# Patient Record
Sex: MALE | Race: WHITE | HISPANIC OR LATINO | Employment: OTHER | URBAN - METROPOLITAN AREA
[De-identification: names, ages, dates, MRNs, and addresses within clinical notes are randomized per-mention and may not be internally consistent; named-entity substitution may affect disease eponyms.]

---

## 2017-05-25 ENCOUNTER — HOSPITAL ENCOUNTER (OUTPATIENT)
Dept: RADIOLOGY | Facility: HOSPITAL | Age: 67
Discharge: HOME | End: 2017-05-25
Payer: COMMERCIAL

## 2017-05-25 ENCOUNTER — ALLSCRIPTS OFFICE VISIT (OUTPATIENT)
Dept: OTHER | Facility: OTHER | Age: 67
End: 2017-05-25

## 2017-05-25 ENCOUNTER — TRANSCRIBE ORDERS (OUTPATIENT)
Dept: ADMINISTRATIVE | Facility: HOSPITAL | Age: 67
End: 2017-05-25

## 2017-05-25 DIAGNOSIS — J20.9 ACUTE BRONCHITIS: ICD-10-CM

## 2017-05-25 PROCEDURE — 71020 HB CHEST X-RAY 2VW FRONTAL&LATL: CPT

## 2017-06-02 ENCOUNTER — ALLSCRIPTS OFFICE VISIT (OUTPATIENT)
Dept: OTHER | Facility: OTHER | Age: 67
End: 2017-06-02

## 2018-01-13 VITALS
SYSTOLIC BLOOD PRESSURE: 128 MMHG | WEIGHT: 143 LBS | RESPIRATION RATE: 17 BRPM | TEMPERATURE: 96.4 F | BODY MASS INDEX: 26.31 KG/M2 | HEIGHT: 62 IN | HEART RATE: 88 BPM | DIASTOLIC BLOOD PRESSURE: 84 MMHG

## 2018-01-13 NOTE — MISCELLANEOUS
Provider Comments  Provider Comments:   CALLED PT REGARDING MISSED APPT, PT R/S FOR 11/1 /AT        Signatures   Electronically signed by : Med Cedeño DO; Oct 27 2016  5:24PM EST                       (Author)

## 2018-01-14 VITALS
OXYGEN SATURATION: 97 % | BODY MASS INDEX: 26.42 KG/M2 | SYSTOLIC BLOOD PRESSURE: 120 MMHG | HEIGHT: 62 IN | TEMPERATURE: 95 F | HEART RATE: 84 BPM | RESPIRATION RATE: 18 BRPM | WEIGHT: 143.56 LBS | DIASTOLIC BLOOD PRESSURE: 70 MMHG

## 2018-01-16 NOTE — MISCELLANEOUS
Message  MRI we had ordered hadn't gone through with insurance 1-2 months ago, but pt had followed with Ortho, Called pt to ask him if he has started PT as per Ortho recs, and if he is still following with Ortho, per him Ortho doesn't want any imaging at this time - I advised him that any imaging he needs can be ordered by [facility] as indicated as they are managing his knee at this time   Pt agreed to the plan and expressed understanding, reports his knee is doing much better with the PT, and he will be following up with Ortho next month      Signatures   Electronically signed by : Lili Ellis DO; Dec 28 2016  9:05PM EST                       (Author)

## 2019-01-17 ENCOUNTER — OFFICE VISIT (OUTPATIENT)
Dept: FAMILY MEDICINE CLINIC | Facility: CLINIC | Age: 69
End: 2019-01-17
Payer: COMMERCIAL

## 2019-01-17 VITALS
BODY MASS INDEX: 26.76 KG/M2 | SYSTOLIC BLOOD PRESSURE: 118 MMHG | HEART RATE: 82 BPM | RESPIRATION RATE: 16 BRPM | DIASTOLIC BLOOD PRESSURE: 70 MMHG | TEMPERATURE: 97.8 F | WEIGHT: 145.4 LBS | HEIGHT: 62 IN

## 2019-01-17 DIAGNOSIS — G47.00 INSOMNIA, UNSPECIFIED TYPE: Primary | ICD-10-CM

## 2019-01-17 DIAGNOSIS — Z13.1 SCREENING FOR DIABETES MELLITUS (DM): ICD-10-CM

## 2019-01-17 DIAGNOSIS — G25.81 RESTLESS LEGS SYNDROME: ICD-10-CM

## 2019-01-17 DIAGNOSIS — Z13.220 SCREENING CHOLESTEROL LEVEL: ICD-10-CM

## 2019-01-17 PROCEDURE — 99203 OFFICE O/P NEW LOW 30 MIN: CPT | Performed by: FAMILY MEDICINE

## 2019-01-17 RX ORDER — GABAPENTIN 100 MG/1
CAPSULE ORAL
Qty: 30 CAPSULE | Refills: 0 | Status: SHIPPED | OUTPATIENT
Start: 2019-01-17 | End: 2019-01-31 | Stop reason: SDUPTHER

## 2019-01-17 RX ORDER — HYDROXYZINE PAMOATE 25 MG/1
25 CAPSULE ORAL
Qty: 30 CAPSULE | Refills: 0 | Status: SHIPPED | OUTPATIENT
Start: 2019-01-17 | End: 2019-02-06

## 2019-01-17 NOTE — PROGRESS NOTES
Gustavo Roth 1950 male MRN: [id number]    [address] [facility]  Follow-up Visit    ASSESSMENT/PLAN  Gustavo Roth is a 79 y o  male with significant PmhX of , peripheral neuropathy/restless leg syndrome, sleep apnea, colon polyps presents to the office for     Diagnoses and all orders for this visit:    Insomnia, unspecified type  -     CBC and differential; Future  -     hydrOXYzine pamoate (VISTARIL) 25 mg capsule; Take 1 capsule (25 mg total) by mouth daily at bedtime as needed for itching    Screening cholesterol level  -     Lipid panel; Future    Screening for diabetes mellitus (DM)  -     Comprehensive metabolic panel; Future  -     Hemoglobin A1C; Future    Restless legs syndrome  -     CBC and differential; Future  -     Magnesium; Future  -     gabapentin (NEURONTIN) 100 mg capsule; Take 1 tablet x 3 days, then 2 tablets x 3 days, then 3 tablets x 3days      Patient is here to establish  He was seen last night with multiple concerns  At that time I advised him let us address 2 topics and bring him back for full physical and workup  Screening labs were sent to discuss at her next visit  Insomnia patient was started on Vistaril 25 mg at bedtime if his symptoms do not improve with 1 tablet he is to increase to 2 tablets  Restless leg syndrome with a mixed component of peripheral neuropathy; will start the patient on gabapentin to taper up to 300 mg at bedtime  Will discuss about starting a dopamine medication if the restless leg worsens      Disposition: Return to the clinic in 2 weeks; would like the flu vaccine our next appointment        No future appointments  SUBJECTIVE  CC: Physical Exam      HPI:  Gustavo Roth is a 79 y o  male with significant pmhx of  peripheral neuropathy/restless leg syndrome, sleep apnea, colon polyps presenting to the office to establish care  Patient has not been seen by his PCP in over 2 years    Patient states that he was supposed to be seen today for physical but has acute concerns  Patient states that he has multiple joint pains from his work  However his biggest concerns are insomnia as well as his restless legs syndrome  He states that he has tried multiple medications over-the-counter to try to help with his sleeping and has not been successful  Does not recall what his PCP put him on almost 2 years ago but it was ineffective  Patient states that his restless leg is more like pins and needles over his calves especially at nighttime  Denies any history of diabetes, hypertension, cholesterol concerns  Patient currently has no shortness of breath or chest pain at this time  He would like the flu vaccine at her next appointment  Review of Systems   Constitutional: Negative for activity change, appetite change, chills, fatigue and fever  HENT: Negative for congestion  Eyes: Negative for visual disturbance  Respiratory: Negative for cough, chest tightness and shortness of breath  Cardiovascular: Negative for chest pain and leg swelling  Gastrointestinal: Negative for abdominal distention, abdominal pain, constipation, diarrhea, nausea and vomiting  Musculoskeletal: Positive for arthralgias and back pain  Allergic/Immunologic: Negative for environmental allergies  Neurological: Negative for dizziness, light-headedness and headaches  Psychiatric/Behavioral: Positive for sleep disturbance  All other systems reviewed and are negative  Historical Information   The patient history was reviewed as follows:    Past Medical History:   Diagnosis Date    Arthritis     shoulder     Past Surgical History:   Procedure Laterality Date    COLONOSCOPY N/A 12/12/2016    Procedure: COLONOSCOPY;  Surgeon: Adam Sorensen MD;  Location: [facility] GI LAB;   Service:     ELBOW SURGERY Bilateral     HERNIA REPAIR Right 2001    inguinal    KNEE ARTHROSCOPY Right     ROTATOR CUFF REPAIR Bilateral 2011     Family History Problem Relation Age of Onset    No Known Problems Mother     No Known Problems Father       Social History   History   Alcohol Use No     History   Drug Use No     History   Smoking Status    Never Smoker   Smokeless Tobacco    Never Used       Medications:     Current Outpatient Prescriptions:     ibuprofen (MOTRIN) 200 mg tablet, Take 200 mg by mouth every 6 (six) hours as needed for mild pain, Disp: , Rfl:   No Known Allergies    OBJECTIVE    Vitals:   Vitals:    01/17/19 1802   BP: 118/70   BP Location: Left arm   Patient Position: Sitting   Cuff Size: Standard   Pulse: 82   Resp: 16   Temp: 97 8 °F (36 6 °C)   Weight: 66 kg (145 lb 6 4 oz)   Height: 5' 2" (1 575 m)           Physical Exam   Constitutional: He is oriented to person, place, and time  Vital signs are normal  He appears well-developed and well-nourished  HENT:   Head: Normocephalic and atraumatic  Right Ear: Hearing normal    Left Ear: Hearing normal    Eyes: Pupils are equal, round, and reactive to light  Conjunctivae and EOM are normal    Neck: Normal range of motion  Neck supple  Cardiovascular: Normal rate, regular rhythm, S1 normal, S2 normal, normal heart sounds and intact distal pulses  No murmur heard  Pulmonary/Chest: Effort normal and breath sounds normal  No respiratory distress  He has no wheezes  Abdominal: Soft  Bowel sounds are normal  He exhibits no distension  There is no tenderness  Musculoskeletal: Normal range of motion  He exhibits no edema, tenderness or deformity  No abnormalities seen   Neurological: He is alert and oriented to person, place, and time  He has normal strength  Skin: Skin is warm  No rash noted  Psychiatric: He has a normal mood and affect  His speech is normal and behavior is normal  Judgment and thought content normal    Vitals reviewed           Fifi Daigle MD  [facility] [address]

## 2019-01-22 LAB
ALBUMIN SERPL-MCNC: 4.4 G/DL (ref 3.6–4.8)
ALBUMIN/GLOB SERPL: 1.9 {RATIO} (ref 1.2–2.2)
ALP SERPL-CCNC: 86 IU/L (ref 39–117)
ALT SERPL-CCNC: 17 IU/L (ref 0–44)
AST SERPL-CCNC: 20 IU/L (ref 0–40)
BASOPHILS # BLD AUTO: 0 X10E3/UL (ref 0–0.2)
BASOPHILS NFR BLD AUTO: 0 %
BILIRUB SERPL-MCNC: 0.4 MG/DL (ref 0–1.2)
BUN SERPL-MCNC: 11 MG/DL (ref 8–27)
BUN/CREAT SERPL: 14 (ref 10–24)
CALCIUM SERPL-MCNC: 8.7 MG/DL (ref 8.6–10.2)
CHLORIDE SERPL-SCNC: 106 MMOL/L (ref 96–106)
CHOLEST SERPL-MCNC: 170 MG/DL (ref 100–199)
CO2 SERPL-SCNC: 23 MMOL/L (ref 20–29)
CREAT SERPL-MCNC: 0.77 MG/DL (ref 0.76–1.27)
EOSINOPHIL # BLD AUTO: 0.1 X10E3/UL (ref 0–0.4)
EOSINOPHIL NFR BLD AUTO: 1 %
ERYTHROCYTE [DISTWIDTH] IN BLOOD BY AUTOMATED COUNT: 12.7 % (ref 12.3–15.4)
GLOBULIN SER-MCNC: 2.3 G/DL (ref 1.5–4.5)
GLUCOSE SERPL-MCNC: 102 MG/DL (ref 65–99)
HBA1C MFR BLD: 5.3 % (ref 4.8–5.6)
HCT VFR BLD AUTO: 45.4 % (ref 37.5–51)
HDLC SERPL-MCNC: 46 MG/DL
HGB BLD-MCNC: 15.9 G/DL (ref 13–17.7)
IMM GRANULOCYTES # BLD: 0 X10E3/UL (ref 0–0.1)
IMM GRANULOCYTES NFR BLD: 0 %
LABCORP COMMENT: NORMAL
LDLC SERPL CALC-MCNC: 108 MG/DL (ref 0–99)
LYMPHOCYTES # BLD AUTO: 2.4 X10E3/UL (ref 0.7–3.1)
LYMPHOCYTES NFR BLD AUTO: 44 %
MAGNESIUM SERPL-MCNC: 2 MG/DL (ref 1.6–2.3)
MCH RBC QN AUTO: 32.3 PG (ref 26.6–33)
MCHC RBC AUTO-ENTMCNC: 35 G/DL (ref 31.5–35.7)
MCV RBC AUTO: 92 FL (ref 79–97)
MICRODELETION SYND BLD/T FISH: NORMAL
MONOCYTES # BLD AUTO: 0.5 X10E3/UL (ref 0.1–0.9)
MONOCYTES NFR BLD AUTO: 9 %
NEUTROPHILS # BLD AUTO: 2.4 X10E3/UL (ref 1.4–7)
NEUTROPHILS NFR BLD AUTO: 46 %
PLATELET # BLD AUTO: 223 X10E3/UL (ref 150–379)
POTASSIUM SERPL-SCNC: 3.8 MMOL/L (ref 3.5–5.2)
PROT SERPL-MCNC: 6.7 G/DL (ref 6–8.5)
RBC # BLD AUTO: 4.93 X10E6/UL (ref 4.14–5.8)
SL AMB EGFR AFRICAN AMERICAN: 109 ML/MIN/1.73
SL AMB EGFR NON AFRICAN AMERICAN: 95 ML/MIN/1.73
SL AMB VLDL CHOLESTEROL CALC: 16 MG/DL (ref 5–40)
SODIUM SERPL-SCNC: 143 MMOL/L (ref 134–144)
TRIGL SERPL-MCNC: 82 MG/DL (ref 0–149)
WBC # BLD AUTO: 5.4 X10E3/UL (ref 3.4–10.8)

## 2019-01-31 ENCOUNTER — OFFICE VISIT (OUTPATIENT)
Dept: FAMILY MEDICINE CLINIC | Facility: CLINIC | Age: 69
End: 2019-01-31
Payer: COMMERCIAL

## 2019-01-31 VITALS
BODY MASS INDEX: 27.05 KG/M2 | HEART RATE: 84 BPM | SYSTOLIC BLOOD PRESSURE: 118 MMHG | WEIGHT: 147 LBS | TEMPERATURE: 98.7 F | RESPIRATION RATE: 16 BRPM | DIASTOLIC BLOOD PRESSURE: 68 MMHG | HEIGHT: 62 IN

## 2019-01-31 DIAGNOSIS — Z23 ENCOUNTER FOR IMMUNIZATION: ICD-10-CM

## 2019-01-31 DIAGNOSIS — G25.81 RESTLESS LEGS SYNDROME: Primary | ICD-10-CM

## 2019-01-31 PROCEDURE — 99213 OFFICE O/P EST LOW 20 MIN: CPT | Performed by: FAMILY MEDICINE

## 2019-01-31 RX ORDER — GABAPENTIN 300 MG/1
300 CAPSULE ORAL
Qty: 90 CAPSULE | Refills: 0 | Status: SHIPPED | OUTPATIENT
Start: 2019-01-31 | End: 2019-03-12 | Stop reason: SDUPTHER

## 2019-01-31 NOTE — PROGRESS NOTES
Leopold Raveling 1950 male MRN: [id number]    [address] [facility]  Follow-up Visit    ASSESSMENT/PLAN  Leopold Raveling is a 79 y o  male presents to the office for     1  Restless legs syndrome  Improvement with gabapentin  Increase to 300mg at bedtime Unable to tolerate vistaril given drowniess next day  W  - gabapentin (NEURONTIN) 300 mg capsule; Take 1 capsule (300 mg total) by mouth daily at bedtime Take 1 tablet at bedtime  Dispense: 90 capsule; Refill: 0    2  Encounter for immunization    - PREFERRED: influenza vaccine, [id number], high-dose, PF 0 5 mL, for patients 65 yr+ (FLUZONE HIGH-DOSE)    Reviewed screening labs  Repeat in 1 year    Disposition: Return to the clinic in 2 months per patient request    Health Maintence:  -Counseling on Weight loss: Encourage 30 minutes of accumulated moderate-intensity physical activity on 5 or more days per week  - Eat Healthy diet      No future appointments  SUBJECTIVE  CC: Follow-up (lab reults and Flu vaccine)      HPI:  Leopold Raveling is a 79 y o  male presenting to the office for a follow up on Lab reviews, and nex Rx  States that gabapentin has decreased his restless leg attacks  taking 200mg  However insomnia has improved but not 100% Has taken hydroxyzine but too drowsy on the morning  Admit to poor diet and exercise  No other concerns    Review of Systems   Constitutional: Negative for activity change, appetite change, chills, fatigue and fever  HENT: Negative for congestion  Eyes: Negative for visual disturbance  Respiratory: Negative for cough, chest tightness and shortness of breath  Cardiovascular: Negative for chest pain and leg swelling  Gastrointestinal: Negative for abdominal distention, abdominal pain, constipation, diarrhea, nausea and vomiting  Musculoskeletal:        Restless leg cramps   Allergic/Immunologic: Negative for environmental allergies     Neurological: Negative for dizziness, light-headedness and headaches  Psychiatric/Behavioral: Positive for sleep disturbance  All other systems reviewed and are negative  Historical Information   The patient history was reviewed as follows:    Past Medical History:   Diagnosis Date    Arthritis     shoulder     Past Surgical History:   Procedure Laterality Date    COLONOSCOPY N/A 12/12/2016    Procedure: COLONOSCOPY;  Surgeon: Graig Spurling, MD;  Location: [facility]; Service:     ELBOW SURGERY Bilateral     HERNIA REPAIR Right 2001    inguinal    KNEE ARTHROSCOPY Right     ROTATOR CUFF REPAIR Bilateral 2011     Family History   Problem Relation Age of Onset    No Known Problems Mother     No Known Problems Father       Social History   History   Alcohol Use No     History   Drug Use No     History   Smoking Status    Never Smoker   Smokeless Tobacco    Never Used       Medications:     Current Outpatient Prescriptions:     gabapentin (NEURONTIN) 300 mg capsule, Take 1 capsule (300 mg total) by mouth daily at bedtime Take 1 tablet at bedtime, Disp: 90 capsule, Rfl: 0    hydrOXYzine pamoate (VISTARIL) 25 mg capsule, Take 1 capsule (25 mg total) by mouth daily at bedtime as needed for itching, Disp: 30 capsule, Rfl: 0    ibuprofen (MOTRIN) 200 mg tablet, Take 200 mg by mouth every 6 (six) hours as needed for mild pain, Disp: , Rfl:   No Known Allergies    OBJECTIVE    Vitals:   Vitals:    01/31/19 1759   BP: 118/68   BP Location: Left arm   Patient Position: Sitting   Cuff Size: Standard   Pulse: 84   Resp: 16   Temp: 98 7 °F (37 1 °C)   Weight: 66 7 kg (147 lb)   Height: 5' 2" (1 575 m)           Physical Exam   Constitutional: He is oriented to person, place, and time  Vital signs are normal  He appears well-developed and well-nourished  HENT:   Head: Normocephalic and atraumatic     Right Ear: Tympanic membrane, external ear and ear canal normal    Left Ear: Tympanic membrane, external ear and ear canal normal    Nose: Nose normal    Mouth/Throat: Oropharynx is clear and moist    Eyes: Pupils are equal, round, and reactive to light  Conjunctivae and EOM are normal    Neck: Normal range of motion  Neck supple  Cardiovascular: Normal rate, regular rhythm, S1 normal, S2 normal, normal heart sounds and intact distal pulses  No murmur heard  Pulmonary/Chest: Effort normal and breath sounds normal  No respiratory distress  He has no wheezes  Musculoskeletal: Normal range of motion  He exhibits no edema  Neurological: He is alert and oriented to person, place, and time  He has normal strength  Skin: Skin is warm  Psychiatric: He has a normal mood and affect  His speech is normal and behavior is normal  Judgment and thought content normal    Vitals reviewed           Kaylene Munguia MD  [facility] 2939

## 2019-02-04 PROCEDURE — 90662 IIV NO PRSV INCREASED AG IM: CPT | Performed by: FAMILY MEDICINE

## 2019-02-04 PROCEDURE — 90471 IMMUNIZATION ADMIN: CPT | Performed by: FAMILY MEDICINE

## 2019-02-04 NOTE — TELEPHONE ENCOUNTER
Patient Info :  Anson Andre  1950    I called and left a v/m for patient to call back to inform us of his [employer]  Our call was dropped while I was filling out patients Reg      I left message asking to also bring his claim info with him at his visit in case of not being able to call back    Patients next appt is 2/6 at 2:45pm

## 2019-02-04 NOTE — TELEPHONE ENCOUNTER
Patient returned call stated he didn't have the claim information handy he was working  Patient will be be bringing everything to his appointment on Wednesday

## 2019-02-06 ENCOUNTER — OFFICE VISIT (OUTPATIENT)
Dept: OBGYN CLINIC | Facility: CLINIC | Age: 69
End: 2019-02-06
Payer: COMMERCIAL

## 2019-02-06 VITALS
HEART RATE: 82 BPM | BODY MASS INDEX: 27.2 KG/M2 | SYSTOLIC BLOOD PRESSURE: 117 MMHG | HEIGHT: 62 IN | DIASTOLIC BLOOD PRESSURE: 68 MMHG | WEIGHT: 147.8 LBS

## 2019-02-06 DIAGNOSIS — I73.9 PERIPHERAL ARTERY DISEASE (HCC): ICD-10-CM

## 2019-02-06 DIAGNOSIS — G25.81 RESTLESS LEGS SYNDROME: ICD-10-CM

## 2019-02-06 DIAGNOSIS — S43.52XA ACROMIOCLAVICULAR SPRAIN, LEFT, INITIAL ENCOUNTER: ICD-10-CM

## 2019-02-06 DIAGNOSIS — S50.01XA CONTUSION OF RIGHT ELBOW, INITIAL ENCOUNTER: ICD-10-CM

## 2019-02-06 DIAGNOSIS — S13.4XXA WHIPLASH INJURY TO NECK, INITIAL ENCOUNTER: Primary | ICD-10-CM

## 2019-02-06 DIAGNOSIS — G47.33 OBSTRUCTIVE SLEEP APNEA OF ADULT: ICD-10-CM

## 2019-02-06 DIAGNOSIS — S80.02XA CONTUSION OF LEFT KNEE, INITIAL ENCOUNTER: ICD-10-CM

## 2019-02-06 DIAGNOSIS — S50.02XA CONTUSION OF LEFT ELBOW, INITIAL ENCOUNTER: ICD-10-CM

## 2019-02-06 PROCEDURE — 99214 OFFICE O/P EST MOD 30 MIN: CPT | Performed by: ORTHOPAEDIC SURGERY

## 2019-02-06 RX ORDER — IBUPROFEN 800 MG/1
800 TABLET ORAL 3 TIMES DAILY
COMMUNITY
Start: 2019-02-01 | End: 2019-03-18 | Stop reason: ALTCHOICE

## 2019-02-06 RX ORDER — CYCLOBENZAPRINE HCL 10 MG
10 TABLET ORAL
COMMUNITY
Start: 2019-02-01 | End: 2019-03-18 | Stop reason: ALTCHOICE

## 2019-02-06 NOTE — LETTER
February 6, 2019     Patient: Prasanna Mendes   YOB: 1950   Date of Visit: 2/6/2019       To Whom it May Concern:    Nova Moise is under my professional care  He was seen in my office on 2/6/2019  He should remain out of work until further notice  He will be re-evaluated in 4 weeks  If you have any questions or concerns, please don't hesitate to call           Sincerely,          River Morris MD        CC: No Recipients
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
## 2019-02-14 NOTE — TELEPHONE ENCOUNTER
Caller: Patient  C/B # [phone]  Dr Amanda Brought    Patient only speaks German -- He called in checking on the status of his paperwork  He states he dropped it off on Monday and apparently needs this paperwork submitted by tomorrow  Do we have any info on this?   Thanks

## 2019-02-25 ENCOUNTER — TELEPHONE (OUTPATIENT)
Dept: OBGYN CLINIC | Facility: CLINIC | Age: 69
End: 2019-02-25

## 2019-03-05 ENCOUNTER — HOSPITAL ENCOUNTER (EMERGENCY)
Facility: HOSPITAL | Age: 69
Discharge: HOME | End: 2019-03-06
Attending: EMERGENCY MEDICINE
Payer: COMMERCIAL

## 2019-03-05 ENCOUNTER — APPOINTMENT (EMERGENCY)
Dept: RADIOLOGY | Facility: HOSPITAL | Age: 69
End: 2019-03-05
Payer: COMMERCIAL

## 2019-03-05 DIAGNOSIS — R11.0 NAUSEA: ICD-10-CM

## 2019-03-05 DIAGNOSIS — R19.7 DIARRHEA: Primary | ICD-10-CM

## 2019-03-05 LAB
ALBUMIN SERPL BCP-MCNC: 3.7 G/DL (ref 3.5–5)
ALP SERPL-CCNC: 95 U/L (ref 46–116)
ALT SERPL W P-5'-P-CCNC: 19 U/L (ref 12–78)
ANION GAP SERPL CALCULATED.3IONS-SCNC: 11 MMOL/L (ref 4–13)
AST SERPL W P-5'-P-CCNC: 18 U/L (ref 5–45)
BACTERIA UR QL AUTO: ABNORMAL /HPF
BASOPHILS # BLD AUTO: 0.01 THOUSANDS/ΜL (ref 0–0.1)
BASOPHILS NFR BLD AUTO: 0 % (ref 0–1)
BILIRUB SERPL-MCNC: 0.7 MG/DL (ref 0.2–1)
BILIRUB UR QL STRIP: NEGATIVE
BUN SERPL-MCNC: 14 MG/DL (ref 5–25)
CALCIUM SERPL-MCNC: 8.4 MG/DL (ref 8.3–10.1)
CHLORIDE SERPL-SCNC: 105 MMOL/L (ref 100–108)
CLARITY UR: CLEAR
CO2 SERPL-SCNC: 23 MMOL/L (ref 21–32)
COLOR UR: YELLOW
CREAT SERPL-MCNC: 0.88 MG/DL (ref 0.6–1.3)
EOSINOPHIL # BLD AUTO: 0.04 THOUSAND/ΜL (ref 0–0.61)
EOSINOPHIL NFR BLD AUTO: 0 % (ref 0–6)
ERYTHROCYTE [DISTWIDTH] IN BLOOD BY AUTOMATED COUNT: 11.9 % (ref 11.6–15.1)
GFR SERPL CREATININE-BSD FRML MDRD: 90 ML/MIN/1.73SQ M
GLUCOSE SERPL-MCNC: 158 MG/DL (ref 65–140)
GLUCOSE UR STRIP-MCNC: NEGATIVE MG/DL
HCT VFR BLD AUTO: 49 % (ref 36.5–49.3)
HGB BLD-MCNC: 16.2 G/DL (ref 12–17)
HGB UR QL STRIP.AUTO: ABNORMAL
IMM GRANULOCYTES # BLD AUTO: 0.02 THOUSAND/UL (ref 0–0.2)
IMM GRANULOCYTES NFR BLD AUTO: 0 % (ref 0–2)
KETONES UR STRIP-MCNC: NEGATIVE MG/DL
LEUKOCYTE ESTERASE UR QL STRIP: NEGATIVE
LIPASE SERPL-CCNC: 172 U/L (ref 73–393)
LYMPHOCYTES # BLD AUTO: 0.63 THOUSANDS/ΜL (ref 0.6–4.47)
LYMPHOCYTES NFR BLD AUTO: 7 % (ref 14–44)
MCH RBC QN AUTO: 31 PG (ref 26.8–34.3)
MCHC RBC AUTO-ENTMCNC: 33.1 G/DL (ref 31.4–37.4)
MCV RBC AUTO: 94 FL (ref 82–98)
MONOCYTES # BLD AUTO: 0.61 THOUSAND/ΜL (ref 0.17–1.22)
MONOCYTES NFR BLD AUTO: 7 % (ref 4–12)
NEUTROPHILS # BLD AUTO: 7.77 THOUSANDS/ΜL (ref 1.85–7.62)
NEUTS SEG NFR BLD AUTO: 86 % (ref 43–75)
NITRITE UR QL STRIP: NEGATIVE
NON-SQ EPI CELLS URNS QL MICRO: ABNORMAL /HPF
NRBC BLD AUTO-RTO: 0 /100 WBCS
PH UR STRIP.AUTO: 6.5 [PH]
PLATELET # BLD AUTO: 215 THOUSANDS/UL (ref 149–390)
PMV BLD AUTO: 9.2 FL (ref 8.9–12.7)
POTASSIUM SERPL-SCNC: 3.5 MMOL/L (ref 3.5–5.3)
PROT SERPL-MCNC: 7.3 G/DL (ref 6.4–8.2)
PROT UR STRIP-MCNC: NEGATIVE MG/DL
RBC # BLD AUTO: 5.22 MILLION/UL (ref 3.88–5.62)
RBC #/AREA URNS AUTO: ABNORMAL /HPF
SODIUM SERPL-SCNC: 139 MMOL/L (ref 136–145)
SP GR UR STRIP.AUTO: 1.01 (ref 1–1.03)
TROPONIN I SERPL-MCNC: <0.02 NG/ML
UROBILINOGEN UR QL STRIP.AUTO: 0.2 E.U./DL
WBC # BLD AUTO: 9.08 THOUSAND/UL (ref 4.31–10.16)
WBC #/AREA URNS AUTO: ABNORMAL /HPF

## 2019-03-05 PROCEDURE — 71046 X-RAY EXAM CHEST 2 VIEWS: CPT

## 2019-03-05 PROCEDURE — 93005 ELECTROCARDIOGRAM TRACING: CPT

## 2019-03-05 PROCEDURE — 96375 TX/PRO/DX INJ NEW DRUG ADDON: CPT

## 2019-03-05 PROCEDURE — 74177 CT ABD & PELVIS W/CONTRAST: CPT

## 2019-03-05 PROCEDURE — 80053 COMPREHEN METABOLIC PANEL: CPT | Performed by: EMERGENCY MEDICINE

## 2019-03-05 PROCEDURE — 81001 URINALYSIS AUTO W/SCOPE: CPT | Performed by: EMERGENCY MEDICINE

## 2019-03-05 PROCEDURE — 99285 EMERGENCY DEPT VISIT HI MDM: CPT

## 2019-03-05 PROCEDURE — 96374 THER/PROPH/DIAG INJ IV PUSH: CPT

## 2019-03-05 PROCEDURE — 84484 ASSAY OF TROPONIN QUANT: CPT | Performed by: EMERGENCY MEDICINE

## 2019-03-05 PROCEDURE — 96361 HYDRATE IV INFUSION ADD-ON: CPT

## 2019-03-05 PROCEDURE — 36415 COLL VENOUS BLD VENIPUNCTURE: CPT | Performed by: EMERGENCY MEDICINE

## 2019-03-05 PROCEDURE — 83690 ASSAY OF LIPASE: CPT | Performed by: EMERGENCY MEDICINE

## 2019-03-05 PROCEDURE — 85025 COMPLETE CBC W/AUTO DIFF WBC: CPT | Performed by: EMERGENCY MEDICINE

## 2019-03-05 RX ORDER — ONDANSETRON 2 MG/ML
4 INJECTION INTRAMUSCULAR; INTRAVENOUS ONCE
Status: COMPLETED | OUTPATIENT
Start: 2019-03-05 | End: 2019-03-05

## 2019-03-05 RX ORDER — MORPHINE SULFATE 4 MG/ML
4 INJECTION, SOLUTION INTRAMUSCULAR; INTRAVENOUS ONCE
Status: COMPLETED | OUTPATIENT
Start: 2019-03-05 | End: 2019-03-05

## 2019-03-05 RX ADMIN — SODIUM CHLORIDE 1000 ML: 0.9 INJECTION, SOLUTION INTRAVENOUS at 21:50

## 2019-03-05 RX ADMIN — ONDANSETRON 4 MG: 2 INJECTION INTRAMUSCULAR; INTRAVENOUS at 21:52

## 2019-03-05 RX ADMIN — MORPHINE SULFATE 4 MG: 4 INJECTION INTRAVENOUS at 21:52

## 2019-03-05 RX ADMIN — IOHEXOL 100 ML: 350 INJECTION, SOLUTION INTRAVENOUS at 23:18

## 2019-03-06 ENCOUNTER — VBI (OUTPATIENT)
Dept: ADMINISTRATIVE | Facility: OTHER | Age: 69
End: 2019-03-06

## 2019-03-06 VITALS
HEART RATE: 80 BPM | TEMPERATURE: 96.5 F | DIASTOLIC BLOOD PRESSURE: 69 MMHG | SYSTOLIC BLOOD PRESSURE: 115 MMHG | OXYGEN SATURATION: 96 % | RESPIRATION RATE: 18 BRPM

## 2019-03-06 LAB
ATRIAL RATE: 0 BPM
ATRIAL RATE: 106 BPM
P AXIS: 19 DEGREES
PR INTERVAL: 164 MS
QRS AXIS: -66 DEGREES
QRS AXIS: 270 DEGREES
QRSD INTERVAL: 84 MS
QRSD INTERVAL: 98 MS
QT INTERVAL: 342 MS
QT INTERVAL: 80 MS
QTC INTERVAL: 146 MS
QTC INTERVAL: 454 MS
T WAVE AXIS: 0 DEGREES
T WAVE AXIS: 36 DEGREES
VENTRICULAR RATE: 106 BPM
VENTRICULAR RATE: 200 BPM

## 2019-03-06 PROCEDURE — 93010 ELECTROCARDIOGRAM REPORT: CPT | Performed by: INTERNAL MEDICINE

## 2019-03-06 RX ORDER — ONDANSETRON 4 MG/1
4 TABLET, FILM COATED ORAL EVERY 6 HOURS
Qty: 12 TABLET | Refills: 0 | Status: SHIPPED | OUTPATIENT
Start: 2019-03-06 | End: 2019-03-12

## 2019-03-06 RX ORDER — LOPERAMIDE HYDROCHLORIDE 2 MG/1
2 CAPSULE ORAL 4 TIMES DAILY PRN
Qty: 12 CAPSULE | Refills: 0 | Status: SHIPPED | OUTPATIENT
Start: 2019-03-06 | End: 2019-03-12

## 2019-03-06 NOTE — ED PROVIDER NOTES
History  Chief Complaint   Patient presents with    Abdominal Pain     pt presents to the ed with abdominal pain and distension for several days  at time of triage the pt left the traige room to emergently use the bathroom      HPI     79 year male that presents today with abdominal pain  Patient states since Sunday he had a barbecue and started having abdominal pain  Patient states feels abdominal distension along with diffuse pain he has been going to the bathroom often with diarrhea watery diarrhea  Mild nausea  Vomited yesterday  States no fevers but has been having chills  States today he had chest pain  No shortness of breath  Previous history of cholecystectomy  79 year female that presents with abdominal pain  Will do lab work symptomatic treatment reassess patient  Prior to Admission Medications   Prescriptions Last Dose Informant Patient Reported? Taking? cyclobenzaprine (FLEXERIL) 10 mg tablet   Yes No   Sig: Take 10 mg by mouth   gabapentin (NEURONTIN) 300 mg capsule   No No   Sig: Take 1 capsule (300 mg total) by mouth daily at bedtime Take 1 tablet at bedtime   ibuprofen (MOTRIN) 800 mg tablet   Yes No   Sig: Take 800 mg by mouth Three times a day      Facility-Administered Medications: None       Past Medical History:   Diagnosis Date    Arthritis     shoulder       Past Surgical History:   Procedure Laterality Date    COLONOSCOPY N/A 12/12/2016    Procedure: COLONOSCOPY;  Surgeon: Abbie Peralta MD;  Location: [facility];   Service:     ELBOW SURGERY Bilateral     HERNIA REPAIR Right 2001    inguinal    KNEE ARTHROSCOPY Right     ROTATOR CUFF REPAIR Bilateral 2011       Family History   Problem Relation Age of Onset    No Known Problems Mother     No Known Problems Father     No Known Problems Sister     No Known Problems Brother     No Known Problems Maternal Aunt     No Known Problems Maternal Uncle     No Known Problems Paternal Aunt     No Known Problems Paternal Uncle     No Known Problems Maternal Grandmother     No Known Problems Maternal Grandfather     No Known Problems Paternal Grandmother     No Known Problems Paternal Grandfather     ADD / ADHD Neg Hx     Anesthesia problems Neg Hx     Cancer Neg Hx     Clotting disorder Neg Hx     Collagen disease Neg Hx     Diabetes Neg Hx     Dislocations Neg Hx     Learning disabilities Neg Hx     Neurological problems Neg Hx     Osteoporosis Neg Hx     Rheumatologic disease Neg Hx     Scoliosis Neg Hx     Vascular Disease Neg Hx      I have reviewed and agree with the history as documented  Social History     Tobacco Use    Smoking status: Never Smoker    Smokeless tobacco: Never Used   Substance Use Topics    Alcohol use: No    Drug use: No        Review of Systems   Constitutional: Positive for chills  Negative for diaphoresis and fever  HENT: Negative  Respiratory: Negative  Negative for cough, shortness of breath and wheezing  Cardiovascular: Negative  Negative for chest pain, palpitations and leg swelling  Gastrointestinal: Positive for abdominal pain, nausea and vomiting  Negative for abdominal distention  Genitourinary: Negative  Musculoskeletal: Negative  Skin: Negative  Neurological: Negative  Psychiatric/Behavioral: Negative  All other systems reviewed and are negative  Physical Exam  Physical Exam   Constitutional: He is oriented to person, place, and time  He appears well-developed and well-nourished  Non-toxic appearance  He does not appear ill  No distress  HENT:   Head: Normocephalic and atraumatic  Nose: Nose normal    Mouth/Throat: Oropharynx is clear and moist    Eyes: Pupils are equal, round, and reactive to light  Conjunctivae and EOM are normal    Neck: Normal range of motion  Neck supple  Cardiovascular: Normal rate, regular rhythm and normal heart sounds  No murmur heard    Pulmonary/Chest: Effort normal and breath sounds normal  No respiratory distress  He has no wheezes  He has no rales  Abdominal: Soft  Bowel sounds are normal  He exhibits no distension  There is generalized tenderness  There is no rigidity, no rebound, no guarding, no CVA tenderness, no tenderness at McBurney's point and negative Michelle's sign  Musculoskeletal: Normal range of motion  He exhibits no edema, tenderness or deformity  Neurological: He is alert and oriented to person, place, and time  No cranial nerve deficit  Skin: Skin is warm and dry  Capillary refill takes less than 2 seconds  No rash noted  He is not diaphoretic  No pallor  Psychiatric: He has a normal mood and affect  Vitals reviewed        Vital Signs  ED Triage Vitals   Temperature Pulse Respirations Blood Pressure SpO2   03/05/19 2113 03/05/19 2113 03/05/19 2113 03/05/19 2113 03/05/19 2113   (!) 96 5 °F (35 8 °C) 98 18 110/69 95 %      Temp src Heart Rate Source Patient Position - Orthostatic VS BP Location FiO2 (%)   -- -- -- -- --             Pain Score       03/05/19 2152       7           Vitals:    03/05/19 2245 03/05/19 2330 03/06/19 0000 03/06/19 0015   BP:       Pulse: 84 82 82 80       Visual Acuity      ED Medications  Medications   sodium chloride 0 9 % bolus 1,000 mL (0 mL Intravenous Stopped 3/5/19 2345)   ondansetron (ZOFRAN) injection 4 mg (4 mg Intravenous Given 3/5/19 2152)   morphine (PF) 4 mg/mL injection 4 mg (4 mg Intravenous Given 3/5/19 2152)   iohexol (OMNIPAQUE) 350 MG/ML injection (SINGLE-DOSE) 100 mL (100 mL Intravenous Given 3/5/19 2318)       Diagnostic Studies  Results Reviewed     Procedure Component Value Units Date/Time    Urine Microscopic [[id number]]  (Abnormal) Collected:  03/05/19 2346    Lab Status:  Final result Specimen:  Urine, Clean Catch Updated:  03/05/19 2357     RBC, UA 0-1 /hpf      WBC, UA None Seen /hpf      Epithelial Cells None Seen /hpf      Bacteria, UA None Seen /hpf     UA w Reflex to Microscopic [[id number]]  (Abnormal) Collected: 03/05/19 2346    Lab Status:  Final result Specimen:  Urine, Clean Catch Updated:  03/05/19 2350     Color, UA Yellow     Clarity, UA Clear     Specific Gravity, UA 1 010     pH, UA 6 5     Leukocytes, UA Negative     Nitrite, UA Negative     Protein, UA Negative mg/dl      Glucose, UA Negative mg/dl      Ketones, UA Negative mg/dl      Urobilinogen, UA 0 2 E U /dl      Bilirubin, UA Negative     Blood, UA Small    Troponin I [[id number]]  (Normal) Collected:  03/05/19 2124    Lab Status:  Final result Specimen:  Blood from Arm, Left Updated:  03/05/19 2202     Troponin I <0 02 ng/mL     Comprehensive metabolic panel [[id number]]  (Abnormal) Collected:  03/05/19 2124    Lab Status:  Final result Specimen:  Blood from Arm, Left Updated:  03/05/19 2159     Sodium 139 mmol/L      Potassium 3 5 mmol/L      Chloride 105 mmol/L      CO2 23 mmol/L      ANION GAP 11 mmol/L      BUN 14 mg/dL      Creatinine 0 88 mg/dL      Glucose 158 mg/dL      Calcium 8 4 mg/dL      AST 18 U/L      ALT 19 U/L      Alkaline Phosphatase 95 U/L      Total Protein 7 3 g/dL      Albumin 3 7 g/dL      Total Bilirubin 0 70 mg/dL      eGFR 90 ml/min/1 73sq m     Narrative:       National Kidney Disease Education Program recommendations are as follows:  GFR calculation is accurate only with a steady state creatinine  Chronic Kidney disease less than 60 ml/min/1 73 sq  meters  Kidney failure less than 15 ml/min/1 73 sq  meters      Lipase [[id number]]  (Normal) Collected:  03/05/19 2124    Lab Status:  Final result Specimen:  Blood from Arm, Left Updated:  03/05/19 2159     Lipase 172 u/L     CBC and differential [[id number]]  (Abnormal) Collected:  03/05/19 2124    Lab Status:  Final result Specimen:  Blood from Arm, Left Updated:  03/05/19 2134     WBC 9 08 Thousand/uL      RBC 5 22 Million/uL      Hemoglobin 16 2 g/dL      Hematocrit 49 0 %      MCV 94 fL      MCH 31 0 pg      MCHC 33 1 g/dL      RDW 11 9 %      MPV 9 2 fL      Platelets 226 Thousands/uL nRBC 0 /100 WBCs      Neutrophils Relative 86 %      Immat GRANS % 0 %      Lymphocytes Relative 7 %      Monocytes Relative 7 %      Eosinophils Relative 0 %      Basophils Relative 0 %      Neutrophils Absolute 7 77 Thousands/µL      Immature Grans Absolute 0 02 Thousand/uL      Lymphocytes Absolute 0 63 Thousands/µL      Monocytes Absolute 0 61 Thousand/µL      Eosinophils Absolute 0 04 Thousand/µL      Basophils Absolute 0 01 Thousands/µL                  CT abdomen pelvis with contrast   Final Result by Preethi Jimenez MD (03/06 9462)      Nonspecific gastroenteritis and diarrhea  Moderate distention of the stomach with relative transition at the junction of the 1st and 2nd segments of the duodenum attributed to reactive gastric ileus and transient peristalsis/muscular contraction  Trace left subdiaphragmatic free fluid  No abscess, free gas, or pneumatosis intestinalis  Sigmoid diverticulosis  Cholelithiasis  Incidentally noted partially visualized ascending aortic aneurysm at 4 1 cm  Minimal gastroesophageal reflux  Prominent dependent hypoventilatory changes at the lung bases  No definitive airspace consolidation to suggest pneumonia as reported on the preliminary [facility] reading  Correlate with clinical findings  The major findings are in agreement with the preliminary report provided by [facility] which was provided shortly after completion of the exam  Additional finding of partially visualized ascending aortic aneurysm  will now be communicated with    patient's clinical team by our radiology liaison  Workstation performed: [id number]         XR chest 2 views   Final Result by Cristine Carballo MD (03/06 6381)      No acute cardiopulmonary disease              Workstation performed: [id number]                    Procedures  Procedures       Phone Contacts  ED Phone Contact    ED Course  ED Course as of Mar 08 0654   Wed Mar 06, 2019 0003 [facility] report: 1  Consolidative infiltrate or atelectasis in posterior lung bases  2  Fluid throughout the colon consistent with diarrha  3  Gallstone  Normal gallbladder  0003 With no cough or congestion  No fever will not treat with antibiotics  Will discharge patient  MDM    Disposition  Final diagnoses:   Diarrhea   Nausea     Time reflects when diagnosis was documented in both MDM as applicable and the Disposition within this note     Time User Action Codes Description Comment    3/6/2019 12:04 AM Tyra Mac Ryanlima Add [R19 7] Diarrhea     3/6/2019 12:04 AM Jair Parks Add [R11 0] Nausea       ED Disposition     ED Disposition Condition Date/Time Comment    Discharge Stable Wed Mar 6, 2019 12:04 AM Lobo Daniels discharge to home/self care              Follow-up Information     Follow up With Specialties Details Why Contact Info    Jacinda Devi MD Family Medicine Schedule an appointment as soon as possible for a visit   [address] [facility]  [phone]            Discharge Medication List as of 3/6/2019 12:04 AM      START taking these medications    Details   loperamide (IMODIUM) 2 mg capsule Take 1 capsule (2 mg total) by mouth 4 (four) times a day as needed for diarrhea, Starting Wed 3/6/2019, Print      ondansetron (ZOFRAN) 4 mg tablet Take 1 tablet (4 mg total) by mouth every 6 (six) hours, Starting Wed 3/6/2019, Print         CONTINUE these medications which have NOT CHANGED    Details   cyclobenzaprine (FLEXERIL) 10 mg tablet Take 10 mg by mouth, Starting Fri 2/1/2019, Until Mon 2/11/2019, Historical Med      gabapentin (NEURONTIN) 300 mg capsule Take 1 capsule (300 mg total) by mouth daily at bedtime Take 1 tablet at bedtime, Starting Thu 1/31/2019, Normal      ibuprofen (MOTRIN) 800 mg tablet Take 800 mg by mouth Three times a day, Starting Fri 2/1/2019, Until Mon 2/11/2019, Historical Med           No discharge procedures on file     ED Provider  Electronically Signed by           Vira Edward MD  03/08/19 8976

## 2019-03-06 NOTE — TELEPHONE ENCOUNTER
lmom for patient to call back - needs a ED F/U for abdominal pain    1st attempt    PATIENT RETURNED PHONE CALL STATED THAT HE IS FINE NOW AND WILL CALL BACK WHEN HE NEEDS AN APPT

## 2019-03-08 ENCOUNTER — TELEPHONE (OUTPATIENT)
Dept: FAMILY MEDICINE CLINIC | Facility: CLINIC | Age: 69
End: 2019-03-08

## 2019-03-08 NOTE — TELEPHONE ENCOUNTER
Left detailed message on personal voice mail    Patient was advised as per Dr Geronimo Preciado to continue medications given in the ED  Imodium and Zolfran and drink plenty of fluid till he is seen on Monday

## 2019-03-12 ENCOUNTER — TELEPHONE (OUTPATIENT)
Dept: FAMILY MEDICINE CLINIC | Facility: CLINIC | Age: 69
End: 2019-03-12

## 2019-03-12 ENCOUNTER — OFFICE VISIT (OUTPATIENT)
Dept: FAMILY MEDICINE CLINIC | Facility: CLINIC | Age: 69
End: 2019-03-12
Payer: COMMERCIAL

## 2019-03-12 VITALS
RESPIRATION RATE: 16 BRPM | DIASTOLIC BLOOD PRESSURE: 60 MMHG | HEART RATE: 80 BPM | TEMPERATURE: 98.4 F | SYSTOLIC BLOOD PRESSURE: 118 MMHG | HEIGHT: 62 IN | WEIGHT: 147 LBS | BODY MASS INDEX: 27.05 KG/M2

## 2019-03-12 DIAGNOSIS — I71.9 AORTIC ANEURYSM WITHOUT RUPTURE, UNSPECIFIED PORTION OF AORTA (HCC): Primary | ICD-10-CM

## 2019-03-12 DIAGNOSIS — K80.80 BILIARY CALCULUS OF OTHER SITE WITHOUT OBSTRUCTION: ICD-10-CM

## 2019-03-12 DIAGNOSIS — K66.0 ABDOMINAL ADHESIONS: ICD-10-CM

## 2019-03-12 DIAGNOSIS — K52.9 GASTROENTERITIS: Primary | ICD-10-CM

## 2019-03-12 DIAGNOSIS — K21.9 GASTROESOPHAGEAL REFLUX DISEASE WITHOUT ESOPHAGITIS: ICD-10-CM

## 2019-03-12 DIAGNOSIS — I71.9 AORTIC ANEURYSM WITHOUT RUPTURE, UNSPECIFIED PORTION OF AORTA (HCC): ICD-10-CM

## 2019-03-12 DIAGNOSIS — G25.81 RESTLESS LEGS SYNDROME: ICD-10-CM

## 2019-03-12 PROBLEM — N40.0 BENIGN PROSTATIC HYPERPLASIA WITHOUT LOWER URINARY TRACT SYMPTOMS: Status: ACTIVE | Noted: 2019-03-12

## 2019-03-12 PROBLEM — K80.20 CHOLELITHIASIS: Status: ACTIVE | Noted: 2019-03-12

## 2019-03-12 PROCEDURE — 1160F RVW MEDS BY RX/DR IN RCRD: CPT | Performed by: FAMILY MEDICINE

## 2019-03-12 PROCEDURE — 99214 OFFICE O/P EST MOD 30 MIN: CPT | Performed by: FAMILY MEDICINE

## 2019-03-12 PROCEDURE — 3008F BODY MASS INDEX DOCD: CPT | Performed by: FAMILY MEDICINE

## 2019-03-12 RX ORDER — GABAPENTIN 300 MG/1
300 CAPSULE ORAL
Qty: 90 CAPSULE | Refills: 3 | Status: SHIPPED | OUTPATIENT
Start: 2019-03-12 | End: 2019-03-18 | Stop reason: ALTCHOICE

## 2019-03-12 RX ORDER — OMEPRAZOLE 40 MG/1
40 CAPSULE, DELAYED RELEASE ORAL DAILY
Qty: 30 CAPSULE | Refills: 2 | Status: SHIPPED | OUTPATIENT
Start: 2019-03-12 | End: 2020-03-12

## 2019-03-12 NOTE — PROGRESS NOTES
Vicente Cedeno 1950 male MRN: [id number]    [facility] - [address]      ASSESSMENT/PLAN  Vicente Cedeno is a 79 y o  male presents to the office for    1  Gastroenteritis  Currently the gastroenteritis has completely resolved  Likely secondary to an infectious component when patient was at a [facility]  2  Aortic aneurysm without rupture, unspecified portion of aorta (HCC)  Incidental finding on CT scan showing an ascending aortic aneurysm  Corrected my error to the patient  At 1st when I 1st discuss this with him I thought it is that abdominal aorta spoke with him over the phone shortly after he left the office to advise him that I meant ascending aortic  Will like the patient to get an ECHO cardiogram in 6 months  Aneurysm that is less than 5 cm may be monitored without surgery  However I may recommend surgical repair of a small aneurysm thats growing more than 0 5 cm per year  Currently the patient is asymptomatic      3  Biliary calculus of other site without obstruction  Currently asymptomatic therefore will continue observed  Educated the patient on the symptoms if worsen    4  Gastroesophageal reflux disease without esophagitis  - omeprazole (PriLOSEC) 40 MG capsule; Take 1 capsule (40 mg total) by mouth daily  Dispense: 30 capsule; Refill: 2    5  Restless legs syndrome  Controlled  - gabapentin (NEURONTIN) 300 mg capsule; Take 1 capsule (300 mg total) by mouth daily at bedtime Take 1 tablet at bedtime  Dispense: 90 capsule; Refill: 3    6  Abdominal adhesions  Likely will need a general surgeon evaluation for possible repair given that patient has significant pain  - Ambulatory referral to General Surgery;  Future     Disposition:  Return to office in 6 months    Future Appointments   Date Time Provider Kailash Maddie   3/18/2019  9:00 AM Mckenzie Koroma MD [facility]          SUBJECTIVE  CC: Follow-up [facility] ED stomach  feeling better today )      HPI:  Zak Antunez is a 79 y o  male who presents for an acute appointment  Urology->BPH/ Infection 1 month appointment  Currently symptoms have improved  Patient was seen in the emergency room on March 5, 2019 after eating something after barbecue  Patient 7 suffered from a gastroenteritis and since then has had no abdominal pain with diarrhea  Patient at baseline does have abdominal pain of the right lower quadrant secondary to adhesions from prior hernia that was performed with a mesh  Patient states that he was told to come directly from the emergency room to see his doctor given acute findings that were found on CT scan  Given the language  The patient was unaware of what they were discussing with him  Patient does admit to having acid reflux and was supposed to be started on a PPI but never received the prescription at her last visit  Patient would like a refill on his restless leg syndrome medications states that is working appropriately  Review of Systems   Constitutional: Negative for activity change, appetite change, chills, fatigue and fever  HENT: Negative for congestion  Eyes: Negative for visual disturbance  Respiratory: Negative for cough, chest tightness and shortness of breath  Cardiovascular: Negative for chest pain and leg swelling  Gastrointestinal: Positive for abdominal pain  Negative for abdominal distention, constipation, diarrhea, nausea and vomiting  Allergic/Immunologic: Negative for environmental allergies  Neurological: Negative for dizziness, light-headedness and headaches  All other systems reviewed and are negative        Historical Information   The patient history was reviewed as follows:  Past Medical History:   Diagnosis Date    Arthritis     shoulder         Medications:     Current Outpatient Medications:     cyclobenzaprine (FLEXERIL) 10 mg tablet, Take 10 mg by mouth, Disp: , Rfl:     gabapentin (NEURONTIN) 300 mg capsule, Take 1 capsule (300 mg total) by mouth daily at bedtime Take 1 tablet at bedtime, Disp: 90 capsule, Rfl: 3    ibuprofen (MOTRIN) 800 mg tablet, Take 800 mg by mouth Three times a day, Disp: , Rfl:     omeprazole (PriLOSEC) 40 MG capsule, Take 1 capsule (40 mg total) by mouth daily, Disp: 30 capsule, Rfl: 2    No Known Allergies    OBJECTIVE  Vitals:   Vitals:    03/12/19 0824   BP: 118/60   BP Location: Left arm   Patient Position: Sitting   Cuff Size: Standard   Pulse: 80   Resp: 16   Temp: 98 4 °F (36 9 °C)   Weight: 66 7 kg (147 lb)   Height: 5' 2" (1 575 m)         Physical Exam   Constitutional: He is oriented to person, place, and time  Vital signs are normal  He appears well-developed and well-nourished  HENT:   Head: Normocephalic and atraumatic  Right Ear: Tympanic membrane, external ear and ear canal normal    Left Ear: Tympanic membrane, external ear and ear canal normal    Nose: Nose normal    Mouth/Throat: Oropharynx is clear and moist    Eyes: Pupils are equal, round, and reactive to light  Conjunctivae and EOM are normal    Neck: Normal range of motion  Neck supple  Cardiovascular: Normal rate, regular rhythm, S1 normal, S2 normal, normal heart sounds and intact distal pulses  No murmur heard  Pulmonary/Chest: Effort normal and breath sounds normal  No respiratory distress  He has no wheezes  Abdominal: Soft  Bowel sounds are normal  There is tenderness (RLQ)  Right lower quadrant hard 5-6 cm mass over the hernia scar   Musculoskeletal: Normal range of motion  He exhibits no edema  Neurological: He is alert and oriented to person, place, and time  He has normal strength  Skin: Skin is warm  Psychiatric: He has a normal mood and affect  His speech is normal and behavior is normal  Judgment and thought content normal    Vitals reviewed                   Jewel Mcelroy MD,   [facility]  3/12/2019

## 2019-03-12 NOTE — TELEPHONE ENCOUNTER
Spoke to the patient's son who works at [employer] to the patient about the findings of his gallbladder and his acid reflux  Main focused on his aortic ascending aneurysm  I advised him that it was 4 1 cm at this time would like to repeat an echo and a CT at 6 months  Patient's son does not want wait 6 months he wants him to be seen by a specialist   I highly recommended that he see a cardiologist and vascular surgeon the son will be having him see his personal colleagues there at the hospital and will let me know what is going on  A copy of all his records and referrals were left at the   I did advise him that the guidelines stated at 5 cm is when you can refer to a vascular cardiologist for further recommendations    Again the patient is asymptomatic and this was found on incidental finding on CT

## 2019-03-18 ENCOUNTER — OFFICE VISIT (OUTPATIENT)
Dept: OBGYN CLINIC | Facility: CLINIC | Age: 69
End: 2019-03-18
Payer: COMMERCIAL

## 2019-03-18 VITALS
HEART RATE: 72 BPM | HEIGHT: 62 IN | BODY MASS INDEX: 26.87 KG/M2 | WEIGHT: 146 LBS | SYSTOLIC BLOOD PRESSURE: 107 MMHG | DIASTOLIC BLOOD PRESSURE: 68 MMHG

## 2019-03-18 DIAGNOSIS — S13.4XXD WHIPLASH INJURY TO NECK, SUBSEQUENT ENCOUNTER: Primary | ICD-10-CM

## 2019-03-18 DIAGNOSIS — S80.02XA CONTUSION OF LEFT KNEE, INITIAL ENCOUNTER: ICD-10-CM

## 2019-03-18 DIAGNOSIS — S43.52XA ACROMIOCLAVICULAR SPRAIN, LEFT, INITIAL ENCOUNTER: ICD-10-CM

## 2019-03-18 DIAGNOSIS — S50.02XA CONTUSION OF LEFT ELBOW, INITIAL ENCOUNTER: ICD-10-CM

## 2019-03-18 PROCEDURE — 99213 OFFICE O/P EST LOW 20 MIN: CPT | Performed by: ORTHOPAEDIC SURGERY

## 2019-03-18 RX ORDER — LEVOFLOXACIN 500 MG/1
500 TABLET, FILM COATED ORAL DAILY
Refills: 0 | COMMUNITY
Start: 2019-03-01 | End: 2019-12-10
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
## 2019-03-18 NOTE — LETTER
2019     Patient: Lida Bone   YOB: 1950   Date of Visit: 3/18/2019       To Whom it May Concern:    Bobetta Apgar is under my professional care  He was seen in my office on 3/18/2019  He may return to work without restriction on 3/25/2019  If you have any questions or concerns, please don't hesitate to call           Sincerely,          Oris Members, MD        CC: No Recipients

## 2019-04-02 ENCOUNTER — TELEPHONE (OUTPATIENT)
Dept: FAMILY MEDICINE CLINIC | Facility: CLINIC | Age: 69
End: 2019-04-02

## 2019-05-01 ENCOUNTER — TELEPHONE (OUTPATIENT)
Dept: FAMILY MEDICINE CLINIC | Facility: CLINIC | Age: 69
End: 2019-05-01

## 2019-06-05 ENCOUNTER — TELEPHONE (OUTPATIENT)
Dept: FAMILY MEDICINE CLINIC | Facility: CLINIC | Age: 69
End: 2019-06-05

## 2019-09-06 ENCOUNTER — TELEPHONE (OUTPATIENT)
Dept: PAIN MEDICINE | Facility: CLINIC | Age: 69
End: 2019-09-06
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
## 2019-09-09 ENCOUNTER — OFFICE VISIT (OUTPATIENT)
Dept: FAMILY MEDICINE CLINIC | Facility: CLINIC | Age: 69
End: 2019-09-09
Payer: COMMERCIAL

## 2019-09-09 VITALS
HEIGHT: 62 IN | HEART RATE: 70 BPM | TEMPERATURE: 97.7 F | WEIGHT: 149.6 LBS | RESPIRATION RATE: 14 BRPM | DIASTOLIC BLOOD PRESSURE: 68 MMHG | SYSTOLIC BLOOD PRESSURE: 110 MMHG | BODY MASS INDEX: 27.53 KG/M2

## 2019-09-09 DIAGNOSIS — G47.09 OTHER INSOMNIA: ICD-10-CM

## 2019-09-09 DIAGNOSIS — I71.9 AORTIC ANEURYSM WITHOUT RUPTURE, UNSPECIFIED PORTION OF AORTA (HCC): Primary | ICD-10-CM

## 2019-09-09 DIAGNOSIS — R41.3 MEMORY CHANGE: ICD-10-CM

## 2019-09-09 PROCEDURE — 99213 OFFICE O/P EST LOW 20 MIN: CPT | Performed by: FAMILY MEDICINE

## 2019-09-09 PROCEDURE — 1101F PT FALLS ASSESS-DOCD LE1/YR: CPT | Performed by: FAMILY MEDICINE

## 2019-09-09 RX ORDER — GABAPENTIN 300 MG/1
300 CAPSULE ORAL DAILY
Refills: 0 | COMMUNITY
Start: 2019-08-29 | End: 2020-04-03

## 2019-09-09 RX ORDER — HYDROXYZINE HYDROCHLORIDE 25 MG/1
25 TABLET, FILM COATED ORAL
Qty: 30 TABLET | Refills: 0 | Status: SHIPPED | OUTPATIENT
Start: 2019-09-09 | End: 2019-11-10 | Stop reason: SDUPTHER

## 2019-09-10 NOTE — PROGRESS NOTES
Assessment/Plan:       Diagnoses and all orders for this visit:    Aortic aneurysm without rupture, unspecified portion of aorta [facility])  Spoke with son [place], patient signed record release for records and testing done at [facility], [facility]  Will get records this week and order CT scan for aortic aneurysm  Echo based on reports  Patient denies any symptoms  Other insomnia  -     hydrOXYzine HCL (ATARAX) 25 mg tablet; Take 1 tablet (25 mg total) by mouth daily at bedtime    Memory change  -     Ambulatory referral to Neurology; Future    Other orders  -     gabapentin (NEURONTIN) 300 mg capsule        Subjective:      Patient ID: Yandel Espana is a 81 y o  male  80 y/o male presents for follow up  Patient states he needs a repeat CT scan for his aorta aneurysm  It was found by incidental finding on 3/19 and was found to be 4 1 cm  Patient did follow up with cardiology at [address] [place] [address] and had testing done there  No report or record release for information  No symptoms of dizziness, headaches, chest pain, or shortness of breath  Patient also complains of insomnia and has taken vistaril and had much improvement with it  Patient complains of memory concerns and is finding himself more forgetful  It is occurring more than before  Still able to pay own bills, dress himself, and cook  The following portions of the patient's history were reviewed and updated as appropriate: allergies, current medications, past family history, past medical history, past social history, past surgical history and problem list     Review of Systems   Constitutional: Negative for fever  HENT: Negative for sore throat  Respiratory: Negative for cough and shortness of breath  Cardiovascular: Negative for chest pain and leg swelling  Gastrointestinal: Negative for abdominal pain, constipation, nausea and vomiting  Genitourinary: Negative for dysuria  Musculoskeletal: Negative for back pain     Neurological: Negative for dizziness, seizures, weakness, light-headedness, numbness and headaches  Psychiatric/Behavioral: Positive for sleep disturbance  Negative for agitation  Objective:      /68 (BP Location: Left arm, Patient Position: Sitting, Cuff Size: Standard)   Pulse 70   Temp 97 7 °F (36 5 °C)   Resp 14   Ht 5' 2" (1 575 m)   Wt 67 9 kg (149 lb 9 6 oz)   BMI 27 36 kg/m²          Physical Exam   Constitutional: He is oriented to person, place, and time  He appears well-developed and well-nourished  HENT:   Head: Normocephalic and atraumatic  Right Ear: External ear normal    Left Ear: External ear normal    Nose: Nose normal    Mouth/Throat: Oropharynx is clear and moist  No oropharyngeal exudate  Eyes: EOM are normal  Right eye exhibits no discharge  Left eye exhibits no discharge  Neck: Normal range of motion  Neck supple  Cardiovascular: Normal rate, regular rhythm, normal heart sounds and intact distal pulses  Pulmonary/Chest: Effort normal and breath sounds normal  He has no wheezes  Abdominal: Soft  Bowel sounds are normal  There is no tenderness  Musculoskeletal: He exhibits no edema or tenderness  Neurological: He is alert and oriented to person, place, and time  No cranial nerve deficit or sensory deficit  Coordination normal    Skin: Skin is warm  No erythema  Psychiatric: He has a normal mood and affect  His behavior is normal    Vitals reviewed

## 2019-09-10 NOTE — TELEPHONE ENCOUNTER
Claim is open and active all info from auto received by  office s/w [facility] at [phone]  I scanned all auto info to patient chart patient is all set for appointment on 09/16/19 with Dr Sylvester Aceves

## 2019-09-16 ENCOUNTER — CONSULT (OUTPATIENT)
Dept: PAIN MEDICINE | Facility: CLINIC | Age: 69
End: 2019-09-16
Payer: COMMERCIAL

## 2019-09-16 VITALS — WEIGHT: 149 LBS | BODY MASS INDEX: 27.42 KG/M2 | HEIGHT: 62 IN

## 2019-09-16 DIAGNOSIS — M54.12 CERVICAL RADICULOPATHY: ICD-10-CM

## 2019-09-16 DIAGNOSIS — M54.2 NECK PAIN: ICD-10-CM

## 2019-09-16 DIAGNOSIS — G89.4 CHRONIC PAIN SYNDROME: Primary | ICD-10-CM

## 2019-09-16 PROCEDURE — 99204 OFFICE O/P NEW MOD 45 MIN: CPT | Performed by: ANESTHESIOLOGY
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
## 2019-09-25 ENCOUNTER — APPOINTMENT (OUTPATIENT)
Dept: RADIOLOGY | Facility: CLINIC | Age: 69
End: 2019-09-25
Payer: COMMERCIAL

## 2019-09-25 ENCOUNTER — TELEPHONE (OUTPATIENT)
Dept: OBGYN CLINIC | Facility: CLINIC | Age: 69
End: 2019-09-25

## 2019-09-25 ENCOUNTER — OFFICE VISIT (OUTPATIENT)
Dept: OBGYN CLINIC | Facility: CLINIC | Age: 69
End: 2019-09-25
Payer: COMMERCIAL

## 2019-09-25 VITALS
HEART RATE: 97 BPM | HEIGHT: 62 IN | BODY MASS INDEX: 27.49 KG/M2 | DIASTOLIC BLOOD PRESSURE: 66 MMHG | SYSTOLIC BLOOD PRESSURE: 109 MMHG | WEIGHT: 149.4 LBS

## 2019-09-25 DIAGNOSIS — G56.21 ULNAR NEUROPATHY OF RIGHT UPPER EXTREMITY: ICD-10-CM

## 2019-09-25 DIAGNOSIS — M25.522 PAIN IN LEFT ELBOW: ICD-10-CM

## 2019-09-25 DIAGNOSIS — M77.01 MEDIAL EPICONDYLITIS OF RIGHT ELBOW: Primary | ICD-10-CM

## 2019-09-25 DIAGNOSIS — M25.521 PAIN IN RIGHT ELBOW: ICD-10-CM

## 2019-09-25 PROCEDURE — 73080 X-RAY EXAM OF ELBOW: CPT

## 2019-09-25 PROCEDURE — 99214 OFFICE O/P EST MOD 30 MIN: CPT | Performed by: ORTHOPAEDIC SURGERY

## 2019-09-25 RX ORDER — MELOXICAM 15 MG/1
15 TABLET ORAL DAILY
Qty: 30 TABLET | Refills: 1 | Status: SHIPPED | OUTPATIENT
Start: 2019-09-25 | End: 2020-09-22

## 2019-09-25 NOTE — TELEPHONE ENCOUNTER
Jamie Kitchen   #: [phone]         Patient is calling requesting Pt script to be mailed to his home address  He will be going to an PT office close to his home   Please advise, thank you

## 2019-11-10 DIAGNOSIS — G47.09 OTHER INSOMNIA: ICD-10-CM

## 2019-11-11 NOTE — TELEPHONE ENCOUNTER
Please have patient schedule appointment with Dr Jose Daniel Dubois for follow up  This will not be refilled

## 2019-11-18 NOTE — TELEPHONE ENCOUNTER
Called patient and lmom once more  Called patient's wife Eligio Akins  She will have him call back to schedule

## 2019-11-20 RX ORDER — HYDROXYZINE HYDROCHLORIDE 25 MG/1
TABLET, FILM COATED ORAL
Qty: 30 TABLET | Refills: 0 | Status: SHIPPED | OUTPATIENT
Start: 2019-11-20 | End: 2019-12-19

## 2019-11-20 NOTE — TELEPHONE ENCOUNTER
Called patient and scheduled appointment 12/12 at 5:45 with Dr Daxa Camp as he needs late evening appointment

## 2019-12-10 ENCOUNTER — OFFICE VISIT (OUTPATIENT)
Dept: FAMILY MEDICINE CLINIC | Facility: CLINIC | Age: 69
End: 2019-12-10
Payer: COMMERCIAL

## 2019-12-10 VITALS
DIASTOLIC BLOOD PRESSURE: 82 MMHG | HEIGHT: 62 IN | TEMPERATURE: 99.8 F | SYSTOLIC BLOOD PRESSURE: 130 MMHG | BODY MASS INDEX: 26.87 KG/M2 | RESPIRATION RATE: 16 BRPM | WEIGHT: 146 LBS

## 2019-12-10 DIAGNOSIS — J06.9 UPPER RESPIRATORY TRACT INFECTION, UNSPECIFIED TYPE: Primary | ICD-10-CM

## 2019-12-10 PROCEDURE — 99213 OFFICE O/P EST LOW 20 MIN: CPT | Performed by: FAMILY MEDICINE

## 2019-12-10 RX ORDER — ALBUTEROL SULFATE 90 UG/1
2 AEROSOL, METERED RESPIRATORY (INHALATION) EVERY 6 HOURS PRN
Qty: 1 INHALER | Refills: 5 | Status: SHIPPED | OUTPATIENT
Start: 2019-12-10 | End: 2020-06-15

## 2019-12-10 RX ORDER — AZITHROMYCIN 250 MG/1
TABLET, FILM COATED ORAL
Qty: 6 TABLET | Refills: 0 | Status: SHIPPED | OUTPATIENT
Start: 2019-12-10 | End: 2019-12-14

## 2019-12-10 RX ORDER — TAMSULOSIN HYDROCHLORIDE 0.4 MG/1
CAPSULE ORAL
Refills: 1 | COMMUNITY
Start: 2019-11-10 | End: 2021-03-02 | Stop reason: SDUPTHER

## 2019-12-12 NOTE — PROGRESS NOTES
Lauern Bethea 1950 male MRN: [id number]    [address] - [address]      ASSESSMENT/PLAN  Lauren Bethea is a 81 y o  male presents to the office for    1  Upper respiratory tract infection, unspecified type  Started on Zpack with albuterol as needed  If symptoms dont improve would recommend a course of steroids  - azithromycin (ZITHROMAX) 250 mg tablet; Take 2 tablets today then 1 tablet daily x 4 days  Dispense: 6 tablet; Refill: 0  - albuterol (PROVENTIL HFA,VENTOLIN HFA) 90 mcg/act inhaler; Inhale 2 puffs every 6 (six) hours as needed for wheezing or shortness of breath  Dispense: 1 Inhaler; Refill: 5        RTC in 2 weeks     Future Appointments   Date Time Provider Kailash Perkins   12/19/2019  6:45 PM Frankie Phan MD [facility]  [address]   1/2/2020  3:30 PM Cindy Ho MD [facility]          SUBJECTIVE  CC: Cold Like Symptoms      HPI:  Lauren Bethea is a 81 y o  male who presents for URI symptoms  Upper Respiratory Infection  Patient complains of symptoms of a URI  Symptoms include congestion, nasal congestion, productive cough with  clear colored sputum, sinus pressure and wheezing  Onset of symptoms was 5 days ago, and has been gradually worsening since that time  Treatment to date: none  Review of Systems   Constitutional: Positive for chills and fatigue  Negative for activity change, appetite change and fever  HENT: Positive for congestion  Eyes: Negative for visual disturbance  Respiratory: Positive for cough and chest tightness  Negative for shortness of breath  Cardiovascular: Negative for chest pain and leg swelling  Gastrointestinal: Negative for abdominal distention, abdominal pain, constipation, diarrhea, nausea and vomiting  Musculoskeletal: Negative for arthralgias, back pain and gait problem  Skin: Negative for rash     Allergic/Immunologic: Negative for environmental allergies  Neurological: Positive for headaches  Negative for dizziness  All other systems reviewed and are negative  Historical Information   The patient history was reviewed as follows:  Past Medical History:   Diagnosis Date    Arthritis     shoulder         Medications:     Current Outpatient Medications:     gabapentin (NEURONTIN) 300 mg capsule, , Disp: , Rfl: 0    hydrOXYzine HCL (ATARAX) 25 mg tablet, take 1 tablet by mouth once daily at bedtime, Disp: 30 tablet, Rfl: 0    meloxicam (MOBIC) 15 mg tablet, Take 1 tablet (15 mg total) by mouth daily, Disp: 30 tablet, Rfl: 1    omeprazole (PriLOSEC) 40 MG capsule, Take 1 capsule (40 mg total) by mouth daily, Disp: 30 capsule, Rfl: 2    tamsulosin (FLOMAX) 0 4 mg, , Disp: , Rfl: 1    albuterol (PROVENTIL HFA,VENTOLIN HFA) 90 mcg/act inhaler, Inhale 2 puffs every 6 (six) hours as needed for wheezing or shortness of breath, Disp: 1 Inhaler, Rfl: 5    azithromycin (ZITHROMAX) 250 mg tablet, Take 2 tablets today then 1 tablet daily x 4 days, Disp: 6 tablet, Rfl: 0    No Known Allergies    OBJECTIVE  Vitals:   Vitals:    12/10/19 1652   BP: 130/82   BP Location: Left arm   Patient Position: Sitting   Cuff Size: Standard   Resp: 16   Temp: 99 8 °F (37 7 °C)   Weight: 66 2 kg (146 lb)   Height: 5' 2" (1 575 m)         Physical Exam   Constitutional: He is oriented to person, place, and time  He appears well-developed and well-nourished  HENT:   Head: Normocephalic and atraumatic  Right Ear: Tympanic membrane, external ear and ear canal normal    Left Ear: Tympanic membrane, external ear and ear canal normal    Nose: No mucosal edema  Mouth/Throat: Uvula is midline  Posterior oropharyngeal erythema present  Eyes: Pupils are equal, round, and reactive to light  Conjunctivae and EOM are normal    Neck: Normal range of motion  Neck supple  Cardiovascular: Normal rate, regular rhythm, normal heart sounds and intact distal pulses     Pulmonary/Chest: Effort normal  No respiratory distress  He has wheezes (lower lobes on expiration)  Musculoskeletal: Normal range of motion  He exhibits no edema  Neurological: He is alert and oriented to person, place, and time  Skin: Skin is warm  Capillary refill takes less than 2 seconds  Vitals reviewed                   Yaima Plasencia MD,   [facility]  12/11/2019

## 2019-12-19 ENCOUNTER — OFFICE VISIT (OUTPATIENT)
Dept: FAMILY MEDICINE CLINIC | Facility: CLINIC | Age: 69
End: 2019-12-19
Payer: COMMERCIAL

## 2019-12-19 VITALS
WEIGHT: 146 LBS | RESPIRATION RATE: 14 BRPM | HEART RATE: 72 BPM | BODY MASS INDEX: 26.87 KG/M2 | DIASTOLIC BLOOD PRESSURE: 78 MMHG | SYSTOLIC BLOOD PRESSURE: 102 MMHG | TEMPERATURE: 97.9 F | HEIGHT: 62 IN

## 2019-12-19 DIAGNOSIS — G47.09 OTHER INSOMNIA: ICD-10-CM

## 2019-12-19 DIAGNOSIS — G89.29 CHRONIC PAIN OF LEFT KNEE: ICD-10-CM

## 2019-12-19 DIAGNOSIS — M25.562 CHRONIC PAIN OF LEFT KNEE: ICD-10-CM

## 2019-12-19 DIAGNOSIS — R05.9 COUGH: Primary | ICD-10-CM

## 2019-12-19 DIAGNOSIS — I71.4 ABDOMINAL AORTIC ANEURYSM (AAA) WITHOUT RUPTURE (HCC): ICD-10-CM

## 2019-12-19 PROCEDURE — 99213 OFFICE O/P EST LOW 20 MIN: CPT | Performed by: FAMILY MEDICINE

## 2019-12-19 PROCEDURE — 3008F BODY MASS INDEX DOCD: CPT | Performed by: FAMILY MEDICINE

## 2019-12-19 PROCEDURE — 1160F RVW MEDS BY RX/DR IN RCRD: CPT | Performed by: FAMILY MEDICINE

## 2019-12-19 RX ORDER — HYDROXYZINE 50 MG/1
25 TABLET, FILM COATED ORAL
Qty: 30 TABLET | Refills: 2 | Status: SHIPPED | OUTPATIENT
Start: 2019-12-19 | End: 2019-12-20 | Stop reason: SDUPTHER

## 2019-12-19 RX ORDER — BENZONATATE 100 MG/1
100 CAPSULE ORAL 3 TIMES DAILY PRN
Qty: 20 CAPSULE | Refills: 0 | Status: SHIPPED | OUTPATIENT
Start: 2019-12-19 | End: 2020-03-12

## 2019-12-19 NOTE — PROGRESS NOTES
Fiona Jain 1950 male MRN: [id number]    [facility] [facility] - [address]      ASSESSMENT/PLAN  Fiona Jain is a 81 y o  male presents to the office for    1  Cough  Started on tessalon perles as needed  Likely post viral cough  Advised that the cough could linger x 2 weeks  If fevers develop to please contact our office    - benzonatate (TESSALON PERLES) 100 mg capsule; Take 1 capsule (100 mg total) by mouth 3 (three) times a day as needed for cough  Dispense: 20 capsule; Refill: 0    2  Abdominal aortic aneurysm (AAA) without rupture [facility])  Cardio/Vascular agreed with my plan 1 year ago  Notes scanned to [facility] for chart  4 5cm AAA, will monitor  Repeat CTA now and then yearly  Keep blood pressure <140/80  Asymptomatic at this time  - CTA abdomen w wo contrast; Future    3  Chronic pain of left knee  -> Worsening left knee pain likely injury at work vs over use at work  - Tender over the right later portion of the knee  Concern for Mensicus tear in the setting of DJD  Hx of needing right knee replacement  Unable to tolerate PT in the past per patient  Home exercises not improving  Unstable gait  - MRI knee left  wo contrast; Future    4  Other insomnia  Increase to Atarax 50mg at bed time   - hydrOXYzine HCL (ATARAX) 50 mg tablet; Take 0 5 tablets (25 mg total) by mouth daily at bedtime  Dispense: 30 tablet; Refill: 2     BMI Counseling: Body mass index is 26 7 kg/m²  The BMI is above normal  Nutrition recommendations include decreasing portion sizes and consuming healthier snacks  Exercise recommendations include strength training exercises            RTC in 1 month for review        Future Appointments   Date Time Provider Kailash Perkins   1/2/2020  3:30 PM Lucy Trujillo MD [facility]   1/16/2020  6:45 PM Melida Louis MD [facility]          SUBJECTIVE  CC: Follow-up (review results)      HPI:  Fiona Jain is a 81 y o  male who presents for a chronic appointment  Patient established with Cardiology/vascular  Was advised to repeat a CT a in 6 months to monitor his aortic abdominal aneurysm  Patient states that he has continues to be asymptomatic with no abdominal pain  Left knee; buckling sensation, unstable gait  Continuous pain not resolving with tylenol and Advil  COncern he might need surgery in his left knee like he needed on the right  Patient has a continuous job where he is sitting to standing and he is unable to keep up at work secondary to this pain  Review of Systems   Constitutional: Negative for activity change, appetite change, chills, fatigue and fever  HENT: Negative for congestion  Respiratory: Negative for cough, chest tightness and shortness of breath  Cardiovascular: Negative for chest pain and leg swelling  Gastrointestinal: Negative for abdominal distention, abdominal pain, constipation, diarrhea, nausea and vomiting  Musculoskeletal: Positive for arthralgias (Left knee pain)  All other systems reviewed and are negative        Historical Information   The patient history was reviewed as follows:  Past Medical History:   Diagnosis Date    Arthritis     shoulder         Medications:     Current Outpatient Medications:     albuterol (PROVENTIL HFA,VENTOLIN HFA) 90 mcg/act inhaler, Inhale 2 puffs every 6 (six) hours as needed for wheezing or shortness of breath, Disp: 1 Inhaler, Rfl: 5    gabapentin (NEURONTIN) 300 mg capsule, , Disp: , Rfl: 0    hydrOXYzine HCL (ATARAX) 50 mg tablet, Take 1 tablet (50 mg total) by mouth daily at bedtime, Disp: 30 tablet, Rfl: 2    meloxicam (MOBIC) 15 mg tablet, Take 1 tablet (15 mg total) by mouth daily, Disp: 30 tablet, Rfl: 1    omeprazole (PriLOSEC) 40 MG capsule, Take 1 capsule (40 mg total) by mouth daily, Disp: 30 capsule, Rfl: 2    tamsulosin (FLOMAX) 0 4 mg, , Disp: , Rfl: 1    benzonatate (TESSALON PERLES) 100 mg capsule, Take 1 capsule (100 mg total) by mouth 3 (three) times a day as needed for cough, Disp: 20 capsule, Rfl: 0    No Known Allergies    OBJECTIVE  Vitals:   Vitals:    12/19/19 1814   BP: 102/78   BP Location: Right arm   Patient Position: Sitting   Cuff Size: Standard   Pulse: 72   Resp: 14   Temp: 97 9 °F (36 6 °C)   TempSrc: Tympanic   Weight: 66 2 kg (146 lb)   Height: 5' 2" (1 575 m)         Physical Exam   Constitutional: He is oriented to person, place, and time  Vital signs are normal  He appears well-developed and well-nourished  HENT:   Head: Normocephalic and atraumatic  Eyes: Pupils are equal, round, and reactive to light  Conjunctivae and EOM are normal    Neck: Normal range of motion  Neck supple  Cardiovascular: Normal rate, regular rhythm, S1 normal, S2 normal, normal heart sounds and intact distal pulses  No murmur heard  Pulmonary/Chest: Effort normal and breath sounds normal  No respiratory distress  He has no wheezes  Musculoskeletal: He exhibits no edema  Left knee: He exhibits decreased range of motion and effusion  He exhibits no swelling  Tenderness found  Lateral joint line tenderness noted  Neurological: He is alert and oriented to person, place, and time  He has normal strength  Skin: Skin is warm  Psychiatric: He has a normal mood and affect  His speech is normal and behavior is normal  Judgment and thought content normal    Vitals reviewed                   Azul Martin MD,   [facility]  12/20/2019

## 2019-12-20 RX ORDER — HYDROXYZINE 50 MG/1
50 TABLET, FILM COATED ORAL
Qty: 30 TABLET | Refills: 2 | Status: SHIPPED | OUTPATIENT
Start: 2019-12-20 | End: 2020-03-12

## 2019-12-30 ENCOUNTER — TELEPHONE (OUTPATIENT)
Dept: FAMILY MEDICINE CLINIC | Facility: CLINIC | Age: 69
End: 2019-12-30

## 2019-12-30 NOTE — TELEPHONE ENCOUNTER
Dr Rut Jimenez:    Patient would like to speak with you in reference  to his CT that has not been approved as of yet

## 2019-12-30 NOTE — TELEPHONE ENCOUNTER
I spoke with insurance, they will only allow it to be repeat yearly given that it is only 4 1 cm  Anything > 5cm can obtain a CTA every 6 months  Waiting for call back to explain given that the patient is only Kazakh speaking

## 2020-01-02 ENCOUNTER — CONSULT (OUTPATIENT)
Dept: NEUROLOGY | Facility: CLINIC | Age: 70
End: 2020-01-02
Payer: COMMERCIAL

## 2020-01-02 VITALS
SYSTOLIC BLOOD PRESSURE: 94 MMHG | BODY MASS INDEX: 26.68 KG/M2 | WEIGHT: 145 LBS | HEIGHT: 62 IN | HEART RATE: 109 BPM | DIASTOLIC BLOOD PRESSURE: 62 MMHG

## 2020-01-02 DIAGNOSIS — G47.00 INSOMNIA, UNSPECIFIED TYPE: Primary | ICD-10-CM

## 2020-01-02 DIAGNOSIS — G25.81 RESTLESS LEGS SYNDROME (RLS): ICD-10-CM

## 2020-01-02 DIAGNOSIS — R41.3 MEMORY CHANGE: ICD-10-CM

## 2020-01-02 DIAGNOSIS — R41.3 SHORT-TERM MEMORY LOSS: ICD-10-CM

## 2020-01-02 DIAGNOSIS — R53.1 WEAKNESS: ICD-10-CM

## 2020-01-02 DIAGNOSIS — R51.9 NEW ONSET OF HEADACHES: ICD-10-CM

## 2020-01-02 PROCEDURE — 1160F RVW MEDS BY RX/DR IN RCRD: CPT | Performed by: PSYCHIATRY & NEUROLOGY

## 2020-01-02 PROCEDURE — 99205 OFFICE O/P NEW HI 60 MIN: CPT | Performed by: PSYCHIATRY & NEUROLOGY

## 2020-01-02 NOTE — PROGRESS NOTES
Outpatient Neurology History and Physical  Alex Nolan  [id number]  43 y o   1950          Consult: Yes    Yaima Plasencia MD      Chief Complaint   Patient presents with    Neurologic Problem     NP-Dr Claudean Bears           History Obtained from: patient     HPI:     Mr Mitzi Collado is a 82 yo M that presents with memory disturbance  He says that for past 6 months he's not able to sleep well  He may get 3 hrs of sleep at night  Patient is Maltese speaking  There is some language barrier but we are able to communicate  He reports short term memory loss  He works and he's able to do his job but has noticed constantly forgetting what he's asked to do  He also reports headache at top of head for past 6 months  Denies associated nausea or vomiting  He does report photo sensitivity  Denies noise sensitivity  His headache can be on alternate days  Denies focal weakness  He does report restlessness of legs  He's tried melatonin 12mg  He does get urge to walk around  He's taking gabapentin for leg pain but says it's not helping  Denies fever or rash       Past Medical History:   Diagnosis Date    Arthritis     shoulder    Memory changes                Current Outpatient Medications on File Prior to Visit   Medication Sig Dispense Refill    albuterol (PROVENTIL HFA,VENTOLIN HFA) 90 mcg/act inhaler Inhale 2 puffs every 6 (six) hours as needed for wheezing or shortness of breath 1 Inhaler 5    gabapentin (NEURONTIN) 300 mg capsule Take 300 mg by mouth daily   0    meloxicam (MOBIC) 15 mg tablet Take 1 tablet (15 mg total) by mouth daily 30 tablet 1    omeprazole (PriLOSEC) 40 MG capsule Take 1 capsule (40 mg total) by mouth daily (Patient taking differently: Take 40 mg by mouth as needed ) 30 capsule 2    benzonatate (TESSALON PERLES) 100 mg capsule Take 1 capsule (100 mg total) by mouth 3 (three) times a day as needed for cough (Patient not taking: Reported on 1/2/2020) 20 capsule 0    hydrOXYzine HCL (ATARAX) 50 mg tablet Take 1 tablet (50 mg total) by mouth daily at bedtime (Patient not taking: Reported on 1/2/2020) 30 tablet 2    tamsulosin (FLOMAX) 0 4 mg   1     No current facility-administered medications on file prior to visit  No Known Allergies      Family History   Problem Relation Age of Onset    No Known Problems Mother     No Known Problems Father     No Known Problems Sister     No Known Problems Brother     No Known Problems Maternal Aunt     No Known Problems Maternal Uncle     No Known Problems Paternal Aunt     No Known Problems Paternal Uncle     No Known Problems Maternal Grandmother     No Known Problems Maternal Grandfather     No Known Problems Paternal Grandmother     No Known Problems Paternal Grandfather     ADD / ADHD Neg Hx     Anesthesia problems Neg Hx     Cancer Neg Hx     Clotting disorder Neg Hx     Collagen disease Neg Hx     Diabetes Neg Hx     Dislocations Neg Hx     Learning disabilities Neg Hx     Neurological problems Neg Hx     Osteoporosis Neg Hx     Rheumatologic disease Neg Hx     Scoliosis Neg Hx     Vascular Disease Neg Hx                 Past Surgical History:   Procedure Laterality Date    COLONOSCOPY N/A 12/12/2016    Procedure: COLONOSCOPY;  Surgeon: Cosmo Corley MD;  Location: [facility];   Service:     ELBOW SURGERY Bilateral     HERNIA REPAIR Right 2001    inguinal    KNEE ARTHROSCOPY Right     ROTATOR CUFF REPAIR Bilateral 2011           Social History     Socioeconomic History    Marital status: /Civil Union     Spouse name: Not on file    Number of children: Not on file    Years of education: Not on file    Highest education level: Not on file   Occupational History    Not on file   Social Needs    Financial resource strain: Not on file    Food insecurity:     Worry: Not on file     Inability: Not on file    Transportation needs:     Medical: Not on file     Non-medical: Not on file   Tobacco Use    Smoking status: Never Smoker    Smokeless tobacco: Never Used   Substance and Sexual Activity    Alcohol use: No    Drug use: No    Sexual activity: Not on file   Lifestyle    Physical activity:     Days per week: Not on file     Minutes per session: Not on file    Stress: Not on file   Relationships    Social connections:     Talks on phone: Not on file     Gets together: Not on file     Attends Rastafarian service: Not on file     Active member of club or organization: Not on file     Attends meetings of clubs or organizations: Not on file     Relationship status: Not on file    Intimate partner violence:     Fear of current or ex partner: Not on file     Emotionally abused: Not on file     Physically abused: Not on file     Forced sexual activity: Not on file   Other Topics Concern    Not on file   Social History Narrative    Not on file       Review of Systems  Refer to positive review of systems in HPI  Constitutional- No fever  Eyes- No visual change  ENT- Hearing normal  CV- No chest pain  Resp- No Shortness of breath  GI- No diarrhea  - Bladder normal  MS- No Arthritis   Skin- No rash  Psych- No depression  Endo- No DM  Heme- No nodes    PHYSICAL EXAM:    Vitals:    01/02/20 1506   BP: 94/62   BP Location: Left arm   Patient Position: Sitting   Cuff Size: Adult   Pulse: (!) 109   Weight: 65 8 kg (145 lb)   Height: 5' 2" (1 575 m)         Appearance: No Acute Distress  Ophthalmoscopic: Disc Flat, Normal fundus  Carotid/Heart/Peripheral Vascular: No Bruits, RRR  Orientation: Awake, Alert, and Oriented x 3  Mental status:  Memory:   MOCA: 16/30  There is however some language barrier     Attention: Normal  Knowledge: Appropriate  Language: No aphasia  Speech: No dysarthria  Cranial Nerves:  2 No Visual Defect on Confrontation; Pupils round, equal, reactive to light  3,4,6 Extraocular Movements Intact; no nystagmus  5 Facial Sensation Intact  7 No facial asymmetry  8 Intact hearing  9,10 Palate symmetric, normal gag  11 Good shoulder shrug  12 Tongue Midline  Gait: Stable, No ataxia, can perform tandem walking  Coordination: No ataxia with finger to nose testing and heel to shin testing  Sensory: decreased to light touch and pin prick in left upper and lower extremity  intact Vibration, and Joint Position  Muscle Tone: Normal  Muscle exam  Arm Right Left Leg Right Left   Deltoid 5/5 5/5 Iliopsoas 5/5 4+/5   Biceps 5/5 5/5 Quads 5/5 4+/5   Triceps 5/5 5/5 Hamstrings 5/5 4+/5   Wrist Extension 5/5 5/5 Ankle Dorsi Flexion 5/5 5/5   Wrist Flexion 5/5 5/5 Ankle Plantar Flexion 5/5 5/5   Interossei 5/5 5/5 Ankle Eversion 5/5 5/5   APB 5/5 5/5 Ankle Inversion 5/5 5/5       Reflexes   RJ BJ TJ KJ AJ Plantars Carpenter's   Right 2+ 2+ 2+ 2+ 2+ Downgoing Not present   Left 2+ 2+ 2+ 2+ 2+ Downgoing Not present         Personal review of         None pertinent for review      Assessment/Plan:     1  Insomnia, unspecified type  Diagnostic Sleep Study   2  Memory change  Ambulatory referral to Neurology    MRI brain NeuroQuant wo and w contrast    TSH, 3rd generation with Free T4 reflex    Vitamin B12    Folate    RPR    Protein electrophoresis, serum    Protein electrophoresis, urine    Copper Level    Vitamin D 25 hydroxy    Lyme Antibody Profile with reflex to WB   3  New onset of headaches  MRI brain NeuroQuant wo and w contrast    Lyme Antibody Profile with reflex to WB   4  Short-term memory loss  MRI brain NeuroQuant wo and w contrast    TSH, 3rd generation with Free T4 reflex    Vitamin B12    Folate    RPR    Protein electrophoresis, serum    Protein electrophoresis, urine    Copper Level    Vitamin D 25 hydroxy    Lyme Antibody Profile with reflex to WB   5  Restless legs syndrome (RLS)     6  Weakness  Sedimentation rate, automated    CK (with reflex to MB)    C-reactive protein    Aldolase       New onset of headaches and decreased sensation on left and LLE mild weakness is concerning   We need to r/o brain structural lesion such as remote stroke, tumor  Will get MRI brain neuroquant protocol  His insomnia can affect memory as well  Will obtain baseline sleep study for witnessed apneas, excessive drowsiness, RLS  Will look for reversible causes of memory and weakness  Counseling Documentation:  The patient and/or patient's family were  counseled regarding diagnostic results  Instructions for management,risk factor reductions,prognosis of disease were discussed  Patient and family were educated regarding impressions,risks and benefits of treatment options,importance of compliance with treatment  Total time of encounter: 60 min  More than 50% of time was spent in counseling and coordination of care of patient  BRAN Tomlinson [facility]  [address]  Tyesha David [address]

## 2020-01-02 NOTE — LETTER
January 2, 2020     Jasmin Pena, [address] [facility]    Patient: Jamila Galaviz   YOB: 1950   Date of Visit: 1/2/2020       Dear Dr Campbell Denson: Thank you for referring Jamison Alpers to me for evaluation  Below are my notes for this consultation  If you have questions, please do not hesitate to call me  I look forward to following your patient along with you  Sincerely,        Boris Estrada MD        CC: MD Boris Sprague MD  1/2/2020  4:39 PM  Incomplete  Outpatient Neurology History and Physical  Jamila Galaviz  [id number]  81 y o   1950          Consult: Yes    Savanna Ying MD      Chief Complaint   Patient presents with    Neurologic Problem     NP-Dr Campbell Denson           History Obtained from: patient     HPI:     Mr Lit Calle is a 82 yo M that presents with memory disturbance  He says that for past 6 months he's not able to sleep well  He may get 3 hrs of sleep at night  Patient is Armenian speaking  There is some language barrier but we are able to communicate  He reports short term memory loss  He works and he's able to do his job but has noticed constantly forgetting what he's asked to do  He also reports headache at top of head for past 6 months  Denies associated nausea or vomiting  He does report photo sensitivity  Denies noise sensitivity  His headache can be on alternate days  Denies focal weakness  He does report restlessness of legs  He's tried melatonin 12mg  He does get urge to walk around  He's taking gabapentin for leg pain but says it's not helping  Denies fever or rash       Past Medical History:   Diagnosis Date    Arthritis     shoulder    Memory changes                Current Outpatient Medications on File Prior to Visit   Medication Sig Dispense Refill    albuterol (PROVENTIL HFA,VENTOLIN HFA) 90 mcg/act inhaler Inhale 2 puffs every 6 (six) hours as needed for wheezing or shortness of breath 1 Inhaler 5    gabapentin (NEURONTIN) 300 mg capsule Take 300 mg by mouth daily   0    meloxicam (MOBIC) 15 mg tablet Take 1 tablet (15 mg total) by mouth daily 30 tablet 1    omeprazole (PriLOSEC) 40 MG capsule Take 1 capsule (40 mg total) by mouth daily (Patient taking differently: Take 40 mg by mouth as needed ) 30 capsule 2    benzonatate (TESSALON PERLES) 100 mg capsule Take 1 capsule (100 mg total) by mouth 3 (three) times a day as needed for cough (Patient not taking: Reported on 1/2/2020) 20 capsule 0    hydrOXYzine HCL (ATARAX) 50 mg tablet Take 1 tablet (50 mg total) by mouth daily at bedtime (Patient not taking: Reported on 1/2/2020) 30 tablet 2    tamsulosin (FLOMAX) 0 4 mg   1     No current facility-administered medications on file prior to visit  No Known Allergies      Family History   Problem Relation Age of Onset    No Known Problems Mother     No Known Problems Father     No Known Problems Sister     No Known Problems Brother     No Known Problems Maternal Aunt     No Known Problems Maternal Uncle     No Known Problems Paternal Aunt     No Known Problems Paternal Uncle     No Known Problems Maternal Grandmother     No Known Problems Maternal Grandfather     No Known Problems Paternal Grandmother     No Known Problems Paternal Grandfather     ADD / ADHD Neg Hx     Anesthesia problems Neg Hx     Cancer Neg Hx     Clotting disorder Neg Hx     Collagen disease Neg Hx     Diabetes Neg Hx     Dislocations Neg Hx     Learning disabilities Neg Hx     Neurological problems Neg Hx     Osteoporosis Neg Hx     Rheumatologic disease Neg Hx     Scoliosis Neg Hx     Vascular Disease Neg Hx                 Past Surgical History:   Procedure Laterality Date    COLONOSCOPY N/A 12/12/2016    Procedure: COLONOSCOPY;  Surgeon: Poonam Dye MD;  Location: [place] [address] [facility];   Service:     ELBOW SURGERY Bilateral     HERNIA REPAIR Right 2001    inguinal    KNEE ARTHROSCOPY Right     ROTATOR CUFF REPAIR Bilateral 2011           Social History     Socioeconomic History    Marital status: /Civil Union     Spouse name: Not on file    Number of children: Not on file    Years of education: Not on file    Highest education level: Not on file   Occupational History    Not on file   Social Needs    Financial resource strain: Not on file    Food insecurity:     Worry: Not on file     Inability: Not on file    Transportation needs:     Medical: Not on file     Non-medical: Not on file   Tobacco Use    Smoking status: Never Smoker    Smokeless tobacco: Never Used   Substance and Sexual Activity    Alcohol use: No    Drug use: No    Sexual activity: Not on file   Lifestyle    Physical activity:     Days per week: Not on file     Minutes per session: Not on file    Stress: Not on file   Relationships    Social connections:     Talks on phone: Not on file     Gets together: Not on file     Attends Buddhism service: Not on file     Active member of club or organization: Not on file     Attends meetings of clubs or organizations: Not on file     Relationship status: Not on file    Intimate partner violence:     Fear of current or ex partner: Not on file     Emotionally abused: Not on file     Physically abused: Not on file     Forced sexual activity: Not on file   Other Topics Concern    Not on file   Social History Narrative    Not on file       Review of Systems  Refer to positive review of systems in HPI  Constitutional- No fever  Eyes- No visual change  ENT- Hearing normal  CV- No chest pain  Resp- No Shortness of breath  GI- No diarrhea  - Bladder normal  MS- No Arthritis   Skin- No rash  Psych- No depression  Endo- No DM  Heme- No nodes    PHYSICAL EXAM:    Vitals:    01/02/20 1506   BP: 94/62   BP Location: Left arm   Patient Position: Sitting   Cuff Size: Adult   Pulse: (!) 109   Weight: 65 8 kg (145 lb)   Height: 5' 2" (1 575 m)         Appearance: No Acute Distress  Ophthalmoscopic: Disc Flat, Normal fundus  Carotid/Heart/Peripheral Vascular: No Bruits, RRR  Orientation: Awake, Alert, and Oriented x 3  Mental status:  Memory:   MOCA: 16/30  There is however some language barrier  Attention: Normal  Knowledge: Appropriate  Language: No aphasia  Speech: No dysarthria  Cranial Nerves:  2 No Visual Defect on Confrontation; Pupils round, equal, reactive to light  3,4,6 Extraocular Movements Intact; no nystagmus  5 Facial Sensation Intact  7 No facial asymmetry  8 Intact hearing  9,10 Palate symmetric, normal gag  11 Good shoulder shrug  12 Tongue Midline  Gait: Stable, No ataxia, can perform tandem walking  Coordination: No ataxia with finger to nose testing and heel to shin testing  Sensory: decreased to light touch and pin prick in left upper and lower extremity  intact Vibration, and Joint Position  Muscle Tone: Normal  Muscle exam  Arm Right Left Leg Right Left   Deltoid 5/5 5/5 Iliopsoas 5/5 4+/5   Biceps 5/5 5/5 Quads 5/5 4+/5   Triceps 5/5 5/5 Hamstrings 5/5 4+/5   Wrist Extension 5/5 5/5 Ankle Dorsi Flexion 5/5 5/5   Wrist Flexion 5/5 5/5 Ankle Plantar Flexion 5/5 5/5   Interossei 5/5 5/5 Ankle Eversion 5/5 5/5   APB 5/5 5/5 Ankle Inversion 5/5 5/5       Reflexes   RJ BJ TJ KJ AJ Plantars Carpenter's   Right 2+ 2+ 2+ 2+ 2+ Downgoing Not present   Left 2+ 2+ 2+ 2+ 2+ Downgoing Not present         Personal review of         None pertinent for review      Assessment/Plan:     1  Insomnia, unspecified type  Diagnostic Sleep Study   2  Memory change  Ambulatory referral to Neurology    MRI brain NeuroQuant wo and w contrast    TSH, 3rd generation with Free T4 reflex    Vitamin B12    Folate    RPR    Protein electrophoresis, serum    Protein electrophoresis, urine    Copper Level    Vitamin D 25 hydroxy    Lyme Antibody Profile with reflex to WB   3  New onset of headaches  MRI brain NeuroQuant wo and w contrast    Lyme Antibody Profile with reflex to WB   4   Short-term memory loss  MRI brain NeuroQuant wo and w contrast    TSH, 3rd generation with Free T4 reflex    Vitamin B12    Folate    RPR    Protein electrophoresis, serum    Protein electrophoresis, urine    Copper Level    Vitamin D 25 hydroxy    Lyme Antibody Profile with reflex to WB   5  Restless legs syndrome (RLS)     6  Weakness  Sedimentation rate, automated    CK (with reflex to MB)    C-reactive protein    Aldolase       New onset of headaches and decreased sensation on left and LLE mild weakness is concerning  We need to r/o brain structural lesion such as remote stroke, tumor  Will get MRI brain neuroquant protocol  His insomnia can affect memory as well  Will obtain baseline sleep study for witnessed apneas, excessive drowsiness, RLS  Will look for reversible causes of memory and weakness  Counseling Documentation:  The patient and/or patient's family were  counseled regarding diagnostic results  Instructions for management,risk factor reductions,prognosis of disease were discussed  Patient and family were educated regarding impressions,risks and benefits of treatment options,importance of compliance with treatment  Total time of encounter: 60 min  More than 50% of time was spent in counseling and coordination of care of patient  BRAN Maria    [facility]  [address]  [address]

## 2020-01-27 ENCOUNTER — HOSPITAL ENCOUNTER (OUTPATIENT)
Dept: RADIOLOGY | Facility: HOSPITAL | Age: 70
Discharge: HOME | End: 2020-01-27
Attending: PSYCHIATRY & NEUROLOGY
Payer: COMMERCIAL

## 2020-01-27 DIAGNOSIS — R51.9 NEW ONSET OF HEADACHES: ICD-10-CM

## 2020-01-27 DIAGNOSIS — R41.3 MEMORY CHANGE: ICD-10-CM

## 2020-01-27 DIAGNOSIS — R41.3 SHORT-TERM MEMORY LOSS: ICD-10-CM

## 2020-01-27 PROCEDURE — 70553 MRI BRAIN STEM W/O & W/DYE: CPT

## 2020-01-27 PROCEDURE — A9585 GADOBUTROL INJECTION: HCPCS | Performed by: PSYCHIATRY & NEUROLOGY

## 2020-01-27 RX ADMIN — GADOBUTROL 6.5 ML: 604.72 INJECTION INTRAVENOUS at 15:50

## 2020-01-29 ENCOUNTER — TELEPHONE (OUTPATIENT)
Dept: FAMILY MEDICINE CLINIC | Facility: CLINIC | Age: 70
End: 2020-01-29

## 2020-01-29 NOTE — TELEPHONE ENCOUNTER
Dr Ashlyn Mcguire    Patient called for results of brain MRI, done this past Monday  I advised him that Dr Harper Waller is ordering provider and results are not yet in

## 2020-01-30 NOTE — TELEPHONE ENCOUNTER
Hi,    Patient looking for test results  I believe this study was ordered by your office  Do you mind addressing the results  I have called Radiology to have it read      Thank you

## 2020-01-31 NOTE — TELEPHONE ENCOUNTER
CALLED PT REVIEWED MRI RESULT AND ADVISED HIM IF HE HAS ANY OTHER CONCERNS TO CONTACT DR PANTOJA [facility]

## 2020-01-31 NOTE — TELEPHONE ENCOUNTER
Please call the patient and advise him that his neurologist said that his MRI of the brain was essentially normal   He is to follow-up with her for any other concerns

## 2020-02-12 DIAGNOSIS — M25.562 CHRONIC PAIN OF LEFT KNEE: Primary | ICD-10-CM

## 2020-02-12 DIAGNOSIS — G89.29 CHRONIC PAIN OF LEFT KNEE: Primary | ICD-10-CM

## 2020-02-14 ENCOUNTER — APPOINTMENT (OUTPATIENT)
Dept: RADIOLOGY | Facility: CLINIC | Age: 70
End: 2020-02-14
Payer: COMMERCIAL

## 2020-02-14 DIAGNOSIS — M25.562 CHRONIC PAIN OF LEFT KNEE: ICD-10-CM

## 2020-02-14 DIAGNOSIS — G89.29 CHRONIC PAIN OF LEFT KNEE: ICD-10-CM

## 2020-02-14 PROCEDURE — 73562 X-RAY EXAM OF KNEE 3: CPT

## 2020-02-20 ENCOUNTER — TELEPHONE (OUTPATIENT)
Dept: FAMILY MEDICINE CLINIC | Facility: CLINIC | Age: 70
End: 2020-02-20

## 2020-02-20 DIAGNOSIS — G89.29 CHRONIC PAIN OF LEFT KNEE: Primary | ICD-10-CM

## 2020-02-20 DIAGNOSIS — M25.562 CHRONIC PAIN OF LEFT KNEE: Primary | ICD-10-CM

## 2020-02-20 NOTE — TELEPHONE ENCOUNTER
----- Message from Rommel Bradley MD sent at 2/19/2020  9:24 PM EST -----  [place] speaking    Please advise the patient there is narrowing of the knee seen  Would like him to see Ortho, and see if he would benefit from an MRI to be sure there is no ligament concerns  I understand he wants an MRI, and it was declined; ortho would be best for e  valuation  Please given him contact for Dr Whitney Ambrose   And let me know to place an order

## 2020-03-04 ENCOUNTER — OFFICE VISIT (OUTPATIENT)
Dept: OBGYN CLINIC | Facility: CLINIC | Age: 70
End: 2020-03-04
Payer: COMMERCIAL

## 2020-03-04 VITALS
DIASTOLIC BLOOD PRESSURE: 67 MMHG | WEIGHT: 148.8 LBS | SYSTOLIC BLOOD PRESSURE: 99 MMHG | BODY MASS INDEX: 27.38 KG/M2 | HEART RATE: 82 BPM | HEIGHT: 62 IN

## 2020-03-04 DIAGNOSIS — M23.92 INTERNAL DERANGEMENT OF LEFT KNEE: Primary | ICD-10-CM

## 2020-03-04 DIAGNOSIS — M25.562 LEFT KNEE PAIN, UNSPECIFIED CHRONICITY: ICD-10-CM

## 2020-03-04 PROCEDURE — 3008F BODY MASS INDEX DOCD: CPT | Performed by: ORTHOPAEDIC SURGERY

## 2020-03-04 PROCEDURE — 99214 OFFICE O/P EST MOD 30 MIN: CPT | Performed by: ORTHOPAEDIC SURGERY

## 2020-03-04 PROCEDURE — 1160F RVW MEDS BY RX/DR IN RCRD: CPT | Performed by: ORTHOPAEDIC SURGERY

## 2020-03-04 PROCEDURE — 1036F TOBACCO NON-USER: CPT | Performed by: ORTHOPAEDIC SURGERY
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
## 2020-03-12 ENCOUNTER — OFFICE VISIT (OUTPATIENT)
Dept: FAMILY MEDICINE CLINIC | Facility: CLINIC | Age: 70
End: 2020-03-12
Payer: COMMERCIAL

## 2020-03-12 VITALS
BODY MASS INDEX: 27.6 KG/M2 | HEIGHT: 62 IN | DIASTOLIC BLOOD PRESSURE: 70 MMHG | HEART RATE: 76 BPM | SYSTOLIC BLOOD PRESSURE: 120 MMHG | WEIGHT: 150 LBS | RESPIRATION RATE: 16 BRPM | TEMPERATURE: 98.3 F

## 2020-03-12 DIAGNOSIS — G47.09 OTHER INSOMNIA: ICD-10-CM

## 2020-03-12 DIAGNOSIS — I71.9 AORTIC ANEURYSM WITHOUT RUPTURE, UNSPECIFIED PORTION OF AORTA (HCC): ICD-10-CM

## 2020-03-12 DIAGNOSIS — K21.9 GASTROESOPHAGEAL REFLUX DISEASE WITHOUT ESOPHAGITIS: Primary | ICD-10-CM

## 2020-03-12 DIAGNOSIS — Z13.220 SCREENING, LIPID: ICD-10-CM

## 2020-03-12 DIAGNOSIS — G25.81 RESTLESS LEGS SYNDROME: ICD-10-CM

## 2020-03-12 PROCEDURE — 1160F RVW MEDS BY RX/DR IN RCRD: CPT | Performed by: FAMILY MEDICINE

## 2020-03-12 PROCEDURE — 99214 OFFICE O/P EST MOD 30 MIN: CPT | Performed by: FAMILY MEDICINE

## 2020-03-12 PROCEDURE — 3008F BODY MASS INDEX DOCD: CPT | Performed by: FAMILY MEDICINE

## 2020-03-12 PROCEDURE — 1036F TOBACCO NON-USER: CPT | Performed by: FAMILY MEDICINE

## 2020-03-12 RX ORDER — PANTOPRAZOLE SODIUM 40 MG/1
40 TABLET, DELAYED RELEASE ORAL DAILY
Qty: 30 TABLET | Refills: 2 | Status: SHIPPED | OUTPATIENT
Start: 2020-03-12 | End: 2020-09-22

## 2020-03-12 RX ORDER — HYDROXYZINE HYDROCHLORIDE 25 MG/1
25 TABLET, FILM COATED ORAL
Qty: 30 TABLET | Refills: 2 | Status: SHIPPED | OUTPATIENT
Start: 2020-03-12 | End: 2020-03-27
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
## 2020-03-26 ENCOUNTER — APPOINTMENT (EMERGENCY)
Dept: RADIOLOGY | Facility: HOSPITAL | Age: 70
End: 2020-03-26
Payer: COMMERCIAL

## 2020-03-26 ENCOUNTER — HOSPITAL ENCOUNTER (EMERGENCY)
Facility: HOSPITAL | Age: 70
Discharge: HOME | End: 2020-03-26
Attending: EMERGENCY MEDICINE | Admitting: EMERGENCY MEDICINE
Payer: COMMERCIAL

## 2020-03-26 VITALS
WEIGHT: 148 LBS | HEART RATE: 62 BPM | TEMPERATURE: 97.1 F | OXYGEN SATURATION: 99 % | BODY MASS INDEX: 27.07 KG/M2 | RESPIRATION RATE: 16 BRPM | DIASTOLIC BLOOD PRESSURE: 58 MMHG | SYSTOLIC BLOOD PRESSURE: 113 MMHG

## 2020-03-26 DIAGNOSIS — R07.9 CHEST PAIN, UNSPECIFIED TYPE: Primary | ICD-10-CM

## 2020-03-26 LAB
ALBUMIN SERPL BCP-MCNC: 4.1 G/DL (ref 3.5–5)
ALP SERPL-CCNC: 88 U/L (ref 46–116)
ALT SERPL W P-5'-P-CCNC: 26 U/L (ref 12–78)
ANION GAP SERPL CALCULATED.3IONS-SCNC: 9 MMOL/L (ref 4–13)
APTT PPP: 32 SECONDS (ref 23–37)
AST SERPL W P-5'-P-CCNC: 18 U/L (ref 5–45)
BASOPHILS # BLD AUTO: 0.01 THOUSANDS/ΜL (ref 0–0.1)
BASOPHILS NFR BLD AUTO: 0 % (ref 0–1)
BILIRUB SERPL-MCNC: 0.5 MG/DL (ref 0.2–1)
BILIRUB UR QL STRIP: NEGATIVE
BUN SERPL-MCNC: 12 MG/DL (ref 5–25)
CALCIUM SERPL-MCNC: 8.8 MG/DL (ref 8.3–10.1)
CHLORIDE SERPL-SCNC: 105 MMOL/L (ref 100–108)
CLARITY UR: CLEAR
CO2 SERPL-SCNC: 26 MMOL/L (ref 21–32)
COLOR UR: NORMAL
CREAT SERPL-MCNC: 0.85 MG/DL (ref 0.6–1.3)
D DIMER PPP FEU-MCNC: 0.31 UG/ML FEU
EOSINOPHIL # BLD AUTO: 0.04 THOUSAND/ΜL (ref 0–0.61)
EOSINOPHIL NFR BLD AUTO: 1 % (ref 0–6)
ERYTHROCYTE [DISTWIDTH] IN BLOOD BY AUTOMATED COUNT: 11.9 % (ref 11.6–15.1)
GFR SERPL CREATININE-BSD FRML MDRD: 90 ML/MIN/1.73SQ M
GLUCOSE SERPL-MCNC: 114 MG/DL (ref 65–140)
GLUCOSE UR STRIP-MCNC: NEGATIVE MG/DL
HCT VFR BLD AUTO: 46.9 % (ref 36.5–49.3)
HGB BLD-MCNC: 15.8 G/DL (ref 12–17)
HGB UR QL STRIP.AUTO: NEGATIVE
IMM GRANULOCYTES # BLD AUTO: 0.01 THOUSAND/UL (ref 0–0.2)
IMM GRANULOCYTES NFR BLD AUTO: 0 % (ref 0–2)
INR PPP: 1.05 (ref 0.84–1.19)
KETONES UR STRIP-MCNC: NEGATIVE MG/DL
LEUKOCYTE ESTERASE UR QL STRIP: NEGATIVE
LIPASE SERPL-CCNC: 259 U/L (ref 73–393)
LYMPHOCYTES # BLD AUTO: 2.34 THOUSANDS/ΜL (ref 0.6–4.47)
LYMPHOCYTES NFR BLD AUTO: 40 % (ref 14–44)
MCH RBC QN AUTO: 31.2 PG (ref 26.8–34.3)
MCHC RBC AUTO-ENTMCNC: 33.7 G/DL (ref 31.4–37.4)
MCV RBC AUTO: 93 FL (ref 82–98)
MONOCYTES # BLD AUTO: 0.45 THOUSAND/ΜL (ref 0.17–1.22)
MONOCYTES NFR BLD AUTO: 8 % (ref 4–12)
NEUTROPHILS # BLD AUTO: 2.98 THOUSANDS/ΜL (ref 1.85–7.62)
NEUTS SEG NFR BLD AUTO: 51 % (ref 43–75)
NITRITE UR QL STRIP: NEGATIVE
NRBC BLD AUTO-RTO: 0 /100 WBCS
NT-PROBNP SERPL-MCNC: 97 PG/ML
PH UR STRIP.AUTO: 6.5 [PH]
PLATELET # BLD AUTO: 227 THOUSANDS/UL (ref 149–390)
PMV BLD AUTO: 8.9 FL (ref 8.9–12.7)
POTASSIUM SERPL-SCNC: 4 MMOL/L (ref 3.5–5.3)
PROT SERPL-MCNC: 7.6 G/DL (ref 6.4–8.2)
PROT UR STRIP-MCNC: NEGATIVE MG/DL
PROTHROMBIN TIME: 14 SECONDS (ref 11.6–14.5)
RBC # BLD AUTO: 5.07 MILLION/UL (ref 3.88–5.62)
SODIUM SERPL-SCNC: 140 MMOL/L (ref 136–145)
SP GR UR STRIP.AUTO: 1.01 (ref 1–1.03)
TROPONIN I SERPL-MCNC: <0.02 NG/ML
TROPONIN I SERPL-MCNC: <0.02 NG/ML
UROBILINOGEN UR QL STRIP.AUTO: 0.2 E.U./DL
WBC # BLD AUTO: 5.83 THOUSAND/UL (ref 4.31–10.16)

## 2020-03-26 PROCEDURE — 96360 HYDRATION IV INFUSION INIT: CPT

## 2020-03-26 PROCEDURE — 36415 COLL VENOUS BLD VENIPUNCTURE: CPT | Performed by: EMERGENCY MEDICINE

## 2020-03-26 PROCEDURE — 85025 COMPLETE CBC W/AUTO DIFF WBC: CPT | Performed by: EMERGENCY MEDICINE

## 2020-03-26 PROCEDURE — 84484 ASSAY OF TROPONIN QUANT: CPT | Performed by: EMERGENCY MEDICINE

## 2020-03-26 PROCEDURE — 80053 COMPREHEN METABOLIC PANEL: CPT | Performed by: EMERGENCY MEDICINE

## 2020-03-26 PROCEDURE — 71045 X-RAY EXAM CHEST 1 VIEW: CPT

## 2020-03-26 PROCEDURE — 83880 ASSAY OF NATRIURETIC PEPTIDE: CPT | Performed by: EMERGENCY MEDICINE

## 2020-03-26 PROCEDURE — 99284 EMERGENCY DEPT VISIT MOD MDM: CPT | Performed by: EMERGENCY MEDICINE

## 2020-03-26 PROCEDURE — 83690 ASSAY OF LIPASE: CPT | Performed by: EMERGENCY MEDICINE

## 2020-03-26 PROCEDURE — 99285 EMERGENCY DEPT VISIT HI MDM: CPT

## 2020-03-26 PROCEDURE — 81003 URINALYSIS AUTO W/O SCOPE: CPT | Performed by: EMERGENCY MEDICINE

## 2020-03-26 PROCEDURE — 85610 PROTHROMBIN TIME: CPT | Performed by: EMERGENCY MEDICINE

## 2020-03-26 PROCEDURE — 85730 THROMBOPLASTIN TIME PARTIAL: CPT | Performed by: EMERGENCY MEDICINE

## 2020-03-26 PROCEDURE — 85379 FIBRIN DEGRADATION QUANT: CPT | Performed by: EMERGENCY MEDICINE

## 2020-03-26 PROCEDURE — 93005 ELECTROCARDIOGRAM TRACING: CPT

## 2020-03-26 RX ORDER — MAGNESIUM HYDROXIDE/ALUMINUM HYDROXICE/SIMETHICONE 120; 1200; 1200 MG/30ML; MG/30ML; MG/30ML
30 SUSPENSION ORAL ONCE
Status: COMPLETED | OUTPATIENT
Start: 2020-03-26 | End: 2020-03-26

## 2020-03-26 RX ADMIN — ALUMINUM HYDROXIDE, MAGNESIUM HYDROXIDE, AND SIMETHICONE 30 ML: 200; 200; 20 SUSPENSION ORAL at 19:34

## 2020-03-26 RX ADMIN — SODIUM CHLORIDE 1000 ML: 0.9 INJECTION, SOLUTION INTRAVENOUS at 17:33

## 2020-03-26 NOTE — ED PROVIDER NOTES
History  Chief Complaint   Patient presents with    Chest Pain     Pt reports chest pain x 2 weeks, worse past few days  Also c/o headache  57-year-old male reports intermittent chest discomfort last 2 weeks worse over the last few days also complaining of headache  No fevers no chills no nausea vomiting or diarrhea no cough or congestion  States pain is intermittent and occasionally gets some shortness of breath with this is not had a history of this in the past   Currently patient has mild pain approximately 2 on a scale of 10  Patient does not have any other vascular history that he knows of  Pain does not radiate into his back patient does not have any other signs and symptom of aortic dissection this point his chest x-ray does not show widened mediastinum      History provided by:  Patient   used: No        Prior to Admission Medications   Prescriptions Last Dose Informant Patient Reported? Taking? albuterol (PROVENTIL HFA,VENTOLIN HFA) 90 mcg/act inhaler   No No   Sig: Inhale 2 puffs every 6 (six) hours as needed for wheezing or shortness of breath   gabapentin (NEURONTIN) 300 mg capsule   Yes No   Sig: Take 300 mg by mouth daily    hydrOXYzine HCL (ATARAX) 25 mg tablet   No No   Sig: Take 1 tablet (25 mg total) by mouth daily at bedtime   meloxicam (MOBIC) 15 mg tablet   No No   Sig: Take 1 tablet (15 mg total) by mouth daily   pantoprazole (PROTONIX) 40 mg tablet   No No   Sig: Take 1 tablet (40 mg total) by mouth daily   tamsulosin (FLOMAX) 0 4 mg   Yes No      Facility-Administered Medications: None       Past Medical History:   Diagnosis Date    Arthritis     shoulder    Memory changes        Past Surgical History:   Procedure Laterality Date    COLONOSCOPY N/A 12/12/2016    Procedure: COLONOSCOPY;  Surgeon: Ibis Zamora MD;  Location: [facility] GI LAB;   Service:     ELBOW SURGERY Bilateral     HERNIA REPAIR Right 2001    inguinal    KNEE ARTHROSCOPY Right     ROTATOR CUFF REPAIR Bilateral 2011       Family History   Problem Relation Age of Onset    No Known Problems Mother     No Known Problems Father     No Known Problems Sister     No Known Problems Brother     No Known Problems Maternal Aunt     No Known Problems Maternal Uncle     No Known Problems Paternal Aunt     No Known Problems Paternal Uncle     No Known Problems Maternal Grandmother     No Known Problems Maternal Grandfather     No Known Problems Paternal Grandmother     No Known Problems Paternal Grandfather     ADD / ADHD Neg Hx     Anesthesia problems Neg Hx     Cancer Neg Hx     Clotting disorder Neg Hx     Collagen disease Neg Hx     Diabetes Neg Hx     Dislocations Neg Hx     Learning disabilities Neg Hx     Neurological problems Neg Hx     Osteoporosis Neg Hx     Rheumatologic disease Neg Hx     Scoliosis Neg Hx     Vascular Disease Neg Hx      I have reviewed and agree with the history as documented  E-Cigarette/Vaping    E-Cigarette Use Never User      E-Cigarette/Vaping Substances     Social History     Tobacco Use    Smoking status: Never Smoker    Smokeless tobacco: Never Used   Substance Use Topics    Alcohol use: No    Drug use: No       Review of Systems   Constitutional: Negative for activity change, chills, diaphoresis and fever  HENT: Negative for congestion, ear pain, nosebleeds, sore throat, trouble swallowing and voice change  Eyes: Negative for pain, discharge and redness  Respiratory: Positive for shortness of breath  Negative for apnea, cough, choking, wheezing and stridor  Cardiovascular: Positive for chest pain  Negative for palpitations  Gastrointestinal: Negative for abdominal distention, abdominal pain, constipation, diarrhea, nausea and vomiting  Endocrine: Negative for polydipsia  Genitourinary: Negative for difficulty urinating, dysuria, flank pain, frequency, hematuria and urgency     Musculoskeletal: Negative for back pain, gait problem, joint swelling, myalgias, neck pain and neck stiffness  Skin: Negative for pallor and rash  Neurological: Negative for dizziness, tremors, syncope, speech difficulty, weakness, numbness and headaches  Hematological: Negative for adenopathy  Psychiatric/Behavioral: Negative for confusion, hallucinations, self-injury and suicidal ideas  The patient is not nervous/anxious  Physical Exam  Physical Exam   Constitutional: He is oriented to person, place, and time  He appears well-developed and well-nourished  No distress  HENT:   Head: Normocephalic and atraumatic  Right Ear: External ear normal    Left Ear: External ear normal    Nose: Nose normal    Mouth/Throat: Oropharynx is clear and moist    Eyes: Pupils are equal, round, and reactive to light  Conjunctivae are normal    Neck: Normal range of motion  Neck supple  Cardiovascular: Normal rate, regular rhythm, normal heart sounds and intact distal pulses  Pulmonary/Chest: Effort normal and breath sounds normal    Abdominal: Soft  Bowel sounds are normal    Musculoskeletal: Normal range of motion  Neurological: He is alert and oriented to person, place, and time  He has normal reflexes  Skin: Skin is warm and dry  He is not diaphoretic  Psychiatric: He has a normal mood and affect  Nursing note and vitals reviewed        Vital Signs  ED Triage Vitals [03/26/20 1721]   Temperature Pulse Respirations Blood Pressure SpO2   (!) 97 1 °F (36 2 °C) 70 16 147/70 97 %      Temp Source Heart Rate Source Patient Position - Orthostatic VS BP Location FiO2 (%)   Tympanic Monitor Lying Right arm --      Pain Score       Worst Possible Pain           Vitals:    03/26/20 1721 03/26/20 2147   BP: 147/70 113/58   Pulse: 70 62   Patient Position - Orthostatic VS: Lying          Visual Acuity      ED Medications  Medications   sodium chloride 0 9 % bolus 1,000 mL (0 mL Intravenous Stopped 3/26/20 1833)   aluminum-magnesium hydroxide-simethicone (MYLANTA) 200-200-20 mg/5 mL oral suspension 30 mL (30 mL Oral Given 3/26/20 1934)       Diagnostic Studies  Results Reviewed     Procedure Component Value Units Date/Time    Troponin I [[id number]]  (Normal) Collected:  03/26/20 2049    Lab Status:  Final result Specimen:  Blood from Arm, Left Updated:  03/26/20 2127     Troponin I <0 02 ng/mL     UA (URINE) with reflex to Scope [[id number]] Collected:  03/26/20 1934    Lab Status:  Final result Specimen:  Urine, Clean Catch Updated:  03/26/20 1949     Color, UA Light Yellow     Clarity, UA Clear     Specific Gravity, UA 1 010     pH, UA 6 5     Leukocytes, UA Negative     Nitrite, UA Negative     Protein, UA Negative mg/dl      Glucose, UA Negative mg/dl      Ketones, UA Negative mg/dl      Urobilinogen, UA 0 2 E U /dl      Bilirubin, UA Negative     Blood, UA Negative    D-Dimer [[id number]]  (Normal) Collected:  03/26/20 1733    Lab Status:  Final result Specimen:  Blood from Arm, Right Updated:  03/26/20 1825     D-Dimer, Quant 0 31 ug/ml FEU     Protime-INR [[id number]]  (Normal) Collected:  03/26/20 1733    Lab Status:  Final result Specimen:  Blood from Arm, Right Updated:  03/26/20 1823     Protime 14 0 seconds      INR 1 05    APTT [[id number]]  (Normal) Collected:  03/26/20 1733    Lab Status:  Final result Specimen:  Blood from Arm, Right Updated:  03/26/20 1823     PTT 32 seconds     Lipase [[id number]]  (Normal) Collected:  03/26/20 1733    Lab Status:  Final result Specimen:  Blood from Arm, Right Updated:  03/26/20 1806     Lipase 259 u/L     NT-BNP PRO [[id number]]  (Normal) Collected:  03/26/20 1733    Lab Status:  Final result Specimen:  Blood from Arm, Right Updated:  03/26/20 1806     NT-proBNP 97 pg/mL     Comprehensive metabolic panel [[id number]] Collected:  03/26/20 1733    Lab Status:  Final result Specimen:  Blood from Arm, Right Updated:  03/26/20 1804     Sodium 140 mmol/L      Potassium 4 0 mmol/L      Chloride 105 mmol/L      CO2 26 mmol/L ANION GAP 9 mmol/L      BUN 12 mg/dL      Creatinine 0 85 mg/dL      Glucose 114 mg/dL      Calcium 8 8 mg/dL      AST 18 U/L      ALT 26 U/L      Alkaline Phosphatase 88 U/L      Total Protein 7 6 g/dL      Albumin 4 1 g/dL      Total Bilirubin 0 50 mg/dL      eGFR 90 ml/min/1 73sq m     Narrative:       National Kidney Disease Foundation guidelines for Chronic Kidney Disease (CKD):     Stage 1 with normal or high GFR (GFR > 90 mL/min/1 73 square meters)    Stage 2 Mild CKD (GFR = 60-89 mL/min/1 73 square meters)    Stage 3A Moderate CKD (GFR = 45-59 mL/min/1 73 square meters)    Stage 3B Moderate CKD (GFR = 30-44 mL/min/1 73 square meters)    Stage 4 Severe CKD (GFR = 15-29 mL/min/1 73 square meters)    Stage 5 End Stage CKD (GFR <15 mL/min/1 73 square meters)  Note: GFR calculation is accurate only with a steady state creatinine    Troponin I [[id number]]  (Normal) Collected:  03/26/20 1733    Lab Status:  Final result Specimen:  Blood from Arm, Right Updated:  03/26/20 1804     Troponin I <0 02 ng/mL     CBC and differential [[id number]] Collected:  03/26/20 1733    Lab Status:  Final result Specimen:  Blood from Arm, Right Updated:  03/26/20 1740     WBC 5 83 Thousand/uL      RBC 5 07 Million/uL      Hemoglobin 15 8 g/dL      Hematocrit 46 9 %      MCV 93 fL      MCH 31 2 pg      MCHC 33 7 g/dL      RDW 11 9 %      MPV 8 9 fL      Platelets 087 Thousands/uL      nRBC 0 /100 WBCs      Neutrophils Relative 51 %      Immat GRANS % 0 %      Lymphocytes Relative 40 %      Monocytes Relative 8 %      Eosinophils Relative 1 %      Basophils Relative 0 %      Neutrophils Absolute 2 98 Thousands/µL      Immature Grans Absolute 0 01 Thousand/uL      Lymphocytes Absolute 2 34 Thousands/µL      Monocytes Absolute 0 45 Thousand/µL      Eosinophils Absolute 0 04 Thousand/µL      Basophils Absolute 0 01 Thousands/µL                  XR chest 1 view portable   Final Result by Lynn Ellis MD (03/26 1758)      No acute cardiopulmonary disease  Workstation performed: [id number]                    Procedures  Procedures         ED Course                                 MDM      Disposition  Final diagnoses:   Chest pain, unspecified type     Time reflects when diagnosis was documented in both MDM as applicable and the Disposition within this note     Time User Action Codes Description Comment    3/26/2020  9:38 PM Justyna Mccarthy Add [R07 9] Chest pain, unspecified type       ED Disposition     ED Disposition Condition Date/Time Comment    Discharge Stable Thu Mar 26, 2020  9:38 PM Lupe Alvarenga discharge to home/self care  Follow-up Information     Follow up With Specialties Details Why Contact Info    Moon Miguel MD Family Medicine Schedule an appointment as soon as possible for a visit  As needed [address] [facility]  [phone]            Discharge Medication List as of 3/26/2020  9:38 PM      CONTINUE these medications which have NOT CHANGED    Details   albuterol (PROVENTIL HFA,VENTOLIN HFA) 90 mcg/act inhaler Inhale 2 puffs every 6 (six) hours as needed for wheezing or shortness of breath, Starting Tue 12/10/2019, Normal      gabapentin (NEURONTIN) 300 mg capsule Take 300 mg by mouth daily , Starting Thu 8/29/2019, Historical Med      hydrOXYzine HCL (ATARAX) 25 mg tablet Take 1 tablet (25 mg total) by mouth daily at bedtime, Starting Thu 3/12/2020, Normal      meloxicam (MOBIC) 15 mg tablet Take 1 tablet (15 mg total) by mouth daily, Starting Wed 9/25/2019, Normal      pantoprazole (PROTONIX) 40 mg tablet Take 1 tablet (40 mg total) by mouth daily, Starting Thu 3/12/2020, Normal      tamsulosin (FLOMAX) 0 4 mg Starting Sun 11/10/2019, Historical Med           No discharge procedures on file      [address] Review     None          ED Provider  Electronically Signed by           Manpreet Ash, DO  03/27/20 [address] [facility], DO  03/27/20 0024

## 2020-03-26 NOTE — PROGRESS NOTES
" I have chest pain persistent"  Was on and off, and now wont go away"    Guamanian speaking    Spoke to him myself, advised to report to ED  I called [facility] and advised that he was in route  R/o MI vs aortic dissection  Patient called with CP persistent 10/10   Refused ambulance

## 2020-03-27 ENCOUNTER — VBI (OUTPATIENT)
Dept: FAMILY MEDICINE CLINIC | Facility: CLINIC | Age: 70
End: 2020-03-27

## 2020-03-27 ENCOUNTER — TELEMEDICINE (OUTPATIENT)
Dept: FAMILY MEDICINE CLINIC | Facility: CLINIC | Age: 70
End: 2020-03-27
Payer: COMMERCIAL

## 2020-03-27 ENCOUNTER — TELEPHONE (OUTPATIENT)
Dept: FAMILY MEDICINE CLINIC | Facility: CLINIC | Age: 70
End: 2020-03-27

## 2020-03-27 DIAGNOSIS — I71.9 AORTIC ANEURYSM WITHOUT RUPTURE, UNSPECIFIED PORTION OF AORTA (HCC): ICD-10-CM

## 2020-03-27 DIAGNOSIS — F41.9 ANXIETY: Primary | ICD-10-CM

## 2020-03-27 DIAGNOSIS — G47.09 OTHER INSOMNIA: ICD-10-CM

## 2020-03-27 LAB
ATRIAL RATE: 71 BPM
ATRIAL RATE: 73 BPM
P AXIS: 20 DEGREES
P AXIS: 34 DEGREES
PR INTERVAL: 158 MS
PR INTERVAL: 160 MS
QRS AXIS: -39 DEGREES
QRS AXIS: -39 DEGREES
QRSD INTERVAL: 104 MS
QRSD INTERVAL: 108 MS
QT INTERVAL: 402 MS
QT INTERVAL: 406 MS
QTC INTERVAL: 441 MS
QTC INTERVAL: 442 MS
T WAVE AXIS: 13 DEGREES
T WAVE AXIS: 7 DEGREES
VENTRICULAR RATE: 71 BPM
VENTRICULAR RATE: 73 BPM

## 2020-03-27 PROCEDURE — 93010 ELECTROCARDIOGRAM REPORT: CPT | Performed by: INTERNAL MEDICINE

## 2020-03-27 PROCEDURE — G2012 BRIEF CHECK IN BY MD/QHP: HCPCS | Performed by: FAMILY MEDICINE

## 2020-03-27 RX ORDER — HYDROXYZINE PAMOATE 50 MG/1
50 CAPSULE ORAL 3 TIMES DAILY PRN
Qty: 30 CAPSULE | Refills: 0 | Status: SHIPPED | OUTPATIENT
Start: 2020-03-27 | End: 2020-05-11

## 2020-03-27 NOTE — PROGRESS NOTES
Virtual Regular Visit    Problem List Items Addressed This Visit        Cardiovascular and Mediastinum    Aortic aneurysm without rupture ([address] )       Other    Other insomnia      Other Visit Diagnoses     Anxiety    -  Primary    Relevant Medications    hydrOXYzine pamoate (VISTARIL) 50 mg capsule        Anxiety: New DX; started on Vistaril as needed  Will call patient in 1 week to see how he is with new medications  Insomnia; HOLD atarax given that vistaril can help with insomnia  Patient understands to not use the both together  AAA: Stable per last scan  Pending repeat  ascending aortic aneurysm at 4 1 cm  RTC in 1 week         Reason for visit is Chest pain    Encounter provider Melida Louis MD    Provider located at [address] [place] [id number]      Recent Visits  Date Type Provider Dept   03/27/20 Pod Kip Lazo MD [address] [facility]   03/27/20 Telephone Melida Louis  [facility] recent visits within past 7 days and meeting all other requirements     Future Appointments  No visits were found meeting these conditions  Showing future appointments within next 150 days and meeting all other requirements        After connecting through [employer], the patient was identified by name and date of birth  Fiona Jain was informed that this is a telemedicine visit and that the visit is being conducted through telephone which may not be secure and therefore, might not be HIPAA-compliant  My office door was closed  No one else was in the room  He acknowledged consent and understanding of privacy and security of the video platform  The patient has agreed to participate and understands they can discontinue the visit at any time  Subjective  Fiona Jain is a 81 y o  male sent patient to the ED yesterday given 10/10 CP  Patient with a hx of AAA that is being montiored  Previous 4 5cm on CT   Due for a repeat scan  Patient ED work up was negative  He was dx with panaic attack  Patient believes that its 2/2  COVID and the world that he is having persistent CP  Patient taking Atarax at bedtime for insomnia as needed  No hx of anxiety medications  Declines SSRI would likes something as needed for the time being  Past Medical History:   Diagnosis Date    Arthritis     shoulder    Memory changes        Past Surgical History:   Procedure Laterality Date    COLONOSCOPY N/A 12/12/2016    Procedure: COLONOSCOPY;  Surgeon: Tyrone Horton MD;  Location: [place] [address] GI LAB; Service:     ELBOW SURGERY Bilateral     HERNIA REPAIR Right 2001    inguinal    KNEE ARTHROSCOPY Right     ROTATOR CUFF REPAIR Bilateral 2011       Current Outpatient Medications   Medication Sig Dispense Refill    albuterol (PROVENTIL HFA,VENTOLIN HFA) 90 mcg/act inhaler Inhale 2 puffs every 6 (six) hours as needed for wheezing or shortness of breath 1 Inhaler 5    gabapentin (NEURONTIN) 300 mg capsule Take 300 mg by mouth daily   0    hydrOXYzine pamoate (VISTARIL) 50 mg capsule Take 1 capsule (50 mg total) by mouth 3 (three) times a day as needed for anxiety 30 capsule 0    meloxicam (MOBIC) 15 mg tablet Take 1 tablet (15 mg total) by mouth daily 30 tablet 1    pantoprazole (PROTONIX) 40 mg tablet Take 1 tablet (40 mg total) by mouth daily 30 tablet 2    tamsulosin (FLOMAX) 0 4 mg   1     No current facility-administered medications for this visit  No Known Allergies    Review of Systems   Constitutional: Negative for activity change, appetite change, chills, fatigue and fever  HENT: Negative for congestion  Respiratory: Negative for cough, chest tightness and shortness of breath  Cardiovascular: Negative for chest pain and leg swelling  Gastrointestinal: Negative for abdominal distention, abdominal pain, constipation, diarrhea, nausea and vomiting  Psychiatric/Behavioral: Positive for sleep disturbance   The patient is nervous/anxious  All other systems reviewed and are negative  I spent 15 minutes with the patient during this visit

## 2020-03-27 NOTE — TELEPHONE ENCOUNTER
Dr Erica Lee,    Patient is returning your call from this morning  He would like to speak to you    Please call

## 2020-03-27 NOTE — TELEPHONE ENCOUNTER
PLEASE REVIEW AND CALL PATIENT IF NEED:      Patient went to the ED on 3/26/220 for chest pain unspecified - ED suggest F/U  if needed - Patient also has a previously scheduled appt on 4/6 for Pre-op Cataracts      Thank you,

## 2020-04-03 DIAGNOSIS — G25.81 RESTLESS LEGS SYNDROME: Primary | ICD-10-CM

## 2020-04-03 RX ORDER — GABAPENTIN 300 MG/1
CAPSULE ORAL
Qty: 90 CAPSULE | Refills: 1 | Status: SHIPPED | OUTPATIENT
Start: 2020-04-03 | End: 2020-08-24 | Stop reason: SDUPTHER

## 2020-04-08 ENCOUNTER — TELEPHONE (OUTPATIENT)
Dept: NEUROLOGY | Facility: CLINIC | Age: 70
End: 2020-04-08

## 2020-04-14 ENCOUNTER — TELEPHONE (OUTPATIENT)
Dept: MRI IMAGING | Facility: HOSPITAL | Age: 70
End: 2020-04-14

## 2020-05-10 DIAGNOSIS — F41.9 ANXIETY: ICD-10-CM

## 2020-05-11 RX ORDER — HYDROXYZINE PAMOATE 50 MG/1
CAPSULE ORAL
Qty: 30 CAPSULE | Refills: 0 | Status: SHIPPED | OUTPATIENT
Start: 2020-05-11 | End: 2020-06-15 | Stop reason: SDUPTHER

## 2020-05-19 ENCOUNTER — HOSPITAL ENCOUNTER (OUTPATIENT)
Dept: RADIOLOGY | Facility: HOSPITAL | Age: 70
Discharge: HOME | End: 2020-05-19
Attending: ORTHOPAEDIC SURGERY
Payer: COMMERCIAL

## 2020-05-19 ENCOUNTER — HOSPITAL ENCOUNTER (OUTPATIENT)
Dept: RADIOLOGY | Facility: HOSPITAL | Age: 70
Discharge: HOME | End: 2020-05-19
Attending: FAMILY MEDICINE
Payer: COMMERCIAL

## 2020-05-19 DIAGNOSIS — M23.92 INTERNAL DERANGEMENT OF LEFT KNEE: ICD-10-CM

## 2020-05-19 DIAGNOSIS — I71.9 AORTIC ANEURYSM WITHOUT RUPTURE, UNSPECIFIED PORTION OF AORTA (HCC): ICD-10-CM

## 2020-05-19 PROCEDURE — 74174 CTA ABD&PLVS W/CONTRAST: CPT

## 2020-05-19 PROCEDURE — 73721 MRI JNT OF LWR EXTRE W/O DYE: CPT

## 2020-05-19 RX ADMIN — IOHEXOL 100 ML: 350 INJECTION, SOLUTION INTRAVENOUS at 08:15

## 2020-05-26 ENCOUNTER — TELEPHONE (OUTPATIENT)
Dept: FAMILY MEDICINE CLINIC | Facility: CLINIC | Age: 70
End: 2020-05-26

## 2020-05-26 DIAGNOSIS — I71.4 ABDOMINAL AORTIC ANEURYSM (AAA) WITHOUT RUPTURE (HCC): Primary | ICD-10-CM

## 2020-05-26 DIAGNOSIS — I71.9 AORTIC ANEURYSM WITHOUT RUPTURE, UNSPECIFIED PORTION OF AORTA (HCC): ICD-10-CM

## 2020-05-27 ENCOUNTER — OFFICE VISIT (OUTPATIENT)
Dept: OBGYN CLINIC | Facility: CLINIC | Age: 70
End: 2020-05-27
Payer: COMMERCIAL

## 2020-05-27 VITALS
SYSTOLIC BLOOD PRESSURE: 113 MMHG | DIASTOLIC BLOOD PRESSURE: 72 MMHG | WEIGHT: 148 LBS | HEART RATE: 69 BPM | BODY MASS INDEX: 27.07 KG/M2 | TEMPERATURE: 98.4 F

## 2020-05-27 DIAGNOSIS — S83.242D OTHER TEAR OF MEDIAL MENISCUS OF LEFT KNEE AS CURRENT INJURY, SUBSEQUENT ENCOUNTER: Primary | ICD-10-CM

## 2020-05-27 PROCEDURE — 1160F RVW MEDS BY RX/DR IN RCRD: CPT | Performed by: ORTHOPAEDIC SURGERY

## 2020-05-27 PROCEDURE — 99214 OFFICE O/P EST MOD 30 MIN: CPT | Performed by: ORTHOPAEDIC SURGERY

## 2020-05-27 PROCEDURE — 1036F TOBACCO NON-USER: CPT | Performed by: ORTHOPAEDIC SURGERY

## 2020-05-28 ENCOUNTER — TELEPHONE (OUTPATIENT)
Dept: OBGYN CLINIC | Facility: CLINIC | Age: 70
End: 2020-05-28

## 2020-05-28 LAB
ALBUMIN SERPL-MCNC: 4.6 G/DL (ref 3.8–4.8)
ALBUMIN/GLOB SERPL: 2 {RATIO} (ref 1.2–2.2)
ALP SERPL-CCNC: 84 IU/L (ref 39–117)
ALT SERPL-CCNC: 13 IU/L (ref 0–44)
AST SERPL-CCNC: 18 IU/L (ref 0–40)
BASOPHILS # BLD AUTO: 0 X10E3/UL (ref 0–0.2)
BASOPHILS NFR BLD AUTO: 0 %
BILIRUB SERPL-MCNC: 0.7 MG/DL (ref 0–1.2)
BUN SERPL-MCNC: 11 MG/DL (ref 8–27)
BUN/CREAT SERPL: 12 (ref 10–24)
CALCIUM SERPL-MCNC: 9.4 MG/DL (ref 8.6–10.2)
CHLORIDE SERPL-SCNC: 107 MMOL/L (ref 96–106)
CHOLEST SERPL-MCNC: 194 MG/DL (ref 100–199)
CO2 SERPL-SCNC: 24 MMOL/L (ref 20–29)
CREAT SERPL-MCNC: 0.92 MG/DL (ref 0.76–1.27)
EOSINOPHIL # BLD AUTO: 0.1 X10E3/UL (ref 0–0.4)
EOSINOPHIL NFR BLD AUTO: 1 %
ERYTHROCYTE [DISTWIDTH] IN BLOOD BY AUTOMATED COUNT: 12.4 % (ref 11.6–15.4)
GLOBULIN SER-MCNC: 2.3 G/DL (ref 1.5–4.5)
GLUCOSE SERPL-MCNC: 102 MG/DL (ref 65–99)
HCT VFR BLD AUTO: 44.4 % (ref 37.5–51)
HDLC SERPL-MCNC: 43 MG/DL
HGB BLD-MCNC: 14.8 G/DL (ref 13–17.7)
IMM GRANULOCYTES # BLD: 0 X10E3/UL (ref 0–0.1)
IMM GRANULOCYTES NFR BLD: 0 %
LDLC SERPL CALC-MCNC: 127 MG/DL (ref 0–99)
LYMPHOCYTES # BLD AUTO: 2.1 X10E3/UL (ref 0.7–3.1)
LYMPHOCYTES NFR BLD AUTO: 37 %
MCH RBC QN AUTO: 31.1 PG (ref 26.6–33)
MCHC RBC AUTO-ENTMCNC: 33.3 G/DL (ref 31.5–35.7)
MCV RBC AUTO: 93 FL (ref 79–97)
MICRODELETION SYND BLD/T FISH: NORMAL
MONOCYTES # BLD AUTO: 0.5 X10E3/UL (ref 0.1–0.9)
MONOCYTES NFR BLD AUTO: 8 %
NEUTROPHILS # BLD AUTO: 3.1 X10E3/UL (ref 1.4–7)
NEUTROPHILS NFR BLD AUTO: 54 %
PLATELET # BLD AUTO: 261 X10E3/UL (ref 150–450)
POTASSIUM SERPL-SCNC: 3.9 MMOL/L (ref 3.5–5.2)
PROT SERPL-MCNC: 6.9 G/DL (ref 6–8.5)
RBC # BLD AUTO: 4.76 X10E6/UL (ref 4.14–5.8)
SL AMB EGFR AFRICAN AMERICAN: 99 ML/MIN/1.73
SL AMB EGFR NON AFRICAN AMERICAN: 86 ML/MIN/1.73
SL AMB VLDL CHOLESTEROL CALC: 24 MG/DL (ref 5–40)
SODIUM SERPL-SCNC: 142 MMOL/L (ref 134–144)
TRIGL SERPL-MCNC: 120 MG/DL (ref 0–149)
TSH SERPL DL<=0.005 MIU/L-ACNC: 3.69 UIU/ML (ref 0.45–4.5)
WBC # BLD AUTO: 5.7 X10E3/UL (ref 3.4–10.8)

## 2020-05-29 ENCOUNTER — HOSPITAL ENCOUNTER (OUTPATIENT)
Dept: RADIOLOGY | Facility: HOSPITAL | Age: 70
Discharge: HOME | End: 2020-05-29
Attending: FAMILY MEDICINE
Payer: COMMERCIAL

## 2020-05-29 DIAGNOSIS — I71.9 AORTIC ANEURYSM WITHOUT RUPTURE, UNSPECIFIED PORTION OF AORTA (HCC): ICD-10-CM

## 2020-05-29 PROCEDURE — 71250 CT THORAX DX C-: CPT

## 2020-06-03 ENCOUNTER — TELEPHONE (OUTPATIENT)
Dept: FAMILY MEDICINE CLINIC | Facility: CLINIC | Age: 70
End: 2020-06-03

## 2020-06-05 ENCOUNTER — TELEPHONE (OUTPATIENT)
Dept: FAMILY MEDICINE CLINIC | Facility: CLINIC | Age: 70
End: 2020-06-05

## 2020-06-12 ENCOUNTER — TELEPHONE (OUTPATIENT)
Dept: OBGYN CLINIC | Facility: HOSPITAL | Age: 70
End: 2020-06-12

## 2020-06-14 DIAGNOSIS — S83.242D OTHER TEAR OF MEDIAL MENISCUS OF LEFT KNEE AS CURRENT INJURY, SUBSEQUENT ENCOUNTER: ICD-10-CM

## 2020-06-14 PROCEDURE — U0003 INFECTIOUS AGENT DETECTION BY NUCLEIC ACID (DNA OR RNA); SEVERE ACUTE RESPIRATORY SYNDROME CORONAVIRUS 2 (SARS-COV-2) (CORONAVIRUS DISEASE [COVID-19]), AMPLIFIED PROBE TECHNIQUE, MAKING USE OF HIGH THROUGHPUT TECHNOLOGIES AS DESCRIBED BY CMS-2020-01-R: HCPCS

## 2020-06-15 ENCOUNTER — OFFICE VISIT (OUTPATIENT)
Dept: FAMILY MEDICINE CLINIC | Facility: CLINIC | Age: 70
End: 2020-06-15
Payer: COMMERCIAL

## 2020-06-15 VITALS
BODY MASS INDEX: 26.17 KG/M2 | DIASTOLIC BLOOD PRESSURE: 66 MMHG | SYSTOLIC BLOOD PRESSURE: 114 MMHG | HEIGHT: 62 IN | WEIGHT: 142.2 LBS | RESPIRATION RATE: 16 BRPM | OXYGEN SATURATION: 98 % | TEMPERATURE: 97.2 F | HEART RATE: 77 BPM

## 2020-06-15 DIAGNOSIS — Z01.818 PRE-OP EXAM: ICD-10-CM

## 2020-06-15 DIAGNOSIS — G25.81 RESTLESS LEGS SYNDROME: ICD-10-CM

## 2020-06-15 DIAGNOSIS — S83.242A OTHER TEAR OF MEDIAL MENISCUS OF LEFT KNEE AS CURRENT INJURY, INITIAL ENCOUNTER: Primary | ICD-10-CM

## 2020-06-15 DIAGNOSIS — H26.9 CATARACT OF BOTH EYES, UNSPECIFIED CATARACT TYPE: ICD-10-CM

## 2020-06-15 DIAGNOSIS — R63.0 DECREASED APPETITE: ICD-10-CM

## 2020-06-15 DIAGNOSIS — I71.9 AORTIC ANEURYSM WITHOUT RUPTURE, UNSPECIFIED PORTION OF AORTA (HCC): ICD-10-CM

## 2020-06-15 DIAGNOSIS — F41.9 ANXIETY: ICD-10-CM

## 2020-06-15 LAB — SARS-COV-2 RNA SPEC QL NAA+PROBE: NOT DETECTED

## 2020-06-15 PROCEDURE — 1160F RVW MEDS BY RX/DR IN RCRD: CPT | Performed by: FAMILY MEDICINE

## 2020-06-15 PROCEDURE — 3008F BODY MASS INDEX DOCD: CPT | Performed by: FAMILY MEDICINE

## 2020-06-15 PROCEDURE — 1036F TOBACCO NON-USER: CPT | Performed by: FAMILY MEDICINE

## 2020-06-15 PROCEDURE — 99214 OFFICE O/P EST MOD 30 MIN: CPT | Performed by: FAMILY MEDICINE

## 2020-06-15 RX ORDER — HYDROXYZINE PAMOATE 50 MG/1
50 CAPSULE ORAL
Qty: 90 CAPSULE | Refills: 2 | Status: SHIPPED | OUTPATIENT
Start: 2020-06-15 | End: 2020-07-24

## 2020-06-16 ENCOUNTER — TELEPHONE (OUTPATIENT)
Dept: OBGYN CLINIC | Facility: CLINIC | Age: 70
End: 2020-06-16

## 2020-06-17 ENCOUNTER — ANESTHESIA EVENT (OUTPATIENT)
Dept: PERIOP | Facility: HOSPITAL | Age: 70
End: 2020-06-17
Payer: COMMERCIAL

## 2020-06-18 ENCOUNTER — ANESTHESIA (OUTPATIENT)
Dept: PERIOP | Facility: HOSPITAL | Age: 70
End: 2020-06-18
Payer: COMMERCIAL

## 2020-06-18 ENCOUNTER — HOSPITAL ENCOUNTER (OUTPATIENT)
Facility: HOSPITAL | Age: 70
Setting detail: OUTPATIENT SURGERY
Discharge: HOME | End: 2020-06-18
Attending: ORTHOPAEDIC SURGERY | Admitting: ORTHOPAEDIC SURGERY
Payer: COMMERCIAL

## 2020-06-18 VITALS
DIASTOLIC BLOOD PRESSURE: 67 MMHG | TEMPERATURE: 97 F | WEIGHT: 139 LBS | SYSTOLIC BLOOD PRESSURE: 108 MMHG | BODY MASS INDEX: 25.58 KG/M2 | OXYGEN SATURATION: 98 % | HEART RATE: 60 BPM | RESPIRATION RATE: 18 BRPM | HEIGHT: 62 IN

## 2020-06-18 PROCEDURE — 29881 ARTHRS KNE SRG MNISECTMY M/L: CPT | Performed by: ORTHOPAEDIC SURGERY

## 2020-06-18 PROCEDURE — 29881 ARTHRS KNE SRG MNISECTMY M/L: CPT | Performed by: PHYSICIAN ASSISTANT

## 2020-06-18 RX ORDER — ONDANSETRON 2 MG/ML
INJECTION INTRAMUSCULAR; INTRAVENOUS AS NEEDED
Status: DISCONTINUED | OUTPATIENT
Start: 2020-06-18 | End: 2020-06-18 | Stop reason: SURG

## 2020-06-18 RX ORDER — PROPOFOL 10 MG/ML
INJECTION, EMULSION INTRAVENOUS AS NEEDED
Status: DISCONTINUED | OUTPATIENT
Start: 2020-06-18 | End: 2020-06-18 | Stop reason: SURG

## 2020-06-18 RX ORDER — MAGNESIUM HYDROXIDE 1200 MG/15ML
LIQUID ORAL AS NEEDED
Status: DISCONTINUED | OUTPATIENT
Start: 2020-06-18 | End: 2020-06-18 | Stop reason: HOSPADM

## 2020-06-18 RX ORDER — BUPIVACAINE HYDROCHLORIDE AND EPINEPHRINE 2.5; 5 MG/ML; UG/ML
INJECTION, SOLUTION INFILTRATION; PERINEURAL AS NEEDED
Status: DISCONTINUED | OUTPATIENT
Start: 2020-06-18 | End: 2020-06-18 | Stop reason: HOSPADM

## 2020-06-18 RX ORDER — DEXAMETHASONE SODIUM PHOSPHATE 4 MG/ML
INJECTION, SOLUTION INTRA-ARTICULAR; INTRALESIONAL; INTRAMUSCULAR; INTRAVENOUS; SOFT TISSUE AS NEEDED
Status: DISCONTINUED | OUTPATIENT
Start: 2020-06-18 | End: 2020-06-18 | Stop reason: SURG

## 2020-06-18 RX ORDER — LIDOCAINE HYDROCHLORIDE 10 MG/ML
INJECTION, SOLUTION EPIDURAL; INFILTRATION; INTRACAUDAL; PERINEURAL AS NEEDED
Status: DISCONTINUED | OUTPATIENT
Start: 2020-06-18 | End: 2020-06-18 | Stop reason: SURG

## 2020-06-18 RX ORDER — CEFAZOLIN SODIUM 2 G/50ML
2000 SOLUTION INTRAVENOUS ONCE
Status: COMPLETED | OUTPATIENT
Start: 2020-06-18 | End: 2020-06-18

## 2020-06-18 RX ORDER — SODIUM CHLORIDE, SODIUM LACTATE, POTASSIUM CHLORIDE, CALCIUM CHLORIDE 600; 310; 30; 20 MG/100ML; MG/100ML; MG/100ML; MG/100ML
125 INJECTION, SOLUTION INTRAVENOUS CONTINUOUS
Status: DISCONTINUED | OUTPATIENT
Start: 2020-06-18 | End: 2020-06-18 | Stop reason: HOSPADM

## 2020-06-18 RX ORDER — FENTANYL CITRATE 50 UG/ML
INJECTION, SOLUTION INTRAMUSCULAR; INTRAVENOUS AS NEEDED
Status: DISCONTINUED | OUTPATIENT
Start: 2020-06-18 | End: 2020-06-18 | Stop reason: SURG

## 2020-06-18 RX ORDER — FENTANYL CITRATE/PF 50 MCG/ML
25 SYRINGE (ML) INJECTION
Status: DISCONTINUED | OUTPATIENT
Start: 2020-06-18 | End: 2020-06-18 | Stop reason: HOSPADM

## 2020-06-18 RX ORDER — MIDAZOLAM HYDROCHLORIDE 2 MG/2ML
INJECTION, SOLUTION INTRAMUSCULAR; INTRAVENOUS AS NEEDED
Status: DISCONTINUED | OUTPATIENT
Start: 2020-06-18 | End: 2020-06-18 | Stop reason: SURG

## 2020-06-18 RX ORDER — KETOROLAC TROMETHAMINE 30 MG/ML
INJECTION, SOLUTION INTRAMUSCULAR; INTRAVENOUS AS NEEDED
Status: DISCONTINUED | OUTPATIENT
Start: 2020-06-18 | End: 2020-06-18 | Stop reason: SURG

## 2020-06-18 RX ADMIN — KETOROLAC TROMETHAMINE 30 MG: 30 INJECTION, SOLUTION INTRAMUSCULAR at 08:55

## 2020-06-18 RX ADMIN — ONDANSETRON 4 MG: 2 INJECTION INTRAMUSCULAR; INTRAVENOUS at 08:53

## 2020-06-18 RX ADMIN — FENTANYL CITRATE 75 MCG: 50 INJECTION, SOLUTION INTRAMUSCULAR; INTRAVENOUS at 08:44

## 2020-06-18 RX ADMIN — FENTANYL CITRATE 25 MCG: 50 INJECTION, SOLUTION INTRAMUSCULAR; INTRAVENOUS at 08:57

## 2020-06-18 RX ADMIN — PROPOFOL 150 MG: 10 INJECTION, EMULSION INTRAVENOUS at 08:44

## 2020-06-18 RX ADMIN — CEFAZOLIN SODIUM 2000 MG: 2 SOLUTION INTRAVENOUS at 08:41

## 2020-06-18 RX ADMIN — LIDOCAINE HYDROCHLORIDE 50 MG: 10 INJECTION, SOLUTION EPIDURAL; INFILTRATION; INTRACAUDAL; PERINEURAL at 08:44

## 2020-06-18 RX ADMIN — DEXAMETHASONE SODIUM PHOSPHATE 4 MG: 4 INJECTION, SOLUTION INTRA-ARTICULAR; INTRALESIONAL; INTRAMUSCULAR; INTRAVENOUS; SOFT TISSUE at 08:55

## 2020-06-18 RX ADMIN — SODIUM CHLORIDE, SODIUM LACTATE, POTASSIUM CHLORIDE, AND CALCIUM CHLORIDE: .6; .31; .03; .02 INJECTION, SOLUTION INTRAVENOUS at 07:51

## 2020-06-18 RX ADMIN — MIDAZOLAM HYDROCHLORIDE 2 MG: 1 INJECTION, SOLUTION INTRAMUSCULAR; INTRAVENOUS at 08:37

## 2020-06-19 ENCOUNTER — EVALUATION (OUTPATIENT)
Dept: PHYSICAL THERAPY | Facility: CLINIC | Age: 70
End: 2020-06-19
Payer: COMMERCIAL

## 2020-06-19 ENCOUNTER — TELEPHONE (OUTPATIENT)
Dept: FAMILY MEDICINE CLINIC | Facility: CLINIC | Age: 70
End: 2020-06-19

## 2020-06-19 DIAGNOSIS — S83.242D OTHER TEAR OF MEDIAL MENISCUS OF LEFT KNEE AS CURRENT INJURY, SUBSEQUENT ENCOUNTER: ICD-10-CM

## 2020-06-19 PROCEDURE — 97161 PT EVAL LOW COMPLEX 20 MIN: CPT | Performed by: PHYSICAL THERAPIST

## 2020-06-22 ENCOUNTER — OFFICE VISIT (OUTPATIENT)
Dept: PHYSICAL THERAPY | Facility: CLINIC | Age: 70
End: 2020-06-22
Payer: COMMERCIAL

## 2020-06-22 DIAGNOSIS — S83.242D OTHER TEAR OF MEDIAL MENISCUS OF LEFT KNEE AS CURRENT INJURY, SUBSEQUENT ENCOUNTER: Primary | ICD-10-CM

## 2020-06-22 PROCEDURE — 97110 THERAPEUTIC EXERCISES: CPT | Performed by: PHYSICAL THERAPIST

## 2020-06-22 PROCEDURE — 97116 GAIT TRAINING THERAPY: CPT | Performed by: PHYSICAL THERAPIST

## 2020-06-23 ENCOUNTER — TELEPHONE (OUTPATIENT)
Dept: FAMILY MEDICINE CLINIC | Facility: CLINIC | Age: 70
End: 2020-06-23

## 2020-06-24 ENCOUNTER — TELEPHONE (OUTPATIENT)
Dept: OBGYN CLINIC | Facility: HOSPITAL | Age: 70
End: 2020-06-24

## 2020-06-24 ENCOUNTER — OFFICE VISIT (OUTPATIENT)
Dept: PHYSICAL THERAPY | Facility: CLINIC | Age: 70
End: 2020-06-24
Payer: COMMERCIAL

## 2020-06-24 DIAGNOSIS — S83.242D OTHER TEAR OF MEDIAL MENISCUS OF LEFT KNEE AS CURRENT INJURY, SUBSEQUENT ENCOUNTER: Primary | ICD-10-CM

## 2020-06-24 PROCEDURE — 97140 MANUAL THERAPY 1/> REGIONS: CPT

## 2020-06-24 PROCEDURE — 97110 THERAPEUTIC EXERCISES: CPT

## 2020-06-24 NOTE — TELEPHONE ENCOUNTER
Dr Remedios Dias, Rose Marie Bartlett and Thomas called to see if the patient's surgery was approved by the PIP carrier     [facility] #[phone]

## 2020-06-24 NOTE — TELEPHONE ENCOUNTER
I returned her call, explained that PIP ([employer]) did not approve his surgery due to lack of conservative treatment  I did speak with Osito Colmenares regarding this prior to his surgery date and he expressed moving forward with his primary health insurance, which I did also relay to [employer]  She appreciated this information

## 2020-06-26 ENCOUNTER — OFFICE VISIT (OUTPATIENT)
Dept: PHYSICAL THERAPY | Facility: CLINIC | Age: 70
End: 2020-06-26
Payer: COMMERCIAL

## 2020-06-26 ENCOUNTER — OFFICE VISIT (OUTPATIENT)
Dept: OBGYN CLINIC | Facility: CLINIC | Age: 70
End: 2020-06-26

## 2020-06-26 VITALS — HEIGHT: 62 IN | WEIGHT: 139 LBS | BODY MASS INDEX: 25.58 KG/M2 | TEMPERATURE: 96.7 F

## 2020-06-26 DIAGNOSIS — S83.242D OTHER TEAR OF MEDIAL MENISCUS OF LEFT KNEE AS CURRENT INJURY, SUBSEQUENT ENCOUNTER: Primary | ICD-10-CM

## 2020-06-26 DIAGNOSIS — Z98.890 STATUS POST MENISCECTOMY: Primary | ICD-10-CM

## 2020-06-26 PROCEDURE — 3008F BODY MASS INDEX DOCD: CPT | Performed by: FAMILY MEDICINE

## 2020-06-26 PROCEDURE — 97140 MANUAL THERAPY 1/> REGIONS: CPT

## 2020-06-26 PROCEDURE — 99024 POSTOP FOLLOW-UP VISIT: CPT | Performed by: ORTHOPAEDIC SURGERY

## 2020-06-26 PROCEDURE — 97110 THERAPEUTIC EXERCISES: CPT

## 2020-06-26 PROCEDURE — 3008F BODY MASS INDEX DOCD: CPT | Performed by: ORTHOPAEDIC SURGERY

## 2020-06-26 PROCEDURE — 1160F RVW MEDS BY RX/DR IN RCRD: CPT | Performed by: ORTHOPAEDIC SURGERY

## 2020-06-29 ENCOUNTER — OFFICE VISIT (OUTPATIENT)
Dept: PHYSICAL THERAPY | Facility: CLINIC | Age: 70
End: 2020-06-29
Payer: COMMERCIAL

## 2020-06-29 DIAGNOSIS — S83.242D OTHER TEAR OF MEDIAL MENISCUS OF LEFT KNEE AS CURRENT INJURY, SUBSEQUENT ENCOUNTER: Primary | ICD-10-CM

## 2020-06-29 PROCEDURE — 97530 THERAPEUTIC ACTIVITIES: CPT

## 2020-06-29 PROCEDURE — 97110 THERAPEUTIC EXERCISES: CPT

## 2020-07-01 ENCOUNTER — OFFICE VISIT (OUTPATIENT)
Dept: PHYSICAL THERAPY | Facility: CLINIC | Age: 70
End: 2020-07-01

## 2020-07-01 DIAGNOSIS — S83.242D OTHER TEAR OF MEDIAL MENISCUS OF LEFT KNEE AS CURRENT INJURY, SUBSEQUENT ENCOUNTER: Primary | ICD-10-CM

## 2020-07-01 PROCEDURE — 97530 THERAPEUTIC ACTIVITIES: CPT

## 2020-07-01 PROCEDURE — 97110 THERAPEUTIC EXERCISES: CPT

## 2020-07-01 NOTE — PROGRESS NOTES
Daily Note     Today's date: 2020  Patient name: Nilson Chavez  : 1950  MRN: [id number]  Referring provider: Brigette Cerda MD  Dx:   Encounter Diagnosis     ICD-10-CM    1  Other tear of medial meniscus of left knee as current injury, subsequent encounter S83 242D        Start Time: 0810          Subjective: Just a little knee pain this morning  I went up & down 15 steps yesterday 2 times at home  Objective: See treatment diary below      Assessment: Tolerated treatment well  Patient would benefit from continued PT      Plan: Progress treatment as tolerated         Precautions: Standard      Manuals          L HS/quad/gastroc stretching   performed performed perf perf                                 L knee AROM   0 -> 120 degrees 0-> 120 degrees         Neuro Re-Ed            Sit to stand    23" mat x 20 reps 23" mat x20 reps Low mat x 20 reps 20x low mat        HR on /2 roll   20x 20x 20x 20x 1/2 roll                    SLS ball toss     outdoors 2x10  reps 2x10                                              Ther Ex        Quad set 10x 15x HEP --- --- ---       Heel slide 10x 20x 20x 20x --- ---       SLR F, ABD 2x10 2x20 SLR x20 reps SLR x 20 reps --- ---       Ankle pumps 10x 20x Gr TB DF/PF x20 reps Gr TB x 20 reps --- ---       Nustep  10m L1 10min L1 Bike x 10 min L3 Bike x 10 min L3 nustep x10 min L3       Step ups   6"x10 reps 6"x10 reps 6"x20 6"x20       Lateral step ups   4"x10 reps 4"x10 reps 6"x20 6"x20       Bridging w/add   20x 20x 20x 20x       clamshells   Supine red TB x 20 reps Red tb x 20 reps --- Green tubing x 20 reps                                 Gait Training             Cane  perf  Up/down 11 steps x 2 reps using 1 rail Up/down 11 steps; gait outdoors, even/uneven terrain, curbs steps & outdoor gait performed                    Modalities          CP L knee  perf 10min 10min  10min 10min

## 2020-07-02 ENCOUNTER — OFFICE VISIT (OUTPATIENT)
Dept: PHYSICAL THERAPY | Facility: CLINIC | Age: 70
End: 2020-07-02

## 2020-07-02 DIAGNOSIS — S83.242D OTHER TEAR OF MEDIAL MENISCUS OF LEFT KNEE AS CURRENT INJURY, SUBSEQUENT ENCOUNTER: Primary | ICD-10-CM

## 2020-07-02 PROCEDURE — 97110 THERAPEUTIC EXERCISES: CPT

## 2020-07-02 PROCEDURE — 97530 THERAPEUTIC ACTIVITIES: CPT

## 2020-07-02 NOTE — PROGRESS NOTES
Daily Note     Today's date: 2020  Patient name: Zak Antunez  : 1950  MRN: [id number]  Referring provider: Ranjit Irby MD  Dx:   Encounter Diagnosis     ICD-10-CM    1  Other tear of medial meniscus of left knee as current injury, subsequent encounter S83 242D        Start Time: 78          Subjective: Feeling good today  Objective: See treatment diary below      Assessment: Tolerated treatment well  Patient would benefit from continued PT      Plan: Progress treatment as tolerated         Precautions: Standard      Manuals         L HS/quad/gastroc stretching   performed performed perf perf perf                                L knee AROM   0 -> 120 degrees 0-> 120 degrees         Neuro Re-Ed            Sit to stand    23" mat x 20 reps 23" mat x20 reps Low mat x 20 reps 20x low mat  20x low mat      HR on 1/2 roll   20x 20x 20x 20x 1/2 roll 20x 1/2 roll                   SLS ball toss     outdoors 2x10  reps 2x10 on foam 2x10 (indoors)                                             Ther Ex       Quad set 10x 15x HEP --- --- --- ---      Heel slide 10x 20x 20x 20x --- --- ---      SLR F, ABD 2x10 2x20 SLR x20 reps SLR x 20 reps --- --- ---      Ankle pumps 10x 20x Gr TB DF/PF x20 reps Gr TB x 20 reps --- --- --      Nustep  10m L1 10min L1 Bike x 10 min L3 Bike x 10 min L3 nustep x10 min L3 NuStep x 10 min L3      Step ups   6"x10 reps 6"x10 reps 6"x20 6"x20 6"x20      Lateral step ups   4"x10 reps 4"x10 reps 6"x20 6"x20 6"x20      Bridging w/add   20x 20x 20x 20x 20x      clamshells   Supine red TB x 20 reps Red tb x 20 reps --- Green tubing x 20 reps 20x blue TB                                Gait Training             Cane  perf  Up/down 11 steps x 2 reps using 1 rail Up/down 11 steps; gait outdoors, even/uneven terrain, curbs steps & outdoor gait performed ---                   Modalities         CP L knee perf 10min 10min  10min 10min 10min

## 2020-07-03 ENCOUNTER — APPOINTMENT (OUTPATIENT)
Dept: PHYSICAL THERAPY | Facility: CLINIC | Age: 70
End: 2020-07-03

## 2020-07-06 ENCOUNTER — OFFICE VISIT (OUTPATIENT)
Dept: PHYSICAL THERAPY | Facility: CLINIC | Age: 70
End: 2020-07-06

## 2020-07-06 DIAGNOSIS — S83.242D OTHER TEAR OF MEDIAL MENISCUS OF LEFT KNEE AS CURRENT INJURY, SUBSEQUENT ENCOUNTER: Primary | ICD-10-CM

## 2020-07-06 PROCEDURE — 97110 THERAPEUTIC EXERCISES: CPT

## 2020-07-06 PROCEDURE — 97530 THERAPEUTIC ACTIVITIES: CPT

## 2020-07-06 NOTE — PROGRESS NOTES
Daily Note     Today's date: 2020  Patient name: Anoop Cason  : 1950  MRN: [id number]  Referring provider: Dagoberto Tolbert MD  Dx:   Encounter Diagnosis     ICD-10-CM    1  Other tear of medial meniscus of left knee as current injury, subsequent encounter S83 242D        Start Time: 0800          Subjective: Patient reports 3/10 L knee pain while ambulating up/down steps  Objective: See treatment diary below      Assessment: Tolerated treatment fair  Patient would benefit from continued PT      Plan: Progress treatment as tolerated         Precautions: Standard      Manuals        L HS/quad/gastroc stretching   performed performed perf perf perf perf                               L knee AROM   0 -> 120 degrees 0-> 120 degrees         Neuro Re-Ed            Sit to stand    23" mat x 20 reps 23" mat x20 reps Low mat x 20 reps 20x low mat  20x low mat 20x chair     HR on 1/2 roll   20x 20x 20x 20x 1/2 roll 20x 1/2 roll 20x 1/2 roll                  SLS ball toss     outdoors 2x10  reps 2x10 on foam 2x10 (indoors) 2x10  On ground     Gait on blue foam         6x (holding rail) - fw/bw/lateral                                Ther Ex       Quad set 10x 15x HEP --- --- --- --- ---     Heel slide 10x 20x 20x 20x --- --- --- 20x     SLR F, ABD 2x10 2x20 SLR x20 reps SLR x 20 reps --- --- --- 2#AK 2 x 10     Ankle pumps 10x 20x Gr TB DF/PF x20 reps Gr TB x 20 reps --- --- -- --     Nustep  10m L1 10min L1 Bike x 10 min L3 Bike x 10 min L3 nustep x10 min L3 NuStep x 10 min L3 Bike x 10 min     Step ups   6"x10 reps 6"x10 reps 6"x20 6"x20 6"x20 ---     Lateral step ups   4"x10 reps 4"x10 reps 6"x20 6"x20 6"x20 ---     Bridging w/add   20x 20x 20x 20x 20x 20x     clamshells   Supine red TB x 20 reps Red tb x 20 reps --- Green tubing x 20 reps 20x blue TB Blue tb x 20 reps                                Gait Training             Cane  perf Up/down 11 steps x 2 reps using 1 rail Up/down 11 steps; gait outdoors, even/uneven terrain, curbs steps & outdoor gait performed --- Up/down 11 steps x 2 reps using 1 handrail                  Modalities   6/24 6/26 6/29 7/1 7/2 7/6     CP L knee  perf 10min 10min  10min 10min 10min 10 min

## 2020-07-08 ENCOUNTER — OFFICE VISIT (OUTPATIENT)
Dept: PHYSICAL THERAPY | Facility: CLINIC | Age: 70
End: 2020-07-08

## 2020-07-08 DIAGNOSIS — S83.242D OTHER TEAR OF MEDIAL MENISCUS OF LEFT KNEE AS CURRENT INJURY, SUBSEQUENT ENCOUNTER: Primary | ICD-10-CM

## 2020-07-08 PROCEDURE — 97110 THERAPEUTIC EXERCISES: CPT

## 2020-07-08 PROCEDURE — 97530 THERAPEUTIC ACTIVITIES: CPT

## 2020-07-08 NOTE — PROGRESS NOTES
Daily Note     Today's date: 2020  Patient name: Everette Jeans  : 1950  MRN: [id number]  Referring provider: Sridhar Wylie MD  Dx:   Encounter Diagnosis     ICD-10-CM    1  Other tear of medial meniscus of left knee as current injury, subsequent encounter S83 242D        Start Time: 0800          Subjective: Patient reports continued improvement  Objective: See treatment diary below      Assessment: Tolerated treatment well  Patient exhibited good technique with therapeutic exercises      Plan: Progress treatment as tolerated         Precautions: Standard      Manuals        L HS/quad/gastroc stretching   performed performed perf perf perf perf                               L knee AROM   0 -> 120 degrees 0-> 120 degrees         Neuro Re-Ed            Sit to stand    23" mat x 20 reps 23" mat x20 reps Low mat x 20 reps 20x low mat  20x low mat Low mat x 20 reps     HR on 1/2 roll   20x 20x 20x 20x 1/2 roll 20x 1/2 roll 20x 1/2 roll                  SLS ball toss     outdoors 2x10  reps 2x10 on foam 2x10 (indoors) 4x10 on blue foam     Gait on blue foam         -----     BOSU lunges        20x (B)                  Ther Ex      Quad set 10x 15x HEP --- --- --- --- ---     Heel slide 10x 20x 20x 20x --- --- --- 20x     SLR F, ABD 2x10 2x20 SLR x20 reps SLR x 20 reps --- --- --- 2#AK 2 x 10     Ankle pumps 10x 20x Gr TB DF/PF x20 reps Gr TB x 20 reps --- --- -- DF x 20 reps blue TB     Nustep  10m L1 10min L1 Bike x 10 min L3 Bike x 10 min L3 nustep x10 min L3 NuStep x 10 min L3 NuStep x 10 min L3     Step ups   6"x10 reps 6"x10 reps 6"x20 6"x20 6"x20 6"x20 reps     Lateral step ups   4"x10 reps 4"x10 reps 6"x20 6"x20 6"x20 6"x20 reps     Bridging w/add   20x 20x 20x 20x 20x 20x     clamshells   Supine red TB x 20 reps Red tb x 20 reps --- Green tubing x 20 reps 20x blue TB Blue tb x 20 reps                                Gait Training 6/22           Cane  perf  Up/down 11 steps x 2 reps using 1 rail Up/down 11 steps; gait outdoors, even/uneven terrain, curbs steps & outdoor gait performed --- -----l                  Modalities   6/24 6/26 6/29 7/1 7/2 7/8     CP L knee  perf 10min 10min  10min 10min 10min deferred

## 2020-07-10 ENCOUNTER — APPOINTMENT (OUTPATIENT)
Dept: PHYSICAL THERAPY | Facility: CLINIC | Age: 70
End: 2020-07-10

## 2020-07-13 ENCOUNTER — OFFICE VISIT (OUTPATIENT)
Dept: PHYSICAL THERAPY | Facility: CLINIC | Age: 70
End: 2020-07-13

## 2020-07-13 DIAGNOSIS — S83.242D OTHER TEAR OF MEDIAL MENISCUS OF LEFT KNEE AS CURRENT INJURY, SUBSEQUENT ENCOUNTER: Primary | ICD-10-CM

## 2020-07-13 PROCEDURE — 97110 THERAPEUTIC EXERCISES: CPT

## 2020-07-13 PROCEDURE — 97530 THERAPEUTIC ACTIVITIES: CPT

## 2020-07-13 NOTE — PROGRESS NOTES
Daily Note     Today's date: 2020  Patient name: Gladis Valverde  : 1950  MRN: [id number]  Referring provider: Joya Chance MD  Dx:   Encounter Diagnosis     ICD-10-CM    1  Other tear of medial meniscus of left knee as current injury, subsequent encounter S83 242D        Start Time: 0800          Subjective: My knee feels better  Objective: See treatment diary below      Assessment: Tolerated treatment well  Patient would benefit from continued PT   SLS on uneven terrain improving  Plan: Progress treatment as tolerated         Precautions: Standard      Manuals       L HS/quad/gastroc stretching   performed performed perf perf perf perf perf                              L knee AROM   0 -> 120 degrees 0-> 120 degrees         Neuro Re-Ed            Sit to stand    23" mat x 20 reps 23" mat x20 reps Low mat x 20 reps 20x low mat  20x low mat Low mat x 20 reps 20x low mat    HR on / roll   20x 20x 20x 20x 1/2 roll 20x 1/2 roll 20x 1/2 roll On 2 roll 20x                 SLS ball toss     outdoors 2x10  reps 2x10 on foam 2x10 (indoors) 4x10 on blue foam 4x10 on blue foam    Gait on blue foam         ----- ---    BOSU lunges        20x (B) 20x (B)    isis 4 way walk outs         @#10 - 4 x 10 reps    Ther Ex     Quad set 10x 15x HEP --- --- --- --- --- ---    Heel slide 10x 20x 20x 20x --- --- --- 20x 20x    SLR F, ABD 2x10 2x20 SLR x20 reps SLR x 20 reps --- --- --- 2#AK 2 x 10 2# AK 2 x 10 reps    Ankle pumps 10x 20x Gr TB DF/PF x20 reps Gr TB x 20 reps --- --- -- DF x 20 reps blue TB ---    Nustep  10m L1 10min L1 Bike x 10 min L3 Bike x 10 min L3 nustep x10 min L3 NuStep x 10 min L3 NuStep x 10 min L3 NuStep x 10 min L3    Step ups   6"x10 reps 6"x10 reps 6"x20 6"x20 6"x20 6"x20 reps 6" x20 reps    Lateral step ups   4"x10 reps 4"x10 reps 6"x20 6"x20 6"x20 6"x20 reps 6" x20 reps    Bridging w/add   20x 20x 20x 20x 20x 20x 20x    clamshells   Supine red TB x 20 reps Red tb x 20 reps --- Green tubing x 20 reps 20x blue TB Blue tb x 20 reps  20x                              Gait Training  6/22           Cane  perf  Up/down 11 steps x 2 reps using 1 rail Up/down 11 steps; gait outdoors, even/uneven terrain, curbs steps & outdoor gait performed --- -----l ---                 Modalities   6/24 6/26 6/29 7/1 7/2 7/8 7/13    CP L knee  perf 10min 10min  10min 10min 10min deferred deferred

## 2020-07-15 ENCOUNTER — OFFICE VISIT (OUTPATIENT)
Dept: PHYSICAL THERAPY | Facility: CLINIC | Age: 70
End: 2020-07-15

## 2020-07-15 DIAGNOSIS — S83.242D OTHER TEAR OF MEDIAL MENISCUS OF LEFT KNEE AS CURRENT INJURY, SUBSEQUENT ENCOUNTER: Primary | ICD-10-CM

## 2020-07-15 PROCEDURE — 97530 THERAPEUTIC ACTIVITIES: CPT

## 2020-07-15 PROCEDURE — 97110 THERAPEUTIC EXERCISES: CPT

## 2020-07-15 NOTE — PROGRESS NOTES
Daily Note     Today's date: 7/15/2020  Patient name: Amaya Forrest  : 1950  MRN: [id number]  Referring provider: Kayla Beckford MD  Dx:   Encounter Diagnosis     ICD-10-CM    1  Other tear of medial meniscus of left knee as current injury, subsequent encounter S83 242D        Start Time: 0800          Subjective: Patient reports L knee discomfort upon full flexion  Objective: See treatment diary below      Assessment: Tolerated treatment well  Patient would benefit from continued PT      Plan: Progress treatment as tolerated         Precautions: Standard      Manuals   6/24 6/26 6/29 7/1 7/2 7/8 7/13 7/15   L HS/quad/gastroc stretching   performed performed perf perf perf perf perf perf                             L knee AROM   0 -> 120 degrees 0-> 120 degrees         Neuro Re-Ed            Sit to stand    23" mat x 20 reps 23" mat x20 reps Low mat x 20 reps 20x low mat  20x low mat Low mat x 20 reps 20x low mat 20x chair   HR on 1/2 roll   20x 20x 20x 20x 1/2 roll 20x 1/2 roll 20x 1/2 roll On 1/2 roll 20x 20x on 1/2 roll                SLS ball toss     outdoors 2x10  reps 2x10 on foam 2x10 (indoors) 4x10 on blue foam 4x10 on blue foam 4x10 on blue foam   Gait on blue foam         ----- --- ----   BOSU lunges        20x (B) 20x (B) 20x (B)   isis 4 way walk outs         @#10 - 4 x 10 reps @#10 - 4 x 10 reps   Ther Ex     Quad set 10x 15x HEP --- --- --- --- --- --- ---   Heel slide 10x 20x 20x 20x --- --- --- 20x 20x 20x   SLR F, ABD 2x10 2x20 SLR x20 reps SLR x 20 reps --- --- --- 2#AK 2 x 10 2# AK 2 x 10 reps 2#AK 2 x 10 reps   Ankle pumps 10x 20x Gr TB DF/PF x20 reps Gr TB x 20 reps --- --- -- DF x 20 reps blue TB --- ---   Nustep  10m L1 10min L1 Bike x 10 min L3 Bike x 10 min L3 nustep x10 min L3 NuStep x 10 min L3 NuStep x 10 min L3 NuStep x 10 min L3 10min L3   Step ups   6"x10 reps 6"x10 reps 6"x20 6"x20 6"x20 6"x20 reps 6" x20 reps 8"x20 reps Lateral step ups   4"x10 reps 4"x10 reps 6"x20 6"x20 6"x20 6"x20 reps 6" x20 reps 8"x20 reps   Bridging w/add   20x 20x 20x 20x 20x 20x 20x 20x   clamshells   Supine red TB x 20 reps Red tb x 20 reps --- Green tubing x 20 reps 20x blue TB Blue tb x 20 reps  20x Blue TB x 20 reps                             Gait Training  6/22           Cane  perf  Up/down 11 steps x 2 reps using 1 rail Up/down 11 steps; gait outdoors, even/uneven terrain, curbs steps & outdoor gait performed --- -----l --- ----                Modalities   6/24 6/26 6/29 7/1 7/2 7/8 7/13    CP L knee  perf 10min 10min  10min 10min 10min deferred deferred deferred

## 2020-07-17 ENCOUNTER — OFFICE VISIT (OUTPATIENT)
Dept: PHYSICAL THERAPY | Facility: CLINIC | Age: 70
End: 2020-07-17

## 2020-07-17 DIAGNOSIS — S83.242D OTHER TEAR OF MEDIAL MENISCUS OF LEFT KNEE AS CURRENT INJURY, SUBSEQUENT ENCOUNTER: Primary | ICD-10-CM

## 2020-07-17 PROCEDURE — 97110 THERAPEUTIC EXERCISES: CPT

## 2020-07-17 PROCEDURE — 97530 THERAPEUTIC ACTIVITIES: CPT

## 2020-07-17 PROCEDURE — 97112 NEUROMUSCULAR REEDUCATION: CPT

## 2020-07-17 NOTE — PROGRESS NOTES
Daily Note     Today's date: 2020  Patient name: Manuel Collado  : 1950  MRN: [id number]  Referring provider: Lacey Mata MD  Dx:   Encounter Diagnosis     ICD-10-CM    1  Other tear of medial meniscus of left knee as current injury, subsequent encounter X51 920B                   Subjective: My knee feels very good  Objective: See treatment diary below      Assessment: Tolerated treatment well   Patient exhibited good technique with therapeutic exercises      Plan: D/C with HEP     Precautions: Standard      Manuals      L HS/quad/gastroc stretching   performed performed perf perf perf perf perf perf                             L knee AROM   0 -> 120 degrees 0-> 120 degrees         Neuro Re-Ed            Sit to stand    23" mat x 20 reps 23" mat x20 reps Low mat x 20 reps 20x low mat  20x low mat Low mat x 20 reps 20x low mat 20x chair   HR on 1/2 roll   20x 20x 20x 20x 1/2 roll 20x 1/2 roll 20x 1/2 roll On 1/2 roll 20x 20x on 1/2 roll                SLS ball toss     outdoors 2x10  reps 2x10 on foam 2x10 (indoors) 4x10 on blue foam 4x10 on blue foam 4x10 on blue foam   Gait on blue foam         ----- --- ----   BOSU lunges        20x (B) 20x (B) 20x (B)   isis 4 way walk outs         @#10 - 4 x 10 reps @#10 - 4 x 10 reps   Ther Ex    Quad set 10x 15x HEP --- --- --- --- --- --- ---   Heel slide 10x 20x 20x 20x --- --- --- 20x 20x 20x   SLR F, ABD 2x10 2x20 SLR x20 reps SLR x 20 reps --- --- --- 2#AK 2 x 10 2# AK 2 x 10 reps 2#AK 2 x 10 reps   Ankle pumps 10x 20x Gr TB DF/PF x20 reps Gr TB x 20 reps --- --- -- DF x 20 reps blue TB --- ---   Nustep  10m L1 10min L1 Bike x 10 min L3 Bike x 10 min L3 nustep x10 min L3 NuStep x 10 min L3 NuStep x 10 min L3 NuStep x 10 min L3 Bike x 15 min L4   Step ups   6"x10 reps 6"x10 reps 6"x20 6"x20 6"x20 6"x20 reps 6" x20 reps 8"x20 reps   Lateral step ups   4"x10 reps 4"x10 reps 6"x20 6"x20 6"x20 6"x20 reps 6" x20 reps 8"x20 reps   Bridging w/add   20x 20x 20x 20x 20x 20x 20x 20x   clamshells   Supine red TB x 20 reps Red tb x 20 reps --- Green tubing x 20 reps 20x blue TB Blue tb x 20 reps  20x Blue TB x 20 reps                             Gait Training  6/22           Cane  perf  Up/down 11 steps x 2 reps using 1 rail Up/down 11 steps; gait outdoors, even/uneven terrain, curbs steps & outdoor gait performed --- -----l --- ----                Modalities   6/24 6/26 6/29 7/1 7/2 7/8 7/13 7/17   CP L knee  perf 10min 10min  10min 10min 10min deferred deferred deferred

## 2020-07-17 NOTE — PROGRESS NOTES
PT Discharge    Today's date: 2020  Patient name: Rosio Baker  : 1950  MRN: [id number]  Referring provider: José Miguel Turner MD  Dx:   Encounter Diagnosis     ICD-10-CM    1  Other tear of medial meniscus of left knee as current injury, subsequent encounter S83 242D        Start Time: 0800  Stop Time: 0547  Total time in clinic (min): 50 minutes    Assessment  Assessment details: Rosio Baker has been seen for Other tear of medial meniscus of left knee as current injury, subsequent encounter  (primary encounter diagnosis),and has met the goals of physical therapy  And is independent with a HEP  Plan  Plan details: Discontinue PT        Subjective Evaluation    History of Present Illness  Mechanism of injury: My knee feels great          Objective     Active Range of Motion   Left Knee   Flexion: 130 degrees   Extension: 0 degrees     Right Knee   Flexion: 130 degrees   Extension: 0 degrees     Strength/Myotome Testing     Left Knee   Flexion: 5  Extension: 5    Right Knee   Flexion: 5  Extension: 5

## 2020-07-20 ENCOUNTER — APPOINTMENT (OUTPATIENT)
Dept: PHYSICAL THERAPY | Facility: CLINIC | Age: 70
End: 2020-07-20

## 2020-07-21 ENCOUNTER — TELEPHONE (OUTPATIENT)
Dept: OBGYN CLINIC | Facility: CLINIC | Age: 70
End: 2020-07-21

## 2020-07-21 NOTE — TELEPHONE ENCOUNTER
Dr Muñoz   #: [phone]           Anna Givens from  called in requesting OVN to be faxed     Faxed to #[phone]

## 2020-07-22 ENCOUNTER — APPOINTMENT (OUTPATIENT)
Dept: PHYSICAL THERAPY | Facility: CLINIC | Age: 70
End: 2020-07-22

## 2020-07-24 ENCOUNTER — APPOINTMENT (OUTPATIENT)
Dept: PHYSICAL THERAPY | Facility: CLINIC | Age: 70
End: 2020-07-24

## 2020-07-24 DIAGNOSIS — B96.81 H PYLORI ULCER: ICD-10-CM

## 2020-07-24 DIAGNOSIS — F41.9 ANXIETY: Primary | ICD-10-CM

## 2020-07-24 DIAGNOSIS — K27.9 H PYLORI ULCER: ICD-10-CM

## 2020-07-24 RX ORDER — HYDROXYZINE HYDROCHLORIDE 10 MG/1
10 TABLET, FILM COATED ORAL
Qty: 30 TABLET | Refills: 0 | Status: SHIPPED | OUTPATIENT
Start: 2020-07-24 | End: 2020-09-22 | Stop reason: SDUPTHER

## 2020-07-24 NOTE — PROGRESS NOTES
Switched to atarax low dose to avoid drowsey  Ordered Stool hypylori test of cure   No insurance charge is 66 called to inform

## 2020-07-27 ENCOUNTER — APPOINTMENT (OUTPATIENT)
Dept: PHYSICAL THERAPY | Facility: CLINIC | Age: 70
End: 2020-07-27

## 2020-07-28 ENCOUNTER — TELEPHONE (OUTPATIENT)
Dept: FAMILY MEDICINE CLINIC | Facility: CLINIC | Age: 70
End: 2020-07-28

## 2020-07-28 NOTE — TELEPHONE ENCOUNTER
Dr Jon Canales referred Carmelo Goldberg to our aortic clinic for findings of a 43-44 mm ascending aortic aneurysm noted on chest CT scan in June  Patient has been scheduled and then rescheduled but has not come into our office due to lack of insurance coverage  The size of his ascending aorta does not meet surgical criteria:     Ascending aortic aneurysm surgical triggers:   * 4 5 cm or > in the setting of + FH of rupture or dissection   * 5 cm with moderate or worse aortic valve disease   *  5 5 cm regardless of aortic valve function and without genetically associated aortic disease   *  Aortic growth > 4mm / year   *  Bicuspid aortic valve with valve dysfunction enough to meet indications for surgery and concomitant ascending aortic aneurysm 4 5 cm or >   * 4 5 cm or > in setting of existing connective tissue disease of Marfan's syndrome, Darian-Danlos syndrome or familiar aneurysms syndrome       We recommend obtaining an echo at some point to assess aortic valve morphology, when patient has insurance coverage  Follow up CT scan in 1 year is adequate for surveillance  If the size of the ascending aorta increases above 45 mm, then referral back to our aortic clinic is recommended  Feel free to contact our office with questions       Daniele Barker, MSN, ACNP-BC

## 2020-07-29 ENCOUNTER — APPOINTMENT (OUTPATIENT)
Dept: PHYSICAL THERAPY | Facility: CLINIC | Age: 70
End: 2020-07-29

## 2020-07-29 ENCOUNTER — APPOINTMENT (OUTPATIENT)
Dept: LAB | Facility: HOSPITAL | Age: 70
End: 2020-07-29
Attending: FAMILY MEDICINE

## 2020-07-29 ENCOUNTER — TRANSCRIBE ORDERS (OUTPATIENT)
Dept: ADMINISTRATIVE | Facility: HOSPITAL | Age: 70
End: 2020-07-29

## 2020-07-29 DIAGNOSIS — K27.9 H PYLORI ULCER: ICD-10-CM

## 2020-07-29 DIAGNOSIS — B96.81 H PYLORI ULCER: ICD-10-CM

## 2020-07-29 PROCEDURE — 87338 HPYLORI STOOL AG IA: CPT

## 2020-07-31 ENCOUNTER — APPOINTMENT (OUTPATIENT)
Dept: PHYSICAL THERAPY | Facility: CLINIC | Age: 70
End: 2020-07-31

## 2020-07-31 LAB — H PYLORI AG STL QL IA: NEGATIVE

## 2020-08-24 DIAGNOSIS — G25.81 RESTLESS LEGS SYNDROME: ICD-10-CM

## 2020-08-24 RX ORDER — GABAPENTIN 300 MG/1
300 CAPSULE ORAL
Qty: 90 CAPSULE | Refills: 1 | Status: SHIPPED | OUTPATIENT
Start: 2020-08-24 | End: 2021-03-02

## 2020-09-22 ENCOUNTER — OFFICE VISIT (OUTPATIENT)
Dept: FAMILY MEDICINE CLINIC | Facility: CLINIC | Age: 70
End: 2020-09-22

## 2020-09-22 VITALS
HEIGHT: 62 IN | RESPIRATION RATE: 16 BRPM | WEIGHT: 141.4 LBS | DIASTOLIC BLOOD PRESSURE: 70 MMHG | HEART RATE: 98 BPM | BODY MASS INDEX: 26.02 KG/M2 | SYSTOLIC BLOOD PRESSURE: 112 MMHG | OXYGEN SATURATION: 95 % | TEMPERATURE: 97.5 F

## 2020-09-22 DIAGNOSIS — R51.9 FREQUENT HEADACHES: ICD-10-CM

## 2020-09-22 DIAGNOSIS — B00.9 HERPES: ICD-10-CM

## 2020-09-22 DIAGNOSIS — G25.81 RESTLESS LEGS SYNDROME: ICD-10-CM

## 2020-09-22 DIAGNOSIS — R07.89 CHEST PRESSURE: ICD-10-CM

## 2020-09-22 DIAGNOSIS — F41.9 ANXIETY: Primary | ICD-10-CM

## 2020-09-22 PROCEDURE — 99213 OFFICE O/P EST LOW 20 MIN: CPT | Performed by: FAMILY MEDICINE

## 2020-09-22 RX ORDER — HYDROXYZINE HYDROCHLORIDE 10 MG/1
10 TABLET, FILM COATED ORAL
Qty: 90 TABLET | Refills: 2 | Status: SHIPPED | OUTPATIENT
Start: 2020-09-22 | End: 2020-12-09 | Stop reason: SDUPTHER

## 2020-09-22 RX ORDER — NAPROXEN 500 MG/1
500 TABLET ORAL 2 TIMES DAILY PRN
Qty: 30 TABLET | Refills: 0 | Status: SHIPPED | OUTPATIENT
Start: 2020-09-22 | End: 2021-01-26 | Stop reason: SDUPTHER

## 2020-09-22 RX ORDER — VALACYCLOVIR HYDROCHLORIDE 1 G/1
1000 TABLET, FILM COATED ORAL 2 TIMES DAILY
Qty: 14 TABLET | Refills: 0 | Status: SHIPPED | OUTPATIENT
Start: 2020-09-22 | End: 2020-10-27 | Stop reason: ALTCHOICE

## 2020-09-22 NOTE — PROGRESS NOTES
Wilder Galindo 1950 male MRN: [id number]    [facility] - [address]      ASSESSMENT/PLAN  Wilder Galindo is a 78 y o  male presents to the office for    1  Anxiety  Controlled on as needed atarax  - hydrOXYzine HCL (ATARAX) 10 mg tablet; Take 1 tablet (10 mg total) by mouth daily at bedtime as needed for anxiety  Dispense: 90 tablet; Refill: 2    2  Restless legs syndrome  Controlled; continue on medications    3  Frequent headaches  Use as needed  If persist to please call  - naproxen (NAPROSYN) 500 mg tablet; Take 1 tablet (500 mg total) by mouth 2 (two) times a day as needed for headaches  Dispense: 30 tablet; Refill: 0    4  Herpes  Nothing present, however symptoms present  Recommend no tx, however patient persistent about wanting treatment  Sent in  - valACYclovir (VALTREX) 1,000 mg tablet; Take 1 tablet (1,000 mg total) by mouth 2 (two) times a day for 7 days  Dispense: 14 tablet; Refill: 0    5  Chest pressure  Currently not present  Recommend cardiology consult per patient request  - Ambulatory referral to Cardiology; Future     BMI Counseling: Body mass index is 25 86 kg/m²  The BMI is above normal  Nutrition recommendations include decreasing portion sizes, decreasing fast food intake, consuming healthier snacks and limiting drinks that contain sugar  No future appointments  SUBJECTIVE  CC: Headache (pt complains of headache and pain in chest near heart not constant, no sob)      HPI:  Wilder Galindo is a 78 y o  male who presents for an acute appointment  Headaches worsening with dizziness  Herpes wife just recently dx with it from exposure to " toliet at her job"  He started with pain and burning in the rectum was advise to come for treatment  Chest pain that comes and goes, requesting to be seen by Cardio  He doesn't want it to hurt daily  No relief with Leigh Ann  Mild relief with ginger tea     Restless leg controlled on gabapentin doesn't need refill  Anxiety: controlled on atarax would like to a refill today  Review of Systems   Constitutional: Negative for activity change, appetite change, chills, fatigue and fever  HENT: Negative for congestion  Respiratory: Negative for cough, chest tightness and shortness of breath  Cardiovascular: Positive for chest pain  Negative for leg swelling  Gastrointestinal: Negative for abdominal distention, abdominal pain, constipation, diarrhea, nausea and vomiting  Genitourinary: Positive for penile pain  Skin: Negative for rash  Neurological: Positive for dizziness, light-headedness and headaches  All other systems reviewed and are negative  Historical Information   The patient history was reviewed as follows:  Past Medical History:   Diagnosis Date    Arthritis     shoulder    Memory changes          Medications:     Current Outpatient Medications:     gabapentin (NEURONTIN) 300 mg capsule, Take 1 capsule (300 mg total) by mouth daily at bedtime, Disp: 90 capsule, Rfl: 1    hydrOXYzine HCL (ATARAX) 10 mg tablet, Take 1 tablet (10 mg total) by mouth daily at bedtime as needed for anxiety, Disp: 30 tablet, Rfl: 0    tamsulosin (FLOMAX) 0 4 mg, daily with dinner , Disp: , Rfl: 1    No Known Allergies    OBJECTIVE  Vitals:   Vitals:    09/22/20 1357   BP: 112/70   BP Location: Left arm   Patient Position: Sitting   Cuff Size: Standard   Pulse: 98   Resp: 16   Temp: 97 5 °F (36 4 °C)   TempSrc: Temporal   SpO2: 95%   Weight: 64 1 kg (141 lb 6 4 oz)   Height: 5' 2" (1 575 m)         Physical Exam  Vitals signs reviewed  Constitutional:       Appearance: He is well-developed  HENT:      Head: Normocephalic and atraumatic  Eyes:      Conjunctiva/sclera: Conjunctivae normal       Pupils: Pupils are equal, round, and reactive to light  Neck:      Musculoskeletal: Normal range of motion and neck supple     Cardiovascular:      Rate and Rhythm: Normal rate and regular rhythm  Heart sounds: Normal heart sounds  Pulmonary:      Effort: Pulmonary effort is normal  No respiratory distress  Breath sounds: Normal breath sounds  Genitourinary:      Musculoskeletal: Normal range of motion  Skin:     General: Skin is warm  Capillary Refill: Capillary refill takes less than 2 seconds  Neurological:      General: No focal deficit present  Mental Status: He is alert and oriented to person, place, and time  Mental status is at baseline  Cranial Nerves: No cranial nerve deficit  Psychiatric:         Mood and Affect: Mood normal          Behavior: Behavior normal          Thought Content:  Thought content normal          Judgment: Judgment normal                     Maryam Chavis MD,   [facility]  9/22/2020

## 2020-10-05 ENCOUNTER — HOSPITAL ENCOUNTER (EMERGENCY)
Facility: HOSPITAL | Age: 70
Discharge: HOME | End: 2020-10-05
Attending: EMERGENCY MEDICINE | Admitting: EMERGENCY MEDICINE

## 2020-10-05 ENCOUNTER — APPOINTMENT (EMERGENCY)
Dept: RADIOLOGY | Facility: HOSPITAL | Age: 70
End: 2020-10-05

## 2020-10-05 VITALS
SYSTOLIC BLOOD PRESSURE: 124 MMHG | DIASTOLIC BLOOD PRESSURE: 62 MMHG | OXYGEN SATURATION: 96 % | WEIGHT: 142 LBS | HEART RATE: 58 BPM | BODY MASS INDEX: 25.97 KG/M2 | TEMPERATURE: 97.5 F | RESPIRATION RATE: 22 BRPM

## 2020-10-05 DIAGNOSIS — R07.9 CHEST PAIN, UNSPECIFIED: Primary | ICD-10-CM

## 2020-10-05 DIAGNOSIS — R51.9 HEADACHE: ICD-10-CM

## 2020-10-05 LAB
ALBUMIN SERPL BCP-MCNC: 4 G/DL (ref 3.5–5)
ALP SERPL-CCNC: 84 U/L (ref 46–116)
ALT SERPL W P-5'-P-CCNC: 22 U/L (ref 12–78)
ANION GAP SERPL CALCULATED.3IONS-SCNC: 10 MMOL/L (ref 4–13)
APAP SERPL-MCNC: <2 UG/ML (ref 10–20)
AST SERPL W P-5'-P-CCNC: 18 U/L (ref 5–45)
BASOPHILS # BLD AUTO: 0 THOUSANDS/ΜL (ref 0–0.1)
BASOPHILS NFR BLD AUTO: 0 % (ref 0–1)
BILIRUB SERPL-MCNC: 0.8 MG/DL (ref 0.2–1)
BUN SERPL-MCNC: 17 MG/DL (ref 5–25)
CALCIUM SERPL-MCNC: 8.5 MG/DL (ref 8.3–10.1)
CHLORIDE SERPL-SCNC: 104 MMOL/L (ref 100–108)
CO2 SERPL-SCNC: 25 MMOL/L (ref 21–32)
CREAT SERPL-MCNC: 0.84 MG/DL (ref 0.6–1.3)
EOSINOPHIL # BLD AUTO: 0.06 THOUSAND/ΜL (ref 0–0.61)
EOSINOPHIL NFR BLD AUTO: 1 % (ref 0–6)
ERYTHROCYTE [DISTWIDTH] IN BLOOD BY AUTOMATED COUNT: 12.3 % (ref 11.6–15.1)
GFR SERPL CREATININE-BSD FRML MDRD: 90 ML/MIN/1.73SQ M
GLUCOSE SERPL-MCNC: 104 MG/DL (ref 65–140)
HCT VFR BLD AUTO: 44.3 % (ref 36.5–49.3)
HGB BLD-MCNC: 15.1 G/DL (ref 12–17)
IMM GRANULOCYTES # BLD AUTO: 0.02 THOUSAND/UL (ref 0–0.2)
IMM GRANULOCYTES NFR BLD AUTO: 0 % (ref 0–2)
LYMPHOCYTES # BLD AUTO: 2.28 THOUSANDS/ΜL (ref 0.6–4.47)
LYMPHOCYTES NFR BLD AUTO: 35 % (ref 14–44)
MCH RBC QN AUTO: 32.6 PG (ref 26.8–34.3)
MCHC RBC AUTO-ENTMCNC: 34.1 G/DL (ref 31.4–37.4)
MCV RBC AUTO: 96 FL (ref 82–98)
MONOCYTES # BLD AUTO: 0.54 THOUSAND/ΜL (ref 0.17–1.22)
MONOCYTES NFR BLD AUTO: 8 % (ref 4–12)
NEUTROPHILS # BLD AUTO: 3.59 THOUSANDS/ΜL (ref 1.85–7.62)
NEUTS SEG NFR BLD AUTO: 56 % (ref 43–75)
NRBC BLD AUTO-RTO: 0 /100 WBCS
PLATELET # BLD AUTO: 206 THOUSANDS/UL (ref 149–390)
PMV BLD AUTO: 9.4 FL (ref 8.9–12.7)
POTASSIUM SERPL-SCNC: 4.1 MMOL/L (ref 3.5–5.3)
PROT SERPL-MCNC: 7.1 G/DL (ref 6.4–8.2)
RBC # BLD AUTO: 4.63 MILLION/UL (ref 3.88–5.62)
SODIUM SERPL-SCNC: 139 MMOL/L (ref 136–145)
TROPONIN I SERPL-MCNC: <0.02 NG/ML
WBC # BLD AUTO: 6.49 THOUSAND/UL (ref 4.31–10.16)

## 2020-10-05 PROCEDURE — 84484 ASSAY OF TROPONIN QUANT: CPT | Performed by: EMERGENCY MEDICINE

## 2020-10-05 PROCEDURE — 71045 X-RAY EXAM CHEST 1 VIEW: CPT

## 2020-10-05 PROCEDURE — 85025 COMPLETE CBC W/AUTO DIFF WBC: CPT | Performed by: EMERGENCY MEDICINE

## 2020-10-05 PROCEDURE — 80053 COMPREHEN METABOLIC PANEL: CPT | Performed by: EMERGENCY MEDICINE

## 2020-10-05 PROCEDURE — 96374 THER/PROPH/DIAG INJ IV PUSH: CPT

## 2020-10-05 PROCEDURE — 96375 TX/PRO/DX INJ NEW DRUG ADDON: CPT

## 2020-10-05 PROCEDURE — 80329 ANALGESICS NON-OPIOID 1 OR 2: CPT | Performed by: EMERGENCY MEDICINE

## 2020-10-05 PROCEDURE — 99285 EMERGENCY DEPT VISIT HI MDM: CPT

## 2020-10-05 PROCEDURE — 93005 ELECTROCARDIOGRAM TRACING: CPT

## 2020-10-05 PROCEDURE — 36415 COLL VENOUS BLD VENIPUNCTURE: CPT | Performed by: EMERGENCY MEDICINE

## 2020-10-05 PROCEDURE — 99285 EMERGENCY DEPT VISIT HI MDM: CPT | Performed by: EMERGENCY MEDICINE

## 2020-10-05 RX ORDER — KETOROLAC TROMETHAMINE 30 MG/ML
15 INJECTION, SOLUTION INTRAMUSCULAR; INTRAVENOUS ONCE
Status: COMPLETED | OUTPATIENT
Start: 2020-10-05 | End: 2020-10-05

## 2020-10-05 RX ORDER — METOCLOPRAMIDE HYDROCHLORIDE 5 MG/ML
10 INJECTION INTRAMUSCULAR; INTRAVENOUS ONCE
Status: COMPLETED | OUTPATIENT
Start: 2020-10-05 | End: 2020-10-05

## 2020-10-05 RX ADMIN — METOCLOPRAMIDE 10 MG: 5 INJECTION, SOLUTION INTRAMUSCULAR; INTRAVENOUS at 13:14

## 2020-10-05 RX ADMIN — KETOROLAC TROMETHAMINE 15 MG: 30 INJECTION, SOLUTION INTRAMUSCULAR at 13:14

## 2020-10-06 LAB
ATRIAL RATE: 64 BPM
P AXIS: -5 DEGREES
PR INTERVAL: 144 MS
QRS AXIS: -33 DEGREES
QRSD INTERVAL: 100 MS
QT INTERVAL: 404 MS
QTC INTERVAL: 416 MS
T WAVE AXIS: 17 DEGREES
VENTRICULAR RATE: 64 BPM

## 2020-10-06 PROCEDURE — 93010 ELECTROCARDIOGRAM REPORT: CPT | Performed by: INTERNAL MEDICINE

## 2020-10-23 ENCOUNTER — CONSULT (OUTPATIENT)
Dept: CARDIOLOGY CLINIC | Facility: CLINIC | Age: 70
End: 2020-10-23

## 2020-10-23 VITALS
TEMPERATURE: 97.4 F | BODY MASS INDEX: 25.95 KG/M2 | SYSTOLIC BLOOD PRESSURE: 120 MMHG | HEART RATE: 69 BPM | WEIGHT: 141 LBS | HEIGHT: 62 IN | DIASTOLIC BLOOD PRESSURE: 70 MMHG | OXYGEN SATURATION: 97 %

## 2020-10-23 DIAGNOSIS — I71.9 PENETRATING ATHEROSCLEROTIC ULCER OF AORTA (HCC): Primary | ICD-10-CM

## 2020-10-23 DIAGNOSIS — I73.9 PAD (PERIPHERAL ARTERY DISEASE) (HCC): ICD-10-CM

## 2020-10-23 DIAGNOSIS — R07.89 CHEST PRESSURE: ICD-10-CM

## 2020-10-23 DIAGNOSIS — I71.2 THORACIC AORTIC ANEURYSM WITHOUT RUPTURE (HCC): ICD-10-CM

## 2020-10-23 PROCEDURE — 99213 OFFICE O/P EST LOW 20 MIN: CPT | Performed by: INTERNAL MEDICINE

## 2020-10-23 RX ORDER — IBUPROFEN 200 MG
TABLET ORAL EVERY 6 HOURS PRN
COMMUNITY
End: 2021-05-18

## 2020-10-23 RX ORDER — ASPIRIN 81 MG/1
81 TABLET ORAL
Qty: 90 TABLET | Refills: 3 | Status: SHIPPED | OUTPATIENT
Start: 2020-10-23 | End: 2021-07-19 | Stop reason: SDUPTHER

## 2020-10-23 RX ORDER — ROSUVASTATIN CALCIUM 10 MG/1
10 TABLET, COATED ORAL
Qty: 90 TABLET | Refills: 3 | Status: SHIPPED | OUTPATIENT
Start: 2020-10-23 | End: 2021-07-19 | Stop reason: SDUPTHER

## 2020-10-27 ENCOUNTER — APPOINTMENT (EMERGENCY)
Dept: RADIOLOGY | Facility: HOSPITAL | Age: 70
End: 2020-10-27

## 2020-10-27 ENCOUNTER — HOSPITAL ENCOUNTER (OUTPATIENT)
Facility: HOSPITAL | Age: 70
Setting detail: OBSERVATION
Discharge: HOME | End: 2020-10-28
Attending: EMERGENCY MEDICINE | Admitting: SURGERY

## 2020-10-27 ENCOUNTER — ANESTHESIA EVENT (OUTPATIENT)
Dept: PERIOP | Facility: HOSPITAL | Age: 70
End: 2020-10-27

## 2020-10-27 ENCOUNTER — ANESTHESIA (OUTPATIENT)
Dept: PERIOP | Facility: HOSPITAL | Age: 70
End: 2020-10-27

## 2020-10-27 VITALS — HEART RATE: 76 BPM

## 2020-10-27 DIAGNOSIS — K80.00 ACUTE CHOLECYSTITIS DUE TO BILIARY CALCULUS: Primary | ICD-10-CM

## 2020-10-27 DIAGNOSIS — K81.0 ACUTE CHOLECYSTITIS: ICD-10-CM

## 2020-10-27 LAB
ALBUMIN SERPL BCP-MCNC: 4 G/DL (ref 3.5–5)
ALP SERPL-CCNC: 91 U/L (ref 46–116)
ALT SERPL W P-5'-P-CCNC: 22 U/L (ref 12–78)
ANION GAP SERPL CALCULATED.3IONS-SCNC: 12 MMOL/L (ref 4–13)
APTT PPP: 31 SECONDS (ref 23–37)
AST SERPL W P-5'-P-CCNC: 14 U/L (ref 5–45)
ATRIAL RATE: 59 BPM
BACTERIA UR QL AUTO: NORMAL /HPF
BASOPHILS # BLD AUTO: 0.01 THOUSANDS/ΜL (ref 0–0.1)
BASOPHILS NFR BLD AUTO: 0 % (ref 0–1)
BILIRUB SERPL-MCNC: 0.6 MG/DL (ref 0.2–1)
BILIRUB UR QL STRIP: NEGATIVE
BUN SERPL-MCNC: 16 MG/DL (ref 5–25)
CALCIUM SERPL-MCNC: 8.4 MG/DL (ref 8.3–10.1)
CHLORIDE SERPL-SCNC: 104 MMOL/L (ref 100–108)
CLARITY UR: CLEAR
CO2 SERPL-SCNC: 23 MMOL/L (ref 21–32)
COLOR UR: YELLOW
CREAT SERPL-MCNC: 0.82 MG/DL (ref 0.6–1.3)
EOSINOPHIL # BLD AUTO: 0.01 THOUSAND/ΜL (ref 0–0.61)
EOSINOPHIL NFR BLD AUTO: 0 % (ref 0–6)
ERYTHROCYTE [DISTWIDTH] IN BLOOD BY AUTOMATED COUNT: 11.6 % (ref 11.6–15.1)
GFR SERPL CREATININE-BSD FRML MDRD: 91 ML/MIN/1.73SQ M
GLUCOSE SERPL-MCNC: 125 MG/DL (ref 65–140)
GLUCOSE UR STRIP-MCNC: NEGATIVE MG/DL
HCT VFR BLD AUTO: 44.8 % (ref 36.5–49.3)
HGB BLD-MCNC: 15.1 G/DL (ref 12–17)
HGB UR QL STRIP.AUTO: ABNORMAL
IMM GRANULOCYTES # BLD AUTO: 0.02 THOUSAND/UL (ref 0–0.2)
IMM GRANULOCYTES NFR BLD AUTO: 0 % (ref 0–2)
INR PPP: 1.05 (ref 0.84–1.19)
KETONES UR STRIP-MCNC: NEGATIVE MG/DL
LEUKOCYTE ESTERASE UR QL STRIP: NEGATIVE
LIPASE SERPL-CCNC: 154 U/L (ref 73–393)
LYMPHOCYTES # BLD AUTO: 1.28 THOUSANDS/ΜL (ref 0.6–4.47)
LYMPHOCYTES NFR BLD AUTO: 12 % (ref 14–44)
MAGNESIUM SERPL-MCNC: 2 MG/DL (ref 1.6–2.6)
MCH RBC QN AUTO: 32.1 PG (ref 26.8–34.3)
MCHC RBC AUTO-ENTMCNC: 33.7 G/DL (ref 31.4–37.4)
MCV RBC AUTO: 95 FL (ref 82–98)
MONOCYTES # BLD AUTO: 0.47 THOUSAND/ΜL (ref 0.17–1.22)
MONOCYTES NFR BLD AUTO: 4 % (ref 4–12)
NEUTROPHILS # BLD AUTO: 8.79 THOUSANDS/ΜL (ref 1.85–7.62)
NEUTS SEG NFR BLD AUTO: 84 % (ref 43–75)
NITRITE UR QL STRIP: NEGATIVE
NON-SQ EPI CELLS URNS QL MICRO: NORMAL /HPF
NRBC BLD AUTO-RTO: 0 /100 WBCS
NT-PROBNP SERPL-MCNC: 83 PG/ML
P AXIS: 1 DEGREES
PH UR STRIP.AUTO: 5.5 [PH]
PLATELET # BLD AUTO: 219 THOUSANDS/UL (ref 149–390)
PMV BLD AUTO: 9.4 FL (ref 8.9–12.7)
POTASSIUM SERPL-SCNC: 4 MMOL/L (ref 3.5–5.3)
PR INTERVAL: 156 MS
PROT SERPL-MCNC: 7.5 G/DL (ref 6.4–8.2)
PROT UR STRIP-MCNC: NEGATIVE MG/DL
PROTHROMBIN TIME: 13.6 SECONDS (ref 11.6–14.5)
QRS AXIS: -28 DEGREES
QRSD INTERVAL: 106 MS
QT INTERVAL: 432 MS
QTC INTERVAL: 427 MS
RBC # BLD AUTO: 4.71 MILLION/UL (ref 3.88–5.62)
RBC #/AREA URNS AUTO: NORMAL /HPF
SARS-COV-2 RNA RESP QL NAA+PROBE: NEGATIVE
SODIUM SERPL-SCNC: 139 MMOL/L (ref 136–145)
SP GR UR STRIP.AUTO: 1.01 (ref 1–1.03)
T WAVE AXIS: 27 DEGREES
TROPONIN I SERPL-MCNC: <0.02 NG/ML
TROPONIN I SERPL-MCNC: <0.02 NG/ML
UROBILINOGEN UR QL STRIP.AUTO: 1 E.U./DL
VENTRICULAR RATE: 59 BPM
WBC # BLD AUTO: 10.58 THOUSAND/UL (ref 4.31–10.16)
WBC #/AREA URNS AUTO: NORMAL /HPF

## 2020-10-27 PROCEDURE — 88304 TISSUE EXAM BY PATHOLOGIST: CPT | Performed by: PATHOLOGY

## 2020-10-27 PROCEDURE — G1004 CDSM NDSC: HCPCS

## 2020-10-27 PROCEDURE — 99219 PR INITIAL OBSERVATION CARE/DAY 50 MINUTES: CPT | Performed by: SURGERY

## 2020-10-27 PROCEDURE — 96374 THER/PROPH/DIAG INJ IV PUSH: CPT

## 2020-10-27 PROCEDURE — 71045 X-RAY EXAM CHEST 1 VIEW: CPT

## 2020-10-27 PROCEDURE — 74177 CT ABD & PELVIS W/CONTRAST: CPT

## 2020-10-27 PROCEDURE — 76705 ECHO EXAM OF ABDOMEN: CPT

## 2020-10-27 PROCEDURE — 93005 ELECTROCARDIOGRAM TRACING: CPT

## 2020-10-27 PROCEDURE — 80053 COMPREHEN METABOLIC PANEL: CPT | Performed by: EMERGENCY MEDICINE

## 2020-10-27 PROCEDURE — 83690 ASSAY OF LIPASE: CPT | Performed by: EMERGENCY MEDICINE

## 2020-10-27 PROCEDURE — 87635 SARS-COV-2 COVID-19 AMP PRB: CPT | Performed by: PHYSICIAN ASSISTANT

## 2020-10-27 PROCEDURE — 47562 LAPAROSCOPIC CHOLECYSTECTOMY: CPT | Performed by: SURGERY

## 2020-10-27 PROCEDURE — 83880 ASSAY OF NATRIURETIC PEPTIDE: CPT | Performed by: EMERGENCY MEDICINE

## 2020-10-27 PROCEDURE — 84484 ASSAY OF TROPONIN QUANT: CPT | Performed by: EMERGENCY MEDICINE

## 2020-10-27 PROCEDURE — 99285 EMERGENCY DEPT VISIT HI MDM: CPT | Performed by: EMERGENCY MEDICINE

## 2020-10-27 PROCEDURE — 85025 COMPLETE CBC W/AUTO DIFF WBC: CPT | Performed by: EMERGENCY MEDICINE

## 2020-10-27 PROCEDURE — 96375 TX/PRO/DX INJ NEW DRUG ADDON: CPT

## 2020-10-27 PROCEDURE — 36415 COLL VENOUS BLD VENIPUNCTURE: CPT | Performed by: EMERGENCY MEDICINE

## 2020-10-27 PROCEDURE — 81001 URINALYSIS AUTO W/SCOPE: CPT | Performed by: EMERGENCY MEDICINE

## 2020-10-27 PROCEDURE — 47562 LAPAROSCOPIC CHOLECYSTECTOMY: CPT | Performed by: PHYSICIAN ASSISTANT

## 2020-10-27 PROCEDURE — 93010 ELECTROCARDIOGRAM REPORT: CPT | Performed by: INTERNAL MEDICINE

## 2020-10-27 PROCEDURE — 85610 PROTHROMBIN TIME: CPT | Performed by: EMERGENCY MEDICINE

## 2020-10-27 PROCEDURE — 99285 EMERGENCY DEPT VISIT HI MDM: CPT

## 2020-10-27 PROCEDURE — 83735 ASSAY OF MAGNESIUM: CPT | Performed by: EMERGENCY MEDICINE

## 2020-10-27 PROCEDURE — C9113 INJ PANTOPRAZOLE SODIUM, VIA: HCPCS | Performed by: EMERGENCY MEDICINE

## 2020-10-27 PROCEDURE — 85730 THROMBOPLASTIN TIME PARTIAL: CPT | Performed by: EMERGENCY MEDICINE

## 2020-10-27 RX ORDER — ONDANSETRON 2 MG/ML
INJECTION INTRAMUSCULAR; INTRAVENOUS AS NEEDED
Status: DISCONTINUED | OUTPATIENT
Start: 2020-10-27 | End: 2020-10-27

## 2020-10-27 RX ORDER — KETOROLAC TROMETHAMINE 30 MG/ML
15 INJECTION, SOLUTION INTRAMUSCULAR; INTRAVENOUS EVERY 6 HOURS SCHEDULED
Status: DISCONTINUED | OUTPATIENT
Start: 2020-10-27 | End: 2020-10-28

## 2020-10-27 RX ORDER — CEFAZOLIN SODIUM 2 G/50ML
2000 SOLUTION INTRAVENOUS EVERY 8 HOURS
Status: DISCONTINUED | OUTPATIENT
Start: 2020-10-27 | End: 2020-10-27

## 2020-10-27 RX ORDER — SUCCINYLCHOLINE/SOD CL,ISO/PF 100 MG/5ML
SYRINGE (ML) INTRAVENOUS AS NEEDED
Status: DISCONTINUED | OUTPATIENT
Start: 2020-10-27 | End: 2020-10-27

## 2020-10-27 RX ORDER — DEXAMETHASONE SODIUM PHOSPHATE 4 MG/ML
INJECTION, SOLUTION INTRA-ARTICULAR; INTRALESIONAL; INTRAMUSCULAR; INTRAVENOUS; SOFT TISSUE AS NEEDED
Status: DISCONTINUED | OUTPATIENT
Start: 2020-10-27 | End: 2020-10-27

## 2020-10-27 RX ORDER — FENTANYL CITRATE 50 UG/ML
INJECTION, SOLUTION INTRAMUSCULAR; INTRAVENOUS AS NEEDED
Status: DISCONTINUED | OUTPATIENT
Start: 2020-10-27 | End: 2020-10-27

## 2020-10-27 RX ORDER — HYDROMORPHONE HCL/PF 1 MG/ML
0.5 SYRINGE (ML) INJECTION ONCE
Status: COMPLETED | OUTPATIENT
Start: 2020-10-27 | End: 2020-10-27

## 2020-10-27 RX ORDER — NEOSTIGMINE METHYLSULFATE 1 MG/ML
INJECTION INTRAVENOUS AS NEEDED
Status: DISCONTINUED | OUTPATIENT
Start: 2020-10-27 | End: 2020-10-27

## 2020-10-27 RX ORDER — MORPHINE SULFATE 4 MG/ML
4 INJECTION, SOLUTION INTRAMUSCULAR; INTRAVENOUS ONCE
Status: COMPLETED | OUTPATIENT
Start: 2020-10-27 | End: 2020-10-27

## 2020-10-27 RX ORDER — HYDROMORPHONE HCL/PF 1 MG/ML
0.5 SYRINGE (ML) INJECTION EVERY 4 HOURS PRN
Status: DISCONTINUED | OUTPATIENT
Start: 2020-10-27 | End: 2020-10-28 | Stop reason: HOSPADM

## 2020-10-27 RX ORDER — ASPIRIN 81 MG/1
324 TABLET, CHEWABLE ORAL ONCE
Status: COMPLETED | OUTPATIENT
Start: 2020-10-27 | End: 2020-10-27

## 2020-10-27 RX ORDER — SODIUM CHLORIDE 9 MG/ML
125 INJECTION, SOLUTION INTRAVENOUS CONTINUOUS
Status: DISCONTINUED | OUTPATIENT
Start: 2020-10-27 | End: 2020-10-28 | Stop reason: HOSPADM

## 2020-10-27 RX ORDER — OXYCODONE HYDROCHLORIDE AND ACETAMINOPHEN 5; 325 MG/1; MG/1
1 TABLET ORAL EVERY 4 HOURS PRN
Status: DISCONTINUED | OUTPATIENT
Start: 2020-10-27 | End: 2020-10-28 | Stop reason: SDUPTHER

## 2020-10-27 RX ORDER — MEPERIDINE HYDROCHLORIDE 25 MG/ML
6.25 INJECTION INTRAMUSCULAR; INTRAVENOUS; SUBCUTANEOUS ONCE
Status: COMPLETED | OUTPATIENT
Start: 2020-10-27 | End: 2020-10-27

## 2020-10-27 RX ORDER — SODIUM CHLORIDE, SODIUM LACTATE, POTASSIUM CHLORIDE, CALCIUM CHLORIDE 600; 310; 30; 20 MG/100ML; MG/100ML; MG/100ML; MG/100ML
INJECTION, SOLUTION INTRAVENOUS CONTINUOUS PRN
Status: DISCONTINUED | OUTPATIENT
Start: 2020-10-27 | End: 2020-10-27

## 2020-10-27 RX ORDER — ONDANSETRON 2 MG/ML
4 INJECTION INTRAMUSCULAR; INTRAVENOUS ONCE AS NEEDED
Status: DISCONTINUED | OUTPATIENT
Start: 2020-10-27 | End: 2020-10-27 | Stop reason: HOSPADM

## 2020-10-27 RX ORDER — METOCLOPRAMIDE HYDROCHLORIDE 5 MG/ML
INJECTION INTRAMUSCULAR; INTRAVENOUS AS NEEDED
Status: DISCONTINUED | OUTPATIENT
Start: 2020-10-27 | End: 2020-10-27

## 2020-10-27 RX ORDER — MAGNESIUM HYDROXIDE 1200 MG/15ML
LIQUID ORAL AS NEEDED
Status: DISCONTINUED | OUTPATIENT
Start: 2020-10-27 | End: 2020-10-27 | Stop reason: HOSPADM

## 2020-10-27 RX ORDER — GLYCOPYRROLATE 0.2 MG/ML
INJECTION INTRAMUSCULAR; INTRAVENOUS AS NEEDED
Status: DISCONTINUED | OUTPATIENT
Start: 2020-10-27 | End: 2020-10-27

## 2020-10-27 RX ORDER — PANTOPRAZOLE SODIUM 40 MG/1
40 INJECTION, POWDER, FOR SOLUTION INTRAVENOUS ONCE
Status: COMPLETED | OUTPATIENT
Start: 2020-10-27 | End: 2020-10-27

## 2020-10-27 RX ORDER — ROCURONIUM BROMIDE 10 MG/ML
INJECTION, SOLUTION INTRAVENOUS AS NEEDED
Status: DISCONTINUED | OUTPATIENT
Start: 2020-10-27 | End: 2020-10-27

## 2020-10-27 RX ORDER — FENTANYL CITRATE/PF 50 MCG/ML
25 SYRINGE (ML) INJECTION
Status: DISCONTINUED | OUTPATIENT
Start: 2020-10-27 | End: 2020-10-27 | Stop reason: HOSPADM

## 2020-10-27 RX ORDER — BUPIVACAINE HYDROCHLORIDE AND EPINEPHRINE 5; 5 MG/ML; UG/ML
INJECTION, SOLUTION EPIDURAL; INTRACAUDAL; PERINEURAL AS NEEDED
Status: DISCONTINUED | OUTPATIENT
Start: 2020-10-27 | End: 2020-10-27 | Stop reason: HOSPADM

## 2020-10-27 RX ORDER — PROPOFOL 10 MG/ML
INJECTION, EMULSION INTRAVENOUS AS NEEDED
Status: DISCONTINUED | OUTPATIENT
Start: 2020-10-27 | End: 2020-10-27

## 2020-10-27 RX ORDER — ONDANSETRON 2 MG/ML
4 INJECTION INTRAMUSCULAR; INTRAVENOUS EVERY 6 HOURS PRN
Status: DISCONTINUED | OUTPATIENT
Start: 2020-10-27 | End: 2020-10-28 | Stop reason: HOSPADM

## 2020-10-27 RX ADMIN — ASPIRIN 81 MG CHEWABLE TABLET 324 MG: 81 TABLET CHEWABLE at 12:03

## 2020-10-27 RX ADMIN — Medication 100 MG: at 18:32

## 2020-10-27 RX ADMIN — SODIUM CHLORIDE, SODIUM LACTATE, POTASSIUM CHLORIDE, CALCIUM CHLORIDE: 600; 310; 30; 20 INJECTION, SOLUTION INTRAVENOUS at 18:25

## 2020-10-27 RX ADMIN — CEFAZOLIN SODIUM 2000 MG: 2 SOLUTION INTRAVENOUS at 17:52

## 2020-10-27 RX ADMIN — METOCLOPRAMIDE HYDROCHLORIDE 10 MG: 5 INJECTION INTRAMUSCULAR; INTRAVENOUS at 18:25

## 2020-10-27 RX ADMIN — HYDROMORPHONE HYDROCHLORIDE 0.5 MG: 1 INJECTION, SOLUTION INTRAMUSCULAR; INTRAVENOUS; SUBCUTANEOUS at 21:16

## 2020-10-27 RX ADMIN — KETOROLAC TROMETHAMINE 15 MG: 30 INJECTION, SOLUTION INTRAMUSCULAR at 22:53

## 2020-10-27 RX ADMIN — ROCURONIUM BROMIDE 25 MG: 10 INJECTION, SOLUTION INTRAVENOUS at 18:34

## 2020-10-27 RX ADMIN — MEPERIDINE HYDROCHLORIDE 6.25 MG: 25 INJECTION INTRAMUSCULAR; INTRAVENOUS; SUBCUTANEOUS at 20:11

## 2020-10-27 RX ADMIN — DEXAMETHASONE SODIUM PHOSPHATE 4 MG: 4 INJECTION, SOLUTION INTRA-ARTICULAR; INTRALESIONAL; INTRAMUSCULAR; INTRAVENOUS; SOFT TISSUE at 18:34

## 2020-10-27 RX ADMIN — ONDANSETRON 4 MG: 2 INJECTION INTRAMUSCULAR; INTRAVENOUS at 18:25

## 2020-10-27 RX ADMIN — PANTOPRAZOLE SODIUM 40 MG: 40 INJECTION, POWDER, FOR SOLUTION INTRAVENOUS at 11:36

## 2020-10-27 RX ADMIN — HYDROMORPHONE HYDROCHLORIDE 0.5 MG: 1 INJECTION, SOLUTION INTRAMUSCULAR; INTRAVENOUS; SUBCUTANEOUS at 16:49

## 2020-10-27 RX ADMIN — MORPHINE SULFATE 4 MG: 4 INJECTION INTRAVENOUS at 11:35

## 2020-10-27 RX ADMIN — PROPOFOL 120 MG: 10 INJECTION, EMULSION INTRAVENOUS at 18:32

## 2020-10-27 RX ADMIN — NEOSTIGMINE METHYLSULFATE 3 MG: 1 INJECTION INTRAVENOUS at 19:32

## 2020-10-27 RX ADMIN — HYDROMORPHONE HYDROCHLORIDE 0.5 MG: 1 INJECTION, SOLUTION INTRAMUSCULAR; INTRAVENOUS; SUBCUTANEOUS at 12:59

## 2020-10-27 RX ADMIN — FENTANYL CITRATE 100 MCG: 50 INJECTION, SOLUTION INTRAMUSCULAR; INTRAVENOUS at 18:32

## 2020-10-27 RX ADMIN — IOHEXOL 100 ML: 350 INJECTION, SOLUTION INTRAVENOUS at 12:18

## 2020-10-27 RX ADMIN — METRONIDAZOLE 500 MG: 500 INJECTION, SOLUTION INTRAVENOUS at 18:27

## 2020-10-27 RX ADMIN — SODIUM CHLORIDE 125 ML/HR: 0.9 INJECTION, SOLUTION INTRAVENOUS at 22:53

## 2020-10-27 RX ADMIN — GLYCOPYRROLATE 0.4 MG: 0.2 INJECTION INTRAMUSCULAR; INTRAVENOUS at 19:32

## 2020-10-27 NOTE — ANESTHESIA PREPROCEDURE EVALUATION
Procedure:  CHOLECYSTECTOMY LAPAROSCOPIC (N/A Abdomen)    Relevant Problems   CARDIO   (+) Aortic aneurysm without rupture (HCC)      GI/HEPATIC   (+) Gastroesophageal reflux disease without esophagitis      /RENAL   (+) Benign prostatic hyperplasia without lower urinary tract symptoms      PULMONARY   (+) Obstructive sleep apnea of adult        Physical Exam    Airway    Mallampati score: II  TM Distance: >3 FB  Neck ROM: full     Dental   No notable dental hx     Cardiovascular  Cardiovascular exam normal    Pulmonary  Pulmonary exam normal     Other Findings        Anesthesia Plan  ASA Score- 3 Emergent    Anesthesia Type- general with ASA Monitors.         Additional Monitors:   Airway Plan: ETT.          Plan Factors-Exercise tolerance (METS): >4 METS.    Chart reviewed. EKG reviewed. Imaging results reviewed. Existing labs reviewed. Patient summary reviewed.    Patient is not a current smoker.         Induction- intravenous and rapid sequence induction.    Postoperative Plan- Plan for postoperative opioid use.     Informed Consent- Anesthetic plan and risks discussed with patient.

## 2020-10-27 NOTE — ANESTHESIA POSTPROCEDURE EVALUATION
Post-Op Assessment Note    CV Status:  Stable    Pain management: adequate     Mental Status:  Alert and awake   Hydration Status:  Euvolemic   PONV Controlled:  Controlled   Airway Patency:  Patent      Post Op Vitals Reviewed: Yes      Staff: Anesthesiologist         No complications documented.    BP (P) 136/56 (10/27/20 1945)    Temp (!) (P) 97 °F (36.1 °C) (10/27/20 1945)    Pulse (P) 69 (10/27/20 1945)   Resp (P) 16 (10/27/20 1945)    SpO2 (P) 99 % (10/27/20 1945)

## 2020-10-28 VITALS
HEIGHT: 62 IN | OXYGEN SATURATION: 94 % | HEART RATE: 67 BPM | RESPIRATION RATE: 17 BRPM | DIASTOLIC BLOOD PRESSURE: 70 MMHG | SYSTOLIC BLOOD PRESSURE: 125 MMHG | WEIGHT: 143 LBS | BODY MASS INDEX: 26.31 KG/M2 | TEMPERATURE: 98.1 F

## 2020-10-28 LAB
ANION GAP SERPL CALCULATED.3IONS-SCNC: 9 MMOL/L (ref 4–13)
BUN SERPL-MCNC: 13 MG/DL (ref 5–25)
CALCIUM SERPL-MCNC: 7.8 MG/DL (ref 8.3–10.1)
CHLORIDE SERPL-SCNC: 104 MMOL/L (ref 100–108)
CO2 SERPL-SCNC: 24 MMOL/L (ref 21–32)
CREAT SERPL-MCNC: 0.74 MG/DL (ref 0.6–1.3)
ERYTHROCYTE [DISTWIDTH] IN BLOOD BY AUTOMATED COUNT: 11.9 % (ref 11.6–15.1)
GFR SERPL CREATININE-BSD FRML MDRD: 95 ML/MIN/1.73SQ M
GLUCOSE SERPL-MCNC: 142 MG/DL (ref 65–140)
HCT VFR BLD AUTO: 34.4 % (ref 36.5–49.3)
HGB BLD-MCNC: 13.2 G/DL (ref 12–17)
MCH RBC QN AUTO: 31.5 PG (ref 26.8–34.3)
MCHC RBC AUTO-ENTMCNC: 38.7 G/DL (ref 31.4–37.4)
MCV RBC AUTO: 95 FL (ref 82–98)
PLATELET # BLD AUTO: 204 THOUSANDS/UL (ref 149–390)
PMV BLD AUTO: 8.9 FL (ref 8.9–12.7)
POTASSIUM SERPL-SCNC: 4 MMOL/L (ref 3.5–5.3)
RBC # BLD AUTO: 4.22 MILLION/UL (ref 3.88–5.62)
SODIUM SERPL-SCNC: 137 MMOL/L (ref 136–145)
WBC # BLD AUTO: 9.83 THOUSAND/UL (ref 4.31–10.16)

## 2020-10-28 PROCEDURE — 99024 POSTOP FOLLOW-UP VISIT: CPT | Performed by: SURGERY

## 2020-10-28 PROCEDURE — 85027 COMPLETE CBC AUTOMATED: CPT | Performed by: SURGERY

## 2020-10-28 PROCEDURE — NC001 PR NO CHARGE: Performed by: SURGERY

## 2020-10-28 PROCEDURE — 80048 BASIC METABOLIC PNL TOTAL CA: CPT | Performed by: SURGERY

## 2020-10-28 RX ORDER — IBUPROFEN 600 MG/1
600 TABLET ORAL EVERY 6 HOURS PRN
Status: DISCONTINUED | OUTPATIENT
Start: 2020-10-28 | End: 2020-10-28 | Stop reason: HOSPADM

## 2020-10-28 RX ORDER — HYDROXYZINE HYDROCHLORIDE 10 MG/1
10 TABLET, FILM COATED ORAL
Status: DISCONTINUED | OUTPATIENT
Start: 2020-10-28 | End: 2020-10-28 | Stop reason: HOSPADM

## 2020-10-28 RX ORDER — TAMSULOSIN HYDROCHLORIDE 0.4 MG/1
0.4 CAPSULE ORAL
Status: DISCONTINUED | OUTPATIENT
Start: 2020-10-28 | End: 2020-10-28 | Stop reason: HOSPADM

## 2020-10-28 RX ORDER — ASPIRIN 81 MG/1
81 TABLET ORAL
Status: DISCONTINUED | OUTPATIENT
Start: 2020-10-29 | End: 2020-10-28 | Stop reason: HOSPADM

## 2020-10-28 RX ORDER — IBUPROFEN 400 MG/1
400 TABLET ORAL EVERY 6 HOURS PRN
Status: DISCONTINUED | OUTPATIENT
Start: 2020-10-28 | End: 2020-10-28

## 2020-10-28 RX ORDER — OXYCODONE HYDROCHLORIDE AND ACETAMINOPHEN 5; 325 MG/1; MG/1
1 TABLET ORAL EVERY 4 HOURS PRN
Qty: 12 TABLET | Refills: 0 | Status: SHIPPED | OUTPATIENT
Start: 2020-10-28 | End: 2020-11-07

## 2020-10-28 RX ORDER — GABAPENTIN 300 MG/1
300 CAPSULE ORAL
Status: DISCONTINUED | OUTPATIENT
Start: 2020-10-28 | End: 2020-10-28 | Stop reason: HOSPADM

## 2020-10-28 RX ADMIN — KETOROLAC TROMETHAMINE 15 MG: 30 INJECTION, SOLUTION INTRAMUSCULAR at 05:22

## 2020-10-28 RX ADMIN — ENOXAPARIN SODIUM 40 MG: 40 INJECTION SUBCUTANEOUS at 08:06

## 2020-10-28 RX ADMIN — SODIUM CHLORIDE 125 ML/HR: 0.9 INJECTION, SOLUTION INTRAVENOUS at 06:22

## 2020-10-29 ENCOUNTER — TRANSITIONAL CARE MANAGEMENT (OUTPATIENT)
Dept: FAMILY MEDICINE CLINIC | Facility: CLINIC | Age: 70
End: 2020-10-29

## 2020-11-10 ENCOUNTER — OFFICE VISIT (OUTPATIENT)
Dept: SURGERY | Facility: CLINIC | Age: 70
End: 2020-11-10

## 2020-11-10 VITALS
HEART RATE: 86 BPM | TEMPERATURE: 96.4 F | DIASTOLIC BLOOD PRESSURE: 68 MMHG | HEIGHT: 62 IN | WEIGHT: 139.2 LBS | BODY MASS INDEX: 25.62 KG/M2 | SYSTOLIC BLOOD PRESSURE: 116 MMHG

## 2020-11-10 DIAGNOSIS — Z09 POSTOPERATIVE EXAMINATION: Primary | ICD-10-CM

## 2020-11-10 PROCEDURE — 99024 POSTOP FOLLOW-UP VISIT: CPT | Performed by: SURGERY

## 2020-12-09 ENCOUNTER — OFFICE VISIT (OUTPATIENT)
Dept: FAMILY MEDICINE CLINIC | Facility: CLINIC | Age: 70
End: 2020-12-09
Payer: MEDICARE

## 2020-12-09 VITALS
SYSTOLIC BLOOD PRESSURE: 98 MMHG | DIASTOLIC BLOOD PRESSURE: 66 MMHG | OXYGEN SATURATION: 97 % | WEIGHT: 140.8 LBS | TEMPERATURE: 97.5 F | BODY MASS INDEX: 25.91 KG/M2 | RESPIRATION RATE: 18 BRPM | HEIGHT: 62 IN | HEART RATE: 62 BPM

## 2020-12-09 DIAGNOSIS — G47.00 INSOMNIA, UNSPECIFIED TYPE: ICD-10-CM

## 2020-12-09 DIAGNOSIS — F41.9 ANXIETY: ICD-10-CM

## 2020-12-09 DIAGNOSIS — K80.00 ACUTE CHOLECYSTITIS DUE TO BILIARY CALCULUS: Primary | ICD-10-CM

## 2020-12-09 DIAGNOSIS — I83.93 VARICOSE VEINS OF BOTH LOWER EXTREMITIES, UNSPECIFIED WHETHER COMPLICATED: ICD-10-CM

## 2020-12-09 PROCEDURE — 99213 OFFICE O/P EST LOW 20 MIN: CPT | Performed by: FAMILY MEDICINE

## 2020-12-09 RX ORDER — HYDROXYZINE HYDROCHLORIDE 25 MG/1
25 TABLET, FILM COATED ORAL
Qty: 90 TABLET | Refills: 2 | Status: SHIPPED | OUTPATIENT
Start: 2020-12-09 | End: 2021-09-13

## 2021-01-13 ENCOUNTER — TELEPHONE (OUTPATIENT)
Dept: OBGYN CLINIC | Facility: HOSPITAL | Age: 71
End: 2021-01-13
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
## 2021-01-13 NOTE — TELEPHONE ENCOUNTER
Patient sees Dr Alexsandra Davis,  calling to receive most recent office visit notes, 6/26/2020      Fax sent 1/13

## 2021-01-15 ENCOUNTER — TELEPHONE (OUTPATIENT)
Dept: OBGYN CLINIC | Facility: HOSPITAL | Age: 71
End: 2021-01-15

## 2021-01-19 ENCOUNTER — TRANSCRIBE ORDERS (OUTPATIENT)
Dept: ADMINISTRATIVE | Facility: HOSPITAL | Age: 71
End: 2021-01-19

## 2021-01-19 ENCOUNTER — HOSPITAL ENCOUNTER (OUTPATIENT)
Dept: NON INVASIVE DIAGNOSTICS | Facility: HOSPITAL | Age: 71
Discharge: HOME | End: 2021-01-19
Attending: INTERNAL MEDICINE
Payer: MEDICARE

## 2021-01-19 DIAGNOSIS — I71.9 PENETRATING ATHEROSCLEROTIC ULCER OF AORTA (HCC): ICD-10-CM

## 2021-01-19 DIAGNOSIS — I73.9 PAD (PERIPHERAL ARTERY DISEASE) (HCC): ICD-10-CM

## 2021-01-19 DIAGNOSIS — R07.89 CHEST PRESSURE: ICD-10-CM

## 2021-01-19 DIAGNOSIS — I71.2 THORACIC AORTIC ANEURYSM WITHOUT RUPTURE (HCC): ICD-10-CM

## 2021-01-19 LAB
CHEST PAIN STATEMENT: NORMAL
MAX DIASTOLIC BP: 70 MMHG
MAX HEART RATE: 133 BPM
MAX PREDICTED HEART RATE: 152 BPM
MAX. SYSTOLIC BP: 140 MMHG
PROTOCOL NAME: NORMAL
REASON FOR TERMINATION: NORMAL
TARGET HR FORMULA: NORMAL
TEST INDICATION: NORMAL
TIME IN EXERCISE PHASE: NORMAL

## 2021-01-19 PROCEDURE — 93017 CV STRESS TEST TRACING ONLY: CPT

## 2021-01-20 ENCOUNTER — LAB (OUTPATIENT)
Dept: LAB | Facility: HOSPITAL | Age: 71
End: 2021-01-20
Attending: INTERNAL MEDICINE
Payer: MEDICARE

## 2021-01-20 DIAGNOSIS — I71.2 THORACIC AORTIC ANEURYSM WITHOUT RUPTURE (HCC): ICD-10-CM

## 2021-01-20 DIAGNOSIS — I73.9 PAD (PERIPHERAL ARTERY DISEASE) (HCC): ICD-10-CM

## 2021-01-20 DIAGNOSIS — R07.89 CHEST PRESSURE: ICD-10-CM

## 2021-01-20 LAB
CHOLEST SERPL-MCNC: 113 MG/DL (ref 50–200)
HDLC SERPL-MCNC: 51 MG/DL
LDLC SERPL CALC-MCNC: 49 MG/DL (ref 0–100)
TRIGL SERPL-MCNC: 66 MG/DL

## 2021-01-20 PROCEDURE — 93016 CV STRESS TEST SUPVJ ONLY: CPT | Performed by: INTERNAL MEDICINE

## 2021-01-20 PROCEDURE — 36415 COLL VENOUS BLD VENIPUNCTURE: CPT

## 2021-01-20 PROCEDURE — 93018 CV STRESS TEST I&R ONLY: CPT | Performed by: INTERNAL MEDICINE

## 2021-01-20 PROCEDURE — 80061 LIPID PANEL: CPT

## 2021-01-22 ENCOUNTER — TELEPHONE (OUTPATIENT)
Dept: CARDIOLOGY CLINIC | Facility: CLINIC | Age: 71
End: 2021-01-22

## 2021-01-22 NOTE — TELEPHONE ENCOUNTER
Pt returned call  Notified him of stress test results  No further questions at this time  Task completed

## 2021-01-22 NOTE — TELEPHONE ENCOUNTER
----- Message from Jose Gutierrez MD sent at 1/21/2021  4:50 PM EST -----  Patient stress test overall looks normal  No contra-indication to surgery/procedures

## 2021-01-26 ENCOUNTER — OFFICE VISIT (OUTPATIENT)
Dept: FAMILY MEDICINE CLINIC | Facility: CLINIC | Age: 71
End: 2021-01-26

## 2021-01-26 VITALS
RESPIRATION RATE: 18 BRPM | OXYGEN SATURATION: 96 % | HEIGHT: 62 IN | WEIGHT: 139.8 LBS | DIASTOLIC BLOOD PRESSURE: 64 MMHG | HEART RATE: 90 BPM | TEMPERATURE: 97.8 F | BODY MASS INDEX: 25.73 KG/M2 | SYSTOLIC BLOOD PRESSURE: 92 MMHG

## 2021-01-26 DIAGNOSIS — R51.9 FREQUENT HEADACHES: ICD-10-CM

## 2021-01-26 DIAGNOSIS — F41.9 ANXIETY: Primary | ICD-10-CM

## 2021-01-26 PROCEDURE — 99212 OFFICE O/P EST SF 10 MIN: CPT | Performed by: FAMILY MEDICINE

## 2021-01-26 RX ORDER — NAPROXEN 500 MG/1
500 TABLET ORAL 2 TIMES DAILY PRN
Qty: 30 TABLET | Refills: 0 | Status: SHIPPED | OUTPATIENT
Start: 2021-01-26 | End: 2021-05-18

## 2021-01-26 RX ORDER — ACETAMINOPHEN 325 MG/1
650 TABLET ORAL EVERY 6 HOURS PRN
COMMUNITY
End: 2022-01-05

## 2021-01-26 RX ORDER — ESCITALOPRAM OXALATE 10 MG/1
10 TABLET ORAL
Qty: 90 TABLET | Refills: 3 | Status: SHIPPED | OUTPATIENT
Start: 2021-01-26 | End: 2021-05-18 | Stop reason: SDUPTHER

## 2021-02-03 ENCOUNTER — TELEPHONE (OUTPATIENT)
Dept: FAMILY MEDICINE CLINIC | Facility: CLINIC | Age: 71
End: 2021-02-03

## 2021-02-03 DIAGNOSIS — K21.9 GASTROESOPHAGEAL REFLUX DISEASE WITHOUT ESOPHAGITIS: ICD-10-CM

## 2021-02-03 DIAGNOSIS — R51.9 FREQUENT HEADACHES: Primary | ICD-10-CM

## 2021-02-03 DIAGNOSIS — R10.9 ABDOMINAL PAIN, UNSPECIFIED ABDOMINAL LOCATION: ICD-10-CM

## 2021-02-03 RX ORDER — PANTOPRAZOLE SODIUM 40 MG/1
40 TABLET, DELAYED RELEASE ORAL
Qty: 90 TABLET | Refills: 0 | Status: SHIPPED | OUTPATIENT
Start: 2021-02-03 | End: 2021-05-18 | Stop reason: SDUPTHER

## 2021-02-03 NOTE — TELEPHONE ENCOUNTER
MRI Blayne Acosta :Patient having recurrent headaches which have been worsening over the last year  Not responding to over-the-counter medication  Recommend doing an MRI of the brain given that the patient now has insurance coverage  CT abdomen :  Has been having generalized abdominal pain since his gallbladder removal   Patient states that he is unable to tolerate food  Would like a CT  Order has been placed for him    Protonix 40mg  Cover for reflux

## 2021-02-18 ENCOUNTER — HOSPITAL ENCOUNTER (OUTPATIENT)
Dept: RADIOLOGY | Facility: HOSPITAL | Age: 71
Discharge: HOME | End: 2021-02-18
Attending: FAMILY MEDICINE

## 2021-02-18 DIAGNOSIS — R10.9 ABDOMINAL PAIN, UNSPECIFIED ABDOMINAL LOCATION: ICD-10-CM

## 2021-02-18 PROCEDURE — 74176 CT ABD & PELVIS W/O CONTRAST: CPT

## 2021-02-18 PROCEDURE — G1004 CDSM NDSC: HCPCS

## 2021-02-25 ENCOUNTER — TELEPHONE (OUTPATIENT)
Dept: VASCULAR SURGERY | Facility: CLINIC | Age: 71
End: 2021-02-25

## 2021-02-25 NOTE — TELEPHONE ENCOUNTER
COVID Pre-Visit Screening     1  Is this a family member screening? No  2  Have you traveled outside of your state in the past 2 weeks? No  3  Do you presently have a fever or flu-like symptoms? No  4  Do you have symptoms of an upper respiratory infection like runny nose, sore throat, or cough? No  5  Are you suffering from new headache that you have not had in the past?  No  6  Do you have/have you experienced any new shortness of breath recently? No  7  Do you have any new diarrhea, nausea or vomiting? No  8  Have you been in contact with anyone who has been sick or diagnosed with COVID-19? No  9  Do you have any new loss of taste or smell? No  10  Are you able to wear a mask without a valve for the entire visit?  Yes     Patient coming in alone, he states he had both vaccines and no symptoms

## 2021-02-26 ENCOUNTER — HOSPITAL ENCOUNTER (OUTPATIENT)
Dept: RADIOLOGY | Facility: HOSPITAL | Age: 71
Discharge: HOME | End: 2021-02-26
Attending: FAMILY MEDICINE

## 2021-02-26 ENCOUNTER — CONSULT (OUTPATIENT)
Dept: VASCULAR SURGERY | Facility: CLINIC | Age: 71
End: 2021-02-26

## 2021-02-26 VITALS
WEIGHT: 138 LBS | TEMPERATURE: 97.6 F | HEART RATE: 74 BPM | DIASTOLIC BLOOD PRESSURE: 62 MMHG | SYSTOLIC BLOOD PRESSURE: 100 MMHG | HEIGHT: 62 IN | BODY MASS INDEX: 25.4 KG/M2

## 2021-02-26 DIAGNOSIS — R51.9 FREQUENT HEADACHES: ICD-10-CM

## 2021-02-26 DIAGNOSIS — I83.93 VARICOSE VEINS OF BOTH LOWER EXTREMITIES, UNSPECIFIED WHETHER COMPLICATED: ICD-10-CM

## 2021-02-26 DIAGNOSIS — I71.9 AORTIC ANEURYSM WITHOUT RUPTURE, UNSPECIFIED PORTION OF AORTA (HCC): Primary | ICD-10-CM

## 2021-02-26 PROCEDURE — 70551 MRI BRAIN STEM W/O DYE: CPT

## 2021-02-26 PROCEDURE — G1004 CDSM NDSC: HCPCS

## 2021-02-26 PROCEDURE — 99244 OFF/OP CNSLTJ NEW/EST MOD 40: CPT | Performed by: PHYSICIAN ASSISTANT

## 2021-02-26 NOTE — PROGRESS NOTES
Assessment/Plan:    Leg cramping at night  Mild, small bulging varicose veins    Patient presents for evaluation of varicose veins and nighttime cramping  Patient reports one year of intermittent R > L calf (and sometimes posterior thigh) cramping  He also complains of intermittent tender and lower extremity veins  At times the legs ache and he he gets minimal edema  He also has hx of restless legs which still bother him at times  He denies calf tightness with walking  He has no wounds  No hx of blood cts  There are mild, smaller varicose veins     Discussion:  Patient with nighttime leg cramping  There are mild, smaller varicose veins present which we can try to treat conservatively  We had a detailed discussion regarding varicose veins and the treatment of venous disease  We discussed the role of compression and other conservative measures for relief of symptoms related to varicose veins      - Order for prescription graded compression stockings of 20-30 mm Hg  - Compression, elevation; heart-healthy, low sodium diet; regular exercise  - Patient education for venous insufficiency  - Follow up in 2 months for recheck      Ectatic aorta  - Ao 1 9 cm on CT a[ (no AAA)  - continue with aspirin 81 and Crestor 10  - no aortic tenderness  - check Ao du on next visit      Abdominal pain/ tenderness, R sided  - over prior surgical hernia repair, (mesh; [place] 2014)  - follow up with PCP or gen surgery       Diagnoses and all orders for this visit:    Varicose veins of both lower extremities, unspecified whether complicated  -     Ambulatory referral to Vascular Surgery  -     Compression Stocking        Subjective:      Patient ID: Daryl Heimlich is a 78 y o  male  Patient is new and referred by Florencio Gutierrez MD for evaluation of varicose veins and nighttime calf pain and cramping  Patient is taking on ASA 81 mg and Crestor        HPI   Daryl Heimlich 79 y/o M hyperlipidemia, PAULINA, anxiety, BPH?, cervical radiculopathy, restless legs, multiple orthopedic surgeries who presents for evaluation of varicose veins and nighttime cramping  Patient reports one year of intermittent R > L calf (and sometimes posterior thigh) cramping  He or his wife have to rub out the cramp which can wake him up in the night  He also complains of intermittent tender and lower extremity veins  At times the legs ache and he he gets minimal edema  He denies calf tightness with walking  He has no wounds  No hx of blood cts  Patient recently had CT a/p to evaluate R sided abd pain over surgical scar (hernia mess repair, 2014?)  The report is below  There is an incidental finding of mildly ectatic abdominal aorta   He has no chest, back or abd pain attributable to AAA  CT a/p without contrast 2/18/21  FINDINGS:  The exam is limited without IV and oral contrast particularly for soft tissue and bowel evaluation      ABDOMEN     LOWER CHEST:  Scattered patchy groundglass densities at the bilateral lung bases similar to the prior exam, question related to atelectasis or other nonspecific inflammation      LIVER/BILIARY TREE:  Tiny calcified granuloma in the right lobe of liver inferiorly  Otherwise unremarkable      GALLBLADDER:  Gallbladder is surgically absent      SPLEEN:  Unremarkable      PANCREAS:  Unremarkable      ADRENAL GLANDS:  Unremarkable      KIDNEYS/URETERS:  One or more simple renal cyst(s) is noted  Otherwise unremarkable kidneys  No hydronephrosis      STOMACH AND BOWEL:  There is colonic diverticulosis without evidence of acute diverticulitis      APPENDIX:  No findings to suggest appendicitis      ABDOMINOPELVIC CAVITY:  No ascites  No pneumoperitoneum  No lymphadenopathy      VESSELS:  Mildly ectatic abdominal aorta with scattered wall calcification      PELVIS     REPRODUCTIVE ORGANS:  Prostate is mild moderately prominent, stable    There are central calcifications      URINARY BLADDER:  Underdistended limiting evaluation      ABDOMINAL WALL/INGUINAL REGIONS:  Unremarkable      OSSEOUS STRUCTURES:  No acute fracture or destructive osseous lesion      IMPRESSION:  The exam is limited without IV and oral contrast particularly for soft tissue and bowel evaluation  1  Otherwise, no acute findings in the abdomen and pelvis  The following portions of the patient's history were reviewed and updated as appropriate: allergies, current medications, past family history, past medical history, past social history, past surgical history and problem list     Review of Systems   Constitutional: Negative  HENT: Negative  Eyes: Positive for visual disturbance  Sudden vision loss   Respiratory: Negative  Cardiovascular: Positive for leg swelling  Painful veins   Gastrointestinal: Positive for abdominal pain and rectal pain  Endocrine: Negative  Genitourinary: Positive for difficulty urinating and dysuria  Musculoskeletal: Negative  Bilateral leg pain   Skin: Negative  Allergic/Immunologic: Negative  Neurological: Negative  Hematological: Negative  Psychiatric/Behavioral: Negative  Objective:      /62 (BP Location: Right arm, Patient Position: Sitting, Cuff Size: Standard)   Pulse 74   Temp 97 6 °F (36 4 °C) (Tympanic)   Ht 5' 2" (1 575 m)   Wt 62 6 kg (138 lb)   BMI 25 24 kg/m²        Physical Exam  Vitals signs and nursing note reviewed  Constitutional:       Appearance: He is well-developed  HENT:      Head: Normocephalic and atraumatic  Eyes:      Pupils: Pupils are equal, round, and reactive to light  Neck:      Musculoskeletal: Neck supple  Thyroid: No thyromegaly  Vascular: No JVD  Trachea: Trachea normal    Cardiovascular:      Rate and Rhythm: Normal rate and regular rhythm  Pulses:           Carotid pulses are 2+ on the right side and 2+ on the left side  Radial pulses are 2+ on the right side and 2+ on the left side  Femoral pulses are 2+ on the right side and 2+ on the left side  Dorsalis pedis pulses are 1+ on the right side and 1+ on the left side  Heart sounds: Normal heart sounds, S1 normal and S2 normal  No murmur  No friction rub  No gallop  Pulmonary:      Effort: Pulmonary effort is normal  No accessory muscle usage or respiratory distress  Breath sounds: Normal breath sounds  No wheezing or rales  Abdominal:      General: Bowel sounds are normal  There is no distension  Palpations: Abdomen is soft  Tenderness: There is no abdominal tenderness  Comments:   Non-tender or palp Ao    R abdominal tenderness over prior hernia repair    Healed surgical scars - appendix, hernia, renal stones   Musculoskeletal: Normal range of motion  General: No deformity  Skin:     General: Skin is warm and dry  Capillary Refill: Capillary refill takes less than 2 seconds  Findings: No lesion or rash  Nails: There is no clubbing  Neurological:      Mental Status: He is alert and oriented to person, place, and time  Comments: Grossly normal    Psychiatric:         Behavior: Behavior is cooperative  I have reviewed and made appropriate changes to the review of systems input by the medical assistant      Vitals:    02/26/21 1102   BP: 100/62   BP Location: Right arm   Patient Position: Sitting   Cuff Size: Standard   Pulse: 74   Temp: 97 6 °F (36 4 °C)   TempSrc: Tympanic   Weight: 62 6 kg (138 lb)   Height: 5' 2" (1 575 m)       Patient Active Problem List   Diagnosis    Cervical radiculopathy    Colon polyps    Obstructive sleep apnea of adult    Peripheral artery disease (HCC)    Peripheral neuropathy    Restless legs syndrome    Benign prostatic hyperplasia without lower urinary tract symptoms    Gastroesophageal reflux disease without esophagitis    Cholelithiasis    Aortic aneurysm without rupture (HCC)    Abdominal adhesions    Other insomnia  Acute cholecystitis due to biliary calculus       Past Surgical History:   Procedure Laterality Date    APPENDECTOMY      CHOLECYSTECTOMY LAPAROSCOPIC N/A 10/27/2020    Procedure: CHOLECYSTECTOMY LAPAROSCOPIC;  Surgeon: Evie Klinefelter, MD;  Location: [address] [place] [address];  Service: General    COLONOSCOPY N/A 12/12/2016    Procedure: COLONOSCOPY;  Surgeon: Jen Valle MD;  Location: [place] [address] [facility];   Service:     ELBOW SURGERY Bilateral     HERNIA REPAIR Right 2001    inguinal    KNEE ARTHROSCOPY Right     ID KNEE SCOPE,MED/LAT MENISECTOMY Left 6/18/2020    Procedure: ARTHROSCOPY KNEE MEDIAL MENISCECTOMY;  Surgeon: Tammy Gilbert MD;  Location: [facility];  Service: Orthopedics    ROTATOR CUFF REPAIR Bilateral 2011       Family History   Problem Relation Age of Onset    No Known Problems Mother     No Known Problems Father     No Known Problems Sister     No Known Problems Brother     No Known Problems Maternal Aunt     No Known Problems Maternal Uncle     No Known Problems Paternal Aunt     No Known Problems Paternal Uncle     No Known Problems Maternal Grandmother     No Known Problems Maternal Grandfather     No Known Problems Paternal Grandmother     No Known Problems Paternal Grandfather     ADD / ADHD Neg Hx     Anesthesia problems Neg Hx     Cancer Neg Hx     Clotting disorder Neg Hx     Collagen disease Neg Hx     Diabetes Neg Hx     Dislocations Neg Hx     Learning disabilities Neg Hx     Neurological problems Neg Hx     Osteoporosis Neg Hx     Rheumatologic disease Neg Hx     Scoliosis Neg Hx     Vascular Disease Neg Hx        Social History     Socioeconomic History    Marital status: /Civil Union     Spouse name: Not on file    Number of children: Not on file    Years of education: Not on file    Highest education level: Not on file   Occupational History    Not on file   Social Needs    Financial resource strain: Not on file    Food insecurity     Worry: Not on file     Inability: Not on file    Transportation needs     Medical: Not on file     Non-medical: Not on file   Tobacco Use    Smoking status: Never Smoker    Smokeless tobacco: Never Used   Substance and Sexual Activity    Alcohol use: Not Currently     Frequency: Never     Drinks per session: Patient refused     Binge frequency: Never    Drug use: No    Sexual activity: Not Currently   Lifestyle    Physical activity     Days per week: Not on file     Minutes per session: Not on file    Stress: Not on file   Relationships    Social connections     Talks on phone: Not on file     Gets together: Not on file     Attends Worship service: Not on file     Active member of club or organization: Not on file     Attends meetings of clubs or organizations: Not on file     Relationship status: Not on file    Intimate partner violence     Fear of current or ex partner: Not on file     Emotionally abused: Not on file     Physically abused: Not on file     Forced sexual activity: Not on file   Other Topics Concern    Not on file   Social History Narrative    Not on file       No Known Allergies      Current Outpatient Medications:     acetaminophen (TYLENOL) 325 mg tablet, Take 650 mg by mouth every 6 (six) hours as needed for mild pain, Disp: , Rfl:     aspirin (ECOTRIN LOW STRENGTH) 81 mg EC tablet, Take 1 tablet (81 mg total) by mouth daily with breakfast, Disp: 90 tablet, Rfl: 3    gabapentin (NEURONTIN) 300 mg capsule, Take 1 capsule (300 mg total) by mouth daily at bedtime, Disp: 90 capsule, Rfl: 1    naproxen (NAPROSYN) 500 mg tablet, Take 1 tablet (500 mg total) by mouth 2 (two) times a day as needed for headaches, Disp: 30 tablet, Rfl: 0    pantoprazole (PROTONIX) 40 mg tablet, Take 1 tablet (40 mg total) by mouth daily before breakfast, Disp: 90 tablet, Rfl: 0    rosuvastatin (CRESTOR) 10 MG tablet, Take 1 tablet (10 mg total) by mouth daily at bedtime, Disp: 90 tablet, Rfl: 3    escitalopram (LEXAPRO) 10 mg tablet, Take 1 tablet (10 mg total) by mouth daily at bedtime (Patient not taking: Reported on 2/26/2021), Disp: 90 tablet, Rfl: 3    hydrOXYzine HCL (ATARAX) 25 mg tablet, Take 1 tablet (25 mg total) by mouth daily at bedtime as needed (Insomnia) (Patient not taking: Reported on 2/26/2021), Disp: 90 tablet, Rfl: 2    ibuprofen (MOTRIN) 200 mg tablet, Take by mouth every 6 (six) hours as needed for mild pain, Disp: , Rfl:     tamsulosin (FLOMAX) 0 4 mg, daily with dinner , Disp: , Rfl: 1

## 2021-02-26 NOTE — PATIENT INSTRUCTIONS
Leg cramping at night  Mild, small bulging varicose veins      We had a detailed discussion regarding varicose veins and the treatment of venous disease  We discussed the role of compression and other conservative measures for relief of symptoms related to varicose veins  Follow up in 2 months for recheck    - Order for prescription graded compression stockings of 20-30 mm Hg  - Wear stocking EVERY day to help control swelling in legs and remove at night  - Elevate legs while at rest  - Continue healthy life-style changes; heart-healthy, low sodium diet; regular exercise  - Patient education for venous insufficiency  - Monitor for increased pain and swelling      Varicose Veins, Ambulatory Care   GENERAL INFORMATION:   Varicose veins  are veins that become large, twisted, and swollen  They are common on the back of your calves, knees, and thighs  Common symptoms include the following:   · Blue, purple, or bulging veins in your legs     · Pain, swelling, or muscle cramps in your legs    · Feeling of heaviness in your legs  Seek immediate care for the following symptoms:   · A wound that does not heal or is infected    · An injury that has broken your skin and caused your varicose veins to bleed    · Swollen and hard leg    · Pain in your leg that does not go away or gets worse    · Legs or feet turn blue or black    · Warmth, tenderness, and pain in your leg (may also look swollen and red)  Treatment of varicose veins  aims to decrease symptoms, improve appearance, and prevent further problems  Treatment will depend on which veins are affected and how severe your condition is  Prescription pain medicine may be given  Ask how to take this medicine safely  Procedures may be done to remove your varicose veins  Your healthcare provider may inject a solution or use a laser to close the varicose veins  Surgery to remove long veins may also be done   Ask your healthcare provider for more information about procedures used in treating varicose veins  Manage varicose veins:   · Wear pressure stockings  The stockings are tight and put pressure on your legs  They improve blood flow and help prevent clots  · Elevate  your legs above the level of your heart for 15 to 30 minutes several times a day  This will help blood to flow back to your heart  · Avoid sitting or standing for long periods of time  This can cause the blood to collect in your legs and make your symptoms worse  Walk around for a few minutes every hour to get blood moving in your legs  · Avoid wearing tight clothing and shoes  Avoid wearing high-heeled shoes  Do not wear clothes that are tight around the waist     · Get plenty of exercise  Talk to your healthcare provider about the best exercise plan for you  Exercise can decrease your blood pressure and improve your health  Bend or rotate your ankles several times every hour  This will help blood to flow back to the heart  · Maintain a healthy weight  Your heart works harder when you are overweight and this can make varicose vein worse  Ask how much you should weigh  Ask him to help you create a weight loss plan if you are overweight  · Do not smoke  If you smoke, it is never too late to quit  Ask for information if you need help quitting  Follow up with your healthcare provider as directed:  Write down your questions so you remember to ask them during your visits  CARE AGREEMENT:   You have the right to help plan your care  Learn about your health condition and how it may be treated  Discuss treatment options with your caregivers to decide what care you want to receive  You always have the right to refuse treatment  The above information is an  only  It is not intended as medical advice for individual conditions or treatments  Talk to your doctor, nurse or pharmacist before following any medical regimen to see if it is safe and effective for you    © 2014 [facility] [address] [place] [address] is for End User's use only and may not be sold, redistributed or otherwise used for commercial purposes  All illustrations and images included in CareNotes® are the copyrighted property of A D A M , Inc  or Manuel Berger

## 2021-03-02 ENCOUNTER — OFFICE VISIT (OUTPATIENT)
Dept: FAMILY MEDICINE CLINIC | Facility: CLINIC | Age: 71
End: 2021-03-02

## 2021-03-02 VITALS
HEIGHT: 62 IN | SYSTOLIC BLOOD PRESSURE: 104 MMHG | RESPIRATION RATE: 16 BRPM | DIASTOLIC BLOOD PRESSURE: 64 MMHG | OXYGEN SATURATION: 97 % | WEIGHT: 138.6 LBS | HEART RATE: 77 BPM | BODY MASS INDEX: 25.51 KG/M2 | TEMPERATURE: 97.6 F

## 2021-03-02 DIAGNOSIS — G25.81 RESTLESS LEGS SYNDROME: ICD-10-CM

## 2021-03-02 DIAGNOSIS — F41.9 ANXIETY: ICD-10-CM

## 2021-03-02 DIAGNOSIS — K40.91 UNILATERAL RECURRENT INGUINAL HERNIA WITHOUT OBSTRUCTION OR GANGRENE: Primary | ICD-10-CM

## 2021-03-02 DIAGNOSIS — I71.9 AORTIC ANEURYSM WITHOUT RUPTURE, UNSPECIFIED PORTION OF AORTA (HCC): ICD-10-CM

## 2021-03-02 DIAGNOSIS — N40.1 BENIGN PROSTATIC HYPERPLASIA WITH LOWER URINARY TRACT SYMPTOMS, SYMPTOM DETAILS UNSPECIFIED: ICD-10-CM

## 2021-03-02 PROCEDURE — 99212 OFFICE O/P EST SF 10 MIN: CPT | Performed by: FAMILY MEDICINE

## 2021-03-02 RX ORDER — TAMSULOSIN HYDROCHLORIDE 0.4 MG/1
0.4 CAPSULE ORAL
Qty: 90 CAPSULE | Refills: 2 | Status: SHIPPED | OUTPATIENT
Start: 2021-03-02 | End: 2022-01-24

## 2021-03-02 NOTE — PROGRESS NOTES
Herman Roman 1950 male MRN: [id number]    FAMILY PRACTICE OFFICE VISIT  [facility] - [address]      ASSESSMENT/PLAN  Herman Roman is a 78 y o  male presents to the office for    Diagnoses and all orders for this visit:    Unilateral recurrent inguinal hernia without obstruction or gangrene  -     Ambulatory referral to General Surgery; Future    Benign prostatic hyperplasia with lower urinary tract symptoms, symptom details unspecified  -     tamsulosin (FLOMAX) 0 4 mg; Take 1 capsule (0 4 mg total) by mouth daily with dinner    Aortic aneurysm without rupture, unspecified portion of aorta (HCC)    Restless legs syndrome    Anxiety       Anxiety currently well controlled continue medications as prescribed  Prostate restarted on Flomax continue medications as prescribed see urologist in  July  Continue cardiologist recommendations  Continue restless leg management per vascular  Hernia referral given to the patient to be seen in July when he has insurance again  Future Appointments   Date Time Provider Kailash Perkins   3/9/2021 11:00 AM Sujey Joshua MD [facility]   4/30/2021 10:30 AM Semaj Estrada PA-C [facility]          SUBJECTIVE  CC: Follow-up (pt her to f/u with ct results)      HPI:  Herman Roman is a 78 y o  male who presents for  An acute appointment  Patient wanted to follow up on all his CT results  Patient states that he continues of lower abdominal pain  He states that he was recommended to see the surgeon who performed the hernia repair in 2001  Patient does not recall the name  Did see someone in [facility]  Patient having irritation of the prostate has not been taking his Flomax  Has not seen his urologist   No longer needs to take his gabapentin given these wearing compression stockings and has had significant improvement with his restless leg syndrome    Patient set to repeat his echocardiogram   Continues to show calcifications on his aorta being managed by Cardiology  Taking his cholesterol medications as prescribed without any difficulties  Review of Systems   Constitutional: Negative for activity change, appetite change, chills, fatigue and fever  HENT: Negative for congestion  Respiratory: Negative for cough, chest tightness and shortness of breath  Cardiovascular: Negative for chest pain and leg swelling  Gastrointestinal: Positive for abdominal pain  Negative for abdominal distention, constipation, diarrhea, nausea and vomiting  Psychiatric/Behavioral: The patient is nervous/anxious  All other systems reviewed and are negative        Historical Information   The patient history was reviewed as follows:  Past Medical History:   Diagnosis Date    Arthritis     shoulder    Ascending aortic aneurysm ([address] )     Memory changes          Medications:     Current Outpatient Medications:     acetaminophen (TYLENOL) 325 mg tablet, Take 650 mg by mouth every 6 (six) hours as needed for mild pain, Disp: , Rfl:     aspirin (ECOTRIN LOW STRENGTH) 81 mg EC tablet, Take 1 tablet (81 mg total) by mouth daily with breakfast, Disp: 90 tablet, Rfl: 3    escitalopram (LEXAPRO) 10 mg tablet, Take 1 tablet (10 mg total) by mouth daily at bedtime, Disp: 90 tablet, Rfl: 3    hydrOXYzine HCL (ATARAX) 25 mg tablet, Take 1 tablet (25 mg total) by mouth daily at bedtime as needed (Insomnia), Disp: 90 tablet, Rfl: 2    ibuprofen (MOTRIN) 200 mg tablet, Take by mouth every 6 (six) hours as needed for mild pain, Disp: , Rfl:     naproxen (NAPROSYN) 500 mg tablet, Take 1 tablet (500 mg total) by mouth 2 (two) times a day as needed for headaches, Disp: 30 tablet, Rfl: 0    pantoprazole (PROTONIX) 40 mg tablet, Take 1 tablet (40 mg total) by mouth daily before breakfast, Disp: 90 tablet, Rfl: 0    rosuvastatin (CRESTOR) 10 MG tablet, Take 1 tablet (10 mg total) by mouth daily at bedtime, Disp: 90 tablet, Rfl: 3    tamsulosin (FLOMAX) 0 4 mg, Take 1 capsule (0 4 mg total) by mouth daily with dinner, Disp: 90 capsule, Rfl: 2    No Known Allergies    OBJECTIVE  Vitals:   Vitals:    03/02/21 1239   BP: 104/64   BP Location: Left arm   Patient Position: Sitting   Cuff Size: Standard   Pulse: 77   Resp: 16   Temp: 97 6 °F (36 4 °C)   TempSrc: Temporal   SpO2: 97%   Weight: 62 9 kg (138 lb 9 6 oz)   Height: 5' 2" (1 575 m)         Physical Exam  Vitals signs reviewed  Constitutional:       Appearance: He is well-developed  HENT:      Head: Normocephalic and atraumatic  Eyes:      Conjunctiva/sclera: Conjunctivae normal       Pupils: Pupils are equal, round, and reactive to light  Neck:      Musculoskeletal: Normal range of motion and neck supple  Cardiovascular:      Rate and Rhythm: Normal rate and regular rhythm  Heart sounds: Normal heart sounds, S1 normal and S2 normal  No murmur  Pulmonary:      Effort: Pulmonary effort is normal  No respiratory distress  Breath sounds: Normal breath sounds  No wheezing  Abdominal:      Hernia: A hernia (right small incisional) is present  Musculoskeletal: Normal range of motion  Skin:     General: Skin is warm  Neurological:      Mental Status: He is alert and oriented to person, place, and time  Psychiatric:         Speech: Speech normal          Behavior: Behavior normal          Thought Content:  Thought content normal          Judgment: Judgment normal                     Danyel Garner MD,   [facility]  3/2/2021

## 2021-04-30 PROBLEM — R25.2 BILATERAL LEG CRAMPS: Status: ACTIVE | Noted: 2021-04-30

## 2021-05-18 ENCOUNTER — OFFICE VISIT (OUTPATIENT)
Dept: FAMILY MEDICINE CLINIC | Facility: CLINIC | Age: 71
End: 2021-05-18

## 2021-05-18 VITALS
WEIGHT: 145.6 LBS | HEIGHT: 62 IN | SYSTOLIC BLOOD PRESSURE: 100 MMHG | HEART RATE: 87 BPM | DIASTOLIC BLOOD PRESSURE: 64 MMHG | TEMPERATURE: 97.6 F | BODY MASS INDEX: 26.79 KG/M2 | OXYGEN SATURATION: 96 % | RESPIRATION RATE: 24 BRPM

## 2021-05-18 DIAGNOSIS — K21.9 GASTROESOPHAGEAL REFLUX DISEASE WITHOUT ESOPHAGITIS: Primary | ICD-10-CM

## 2021-05-18 DIAGNOSIS — F41.9 ANXIETY: ICD-10-CM

## 2021-05-18 DIAGNOSIS — G47.09 OTHER INSOMNIA: ICD-10-CM

## 2021-05-18 PROCEDURE — 99212 OFFICE O/P EST SF 10 MIN: CPT | Performed by: FAMILY MEDICINE

## 2021-05-18 RX ORDER — ESCITALOPRAM OXALATE 10 MG/1
10 TABLET ORAL
Qty: 90 TABLET | Refills: 3 | Status: SHIPPED | OUTPATIENT
Start: 2021-05-18 | End: 2021-09-13

## 2021-05-18 RX ORDER — PANTOPRAZOLE SODIUM 40 MG/1
40 TABLET, DELAYED RELEASE ORAL
Qty: 30 TABLET | Refills: 0 | Status: SHIPPED | OUTPATIENT
Start: 2021-05-18 | End: 2022-01-05 | Stop reason: SDUPTHER

## 2021-05-18 NOTE — PROGRESS NOTES
Rosio Baker 1950 male MRN: [id number]    FAMILY PRACTICE OFFICE VISIT  [facility] - [address]      ASSESSMENT/PLAN  Rosio Baker is a 78 y o  male presents to the office for    Diagnoses and all orders for this visit:    Gastroesophageal reflux disease without esophagitis  -     pantoprazole (PROTONIX) 40 mg tablet; Take 1 tablet (40 mg total) by mouth daily before breakfast    Anxiety  -     escitalopram (LEXAPRO) 10 mg tablet; Take 1 tablet (10 mg total) by mouth daily at bedtime    Other insomnia         Acid reflux not controlled  Started the patient on Protonix   Anxiety patient has not been taking his Lexapro  Refilled today given that he did not have a refill  Lexapro has been helping him for insomnia as well  If the symptoms of acid reflux worsen recommend that the patient report to the emergency room or gastroenterologist   Patient aware he currently is self pay therefore do not want him to get an endoscopy unless symptoms do not improve with Protonix         Future Appointments   Date Time Provider Kailash Perkins   7/1/2021  9:00 AM Aicha Chopra MD [facility]          SUBJECTIVE  CC: Shortness of Breath (patient here states he has been getting bouts of SOB for 3 weeks)      HPI:  Rosio Baker is a 78 y o  male who presents for acute appointment  Patient predominantly speaks [address]  Therefore chief complaint was placed in air  Patient states that he has heartburn that sits in the middle of his chest 247 if he does not take a Tums  He states he used to be on Protonix and would like to be refilled on that  Had a complete cardiac workup in the emergency room so he knows it is not his heart he states that he has no more bowel movements  He states that happens usually after eating  States that his anxiety has not been the best   But he also stopped taking his Lexapro kids he did have enough refills    Patient states that he takes his Atarax as needed for sleep and states that that does help him  Denies any side effects at this time  Did have a history of an endoscopy that showed mild gastritis  Review of Systems   Constitutional: Negative for activity change, appetite change, chills, fatigue and fever  HENT: Negative for congestion  Respiratory: Negative for cough, chest tightness and shortness of breath  Cardiovascular: Negative for chest pain and leg swelling  Gastrointestinal: Negative for abdominal distention, abdominal pain, constipation, diarrhea, nausea and vomiting  All other systems reviewed and are negative        Historical Information   The patient history was reviewed as follows:  Past Medical History:   Diagnosis Date    Arthritis     shoulder    Ascending aortic aneurysm ([facility] [address] )     Memory changes          Medications:     Current Outpatient Medications:     acetaminophen (TYLENOL) 325 mg tablet, Take 650 mg by mouth every 6 (six) hours as needed for mild pain, Disp: , Rfl:     aspirin (ECOTRIN LOW STRENGTH) 81 mg EC tablet, Take 1 tablet (81 mg total) by mouth daily with breakfast, Disp: 90 tablet, Rfl: 3    escitalopram (LEXAPRO) 10 mg tablet, Take 1 tablet (10 mg total) by mouth daily at bedtime, Disp: 90 tablet, Rfl: 3    hydrOXYzine HCL (ATARAX) 25 mg tablet, Take 1 tablet (25 mg total) by mouth daily at bedtime as needed (Insomnia), Disp: 90 tablet, Rfl: 2    ibuprofen (MOTRIN) 200 mg tablet, Take by mouth every 6 (six) hours as needed for mild pain, Disp: , Rfl:     naproxen (NAPROSYN) 500 mg tablet, Take 1 tablet (500 mg total) by mouth 2 (two) times a day as needed for headaches, Disp: 30 tablet, Rfl: 0    pantoprazole (PROTONIX) 40 mg tablet, Take 1 tablet (40 mg total) by mouth daily before breakfast, Disp: 90 tablet, Rfl: 0    rosuvastatin (CRESTOR) 10 MG tablet, Take 1 tablet (10 mg total) by mouth daily at bedtime, Disp: 90 tablet, Rfl: 3    tamsulosin (FLOMAX) 0 4 mg, Take 1 capsule (0 4 mg total) by mouth daily with dinner, Disp: 90 capsule, Rfl: 2    No Known Allergies    OBJECTIVE  Vitals:   Vitals:    05/18/21 1421   BP: 100/64   BP Location: Left arm   Patient Position: Sitting   Cuff Size: Standard   Pulse: 87   Resp: (!) 24   Temp: 97 6 °F (36 4 °C)   TempSrc: Temporal   SpO2: 96%   Weight: 66 kg (145 lb 9 6 oz)   Height: 5' 2" (1 575 m)         Physical Exam  Vitals signs reviewed  Constitutional:       Appearance: Normal appearance  He is well-developed  HENT:      Head: Normocephalic and atraumatic  Eyes:      Conjunctiva/sclera: Conjunctivae normal       Pupils: Pupils are equal, round, and reactive to light  Neck:      Musculoskeletal: Normal range of motion and neck supple  Cardiovascular:      Rate and Rhythm: Normal rate and regular rhythm  Heart sounds: Normal heart sounds  Pulmonary:      Effort: Pulmonary effort is normal  No respiratory distress  Breath sounds: Normal breath sounds  Abdominal:      Tenderness: There is abdominal tenderness in the epigastric area  Musculoskeletal: Normal range of motion  Skin:     General: Skin is warm  Capillary Refill: Capillary refill takes less than 2 seconds  Neurological:      General: No focal deficit present  Mental Status: He is alert and oriented to person, place, and time  Psychiatric:         Mood and Affect: Mood normal          Behavior: Behavior normal          Thought Content:  Thought content normal          Judgment: Judgment normal                     Maya Treadwell MD,   [facility]  5/18/2021

## 2021-07-06 ENCOUNTER — CONSULT (OUTPATIENT)
Dept: SURGERY | Facility: CLINIC | Age: 71
End: 2021-07-06
Payer: COMMERCIAL

## 2021-07-06 VITALS
HEIGHT: 62 IN | WEIGHT: 147.4 LBS | TEMPERATURE: 97.5 F | DIASTOLIC BLOOD PRESSURE: 62 MMHG | HEART RATE: 65 BPM | BODY MASS INDEX: 27.12 KG/M2 | SYSTOLIC BLOOD PRESSURE: 112 MMHG

## 2021-07-06 DIAGNOSIS — S39.011A ABDOMINAL WALL STRAIN: ICD-10-CM

## 2021-07-06 PROCEDURE — 3008F BODY MASS INDEX DOCD: CPT | Performed by: SURGERY

## 2021-07-06 PROCEDURE — 1036F TOBACCO NON-USER: CPT | Performed by: SURGERY

## 2021-07-06 PROCEDURE — 99214 OFFICE O/P EST MOD 30 MIN: CPT | Performed by: SURGERY

## 2021-07-06 NOTE — PROGRESS NOTES
[address] [place] [address] History and Physical Note:    Assessment:    Abdominal wall strain  No abdominal wall hernia noted on laparoscopic exploration in October 2020 or on his CT scan dated February 2021    Pertinent labs reviewed   reviewed the BMP and CBC from 28 October 2020  Pertinent images and available reads personally reviewed   reviewed the CT scan images as well as CT scan report from 18 February 2021  Pertinent notes reviewed   I reviewed the note by his primary care provider, Dr Reyes Hauser,  Dated 2 March 2021    Plan:   no general surgical issue  Chief Complaint:  "I think I have a hernia"    HPI   patient is a 53-year-old male sent to my office to be evaluated for possible recurrent abdominal wall hernia  He reports a right mid abdominal incision many years ago for a hernia  There is no report of this, but it is in a location of a spigelian hernia  He reports some pain at this site  No bulge  Of note, I performed a laparoscopic cholecystectomy on him in October 2020 and there was no ventral hernia noted during his laparoscopic exploration  He also had a abdomen and pelvis CT scan in February which also showed no abdominal wall hernia  PMH:  Past Medical History:   Diagnosis Date    Arthritis     shoulder    Ascending aortic aneurysm ([address] )     Memory changes        PSH:  Past Surgical History:   Procedure Laterality Date    APPENDECTOMY      CHOLECYSTECTOMY LAPAROSCOPIC N/A 10/27/2020    Procedure: CHOLECYSTECTOMY LAPAROSCOPIC;  Surgeon: Catrachita Delacruz MD;  Location: [address] [place] [address];  Service: General    COLONOSCOPY N/A 12/12/2016    Procedure: COLONOSCOPY;  Surgeon: Larisa Mehta MD;  Location: [facility];   Service:     ELBOW SURGERY Bilateral     HERNIA REPAIR Right 2001    inguinal    KNEE ARTHROSCOPY Right     OR KNEE SCOPE,MED/LAT MENISECTOMY Left 6/18/2020    Procedure: ARTHROSCOPY KNEE MEDIAL MENISCECTOMY;  Surgeon: Lee Ann Yoon MD;  [facility];  Service: Orthopedics    ROTATOR CUFF REPAIR Bilateral 2011       Home Meds:  Current Outpatient Medications on File Prior to Visit   Medication Sig Dispense Refill    acetaminophen (TYLENOL) 325 mg tablet Take 650 mg by mouth every 6 (six) hours as needed for mild pain      aspirin (ECOTRIN LOW STRENGTH) 81 mg EC tablet Take 1 tablet (81 mg total) by mouth daily with breakfast 90 tablet 3    pantoprazole (PROTONIX) 40 mg tablet Take 1 tablet (40 mg total) by mouth daily before breakfast 30 tablet 0    rosuvastatin (CRESTOR) 10 MG tablet Take 1 tablet (10 mg total) by mouth daily at bedtime 90 tablet 3    tamsulosin (FLOMAX) 0 4 mg Take 1 capsule (0 4 mg total) by mouth daily with dinner 90 capsule 2    escitalopram (LEXAPRO) 10 mg tablet Take 1 tablet (10 mg total) by mouth daily at bedtime (Patient not taking: Reported on 7/6/2021) 90 tablet 3    hydrOXYzine HCL (ATARAX) 25 mg tablet Take 1 tablet (25 mg total) by mouth daily at bedtime as needed (Insomnia) (Patient not taking: Reported on 7/6/2021) 90 tablet 2     No current facility-administered medications on file prior to visit         Allergies:  No Known Allergies    Social Hx:  Social History     Socioeconomic History    Marital status: /Civil Union     Spouse name: Not on file    Number of children: Not on file    Years of education: Not on file    Highest education level: Not on file   Occupational History    Not on file   Tobacco Use    Smoking status: Never Smoker    Smokeless tobacco: Never Used   Vaping Use    Vaping Use: Never used   Substance and Sexual Activity    Alcohol use: Not Currently    Drug use: No    Sexual activity: Not Currently   Other Topics Concern    Not on file   Social History Narrative    Not on file     Social Determinants of Health     Financial Resource Strain:     Difficulty of Paying Living Expenses:    Food Insecurity:     Worried About Running Out of Food in the Last Year:     Alvin of Food in the Last Year:    Transportation Needs:     Lack of Transportation (Medical):  Lack of Transportation (Non-Medical):    Physical Activity:     Days of Exercise per Week:     Minutes of Exercise per Session:    Stress:     Feeling of Stress :    Social Connections:     Frequency of Communication with Friends and Family:     Frequency of Social Gatherings with Friends and Family:     Attends Sabianism Services:     Active Member of Clubs or Organizations:     Attends Club or Organization Meetings:     Marital Status:    Intimate Partner Violence:     Fear of Current or Ex-Partner:     Emotionally Abused:     Physically Abused:     Sexually Abused:         Family Hx:    Family History   Problem Relation Age of Onset    No Known Problems Mother     No Known Problems Father     No Known Problems Sister     No Known Problems Brother     No Known Problems Maternal Aunt     No Known Problems Maternal Uncle     No Known Problems Paternal Aunt     No Known Problems Paternal Uncle     No Known Problems Maternal Grandmother     No Known Problems Maternal Grandfather     No Known Problems Paternal Grandmother     No Known Problems Paternal Grandfather     ADD / ADHD Neg Hx     Anesthesia problems Neg Hx     Cancer Neg Hx     Clotting disorder Neg Hx     Collagen disease Neg Hx     Diabetes Neg Hx     Dislocations Neg Hx     Learning disabilities Neg Hx     Neurological problems Neg Hx     Osteoporosis Neg Hx     Rheumatologic disease Neg Hx     Scoliosis Neg Hx     Vascular Disease Neg Hx          Review of Systems   Constitutional: Negative  HENT: Negative  Respiratory: Negative  Cardiovascular: Negative  Gastrointestinal: Negative  See past medical history   Genitourinary: Negative      Musculoskeletal:         See past medical history   Neurological:         See past medical history       /62 (BP Location: Right arm, Patient Position: Sitting, Cuff Size: Large)   Pulse 65   Temp 97 5 °F (36 4 °C) (Core)   Ht 5' 2" (1 575 m)   Wt 66 9 kg (147 lb 6 4 oz)   BMI 26 96 kg/m²     Physical Exam  Vitals reviewed  Constitutional:       General: He is not in acute distress  Appearance: Normal appearance  HENT:      Head: Normocephalic and atraumatic  Mouth/Throat:      Pharynx: Oropharynx is clear  Cardiovascular:      Rate and Rhythm: Normal rate and regular rhythm  Pulmonary:      Effort: Pulmonary effort is normal  No respiratory distress  Breath sounds: Normal breath sounds  Abdominal:      General: Abdomen is flat  There is no distension  Palpations: Abdomen is soft  Comments: For a mild tenderness at his incision in the right mid abdomen  There is no recurrent hernia  There is a small amount of scar tissue as expected  Musculoskeletal:      Cervical back: Normal range of motion and neck supple  Skin:     General: Skin is warm and dry  Neurological:      General: No focal deficit present  Mental Status: He is alert and oriented to person, place, and time  Cranial Nerves: No cranial nerve deficit     Psychiatric:         Mood and Affect: Mood normal          Behavior: Behavior normal          Pertinent labs reviewed   reviewed the BMP and CBC from 28 October 2020  Pertinent images and available reads personally reviewed   reviewed the CT scan images as well as CT scan report from 18 February 2021  Pertinent notes reviewed   I reviewed the note by his primary care provider, Dr Reyes Hauser,  Dated 2 March 2021     Radha Linder MD [address] [facility]  [phone]

## 2021-07-08 ENCOUNTER — TELEPHONE (OUTPATIENT)
Dept: FAMILY MEDICINE CLINIC | Facility: CLINIC | Age: 71
End: 2021-07-08

## 2021-07-08 NOTE — TELEPHONE ENCOUNTER
Dr Reyes Hauser,    Patient would like to speak to you  Very hard to understand, I think it is in reference to a doctor that you referred him to in [place]  Please call

## 2021-07-19 ENCOUNTER — TELEPHONE (OUTPATIENT)
Dept: FAMILY MEDICINE CLINIC | Facility: CLINIC | Age: 71
End: 2021-07-19

## 2021-07-19 ENCOUNTER — OFFICE VISIT (OUTPATIENT)
Dept: CARDIOLOGY CLINIC | Facility: CLINIC | Age: 71
End: 2021-07-19
Payer: COMMERCIAL

## 2021-07-19 VITALS
DIASTOLIC BLOOD PRESSURE: 60 MMHG | SYSTOLIC BLOOD PRESSURE: 112 MMHG | BODY MASS INDEX: 26.87 KG/M2 | TEMPERATURE: 97.7 F | RESPIRATION RATE: 18 BRPM | WEIGHT: 146 LBS | HEART RATE: 73 BPM | HEIGHT: 62 IN | OXYGEN SATURATION: 96 %

## 2021-07-19 DIAGNOSIS — I71.2 THORACIC AORTIC ANEURYSM WITHOUT RUPTURE (HCC): ICD-10-CM

## 2021-07-19 DIAGNOSIS — I71.9 AORTIC ANEURYSM WITHOUT RUPTURE, UNSPECIFIED PORTION OF AORTA (HCC): Primary | ICD-10-CM

## 2021-07-19 DIAGNOSIS — I73.9 PAD (PERIPHERAL ARTERY DISEASE) (HCC): ICD-10-CM

## 2021-07-19 DIAGNOSIS — R07.89 CHEST PRESSURE: ICD-10-CM

## 2021-07-19 PROCEDURE — 93000 ELECTROCARDIOGRAM COMPLETE: CPT | Performed by: INTERNAL MEDICINE

## 2021-07-19 PROCEDURE — 1160F RVW MEDS BY RX/DR IN RCRD: CPT | Performed by: INTERNAL MEDICINE

## 2021-07-19 PROCEDURE — 99214 OFFICE O/P EST MOD 30 MIN: CPT | Performed by: INTERNAL MEDICINE

## 2021-07-19 PROCEDURE — 3008F BODY MASS INDEX DOCD: CPT | Performed by: INTERNAL MEDICINE

## 2021-07-19 PROCEDURE — 1036F TOBACCO NON-USER: CPT | Performed by: INTERNAL MEDICINE

## 2021-07-19 RX ORDER — ASPIRIN 81 MG/1
81 TABLET ORAL
Qty: 90 TABLET | Refills: 3 | Status: SHIPPED | OUTPATIENT
Start: 2021-07-19 | End: 2022-01-28 | Stop reason: SDUPTHER

## 2021-07-19 RX ORDER — ROSUVASTATIN CALCIUM 10 MG/1
10 TABLET, COATED ORAL
Qty: 90 TABLET | Refills: 3 | Status: SHIPPED | OUTPATIENT
Start: 2021-07-19 | End: 2022-01-28 | Stop reason: SDUPTHER

## 2021-07-19 NOTE — PROGRESS NOTES
[facility]  [address] [place] [address] [address]   [place], [address]  Cardiology Follow-up    Jacob Leal  [id number]  1950      Consult for:Atypical chest pain  Appreciate consult by: Andrey Drummond MD    1  Aortic aneurysm without rupture, unspecified portion of aorta (HCC)  POCT ECG   2  Chest pressure  aspirin (ECOTRIN LOW STRENGTH) 81 mg EC tablet    rosuvastatin (CRESTOR) 10 MG tablet   3  Thoracic aortic aneurysm without rupture (HCC)  aspirin (ECOTRIN LOW STRENGTH) 81 mg EC tablet    rosuvastatin (CRESTOR) 10 MG tablet   4  PAD (peripheral artery disease) (HCC)  aspirin (ECOTRIN LOW STRENGTH) 81 mg EC tablet    rosuvastatin (CRESTOR) 10 MG tablet      Discussion/Summary:   Atypical chest pain- ruled out for an acute coronary syndrome in the ER  He has atypical features  Stress test negative  Benign physical examination  Cannot rule out acid reflux  We will try conservative aspirin 81 mg plus rosuvastatin 10 mg  Low lasted foods  Peripheral vascular disease- last CT scan with contrast of the abdomen showed 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  Also noted with peripheral vascular disease  His blood pressures have been controlled  He is a nonsmoker  Will put him on low-dose statin  Also recommend aspirin 81 mg  Followed by vascular    Thoracic aortic aneurysm- mild  Denies having family history for abdominal rupture  Nonsmoker  Blood pressure is controlled  Continue to monitor  Followed by vascular    HPI:   81-year-old gentleman with no prior cardiac events, mild thoracic aortic aneurysm presents with recent ER visit secondary to chest discomfort  He states having sharp chest discomfort at times  Not completely related to exercise  No family history for myocardial infarction  He denies having smoking history  He does have a history of peripheral vascular disease  Sharp pain with deep breath  Not related to eating   Some pain is positional       07/18/2021: He denies having chest discomfort  His blood pressures are at goal   His last LDL was controlled  Past Medical History:   Diagnosis Date    Arthritis     shoulder    Ascending aortic aneurysm ([address] )     Memory changes      Social History     Socioeconomic History    Marital status: /Civil Union     Spouse name: Not on file    Number of children: Not on file    Years of education: Not on file    Highest education level: Not on file   Occupational History    Not on file   Tobacco Use    Smoking status: Never Smoker    Smokeless tobacco: Never Used   Vaping Use    Vaping Use: Never used   Substance and Sexual Activity    Alcohol use: Not Currently    Drug use: No    Sexual activity: Not Currently   Other Topics Concern    Not on file   Social History Narrative    Not on file     Social Determinants of Health     Financial Resource Strain:     Difficulty of Paying Living Expenses:    Food Insecurity:     Worried About [address] [employer] in the Last Year:     [address] [employer] in the Last Year:    Transportation Needs:     Lack of Transportation (Medical):      Lack of Transportation (Non-Medical):    Physical Activity:     Days of Exercise per Week:     Minutes of Exercise per Session:    Stress:     Feeling of Stress :    Social Connections:     Frequency of Communication with Friends and Family:     Frequency of Social Gatherings with Friends and Family:     Attends Anabaptist Services:     Active Member of Clubs or Organizations:     Attends Club or Organization Meetings:     Marital Status:    Intimate Partner Violence:     Fear of Current or Ex-Partner:     Emotionally Abused:     Physically Abused:     Sexually Abused:       Family History   Problem Relation Age of Onset    No Known Problems Mother     No Known Problems Father     No Known Problems Sister     No Known Problems Brother     No Known Problems Maternal Aunt     No Known Problems Maternal Uncle     No Known Problems Paternal Aunt     No Known Problems Paternal Uncle     No Known Problems Maternal Grandmother     No Known Problems Maternal Grandfather     No Known Problems Paternal Grandmother     No Known Problems Paternal Grandfather     ADD / ADHD Neg Hx     Anesthesia problems Neg Hx     Cancer Neg Hx     Clotting disorder Neg Hx     Collagen disease Neg Hx     Diabetes Neg Hx     Dislocations Neg Hx     Learning disabilities Neg Hx     Neurological problems Neg Hx     Osteoporosis Neg Hx     Rheumatologic disease Neg Hx     Scoliosis Neg Hx     Vascular Disease Neg Hx      Past Surgical History:   Procedure Laterality Date    APPENDECTOMY      CHOLECYSTECTOMY LAPAROSCOPIC N/A 10/27/2020    Procedure: CHOLECYSTECTOMY LAPAROSCOPIC;  Surgeon: Zaida Stoner MD;  [facility];  Service: General    COLONOSCOPY N/A 12/12/2016    Procedure: COLONOSCOPY;  Surgeon: Andrew Pandya MD;  Location: [place] [address] GI LAB;   Service:     ELBOW SURGERY Bilateral     HERNIA REPAIR Right 2001    inguinal    KNEE ARTHROSCOPY Right     OK KNEE SCOPE,MED/LAT MENISECTOMY Left 6/18/2020    Procedure: ARTHROSCOPY KNEE MEDIAL MENISCECTOMY;  Surgeon: Frieda Horner MD;  Location: [address] [place] [address];  Service: Orthopedics    ROTATOR CUFF REPAIR Bilateral 2011       Current Outpatient Medications:     acetaminophen (TYLENOL) 325 mg tablet, Take 650 mg by mouth every 6 (six) hours as needed for mild pain, Disp: , Rfl:     aspirin (ECOTRIN LOW STRENGTH) 81 mg EC tablet, Take 1 tablet (81 mg total) by mouth daily with breakfast, Disp: 90 tablet, Rfl: 3    pantoprazole (PROTONIX) 40 mg tablet, Take 1 tablet (40 mg total) by mouth daily before breakfast, Disp: 30 tablet, Rfl: 0    rosuvastatin (CRESTOR) 10 MG tablet, Take 1 tablet (10 mg total) by mouth daily at bedtime, Disp: 90 tablet, Rfl: 3    tamsulosin (FLOMAX) 0 4 mg, Take 1 capsule (0 4 mg total) by mouth daily with dinner, Disp: 90 capsule, Rfl: 2    escitalopram (LEXAPRO) 10 mg tablet, Take 1 tablet (10 mg total) by mouth daily at bedtime (Patient not taking: Reported on 7/6/2021), Disp: 90 tablet, Rfl: 3    hydrOXYzine HCL (ATARAX) 25 mg tablet, Take 1 tablet (25 mg total) by mouth daily at bedtime as needed (Insomnia) (Patient not taking: Reported on 7/6/2021), Disp: 90 tablet, Rfl: 2  No Known Allergies  Vitals:    07/19/21 0958   BP: 112/60   BP Location: Left arm   Patient Position: Sitting   Cuff Size: Standard   Pulse: 73   Resp: 18   Temp: 97 7 °F (36 5 °C)   TempSrc: Temporal   SpO2: 96%   Weight: 66 2 kg (146 lb)   Height: 5' 2" (1 575 m)       Review of Systems:   Review of Systems   Constitutional: Negative  HENT: Negative  Eyes: Negative  Respiratory: Negative  Cardiovascular: Negative for chest pain  Gastrointestinal: Negative  Endocrine: Negative  Genitourinary: Negative  Musculoskeletal: Negative  Skin: Negative  Allergic/Immunologic: Negative  Neurological: Negative  Hematological: Negative  Psychiatric/Behavioral: Negative  Vitals:    07/19/21 0958   BP: 112/60   BP Location: Left arm   Patient Position: Sitting   Cuff Size: Standard   Pulse: 73   Resp: 18   Temp: 97 7 °F (36 5 °C)   TempSrc: Temporal   SpO2: 96%   Weight: 66 2 kg (146 lb)   Height: 5' 2" (1 575 m)     Physical Examination:   Physical Exam  Constitutional:       General: He is not in acute distress  Appearance: He is well-developed  He is not diaphoretic  HENT:      Head: Normocephalic and atraumatic  Right Ear: External ear normal       Left Ear: External ear normal    Eyes:      General: No scleral icterus  Right eye: No discharge  Left eye: No discharge  Conjunctiva/sclera: Conjunctivae normal       Pupils: Pupils are equal, round, and reactive to light  Neck:      Thyroid: No thyromegaly  Vascular: No JVD  Trachea: No tracheal deviation     Cardiovascular: Rate and Rhythm: Normal rate and regular rhythm  Heart sounds: No murmur heard  No friction rub  No gallop  Pulmonary:      Effort: Pulmonary effort is normal  No respiratory distress  Breath sounds: Normal breath sounds  No stridor  No wheezing or rales  Chest:      Chest wall: No tenderness  Abdominal:      General: Bowel sounds are normal  There is no distension  Palpations: Abdomen is soft  There is no mass  Tenderness: There is no abdominal tenderness  There is no guarding or rebound  Musculoskeletal:         General: No tenderness or deformity  Normal range of motion  Cervical back: Normal range of motion and neck supple  Skin:     General: Skin is warm and dry  Coloration: Skin is not pale  Findings: No erythema or rash  Neurological:      Mental Status: He is alert and oriented to person, place, and time  Cranial Nerves: No cranial nerve deficit  Motor: No abnormal muscle tone  Coordination: Coordination normal       Deep Tendon Reflexes: Reflexes are normal and symmetric  Reflexes normal    Psychiatric:         Behavior: Behavior normal          Thought Content:  Thought content normal          Judgment: Judgment normal          Labs:     Lab Results   Component Value Date    WBC 9 83 10/28/2020    HGB 13 2 10/28/2020    HCT 34 4 (L) 10/28/2020    MCV 95 10/28/2020    RDW 11 9 10/28/2020     10/28/2020     BMP:  Lab Results   Component Value Date    SODIUM 137 10/28/2020    K 4 0 10/28/2020     10/28/2020    CO2 24 10/28/2020    BUN 13 10/28/2020    CREATININE 0 74 10/28/2020    GLUC 142 (H) 10/28/2020    CALCIUM 7 8 (L) 10/28/2020    EGFR 95 10/28/2020    MG 2 0 10/27/2020     LFT:  Lab Results   Component Value Date    AST 14 10/27/2020    ALT 22 10/27/2020    ALKPHOS 91 10/27/2020    TP 7 5 10/27/2020    ALB 4 0 10/27/2020      No results found for: [facility]  Lab Results   Component Value Date    HGBA1C 5 3 01/21/2019 Lipid Profile:   Lab Results   Component Value Date    CHOLESTEROL 113 01/20/2021    HDL 51 01/20/2021    LDLCALC 49 01/20/2021    TRIG 66 01/20/2021     Lab Results   Component Value Date    CHOLESTEROL 113 01/20/2021    CHOLESTEROL 194 05/27/2020     Lab Results   Component Value Date    TROPONINI <0 02 10/27/2020     Lab Results   Component Value Date    NTBNP 83 10/27/2020      No results found for this or any previous visit (from the past 672 hour(s))  Imaging & Testing   I have personally reviewed pertinent reports  Cardiac Testing     EKG: Personally reviewed  Karla Ferrera MD [facility]  [phone]  Please call with any questions or suggestions    Counseling :  A description of the counseling:   Goals and Barriers:  Patient's ability to self care:  Medication side effect reviewed with patient in detail and all their questions answered  "Portions of the record may have been created with voice recognition software  Occasional wrong word or "sound a like" substitutions may have occurred due to the inherent limitations of voice recognition software  Read the chart carefully and recognize, using context, where substitutions have occurred   Please call if you have any questions  "

## 2021-07-19 NOTE — TELEPHONE ENCOUNTER
Dr Sofya Jimenez,    Patient would like to speak to you regarding his cataract issue     Please call

## 2021-07-20 NOTE — TELEPHONE ENCOUNTER
Dr Elke Santana,    Patient would like to speak to you  Very hard to understand please call patient  Advised patient the Dr Elke Santana left the office for today and will be back in tomorrow

## 2021-07-21 NOTE — TELEPHONE ENCOUNTER
Referred to Dr Jordi Cortez information given  Patient has GLAUCOMA not Cataracts   Patient has a language barrier with our front staff

## 2021-09-08 ENCOUNTER — OFFICE VISIT (OUTPATIENT)
Dept: FAMILY MEDICINE CLINIC | Facility: CLINIC | Age: 71
End: 2021-09-08
Payer: COMMERCIAL

## 2021-09-08 VITALS
WEIGHT: 153.4 LBS | HEIGHT: 62 IN | TEMPERATURE: 97.7 F | HEART RATE: 64 BPM | RESPIRATION RATE: 18 BRPM | OXYGEN SATURATION: 96 % | SYSTOLIC BLOOD PRESSURE: 108 MMHG | DIASTOLIC BLOOD PRESSURE: 66 MMHG | BODY MASS INDEX: 28.23 KG/M2

## 2021-09-08 DIAGNOSIS — F41.9 ANXIETY: ICD-10-CM

## 2021-09-08 DIAGNOSIS — Z12.5 SCREENING PSA (PROSTATE SPECIFIC ANTIGEN): ICD-10-CM

## 2021-09-08 DIAGNOSIS — E78.5 HYPERLIPIDEMIA, UNSPECIFIED HYPERLIPIDEMIA TYPE: ICD-10-CM

## 2021-09-08 DIAGNOSIS — K21.9 GASTROESOPHAGEAL REFLUX DISEASE WITHOUT ESOPHAGITIS: Primary | ICD-10-CM

## 2021-09-08 PROCEDURE — 99214 OFFICE O/P EST MOD 30 MIN: CPT | Performed by: FAMILY MEDICINE

## 2021-09-08 PROCEDURE — 3725F SCREEN DEPRESSION PERFORMED: CPT | Performed by: FAMILY MEDICINE

## 2021-09-08 PROCEDURE — 3008F BODY MASS INDEX DOCD: CPT | Performed by: FAMILY MEDICINE

## 2021-09-08 PROCEDURE — 1160F RVW MEDS BY RX/DR IN RCRD: CPT | Performed by: FAMILY MEDICINE

## 2021-09-08 PROCEDURE — 1036F TOBACCO NON-USER: CPT | Performed by: FAMILY MEDICINE

## 2021-09-13 NOTE — PROGRESS NOTES
Maya Valenzuela 1950 male MRN: [id number]    [address] [facility] - [address]      ASSESSMENT/PLAN  Maya Valenzuela is a 73 y o  male presents to the office for    1  Gastroesophageal reflux disease without esophagitis    Well controlled on Protonix 40 mg continue as prescribed  - Comprehensive metabolic panel; Future    2  Hyperlipidemia, unspecified hyperlipidemia type   continue on low dose of Crestor 10 mg  Repeat lipid panel  - Lipid panel; Future  - Comprehensive metabolic panel; Future  - CBC and differential; Future    3  Screening PSA (prostate specific antigen)  - PSA, Total Screen; Future     4  Anxiety  Patient discontinued medication and is feeling better  Was taking Lexapro 10/Atarax 25 at bedtime      return to the office for scheduled physical    Future Appointments   Date Time Provider Kailash Perkins   10/11/2021  8:00 AM Kamari Mcknight MD [facility]          SUBJECTIVE  CC: Follow-up (patient here to follow up acid reflux says its a little better)      HPI:  Maya Valenzuela is a 73 y o  male who presents for  Acute appointment  Patient states that he would like to advised us that his acid reflux has been feeling a lot better since taking Protonix 40 mg  Patient has stopped taking Lexapro and Atarax given that his anxiety has been controlled as well  Patient just finally got insurance and would like to get blood work and make sure he is doing okay  He is taking his Crestor without any difficulties as well  Review of Systems   Constitutional: Negative for activity change, appetite change, chills, fatigue and fever  HENT: Negative for congestion  Respiratory: Negative for cough, chest tightness and shortness of breath  Cardiovascular: Negative for chest pain and leg swelling  Gastrointestinal: Negative for abdominal distention, abdominal pain, constipation, diarrhea, nausea and vomiting     All other systems reviewed and are negative  Historical Information   The patient history was reviewed as follows:  Past Medical History:   Diagnosis Date    Arthritis     shoulder    Ascending aortic aneurysm ([address] )     Memory changes          Medications:     Current Outpatient Medications:     acetaminophen (TYLENOL) 325 mg tablet, Take 650 mg by mouth every 6 (six) hours as needed for mild pain, Disp: , Rfl:     aspirin (ECOTRIN LOW STRENGTH) 81 mg EC tablet, Take 1 tablet (81 mg total) by mouth daily with breakfast, Disp: 90 tablet, Rfl: 3    pantoprazole (PROTONIX) 40 mg tablet, Take 1 tablet (40 mg total) by mouth daily before breakfast, Disp: 30 tablet, Rfl: 0    rosuvastatin (CRESTOR) 10 MG tablet, Take 1 tablet (10 mg total) by mouth daily at bedtime, Disp: 90 tablet, Rfl: 3    tamsulosin (FLOMAX) 0 4 mg, Take 1 capsule (0 4 mg total) by mouth daily with dinner, Disp: 90 capsule, Rfl: 2    No Known Allergies    OBJECTIVE  Vitals:   Vitals:    09/08/21 0801   BP: 108/66   BP Location: Left arm   Patient Position: Sitting   Cuff Size: Standard   Pulse: 64   Resp: 18   Temp: 97 7 °F (36 5 °C)   TempSrc: Temporal   SpO2: 96%   Weight: 69 6 kg (153 lb 6 4 oz)   Height: 5' 2" (1 575 m)         Physical Exam  Vitals reviewed  Constitutional:       Appearance: Normal appearance  He is well-developed  HENT:      Head: Normocephalic and atraumatic  Eyes:      Conjunctiva/sclera: Conjunctivae normal       Pupils: Pupils are equal, round, and reactive to light  Cardiovascular:      Rate and Rhythm: Normal rate and regular rhythm  Heart sounds: Normal heart sounds, S1 normal and S2 normal  No murmur heard  Pulmonary:      Effort: Pulmonary effort is normal  No respiratory distress  Breath sounds: Normal breath sounds  No wheezing  Abdominal:      General: Abdomen is flat  Bowel sounds are normal       Palpations: Abdomen is soft  Musculoskeletal:         General: Normal range of motion        Cervical back: Normal range of motion and neck supple  Skin:     General: Skin is warm  Neurological:      General: No focal deficit present  Mental Status: He is alert and oriented to person, place, and time  Psychiatric:         Mood and Affect: Mood normal          Speech: Speech normal          Behavior: Behavior normal          Thought Content:  Thought content normal          Judgment: Judgment normal                     Kamari Mcknight MD,   [facility]  9/13/2021

## 2021-10-05 ENCOUNTER — RA CDI HCC (OUTPATIENT)
Dept: OTHER | Facility: HOSPITAL | Age: 71
End: 2021-10-05

## 2021-10-06 ENCOUNTER — APPOINTMENT (OUTPATIENT)
Dept: LAB | Facility: CLINIC | Age: 71
End: 2021-10-06
Payer: COMMERCIAL

## 2021-10-06 DIAGNOSIS — Z12.5 SCREENING PSA (PROSTATE SPECIFIC ANTIGEN): ICD-10-CM

## 2021-10-06 DIAGNOSIS — E78.5 HYPERLIPIDEMIA, UNSPECIFIED HYPERLIPIDEMIA TYPE: ICD-10-CM

## 2021-10-06 DIAGNOSIS — K21.9 GASTROESOPHAGEAL REFLUX DISEASE WITHOUT ESOPHAGITIS: ICD-10-CM

## 2021-10-06 LAB
ALBUMIN SERPL BCP-MCNC: 4 G/DL (ref 3.5–5)
ALP SERPL-CCNC: 86 U/L (ref 46–116)
ALT SERPL W P-5'-P-CCNC: 33 U/L (ref 12–78)
ANION GAP SERPL CALCULATED.3IONS-SCNC: 5 MMOL/L (ref 4–13)
AST SERPL W P-5'-P-CCNC: 35 U/L (ref 5–45)
BASOPHILS # BLD AUTO: 0 THOUSANDS/ΜL (ref 0–0.1)
BASOPHILS NFR BLD AUTO: 0 % (ref 0–1)
BILIRUB SERPL-MCNC: 0.68 MG/DL (ref 0.2–1)
BUN SERPL-MCNC: 13 MG/DL (ref 5–25)
CALCIUM SERPL-MCNC: 8.8 MG/DL (ref 8.3–10.1)
CHLORIDE SERPL-SCNC: 107 MMOL/L (ref 100–108)
CHOLEST SERPL-MCNC: 117 MG/DL (ref 50–200)
CO2 SERPL-SCNC: 24 MMOL/L (ref 21–32)
CREAT SERPL-MCNC: 0.97 MG/DL (ref 0.6–1.3)
EOSINOPHIL # BLD AUTO: 0.08 THOUSAND/ΜL (ref 0–0.61)
EOSINOPHIL NFR BLD AUTO: 1 % (ref 0–6)
ERYTHROCYTE [DISTWIDTH] IN BLOOD BY AUTOMATED COUNT: 12.5 % (ref 11.6–15.1)
GFR SERPL CREATININE-BSD FRML MDRD: 79 ML/MIN/1.73SQ M
GLUCOSE P FAST SERPL-MCNC: 105 MG/DL (ref 65–99)
HCT VFR BLD AUTO: 42.7 % (ref 36.5–49.3)
HDLC SERPL-MCNC: 54 MG/DL
HGB BLD-MCNC: 14.2 G/DL (ref 12–17)
IMM GRANULOCYTES # BLD AUTO: 0.01 THOUSAND/UL (ref 0–0.2)
IMM GRANULOCYTES NFR BLD AUTO: 0 % (ref 0–2)
LDLC SERPL CALC-MCNC: 53 MG/DL (ref 0–100)
LYMPHOCYTES # BLD AUTO: 2.35 THOUSANDS/ΜL (ref 0.6–4.47)
LYMPHOCYTES NFR BLD AUTO: 41 % (ref 14–44)
MCH RBC QN AUTO: 31.7 PG (ref 26.8–34.3)
MCHC RBC AUTO-ENTMCNC: 33.3 G/DL (ref 31.4–37.4)
MCV RBC AUTO: 95 FL (ref 82–98)
MONOCYTES # BLD AUTO: 0.56 THOUSAND/ΜL (ref 0.17–1.22)
MONOCYTES NFR BLD AUTO: 10 % (ref 4–12)
NEUTROPHILS # BLD AUTO: 2.67 THOUSANDS/ΜL (ref 1.85–7.62)
NEUTS SEG NFR BLD AUTO: 48 % (ref 43–75)
NONHDLC SERPL-MCNC: 63 MG/DL
NRBC BLD AUTO-RTO: 0 /100 WBCS
PLATELET # BLD AUTO: 222 THOUSANDS/UL (ref 149–390)
PMV BLD AUTO: 9.5 FL (ref 8.9–12.7)
POTASSIUM SERPL-SCNC: 3.9 MMOL/L (ref 3.5–5.3)
PROT SERPL-MCNC: 7.3 G/DL (ref 6.4–8.2)
PSA SERPL-MCNC: 1.8 NG/ML (ref 0–4)
RBC # BLD AUTO: 4.48 MILLION/UL (ref 3.88–5.62)
SODIUM SERPL-SCNC: 136 MMOL/L (ref 136–145)
TRIGL SERPL-MCNC: 49 MG/DL
WBC # BLD AUTO: 5.67 THOUSAND/UL (ref 4.31–10.16)

## 2021-10-06 PROCEDURE — 36415 COLL VENOUS BLD VENIPUNCTURE: CPT

## 2021-10-06 PROCEDURE — 80061 LIPID PANEL: CPT

## 2021-10-06 PROCEDURE — G0103 PSA SCREENING: HCPCS

## 2021-10-06 PROCEDURE — 85025 COMPLETE CBC W/AUTO DIFF WBC: CPT

## 2021-10-06 PROCEDURE — 80053 COMPREHEN METABOLIC PANEL: CPT

## 2021-10-11 ENCOUNTER — OFFICE VISIT (OUTPATIENT)
Dept: FAMILY MEDICINE CLINIC | Facility: CLINIC | Age: 71
End: 2021-10-11
Payer: COMMERCIAL

## 2021-10-11 VITALS
BODY MASS INDEX: 27.94 KG/M2 | SYSTOLIC BLOOD PRESSURE: 98 MMHG | HEIGHT: 62 IN | WEIGHT: 151.8 LBS | TEMPERATURE: 97 F | OXYGEN SATURATION: 96 % | HEART RATE: 56 BPM | DIASTOLIC BLOOD PRESSURE: 68 MMHG | RESPIRATION RATE: 18 BRPM

## 2021-10-11 DIAGNOSIS — Z00.00 WELCOME TO MEDICARE PREVENTIVE VISIT: Primary | ICD-10-CM

## 2021-10-11 DIAGNOSIS — R73.09 ABNORMAL GLUCOSE: ICD-10-CM

## 2021-10-11 DIAGNOSIS — Z23 NEED FOR INFLUENZA VACCINATION: ICD-10-CM

## 2021-10-11 PROCEDURE — 3288F FALL RISK ASSESSMENT DOCD: CPT | Performed by: FAMILY MEDICINE

## 2021-10-11 PROCEDURE — 3008F BODY MASS INDEX DOCD: CPT | Performed by: FAMILY MEDICINE

## 2021-10-11 PROCEDURE — 1036F TOBACCO NON-USER: CPT | Performed by: FAMILY MEDICINE

## 2021-10-11 PROCEDURE — G0008 ADMIN INFLUENZA VIRUS VAC: HCPCS | Performed by: FAMILY MEDICINE

## 2021-10-11 PROCEDURE — 1170F FXNL STATUS ASSESSED: CPT | Performed by: FAMILY MEDICINE

## 2021-10-11 PROCEDURE — G0403 EKG FOR INITIAL PREVENT EXAM: HCPCS | Performed by: FAMILY MEDICINE

## 2021-10-11 PROCEDURE — 90662 IIV NO PRSV INCREASED AG IM: CPT | Performed by: FAMILY MEDICINE

## 2021-10-11 PROCEDURE — 1125F AMNT PAIN NOTED PAIN PRSNT: CPT | Performed by: FAMILY MEDICINE

## 2021-10-11 PROCEDURE — 3725F SCREEN DEPRESSION PERFORMED: CPT | Performed by: FAMILY MEDICINE

## 2021-10-11 PROCEDURE — G0402 INITIAL PREVENTIVE EXAM: HCPCS | Performed by: FAMILY MEDICINE

## 2021-10-11 PROCEDURE — 1160F RVW MEDS BY RX/DR IN RCRD: CPT | Performed by: FAMILY MEDICINE

## 2021-11-02 ENCOUNTER — OFFICE VISIT (OUTPATIENT)
Dept: FAMILY MEDICINE CLINIC | Facility: CLINIC | Age: 71
End: 2021-11-02
Payer: COMMERCIAL

## 2021-11-02 ENCOUNTER — TELEPHONE (OUTPATIENT)
Dept: CARDIOLOGY CLINIC | Facility: CLINIC | Age: 71
End: 2021-11-02

## 2021-11-02 VITALS
HEIGHT: 62 IN | DIASTOLIC BLOOD PRESSURE: 58 MMHG | HEART RATE: 71 BPM | WEIGHT: 151.4 LBS | TEMPERATURE: 97.8 F | BODY MASS INDEX: 27.86 KG/M2 | RESPIRATION RATE: 16 BRPM | SYSTOLIC BLOOD PRESSURE: 88 MMHG | OXYGEN SATURATION: 97 %

## 2021-11-02 DIAGNOSIS — I95.9 HYPOTENSION, UNSPECIFIED HYPOTENSION TYPE: ICD-10-CM

## 2021-11-02 DIAGNOSIS — R10.9 ABDOMINAL WALL PAIN: Primary | ICD-10-CM

## 2021-11-02 PROCEDURE — 1036F TOBACCO NON-USER: CPT | Performed by: FAMILY MEDICINE

## 2021-11-02 PROCEDURE — 99213 OFFICE O/P EST LOW 20 MIN: CPT | Performed by: FAMILY MEDICINE

## 2021-11-02 PROCEDURE — 3008F BODY MASS INDEX DOCD: CPT | Performed by: FAMILY MEDICINE

## 2021-11-02 PROCEDURE — 1160F RVW MEDS BY RX/DR IN RCRD: CPT | Performed by: FAMILY MEDICINE

## 2021-11-03 ENCOUNTER — HOSPITAL ENCOUNTER (OUTPATIENT)
Dept: RADIOLOGY | Facility: HOSPITAL | Age: 71
Discharge: HOME | End: 2021-11-03
Attending: FAMILY MEDICINE
Payer: COMMERCIAL

## 2021-11-03 DIAGNOSIS — R10.9 ABDOMINAL WALL PAIN: ICD-10-CM

## 2021-11-03 PROCEDURE — 76705 ECHO EXAM OF ABDOMEN: CPT

## 2021-11-04 ENCOUNTER — TELEPHONE (OUTPATIENT)
Dept: FAMILY MEDICINE CLINIC | Facility: CLINIC | Age: 71
End: 2021-11-04

## 2021-12-27 ENCOUNTER — PREP FOR PROCEDURE (OUTPATIENT)
Dept: GASTROENTEROLOGY | Facility: CLINIC | Age: 71
End: 2021-12-27

## 2021-12-27 DIAGNOSIS — Z86.010 HISTORY OF COLON POLYPS: ICD-10-CM

## 2021-12-27 DIAGNOSIS — K22.70 BARRETT'S ESOPHAGUS WITHOUT DYSPLASIA: Primary | ICD-10-CM

## 2021-12-27 DIAGNOSIS — Z01.818 ENCOUNTER FOR PREADMISSION TESTING: ICD-10-CM

## 2022-01-05 ENCOUNTER — OFFICE VISIT (OUTPATIENT)
Dept: FAMILY MEDICINE CLINIC | Facility: CLINIC | Age: 72
End: 2022-01-05
Payer: COMMERCIAL

## 2022-01-05 VITALS
BODY MASS INDEX: 26.98 KG/M2 | TEMPERATURE: 96.9 F | HEIGHT: 62 IN | OXYGEN SATURATION: 95 % | HEART RATE: 75 BPM | RESPIRATION RATE: 14 BRPM | DIASTOLIC BLOOD PRESSURE: 70 MMHG | WEIGHT: 146.6 LBS | SYSTOLIC BLOOD PRESSURE: 100 MMHG

## 2022-01-05 DIAGNOSIS — K21.9 GASTROESOPHAGEAL REFLUX DISEASE WITHOUT ESOPHAGITIS: ICD-10-CM

## 2022-01-05 DIAGNOSIS — M54.2 NECK PAIN: ICD-10-CM

## 2022-01-05 DIAGNOSIS — E78.5 HYPERLIPIDEMIA, UNSPECIFIED HYPERLIPIDEMIA TYPE: ICD-10-CM

## 2022-01-05 DIAGNOSIS — Z01.818 PREOP TESTING: ICD-10-CM

## 2022-01-05 DIAGNOSIS — H26.9 CATARACT OF BOTH EYES, UNSPECIFIED CATARACT TYPE: Primary | ICD-10-CM

## 2022-01-05 PROCEDURE — 99214 OFFICE O/P EST MOD 30 MIN: CPT | Performed by: FAMILY MEDICINE

## 2022-01-05 RX ORDER — SILODOSIN 8 MG/1
1 CAPSULE ORAL DAILY
COMMUNITY
Start: 2021-11-30

## 2022-01-05 RX ORDER — CYCLOBENZAPRINE HCL 5 MG
5 TABLET ORAL
Qty: 30 TABLET | Refills: 0 | Status: SHIPPED | OUTPATIENT
Start: 2022-01-05

## 2022-01-05 RX ORDER — SULFAMETHOXAZOLE AND TRIMETHOPRIM 800; 160 MG/1; MG/1
1 TABLET ORAL 2 TIMES DAILY
COMMUNITY
Start: 2021-11-26 | End: 2022-01-31

## 2022-01-05 RX ORDER — PANTOPRAZOLE SODIUM 40 MG/1
40 TABLET, DELAYED RELEASE ORAL
Qty: 30 TABLET | Refills: 0 | Status: SHIPPED | OUTPATIENT
Start: 2022-01-05 | End: 2022-02-24 | Stop reason: SDUPTHER

## 2022-01-05 NOTE — PROGRESS NOTES
PRE-OPERATIVE EVALUATION  [facility]    NAME: Lobo Daniels  AGE: 73 y o  SEX: male  : 1950   DATE: 2022    Pre-Operative Evaluation      Chief Complaint: Pre-operative Evaluation     Surgery: Cataracts  Anticipated Date of Surgery:22;22  Referring Provider: Dr Cuco Gonzalez    History of Present Illness:     Lobo Daniels is a 73 y o  male who presents to the office today for a preoperative Planned anesthesia is local Patient has not had any complications with anesthesia in the past  Patient  does not have a bleeding risk  Patient does not have objections to receiving blood products if needed  Neck spasm of the right neck not improving with heat and massage from his wife  Patient states he would like something for this  Has a problem with his acid reflux again would like a refill on his medication if possible  Hyperlipidemia controlled by Crestor 10 mg  Assessment of Cardiac Risk:  · Denies unstable or severe angina or MI in the last 6 weeks or history of stent placement in the last year   · Denies decompensated heart failure (e g  New onset heart failure, NYHA functional class IV heart failure, or worsening existing heart failure)  · Denies significant arrhythmias such as high grade AV block, symptomatic ventricular arrhythmia, newly recognized ventricular tachycardia, supraventricular tachycardia with resting heart rate >100, or symptomatic bradycardia  · Denies severe heart valve disease including aortic stenosis or symptomatic mitral stenosis     Exercise Capacity:  · Able to walk 4 blocks without symptoms?: Yes  · Able to walk 2 flights without symptoms?: Yes      Prior Anesthesia Reactions: No     Personal history of venous thromboembolic disease? No    History of steroid use for >2 weeks within last year?  No      Review of Systems:     Review of Systems  Chest pain: no   Shortness of breath: no  Shortness of breath with exertion: no  Orthopnea: no  Dizziness: no  Unexplained weight change: no    Current Problem List:     Patient Active Problem List   Diagnosis    Cervical radiculopathy    Colon polyps    Obstructive sleep apnea of adult    Peripheral artery disease (HCC)    Peripheral neuropathy    Restless legs syndrome    Benign prostatic hyperplasia without lower urinary tract symptoms    Gastroesophageal reflux disease without esophagitis    Cholelithiasis    Ectatic aorta    Abdominal adhesions    Other insomnia    Acute cholecystitis due to biliary calculus    Bilateral leg cramps       Allergies:     No Known Allergies    Current Medications:       Current Outpatient Medications:     aspirin (ECOTRIN LOW STRENGTH) 81 mg EC tablet, Take 1 tablet (81 mg total) by mouth daily with breakfast, Disp: 90 tablet, Rfl: 3    rosuvastatin (CRESTOR) 10 MG tablet, Take 1 tablet (10 mg total) by mouth daily at bedtime, Disp: 90 tablet, Rfl: 3    Silodosin 8 MG CAPS, Take 1 capsule by mouth daily, Disp: , Rfl:     sulfamethoxazole-trimethoprim (BACTRIM DS) 800-160 mg per tablet, Take 1 tablet by mouth 2 (two) times a day, Disp: , Rfl:     cyclobenzaprine (FLEXERIL) 5 mg tablet, Take 1 tablet (5 mg total) by mouth daily at bedtime as needed for muscle spasms, Disp: 30 tablet, Rfl: 0    pantoprazole (PROTONIX) 40 mg tablet, Take 1 tablet (40 mg total) by mouth daily before breakfast, Disp: 30 tablet, Rfl: 0    tamsulosin (FLOMAX) 0 4 mg, Take 1 capsule (0 4 mg total) by mouth daily with dinner (Patient not taking: Reported on 1/5/2022 ), Disp: 90 capsule, Rfl: 2    Past Medical History:       Past Medical History:   Diagnosis Date    Arthritis     shoulder    Ascending aortic aneurysm ([address] )     Memory changes         Past Surgical History:   Procedure Laterality Date    APPENDECTOMY      CHOLECYSTECTOMY LAPAROSCOPIC N/A 10/27/2020    Procedure: CHOLECYSTECTOMY LAPAROSCOPIC;  Surgeon: Keyon Pang MD;  Location: [address] [place] [address]; Service: General    COLONOSCOPY N/A 12/12/2016    Procedure: COLONOSCOPY;  Surgeon: Jasson Bains MD;  [facility];   Service:     ELBOW SURGERY Bilateral     HERNIA REPAIR Right 2001    inguinal    KNEE ARTHROSCOPY Right     OK KNEE SCOPE,MED/LAT MENISECTOMY Left 6/18/2020    Procedure: ARTHROSCOPY KNEE MEDIAL MENISCECTOMY;  Surgeon: Rosendo Atwood MD;  Location[facility];  Service: Orthopedics    ROTATOR CUFF REPAIR Bilateral 2011        Family History   Problem Relation Age of Onset    No Known Problems Mother     No Known Problems Father     No Known Problems Sister     No Known Problems Brother     No Known Problems Maternal Aunt     No Known Problems Maternal Uncle     No Known Problems Paternal Aunt     No Known Problems Paternal Uncle     No Known Problems Maternal Grandmother     No Known Problems Maternal Grandfather     No Known Problems Paternal Grandmother     No Known Problems Paternal Grandfather     ADD / ADHD Neg Hx     Anesthesia problems Neg Hx     Cancer Neg Hx     Clotting disorder Neg Hx     Collagen disease Neg Hx     Diabetes Neg Hx     Dislocations Neg Hx     Learning disabilities Neg Hx     Neurological problems Neg Hx     Osteoporosis Neg Hx     Rheumatologic disease Neg Hx     Scoliosis Neg Hx     Vascular Disease Neg Hx         Social History     Socioeconomic History    Marital status: /Civil Union     Spouse name: Not on file    Number of children: Not on file    Years of education: Not on file    Highest education level: Not on file   Occupational History    Not on file   Tobacco Use    Smoking status: Never Smoker    Smokeless tobacco: Never Used   Vaping Use    Vaping Use: Never used   Substance and Sexual Activity    Alcohol use: Not Currently    Drug use: No    Sexual activity: Not Currently   Other Topics Concern    Not on file   Social History Narrative    Not on file     Social Determinants of Health Financial Resource Strain: Not on file   Food Insecurity: Not on file   Transportation Needs: Not on file   Physical Activity: Not on file   Stress: Not on file   Social Connections: Not on file   Intimate Partner Violence: Not on file   Housing Stability: Not on file        Physical Exam:     Vitals:    01/05/22 1535   BP: 100/70   Pulse: 75   Resp: 14   Temp: (!) 96 9 °F (36 1 °C)   SpO2: 95%     Physical Exam    Pre-Op Evaluation Assessment  73 y o  male with planned surgery:  Bilateral cataracts    Known risk factors for perioperative complications: None  Cardiac Risk Estimation: per the Revised Cardiac Risk Index (Circ  100:1043, 1999), the patient's risk factors for cardiac complications include none, putting him in: RCI RISK CLASS I (0 risk factors, risk of major cardiac compl  appr  0 5%)  Recommendations:  Leopold Raveling is undergoing an elective Minimal risk surgery  They are at [address] risk for major adverse cardiac event (MACE)  They may proceed with surgery as planned without further workup  EKG stable within normal limits  No blood work needed  Acid reflux represcribed  Muscle relaxant given the patient to be used as needed for neck spasm    Hyperlipidemia stable

## 2022-01-06 ENCOUNTER — TELEPHONE (OUTPATIENT)
Dept: FAMILY MEDICINE CLINIC | Facility: CLINIC | Age: 72
End: 2022-01-06

## 2022-01-06 NOTE — TELEPHONE ENCOUNTER
Faxed medical clearance request to [phone] along with Dr Bravo Montero notes from office visit EKG and the EYE and LASER form

## 2022-01-20 ENCOUNTER — TELEPHONE (OUTPATIENT)
Dept: PREADMISSION TESTING | Facility: HOSPITAL | Age: 72
End: 2022-01-20

## 2022-01-24 VITALS — BODY MASS INDEX: 27.6 KG/M2 | HEIGHT: 62 IN | WEIGHT: 150 LBS

## 2022-01-24 RX ORDER — TOBRAMYCIN AND DEXAMETHASONE 3; 1 MG/ML; MG/ML
SUSPENSION/ DROPS OPHTHALMIC
COMMUNITY
Start: 2022-01-19 | End: 2022-01-31

## 2022-01-24 NOTE — PRE-PROCEDURE INSTRUCTIONS
Pre-Surgery Instructions:   Medication Instructions    aspirin (ECOTRIN LOW STRENGTH) 81 mg EC tablet Instructed patient per Anesthesia Guidelines   cyclobenzaprine (FLEXERIL) 5 mg tablet Instructed patient per Anesthesia Guidelines   pantoprazole (PROTONIX) 40 mg tablet Instructed patient per Anesthesia Guidelines   rosuvastatin (CRESTOR) 10 MG tablet Instructed patient per Anesthesia Guidelines   Silodosin 8 MG CAPS Instructed patient per Anesthesia Guidelines   sulfamethoxazole-trimethoprim (BACTRIM DS) 800-160 mg per tablet Instructed patient per Anesthesia Guidelines   tobramycin-dexamethasone (TOBRADEX) ophthalmic suspension Instructed patient per Anesthesia Guidelines  Pt to follow Dr Mariola Gilliland instructions  # for [facility] given to daughter Guilherme Gonzales -to obtain prep instructions    Guilherme Gonzales [phone]

## 2022-01-28 ENCOUNTER — OFFICE VISIT (OUTPATIENT)
Dept: CARDIOLOGY CLINIC | Facility: CLINIC | Age: 72
End: 2022-01-28
Payer: COMMERCIAL

## 2022-01-28 VITALS
SYSTOLIC BLOOD PRESSURE: 110 MMHG | WEIGHT: 146 LBS | HEART RATE: 79 BPM | OXYGEN SATURATION: 95 % | TEMPERATURE: 97.3 F | BODY MASS INDEX: 26.87 KG/M2 | DIASTOLIC BLOOD PRESSURE: 64 MMHG | HEIGHT: 62 IN

## 2022-01-28 DIAGNOSIS — I73.9 PAD (PERIPHERAL ARTERY DISEASE) (HCC): ICD-10-CM

## 2022-01-28 DIAGNOSIS — R07.89 CHEST PRESSURE: ICD-10-CM

## 2022-01-28 DIAGNOSIS — I71.2 THORACIC AORTIC ANEURYSM WITHOUT RUPTURE (HCC): ICD-10-CM

## 2022-01-28 PROCEDURE — 1036F TOBACCO NON-USER: CPT | Performed by: INTERNAL MEDICINE

## 2022-01-28 PROCEDURE — 99214 OFFICE O/P EST MOD 30 MIN: CPT | Performed by: INTERNAL MEDICINE

## 2022-01-28 PROCEDURE — 1160F RVW MEDS BY RX/DR IN RCRD: CPT | Performed by: INTERNAL MEDICINE

## 2022-01-28 RX ORDER — ASPIRIN 81 MG/1
81 TABLET ORAL
Qty: 90 TABLET | Refills: 3 | Status: SHIPPED | OUTPATIENT
Start: 2022-01-28

## 2022-01-28 RX ORDER — ROSUVASTATIN CALCIUM 10 MG/1
10 TABLET, COATED ORAL
Qty: 90 TABLET | Refills: 3 | Status: SHIPPED | OUTPATIENT
Start: 2022-01-28

## 2022-01-28 NOTE — PROGRESS NOTES
[facility]  [address]   [place], [address]  Cardiology Follow-up    Vicente Cedeno  [id number]  1950      Consult for:Atypical chest pain  Appreciate consult by: Paula Mittal MD    1  Chest pressure  rosuvastatin (CRESTOR) 10 MG tablet    aspirin (ECOTRIN LOW STRENGTH) 81 mg EC tablet   2  Thoracic aortic aneurysm without rupture (HCC)  rosuvastatin (CRESTOR) 10 MG tablet    aspirin (ECOTRIN LOW STRENGTH) 81 mg EC tablet   3  PAD (peripheral artery disease) (HCC)  rosuvastatin (CRESTOR) 10 MG tablet    aspirin (ECOTRIN LOW STRENGTH) 81 mg EC tablet      Discussion/Summary:   Atypical chest pain- no major ekg changes  a ruled out for an acute coronary syndrome in the ER  He has atypical features  Stress test negative  Benign physical examination  Cannot rule out acid reflux  Was started on Protonix therapy  He has had EGD pending  conservative aspirin 81 mg plus rosuvastatin 10 mg  Low lasted foods  Peripheral vascular disease- last CT scan with contrast of the abdomen showed 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  Also noted with peripheral vascular disease  His blood pressures have been controlled  He is a nonsmoker  Will put him on low-dose statin  Also recommend aspirin 81 mg  Followed by vascular    Thoracic aortic aneurysm- mild  Denies having family history for abdominal rupture  Nonsmoker  Blood pressure is controlled  Continue to monitor  Followed by vascular    HPI:   59-year-old gentleman with no prior cardiac events, mild thoracic aortic aneurysm presents with recent ER visit secondary to chest discomfort  He states having sharp chest discomfort at times  Not completely related to exercise  No family history for myocardial infarction  He denies having smoking history  He does have a history of peripheral vascular disease  Sharp pain with deep breath  Not related to eating   Some pain is positional  07/18/2021: He denies having chest discomfort  His blood pressures are at goal   His last LDL was controlled  01/28/2022:  He continues to have the left-sided and epigastric chest discomfort  He association with food  He denies any major worsening with exertion  His last EKG reviewed without any major EKG changes  His blood pressures remain controlled  He is compliant on low-dose aspirin and statin          Past Medical History:   Diagnosis Date    Arthritis     shoulder    Ascending aortic aneurysm (HCC)     Cataract     both eyes-to have the right cataract removed on 1/25/22    Colon polyp     GERD (gastroesophageal reflux disease)     Insomnia     Leg cramps     Memory changes     Restless legs      Social History     Socioeconomic History    Marital status: /Civil Union     Spouse name: Not on file    Number of children: Not on file    Years of education: Not on file    Highest education level: Not on file   Occupational History    Not on file   Tobacco Use    Smoking status: Never Smoker    Smokeless tobacco: Never Used   Vaping Use    Vaping Use: Never used   Substance and Sexual Activity    Alcohol use: Not Currently    Drug use: No    Sexual activity: Not Currently   Other Topics Concern    Not on file   Social History Narrative    Not on file     Social Determinants of Health     Financial Resource Strain: Not on file   Food Insecurity: Not on file   Transportation Needs: Not on file   Physical Activity: Not on file   Stress: Not on file   Social Connections: Not on file   Intimate Partner Violence: Not on file   Housing Stability: Not on file      Family History   Problem Relation Age of Onset    No Known Problems Mother     No Known Problems Father     No Known Problems Sister     No Known Problems Brother     No Known Problems Maternal Aunt     No Known Problems Maternal Uncle     No Known Problems Paternal Aunt     No Known Problems Paternal Uncle     No Known Problems Maternal Grandmother     No Known Problems Maternal Grandfather     No Known Problems Paternal Grandmother     No Known Problems Paternal Grandfather     ADD / ADHD Neg Hx     Anesthesia problems Neg Hx     Cancer Neg Hx     Clotting disorder Neg Hx     Collagen disease Neg Hx     Diabetes Neg Hx     Dislocations Neg Hx     Learning disabilities Neg Hx     Neurological problems Neg Hx     Osteoporosis Neg Hx     Rheumatologic disease Neg Hx     Scoliosis Neg Hx     Vascular Disease Neg Hx      Past Surgical History:   Procedure Laterality Date    APPENDECTOMY      CHOLECYSTECTOMY      CHOLECYSTECTOMY LAPAROSCOPIC N/A 10/27/2020    Procedure: CHOLECYSTECTOMY LAPAROSCOPIC;  Surgeon: Lacey Tinajero MD;  Location: [address] [place] [address];  Service: General    COLONOSCOPY N/A 12/12/2016    Procedure: COLONOSCOPY;  Surgeon: Gene Blanco MD;  Location: [facility];   Service:     ELBOW SURGERY Bilateral     HERNIA REPAIR Right 2001    inguinal    KNEE ARTHROSCOPY Right     CT KNEE SCOPE,MED/LAT MENISECTOMY Left 6/18/2020    Procedure: ARTHROSCOPY KNEE MEDIAL MENISCECTOMY;  Surgeon: Mari Winkler MD;  [facility];  Service: Orthopedics    ROTATOR CUFF REPAIR Bilateral 2011       Current Outpatient Medications:     aspirin (ECOTRIN LOW STRENGTH) 81 mg EC tablet, Take 1 tablet (81 mg total) by mouth daily with breakfast, Disp: 90 tablet, Rfl: 3    cyclobenzaprine (FLEXERIL) 5 mg tablet, Take 1 tablet (5 mg total) by mouth daily at bedtime as needed for muscle spasms, Disp: 30 tablet, Rfl: 0    pantoprazole (PROTONIX) 40 mg tablet, Take 1 tablet (40 mg total) by mouth daily before breakfast, Disp: 30 tablet, Rfl: 0    rosuvastatin (CRESTOR) 10 MG tablet, Take 1 tablet (10 mg total) by mouth daily at bedtime, Disp: 90 tablet, Rfl: 3    Silodosin 8 MG CAPS, Take 1 capsule by mouth daily, Disp: , Rfl:     sulfamethoxazole-trimethoprim (BACTRIM DS) 800-160 mg per tablet, Take 1 tablet by mouth 2 (two) times a day, Disp: , Rfl:     tobramycin-dexamethasone (TOBRADEX) ophthalmic suspension, , Disp: , Rfl:   No Known Allergies  Vitals:    01/28/22 0801   BP: 110/64   BP Location: Left arm   Patient Position: Sitting   Cuff Size: Standard   Pulse: 79   Temp: (!) 97 3 °F (36 3 °C)   SpO2: 95%   Weight: 66 2 kg (146 lb)   Height: 5' 2" (1 575 m)       Review of Systems:   Review of Systems   Constitutional: Negative  HENT: Negative  Eyes: Negative  Respiratory: Negative  Cardiovascular: Negative for chest pain  Gastrointestinal: Negative  Endocrine: Negative  Genitourinary: Negative  Musculoskeletal: Negative  Skin: Negative  Allergic/Immunologic: Negative  Neurological: Negative  Hematological: Negative  Psychiatric/Behavioral: Negative  Vitals:    01/28/22 0801   BP: 110/64   BP Location: Left arm   Patient Position: Sitting   Cuff Size: Standard   Pulse: 79   Temp: (!) 97 3 °F (36 3 °C)   SpO2: 95%   Weight: 66 2 kg (146 lb)   Height: 5' 2" (1 575 m)     Physical Examination:   Physical Exam  Constitutional:       General: He is not in acute distress  Appearance: He is well-developed  He is not diaphoretic  HENT:      Head: Normocephalic and atraumatic  Right Ear: External ear normal       Left Ear: External ear normal    Eyes:      General: No scleral icterus  Right eye: No discharge  Left eye: No discharge  Conjunctiva/sclera: Conjunctivae normal       Pupils: Pupils are equal, round, and reactive to light  Neck:      Thyroid: No thyromegaly  Vascular: No JVD  Trachea: No tracheal deviation  Cardiovascular:      Rate and Rhythm: Normal rate and regular rhythm  Heart sounds: No murmur heard  No friction rub  No gallop  Pulmonary:      Effort: Pulmonary effort is normal  No respiratory distress  Breath sounds: Normal breath sounds  No stridor  No wheezing or rales     Chest:      Chest wall: No tenderness  Abdominal:      General: Bowel sounds are normal  There is no distension  Palpations: Abdomen is soft  There is no mass  Tenderness: There is no abdominal tenderness  There is no guarding or rebound  Musculoskeletal:         General: No tenderness or deformity  Normal range of motion  Cervical back: Normal range of motion and neck supple  Skin:     General: Skin is warm and dry  Coloration: Skin is not pale  Findings: No erythema or rash  Neurological:      Mental Status: He is alert and oriented to person, place, and time  Cranial Nerves: No cranial nerve deficit  Motor: No abnormal muscle tone  Coordination: Coordination normal       Deep Tendon Reflexes: Reflexes are normal and symmetric  Reflexes normal    Psychiatric:         Behavior: Behavior normal          Thought Content:  Thought content normal          Judgment: Judgment normal          Labs:     Lab Results   Component Value Date    WBC 5 67 10/06/2021    HGB 14 2 10/06/2021    HCT 42 7 10/06/2021    MCV 95 10/06/2021    RDW 12 5 10/06/2021     10/06/2021     BMP:  Lab Results   Component Value Date    SODIUM 136 10/06/2021    K 3 9 10/06/2021     10/06/2021    CO2 24 10/06/2021    BUN 13 10/06/2021    CREATININE 0 97 10/06/2021    GLUC 142 (H) 10/28/2020    GLUF 105 (H) 10/06/2021    CALCIUM 8 8 10/06/2021    EGFR 79 10/06/2021    MG 2 0 10/27/2020     LFT:  Lab Results   Component Value Date    AST 35 10/06/2021    ALT 33 10/06/2021    ALKPHOS 86 10/06/2021    TP 7 3 10/06/2021    ALB 4 0 10/06/2021      No results found for: [facility]  Lab Results   Component Value Date    HGBA1C 5 3 01/21/2019     Lipid Profile:   Lab Results   Component Value Date    CHOLESTEROL 117 10/06/2021    HDL 54 10/06/2021    LDLCALC 53 10/06/2021    TRIG 49 10/06/2021     Lab Results   Component Value Date    CHOLESTEROL 117 10/06/2021    CHOLESTEROL 113 01/20/2021     Lab Results Component Value Date    TROPONINI <0 02 10/27/2020     Lab Results   Component Value Date    NTBNP 83 10/27/2020      No results found for this or any previous visit (from the past 672 hour(s))  Imaging & Testing   I have personally reviewed pertinent reports  Cardiac Testing     EKG: Personally reviewed  Anne Pizarro MD [facility]  [phone]  Please call with any questions or suggestions    Counseling :  A description of the counseling:   Goals and Barriers:  Patient's ability to self care:  Medication side effect reviewed with patient in detail and all their questions answered  "Portions of the record may have been created with voice recognition software  Occasional wrong word or "sound a like" substitutions may have occurred due to the inherent limitations of voice recognition software  Read the chart carefully and recognize, using context, where substitutions have occurred   Please call if you have any questions  "

## 2022-01-28 NOTE — PATIENT INSTRUCTIONS
Dieta saludable para el corazón   LO QUE NECESITA SABER:   Rod Buttery kp para el corazón es un plan alimenticio bajo en grasas no saludables y sodio (sal)  El plan es alto en fibra y grasas saludables  Rod Buttery cardiosaludable ayuda a mejorar vikas niveles de colesterol y disminuye alonso riesgo de enfermedad cardiaca y accidente cerebrovascular  Un dietista le enseñará a leer y a entender las etiquetas de los alimentos  INSTRUCCIONES SOBRE [facility]:   Pautas de dieta saludable para el corazón a seguir:  · Elija alimentos que contengan grasas saludables  ? Las grasas no saturadas incluyen grasas monoinsaturadas y poliinsaturadas  Las grasas no saturadas se encuentran en alimentos hang el frijol de soja, aceites de [place], de [place], de [place] y de [place]  Se encuentra también en la margarina suave hecha con aceite líquido vegetal     ? Las grasas Omega 3 se encuentran en ciertos pescados, hang el salmón, el atún y la Lenz, en las nueces y en la semilla de katarina  Consuma pescado con altas cantidades de Omega-3 por lo menos 2 veces a la semana  · Consuma entre 20 y 27 gramos de fibra cada día  Las frutas, los vegetales, los alimentos integrales y las legumbres (frijoles cocidos) son Cherene Craig barbosa de Angélica  · Limite o no ingiera grasas no saludables  ? El colesterol se encuentra en alimentos de origen animal, hang los [employer] y la langosta al igual que en productos lácteos hechos con leche entera  Limite el colesterol por debajo de los 200 mg por día  ? Las grasas saturadas se encuentran en amanda hang el tocino y la hamburguesa  Estas se encuentran también en la piel del reva y del [place], [place] entera y [place]  Limite el consumo de grasas saturadas a menos del 7% del total de vikas calorías diarias  ? La grasa trans se encuentran en los alimentos envasados, hang las papitas de bolsa y las [place]  También se encuentra en la margarina dura, algunos alimentos fritos y la manteca  No coma alimentos que contengan grasas trans  · Limite el sodio hang se le indique  Se le puede solicitar que limite el sodio a 2,000 a 2,300 mg por día  Elija alimentos bajos en sodio o sin sal agregada  Al preparar la comida añada muy poca sal o no use sal  Use hierbas y especias en vez de la sal        Incluya lo siguiente en alonso plan de merecd para el corazón: Solicite que alonso nutricionista o el médico le indique cuántas porciones necesita consumir de los siguientes alimentos de cada kaci alimenticio:  · Granos:     ? Panes, cereales y pastas de [place] integral y Erika Luigi integral    ? Patatas fritas y galletas saladas bajas en grasa, bajas en sodio    · Verduras:     ? Brócoli, judías verdes, guisantes y espinacas    ? Mickeal Ormond, col y habas    ? Zanahorias, patatas dulces, tomates y pimientos    ? Vegetales enlatados sin mario añadida    · Frutas:     ? Plátanos, melocotones, peras y jessica    ? Uvas, pasas y dátiles    ? Mario Hyde, nila, [facility] y [facility]    ? Albaricoques, mangos, melón y papaya    ? Henrene Nam y fresas    ? Conservas de frutas sin azúcares añadidos    · Lácteos bajos en grasa:     ? Leche sin grasa (descremada), leche 1%, leche baja en grasa de [place], anacardo o leche de soja fortificada con calcio    ? [employer], queso bajo en grasa y yogur regular o congelado    · Rebeccaside y proteínas:     ? Lexie Coats de carne de res y [de-identified] ([place], hailey), reva y [place] sin piel    ? Legumbres, productos de soya, claras de huevo o nueces    Limite o no incluya lo siguiente en alonso plan de merced cardiaca:  · Aceites y grasas no saludables:     ? [place] entera o 2%, queso crema, crema agria o queso    ? Royal de carne altos en grasas ([address] chuletas con hueso), el reva o pavo sin piel y las vísceras de animal hang el hígado    ?  Corine Lyle en Sumi Rave y aceites para cocinar, hang el aceite de minh o j[place]    · [place] y líquidos ricos en sodio:     ? alimentos empaquetados, comidas congeladas, galletas, macarrón con queso y cereales con más de 300 mg de sodio por porción    ? Vegetales con sodio agregado, hang corrina instantáneas, verduras con salsas agregadas o conservas regulares de verduras    ? Deshaun curadas o ahumadas, hang perritos calientes, tocino y salchichas    ? Salsa de [facility], salsa de [facility], aderezo para [place], encurtidos de [place], [place], salsa de soya o miso    · Alimentos y líquidos ricos en azúcar:     ? Dulces, tortas, galletas, pasteles o donas    ? Refrescos (gaseosas), bebidas para deportistas o té endulzado    ? Mezclas enlatadas o secas para pasteles, sopas, salsas o salsas    Otras pautas para un corazón saludable:  · No fume  La nicotina y otros químicos contenidos en los cigarrillos y cigarros pueden causar daño a vikas pulmones y el corazón  Pida información a alonso médico si usted actualmente fuma y necesita ayuda para dejar de fumar  Los cigarrillos electrónicos o el tabaco sin humo igualmente contienen nicotina  Consulte con alonso médico antes de [employer]  · Limite o no consuma bebidas alcohólicas, según indicaciones  El alcohol puede dañar alonso corazón y elevar alonso presión arterial  Alonso médico puede darle límites específicos diarios y semanales  El límite general recomendado es de 1 bebida por día para mujeres de 24 años o más y para hombres de 72 años o más  No tome más de 3 bebidas en un día o 7 en nito semana  El límite recomendado es de 2 bebidas por día para los hombres de 21 a 59 años de edad  No tome más de 4 bebidas en un día o 14 en nito semana  Un trago equivale a 12 onzas de cerveza, 5 onzas de vino o 1 onza y ½ de licor  · Realice actividad física con regularidad  El ejercicio puede ayudarlo a mantener un peso saludable y mejorar alonso presión arterial y niveles de colesterol  El ejercicio regular también puede disminuir el riesgo de presentar problemas cardíacos  Pregunte a alonso médico acerca del mejor plan de ejercicio para usted  No empiece un programa de ejercicios sin antes consultar con alonso Patricio Mora a vikas consultas de control con alonso médico o cardiólogo según le indicaron: Anote vikas preguntas para que se acuerde de hacerlas carrington vikas visitas  © Copyright [employer] 2021 Information is for End User's use only and may not be sold, redistributed or otherwise used for commercial purposes  All illustrations and images included in CareNotes® are the copyrighted property of A D A dot[employer]  or [address] [place] [address] es sólo para uso en educación  Alonso intención no es darle un consejo médico sobre enfermedades o tratamientos  Colsulte con alonso Charna Heckler farmacéutico antes de seguir cualquier régimen médico para saber si es seguro y efectivo para usted

## 2022-01-31 ENCOUNTER — HOSPITAL ENCOUNTER (OUTPATIENT)
Dept: GASTROENTEROLOGY | Facility: AMBULARY SURGERY CENTER | Age: 72
Setting detail: OUTPATIENT SURGERY
Discharge: HOME | End: 2022-01-31
Attending: INTERNAL MEDICINE
Payer: COMMERCIAL

## 2022-01-31 ENCOUNTER — ANESTHESIA EVENT (OUTPATIENT)
Dept: GASTROENTEROLOGY | Facility: AMBULARY SURGERY CENTER | Age: 72
End: 2022-01-31

## 2022-01-31 ENCOUNTER — ANESTHESIA (OUTPATIENT)
Dept: GASTROENTEROLOGY | Facility: AMBULARY SURGERY CENTER | Age: 72
End: 2022-01-31

## 2022-01-31 VITALS
DIASTOLIC BLOOD PRESSURE: 73 MMHG | WEIGHT: 148 LBS | SYSTOLIC BLOOD PRESSURE: 106 MMHG | HEART RATE: 78 BPM | OXYGEN SATURATION: 98 % | TEMPERATURE: 96.7 F | RESPIRATION RATE: 18 BRPM | HEIGHT: 62 IN | BODY MASS INDEX: 27.23 KG/M2

## 2022-01-31 DIAGNOSIS — Z86.010 HISTORY OF COLON POLYPS: ICD-10-CM

## 2022-01-31 DIAGNOSIS — K22.70 BARRETT'S ESOPHAGUS WITHOUT DYSPLASIA: ICD-10-CM

## 2022-01-31 PROCEDURE — 45385 COLONOSCOPY W/LESION REMOVAL: CPT | Performed by: INTERNAL MEDICINE

## 2022-01-31 PROCEDURE — 88305 TISSUE EXAM BY PATHOLOGIST: CPT | Performed by: PATHOLOGY

## 2022-01-31 PROCEDURE — 43239 EGD BIOPSY SINGLE/MULTIPLE: CPT | Performed by: INTERNAL MEDICINE

## 2022-01-31 PROCEDURE — 45380 COLONOSCOPY AND BIOPSY: CPT | Performed by: INTERNAL MEDICINE

## 2022-01-31 RX ORDER — SODIUM CHLORIDE, SODIUM LACTATE, POTASSIUM CHLORIDE, CALCIUM CHLORIDE 600; 310; 30; 20 MG/100ML; MG/100ML; MG/100ML; MG/100ML
INJECTION, SOLUTION INTRAVENOUS CONTINUOUS PRN
Status: DISCONTINUED | OUTPATIENT
Start: 2022-01-31 | End: 2022-01-31

## 2022-01-31 RX ORDER — PROPOFOL 10 MG/ML
INJECTION, EMULSION INTRAVENOUS CONTINUOUS PRN
Status: DISCONTINUED | OUTPATIENT
Start: 2022-01-31 | End: 2022-01-31

## 2022-01-31 RX ORDER — LIDOCAINE HYDROCHLORIDE 20 MG/ML
INJECTION, SOLUTION EPIDURAL; INFILTRATION; INTRACAUDAL; PERINEURAL AS NEEDED
Status: DISCONTINUED | OUTPATIENT
Start: 2022-01-31 | End: 2022-01-31

## 2022-01-31 RX ORDER — PROPOFOL 10 MG/ML
INJECTION, EMULSION INTRAVENOUS AS NEEDED
Status: DISCONTINUED | OUTPATIENT
Start: 2022-01-31 | End: 2022-01-31

## 2022-01-31 RX ORDER — SODIUM CHLORIDE, SODIUM LACTATE, POTASSIUM CHLORIDE, CALCIUM CHLORIDE 600; 310; 30; 20 MG/100ML; MG/100ML; MG/100ML; MG/100ML
125 INJECTION, SOLUTION INTRAVENOUS CONTINUOUS
Status: CANCELLED | OUTPATIENT
Start: 2022-01-31

## 2022-01-31 RX ORDER — SODIUM CHLORIDE, SODIUM LACTATE, POTASSIUM CHLORIDE, CALCIUM CHLORIDE 600; 310; 30; 20 MG/100ML; MG/100ML; MG/100ML; MG/100ML
125 INJECTION, SOLUTION INTRAVENOUS CONTINUOUS
Status: DISCONTINUED | OUTPATIENT
Start: 2022-01-31 | End: 2022-02-04 | Stop reason: HOSPADM

## 2022-01-31 RX ADMIN — SODIUM CHLORIDE, SODIUM LACTATE, POTASSIUM CHLORIDE, AND CALCIUM CHLORIDE: .6; .31; .03; .02 INJECTION, SOLUTION INTRAVENOUS at 11:35

## 2022-01-31 RX ADMIN — LIDOCAINE HYDROCHLORIDE 100 MG: 20 INJECTION, SOLUTION EPIDURAL; INFILTRATION; INTRACAUDAL; PERINEURAL at 11:39

## 2022-01-31 RX ADMIN — PROPOFOL 120 MCG/KG/MIN: 10 INJECTION, EMULSION INTRAVENOUS at 11:39

## 2022-01-31 RX ADMIN — PROPOFOL 70 MG: 10 INJECTION, EMULSION INTRAVENOUS at 11:39

## 2022-01-31 NOTE — ANESTHESIA POSTPROCEDURE EVALUATION
Post-Op Assessment Note    CV Status:  Stable  Pain Score: 0    Pain management: adequate     Mental Status:  Sleepy   Hydration Status:  Stable   PONV Controlled:  Controlled   Airway Patency:  Patent      Post Op Vitals Reviewed: Yes      Staff: CRNA, Anesthesiologist         No complications documented      BP      Temp     Pulse     Resp      SpO2

## 2022-01-31 NOTE — ANESTHESIA PREPROCEDURE EVALUATION
Procedure:  COLONOSCOPY  EGD    Relevant Problems   CARDIO   (+) Ectatic aorta      GI/HEPATIC   (+) Gastroesophageal reflux disease without esophagitis      /RENAL   (+) Benign prostatic hyperplasia without lower urinary tract symptoms      PULMONARY   (+) Obstructive sleep apnea of adult        Physical Exam    Airway    Mallampati score: II  TM Distance: >3 FB  Neck ROM: full     Dental   No notable dental hx     Cardiovascular      Pulmonary      Other Findings        Anesthesia Plan  ASA Score- 2     Anesthesia Type- IV sedation with anesthesia with ASA Monitors  Additional Monitors:   Airway Plan:           Plan Factors-Exercise tolerance (METS): >4 METS  Chart reviewed  Existing labs reviewed  Patient summary reviewed  Patient is not a current smoker  Induction- intravenous  Postoperative Plan- Plan for postoperative opioid use  Informed Consent- Anesthetic plan and risks discussed with patient  I personally reviewed this patient with the CRNA  Discussed and agreed on the Anesthesia Plan with the CRNA  Hussein Boucher

## 2022-01-31 NOTE — H&P
History and Physical -  [facility]  Anoop Cason 73 y o  male MRN: [id number]                  HPI: Anoop Cason is a 73y o  year old male who presents for EGD and colonoscopy for Yeboah's surveillance and polyp surveillance  Last colonoscopy 3 years ago does have a history of tubular adenomas      REVIEW OF SYSTEMS: Per the HPI, and otherwise unremarkable  Historical Information   Past Medical History:   Diagnosis Date    Arthritis     shoulder    Ascending aortic aneurysm ([address] )     Cataract     both eyes-to have the right cataract removed on 1/25/22    Colon polyp     GERD (gastroesophageal reflux disease)     Insomnia     Leg cramps     Memory changes     Restless legs      Past Surgical History:   Procedure Laterality Date    APPENDECTOMY      CHOLECYSTECTOMY      CHOLECYSTECTOMY LAPAROSCOPIC N/A 10/27/2020    Procedure: CHOLECYSTECTOMY LAPAROSCOPIC;  Surgeon: Gregory Delgado MD;  Location: [address] [place] [address];  Service: General    COLONOSCOPY N/A 12/12/2016    Procedure: COLONOSCOPY;  Surgeon: Gabriel Delacruz MD;  Location: [facility];   Service:     ELBOW SURGERY Bilateral     HERNIA REPAIR Right 2001    inguinal    KNEE ARTHROSCOPY Right     UT KNEE SCOPE,MED/LAT MENISECTOMY Left 6/18/2020    Procedure: ARTHROSCOPY KNEE MEDIAL MENISCECTOMY;  Surgeon: Liseth Potter MD;  Location: [facility];  Service: Orthopedics    ROTATOR CUFF REPAIR Bilateral 2011     Social History   Social History     Substance and Sexual Activity   Alcohol Use Not Currently     Social History     Substance and Sexual Activity   Drug Use No     Social History     Tobacco Use   Smoking Status Never Smoker   Smokeless Tobacco Never Used     Family History   Problem Relation Age of Onset    No Known Problems Mother     No Known Problems Father     No Known Problems Sister     No Known Problems Brother     No Known Problems Maternal Aunt     No Known Problems Maternal Uncle     No Known Problems Paternal Aunt     No Known Problems Paternal Uncle     No Known Problems Maternal Grandmother     No Known Problems Maternal Grandfather     No Known Problems Paternal Grandmother     No Known Problems Paternal Grandfather     ADD / ADHD Neg Hx     Anesthesia problems Neg Hx     Cancer Neg Hx     Clotting disorder Neg Hx     Collagen disease Neg Hx     Diabetes Neg Hx     Dislocations Neg Hx     Learning disabilities Neg Hx     Neurological problems Neg Hx     Osteoporosis Neg Hx     Rheumatologic disease Neg Hx     Scoliosis Neg Hx     Vascular Disease Neg Hx        Meds/Allergies       Current Outpatient Medications:     aspirin (ECOTRIN LOW STRENGTH) 81 mg EC tablet    cyclobenzaprine (FLEXERIL) 5 mg tablet    pantoprazole (PROTONIX) 40 mg tablet    rosuvastatin (CRESTOR) 10 MG tablet    Silodosin 8 MG CAPS  No current facility-administered medications for this encounter  Facility-Administered Medications Ordered in Other Encounters:     lactated ringers infusion, , Intravenous, Continuous PRN, New Bag at 01/31/22 1135    No Known Allergies    Objective     /81   Pulse 89   Temp (!) 96 7 °F (35 9 °C) (Temporal)   Resp 16   Ht 5' 2" (1 575 m)   Wt 67 1 kg (148 lb)   SpO2 97%   BMI 27 07 kg/m²       PHYSICAL EXAM    Gen: NAD  Head: NCAT  CV: RRR  CHEST: Clear  ABD: soft, NT/ND  EXT: no edema      ASSESSMENT/PLAN:  This is a 73y o  year old male here for EGD and colonoscopy, and he is stable and optimized for his procedure

## 2022-02-03 NOTE — RESULT ENCOUNTER NOTE
Please inform patient that their biopsies were benign    Colonoscopy in 5 years, repeat EGD in 3 years for Yeboah's and intestinal metaplasia of the antrum

## 2022-02-16 ENCOUNTER — RA CDI HCC (OUTPATIENT)
Dept: OTHER | Facility: HOSPITAL | Age: 72
End: 2022-02-16

## 2022-02-16 NOTE — PROGRESS NOTES
[facility]  coding opportunities       Chart reviewed, no opportunity found: CHART REVIEWED, NO OPPORTUNITY FOUND                        Patients insurance company: [employer] only)

## 2022-02-24 ENCOUNTER — OFFICE VISIT (OUTPATIENT)
Dept: FAMILY MEDICINE CLINIC | Facility: CLINIC | Age: 72
End: 2022-02-24
Payer: COMMERCIAL

## 2022-02-24 VITALS
SYSTOLIC BLOOD PRESSURE: 92 MMHG | BODY MASS INDEX: 27.42 KG/M2 | HEIGHT: 62 IN | TEMPERATURE: 97.4 F | RESPIRATION RATE: 14 BRPM | WEIGHT: 149 LBS | HEART RATE: 78 BPM | DIASTOLIC BLOOD PRESSURE: 60 MMHG

## 2022-02-24 DIAGNOSIS — G47.00 INSOMNIA, UNSPECIFIED TYPE: ICD-10-CM

## 2022-02-24 DIAGNOSIS — K21.9 GASTROESOPHAGEAL REFLUX DISEASE WITHOUT ESOPHAGITIS: ICD-10-CM

## 2022-02-24 DIAGNOSIS — K57.90 DIVERTICULOSIS: Primary | ICD-10-CM

## 2022-02-24 PROCEDURE — 1160F RVW MEDS BY RX/DR IN RCRD: CPT | Performed by: FAMILY MEDICINE

## 2022-02-24 PROCEDURE — 99214 OFFICE O/P EST MOD 30 MIN: CPT | Performed by: FAMILY MEDICINE

## 2022-02-24 PROCEDURE — 3008F BODY MASS INDEX DOCD: CPT | Performed by: FAMILY MEDICINE

## 2022-02-24 PROCEDURE — 1036F TOBACCO NON-USER: CPT | Performed by: FAMILY MEDICINE

## 2022-02-24 RX ORDER — KETOROLAC TROMETHAMINE 5 MG/ML
SOLUTION OPHTHALMIC
COMMUNITY
Start: 2022-02-24

## 2022-02-24 RX ORDER — PANTOPRAZOLE SODIUM 40 MG/1
40 TABLET, DELAYED RELEASE ORAL
Qty: 90 TABLET | Refills: 2 | Status: SHIPPED | OUTPATIENT
Start: 2022-02-24

## 2022-02-24 RX ORDER — HYDROXYZINE HYDROCHLORIDE 25 MG/1
TABLET, FILM COATED ORAL
Qty: 90 TABLET | Refills: 2 | Status: SHIPPED | OUTPATIENT
Start: 2022-02-24

## 2022-02-24 NOTE — PROGRESS NOTES
Vicente Cedeno 1950 male MRN: [id number]    [facility] PRACTICE OFFICE VISIT  [facility] - [address]      ASSESSMENT/PLAN  Vicente Cedeno is a 73 y o  male presents to the office for    Diagnoses and all orders for this visit:    Diverticulosis    Gastroesophageal reflux disease without esophagitis  -     pantoprazole (PROTONIX) 40 mg tablet; Take 1 tablet (40 mg total) by mouth daily before breakfast    Insomnia, unspecified type  -     hydrOXYzine HCL (ATARAX) 25 mg tablet; Take 1 tablet at bedtime to sleep, no later then 8 pm    Other orders  -     ketorolac (ACULAR) 0 5 % ophthalmic solution       Spoke to the patient's daughter via telephone given that the patient was unaware of his test results from an endoscopy in the been playing phone tag with the GI specialist   Therefore discussed what diverticulosis/acid reflux symptoms are  Advised him of the results as well  Patient's family aware of the changes that he needs to make during his diet  Patient has insomnia will start the patient on Atarax 25 mg as needed         Future Appointments   Date Time Provider Kailash Perkins   4/11/2022  8:00 AM Paula Mittal MD [facility]          SUBJECTIVE  CC: Chest Pain (had endoscopy 3 weeks ago pain started after that finished protonix last week)      HPI:  Vicente Cedeno is a 73 y o  male who presents for for follow-up appointment  Three weeks ago had an endoscopy patient states that he finished his Protonix last week and does not know what he is supposed to do next  He states that he was explained in his language of what is going on and would like to know more details of what his diagnosis was and what findings they found on the endoscopy and colonoscopy  Patient has no acute concerns today except for insomnia  Review of Systems   Constitutional: Negative for activity change, appetite change, chills, fatigue and fever  HENT: Negative for congestion      Respiratory: Negative for cough, chest tightness and shortness of breath  Cardiovascular: Negative for chest pain and leg swelling  Gastrointestinal: Negative for abdominal distention, abdominal pain, constipation, diarrhea, nausea and vomiting  Psychiatric/Behavioral: Positive for sleep disturbance  All other systems reviewed and are negative  Historical Information   The patient history was reviewed as follows:  Past Medical History:   Diagnosis Date    Arthritis     shoulder    Ascending aortic aneurysm ([address] )     Cataract     both eyes-to have the right cataract removed on 1/25/22    Colon polyp     GERD (gastroesophageal reflux disease)     Insomnia     Leg cramps     Memory changes     Restless legs          Medications:     Current Outpatient Medications:     aspirin (ECOTRIN LOW STRENGTH) 81 mg EC tablet, Take 1 tablet (81 mg total) by mouth daily with breakfast, Disp: 90 tablet, Rfl: 3    cyclobenzaprine (FLEXERIL) 5 mg tablet, Take 1 tablet (5 mg total) by mouth daily at bedtime as needed for muscle spasms, Disp: 30 tablet, Rfl: 0    ketorolac (ACULAR) 0 5 % ophthalmic solution, , Disp: , Rfl:     rosuvastatin (CRESTOR) 10 MG tablet, Take 1 tablet (10 mg total) by mouth daily at bedtime, Disp: 90 tablet, Rfl: 3    Silodosin 8 MG CAPS, Take 1 capsule by mouth daily, Disp: , Rfl:     pantoprazole (PROTONIX) 40 mg tablet, Take 1 tablet (40 mg total) by mouth daily before breakfast (Patient not taking: Reported on 2/24/2022 ), Disp: 30 tablet, Rfl: 0    No Known Allergies    OBJECTIVE  Vitals:   Vitals:    02/24/22 1542   BP: 92/60   BP Location: Left arm   Patient Position: Sitting   Cuff Size: Standard   Pulse: 78   Resp: 14   Temp: (!) 97 4 °F (36 3 °C)   TempSrc: Temporal   Weight: 67 6 kg (149 lb)   Height: 5' 2" (1 575 m)         Physical Exam  Vitals reviewed  Constitutional:       Appearance: He is well-developed  HENT:      Head: Normocephalic and atraumatic     Eyes: Conjunctiva/sclera: Conjunctivae normal       Pupils: Pupils are equal, round, and reactive to light  Cardiovascular:      Rate and Rhythm: Normal rate and regular rhythm  Heart sounds: Normal heart sounds  Pulmonary:      Effort: Pulmonary effort is normal  No respiratory distress  Breath sounds: Normal breath sounds  Musculoskeletal:         General: Normal range of motion  Cervical back: Normal range of motion and neck supple  Skin:     General: Skin is warm  Capillary Refill: Capillary refill takes less than 2 seconds  Neurological:      Mental Status: He is alert and oriented to person, place, and time                      Arben Tello MD,   [facility]  2/24/2022

## 2022-02-24 NOTE — PATIENT INSTRUCTIONS
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  Starting taking Protonix 40mg daily at breakfast to help with acid  Increase fiber intake      Yeboah Esophagus   WHAT YOU NEED TO KNOW:   Yeboah esophagus is a condition that is also called intestinal metaplasia  Cells that line your esophagus change into cells that are like intestine cells  The change increases your risk for esophageal cancer  DISCHARGE INSTRUCTIONS:   Call your local emergency number (911 in the [address]) if:   · You have severe chest pain and shortness of breath  Return to the emergency department if:   · Your bowel movements are black, bloody, or tarry  · Your vomit looks like coffee grounds or has blood in it  Call your doctor or gastroenterologist if:   · Your symptoms do not improve with treatment  · You have questions or concerns about your condition or care  Medicines:   · Anti-reflux medicines  may be needed to help decrease the stomach acid that can irritate your esophagus and stomach  These medicines may include proton pump inhibitors (PPI) and histamine type-2 receptor (H2) blockers  You may also be given medicines to stop vomiting  · Take your medicine as directed  Contact your healthcare provider if you think your medicine is not helping or if you have side effects  Tell him or her if you are allergic to any medicine  Keep a list of the medicines, vitamins, and herbs you take  Include the amounts, and when and why you take them  Bring the list or the pill bottles to follow-up visits  Carry your medicine list with you in case of an emergency  Nutrition:  Do not eat foods that make your symptoms worse  Examples are chocolate, garlic, onions, spicy or fatty foods, citrus fruits (oranges), and tomato-based foods (spaghetti sauce)  Do not drink alcohol, drinks that contain caffeine, or carbonated drinks, such as soda   Ask your healthcare provider if there are other foods and drinks you should not have  Maintain a healthy weight:  If you are overweight, weight loss may help relieve symptoms  Ask your healthcare provider about safe ways to lose weight  Do not smoke: If you smoke, it is never too late to quit  Smoking may worsen acid reflux  Ask your healthcare provider for information if you need help quitting  For support and more information:   · [address]  [address]  Phone: [phone]  Web Address: http://Amen./  EatWith    Follow up with your doctor or gastroenterologist as directed:  He or she may need to repeat your endoscopy and biopsy  These tests help look for early signs of esophageal cancer  Write down your questions so you remember to ask them during your visits  © Copyright [employer] 2021 Information is for End User's use only and may not be sold, redistributed or otherwise used for commercial purposes  All illustrations and images included in CareNotes® are the copyrighted property of A D A M , Inc  or [facility] Rahel Powell   The above information is an  only  It is not intended as medical advice for individual conditions or treatments  Talk to your doctor, nurse or pharmacist before following any medical regimen to see if it is safe and effective for you  Diverticulosis   WHAT YOU NEED TO KNOW:   Diverticulosis is a condition that causes small pockets called diverticula to form in your intestine  These pockets make it difficult for bowel movements to pass through your digestive system  DISCHARGE INSTRUCTIONS:   Return to the emergency department if:   · You have severe pain on the left side of your lower abdomen  · Your bowel movements are bright or dark red  Call your doctor if:   · You have a fever and chills  · You feel dizzy or lightheaded  · You have nausea, or you are vomiting  · You have a change in your bowel movements      · You have questions or concerns about your condition or care  Medicines:   · Medicines  to soften your bowel movements may be given  You may also need medicines to treat symptoms such as bloating and pain  · Take your medicine as directed  Contact your healthcare provider if you think your medicine is not helping or if you have side effects  Tell him or her if you are allergic to any medicine  Keep a list of the medicines, vitamins, and herbs you take  Include the amounts, and when and why you take them  Bring the list or the pill bottles to follow-up visits  Carry your medicine list with you in case of an emergency  Self-care: The goal of treatment is to manage any symptoms you have and prevent other problems such as diverticulitis  Diverticulitis is swelling or infection of the diverticula  Your healthcare provider may recommend any of the following:  · Eat a variety of high-fiber foods  High-fiber foods help you have regular bowel movements  High-fiber foods include cooked beans, fruits, vegetables, and some cereals  Most adults need 25 to 35 grams of fiber each day  Your healthcare provider may recommend that you have more  Ask your healthcare provider how much fiber you need  Increase fiber slowly  You may have abdominal discomfort, bloating, and gas if you add fiber to your diet too quickly  You may need to take a fiber supplement if you are not getting enough fiber from food  · Drink liquids as directed  You may need to drink 2 to 3 liters (8 to 12 cups) of liquids every day  Ask your healthcare provider how much liquid to drink each day and which liquids are best for you  · Apply heat  on your abdomen for 20 to 30 minutes every 2 hours for as many days as directed  Heat helps decrease pain and muscle spasms  Help prevent diverticulitis or other symptoms: The following may help decrease your risk for diverticulitis or symptoms, such as bleeding   Talk to your provider about these or other things you can do to prevent problems that may occur with diverticulosis  · Exercise regularly  Ask your healthcare provider about the best exercise plan for you  Exercise can help you have regular bowel movements  Get 30 minutes of exercise on most days of the week  · Maintain a healthy weight  Ask your healthcare provider what a healthy weight is for you  Ask him or her to help you create a weight loss plan if you are overweight  · Do not smoke  Nicotine and other chemicals in cigarettes increase your risk for diverticulitis  Ask your healthcare provider for information if you currently smoke and need help to quit  E-cigarettes or smokeless tobacco still contain nicotine  Talk to your healthcare provider before you use these products  · Ask your healthcare provider if it is safe to take NSAIDs  NSAIDs may increase your risk of diverticulitis  Follow up with your doctor as directed:  Write down your questions so you remember to ask them during your visits  © Copyright [employer] 2021 Information is for End User's use only and may not be sold, redistributed or otherwise used for commercial purposes  All illustrations and images included in CareNotes® are the copyrighted property of [employer] , Inc  or Claudio Powell   The above information is an  only  It is not intended as medical advice for individual conditions or treatments  Talk to your doctor, nurse or pharmacist before following any medical regimen to see if it is safe and effective for you

## 2022-03-09 ENCOUNTER — TELEPHONE (OUTPATIENT)
Dept: GASTROENTEROLOGY | Facility: CLINIC | Age: 72
End: 2022-03-09

## 2022-03-09 NOTE — TELEPHONE ENCOUNTER
----- Message from Sofia Alvarado LPN sent at 3/5/5923  4:31 PM EST -----  Unable to leave message  Please send registered letter   Thank you

## 2022-03-24 ENCOUNTER — OFFICE VISIT (OUTPATIENT)
Dept: FAMILY MEDICINE CLINIC | Facility: CLINIC | Age: 72
End: 2022-03-24
Payer: COMMERCIAL

## 2022-03-24 VITALS
RESPIRATION RATE: 14 BRPM | BODY MASS INDEX: 27.42 KG/M2 | HEIGHT: 62 IN | SYSTOLIC BLOOD PRESSURE: 100 MMHG | DIASTOLIC BLOOD PRESSURE: 60 MMHG | TEMPERATURE: 98.3 F | WEIGHT: 149 LBS | HEART RATE: 72 BPM

## 2022-03-24 DIAGNOSIS — G47.30 SLEEP APNEA, UNSPECIFIED TYPE: Primary | ICD-10-CM

## 2022-03-24 DIAGNOSIS — K21.9 GASTROESOPHAGEAL REFLUX DISEASE WITHOUT ESOPHAGITIS: ICD-10-CM

## 2022-03-24 DIAGNOSIS — G47.09 OTHER INSOMNIA: ICD-10-CM

## 2022-03-24 DIAGNOSIS — R31.9 HEMATURIA, UNSPECIFIED TYPE: ICD-10-CM

## 2022-03-24 LAB
SL AMB  POCT GLUCOSE, UA: ABNORMAL
SL AMB LEUKOCYTE ESTERASE,UA: ABNORMAL
SL AMB POCT BILIRUBIN,UA: 1
SL AMB POCT BLOOD,UA: 50
SL AMB POCT CLARITY,UA: CLEAR
SL AMB POCT COLOR,UA: YELLOW
SL AMB POCT KETONES,UA: ABNORMAL
SL AMB POCT NITRITE,UA: ABNORMAL
SL AMB POCT PH,UA: 5
SL AMB POCT SPECIFIC GRAVITY,UA: 1.02
SL AMB POCT URINE PROTEIN: ABNORMAL
SL AMB POCT UROBILINOGEN: ABNORMAL

## 2022-03-24 PROCEDURE — 1036F TOBACCO NON-USER: CPT | Performed by: FAMILY MEDICINE

## 2022-03-24 PROCEDURE — 99214 OFFICE O/P EST MOD 30 MIN: CPT | Performed by: FAMILY MEDICINE

## 2022-03-24 PROCEDURE — 3008F BODY MASS INDEX DOCD: CPT | Performed by: FAMILY MEDICINE

## 2022-03-24 PROCEDURE — 81003 URINALYSIS AUTO W/O SCOPE: CPT | Performed by: FAMILY MEDICINE

## 2022-03-24 PROCEDURE — 1160F RVW MEDS BY RX/DR IN RCRD: CPT | Performed by: FAMILY MEDICINE

## 2022-03-24 RX ORDER — PREDNISOLONE ACETATE 10 MG/ML
SUSPENSION/ DROPS OPHTHALMIC
COMMUNITY
Start: 2022-03-21

## 2022-03-24 NOTE — PROGRESS NOTES
Roxanna Bautista 1950 male MRN: [id number]    [facility] - [address]      ASSESSMENT/PLAN  Roxanna Bautista is a 73 y o  male presents to the office for    Diagnoses and all orders for this visit:    Sleep apnea, unspecified type  -     Ambulatory Referral to Sleep Medicine; Future    Gastroesophageal reflux disease without esophagitis    Other insomnia    Other orders  -     prednisoLONE acetate (PRED FORTE) 1 % ophthalmic suspension; instill 1 drop into right eye four times a day       Reviewed 2013 sleep study  Patient states that at this time he would like to proceed with another study because he never received the machine  I did advise him that he will need a consult and a study performed again given it has been so long  Patient agrees with our plan  Patient continues to have insomnia recommend taking a pill and a half of Atarax to see if it helps alleviate his symptoms     Prior to this 25 mg was not sufficient enough  Tolerating his cholesterol medication as prescribed with no difficulties  Acid reflux is completely resolved since taking the Protonix  Okay to be off the medication unless symptoms return  UA (+) BLD recommend follow up with his urologist Miroslava  Increase fluids to 3 bottles daily  BMI Counseling: Body mass index is 27 25 kg/m²  The BMI is above normal  Nutrition recommendations include decreasing fast food intake and consuming healthier snacks  Exercise recommendations include exercising 3-5 times per week  Rationale for BMI follow-up plan is due to patient being overweight or obese  Future Appointments   Date Time Provider Kailash Perkins   4/11/2022  8:00 AM Buckley Schlatter, MD [address] [place] [address]          SUBJECTIVE  CC: Follow-up (diverticulitis)      HPI:  Roxanna Bautista is a 73 y o  male who presents for a follow-up on acid reflux    Patient took the complete course of Protonix and states that his acid reflux is completely resolved  He no longer has chest tenderness  He denies t having an abnormal diet such as acid coffee or anything provoking it  But he has been trying to keep his diet bland  States that he would like a sleep study given that something that he was supposed to use in the past for CPAP but never received the machine  Patient states that he did not feel like Atarax 25 mg of sufficient enough to help him alleviate his insomnia symptoms  Urine cloudy  Review of Systems   Constitutional: Negative for activity change, appetite change, chills, fatigue and fever  HENT: Negative for congestion  Respiratory: Negative for cough, chest tightness and shortness of breath  Cardiovascular: Negative for chest pain and leg swelling  Gastrointestinal: Positive for abdominal pain (Improved)  Negative for abdominal distention, constipation, diarrhea, nausea and vomiting  Psychiatric/Behavioral: Positive for sleep disturbance  All other systems reviewed and are negative        Historical Information   The patient history was reviewed as follows:  Past Medical History:   Diagnosis Date    Arthritis     shoulder    [address]ending aortic [address] [address]     Cataract     both eyes-to have the right cataract removed on 1/25/22    Colon polyp     GERD (gastroesophageal reflux disease)     Insomnia     Leg cramps     Memory changes     Restless legs          Medications:     Current Outpatient Medications:     aspirin (ECOTRIN LOW STRENGTH) 81 mg EC tablet, Take 1 tablet (81 mg total) by mouth daily with breakfast, Disp: 90 tablet, Rfl: 3    cyclobenzaprine (FLEXERIL) 5 mg tablet, Take 1 tablet (5 mg total) by mouth daily at bedtime as needed for muscle spasms, Disp: 30 tablet, Rfl: 0    hydrOXYzine HCL (ATARAX) 25 mg tablet, Take 1 tablet at bedtime to sleep, no later then 8 pm, Disp: 90 tablet, Rfl: 2    ketorolac (ACULAR) 0 5 % ophthalmic solution, , Disp: , Rfl:     pantoprazole (PROTONIX) 40 mg tablet, Take 1 tablet (40 mg total) by mouth daily before breakfast, Disp: 90 tablet, Rfl: 2    prednisoLONE acetate (PRED FORTE) 1 % ophthalmic suspension, instill 1 drop into right eye four times a day, Disp: , Rfl:     rosuvastatin (CRESTOR) 10 MG tablet, Take 1 tablet (10 mg total) by mouth daily at bedtime, Disp: 90 tablet, Rfl: 3    Silodosin 8 MG CAPS, Take 1 capsule by mouth daily, Disp: , Rfl:     No Known Allergies    OBJECTIVE  Vitals:   Vitals:    03/24/22 1456   BP: 100/60   BP Location: Left arm   Patient Position: Sitting   Cuff Size: Standard   Pulse: 72   Resp: 14   Temp: 98 3 °F (36 8 °C)   TempSrc: Temporal   Weight: 67 6 kg (149 lb)   Height: 5' 2" (1 575 m)         Physical Exam  Vitals reviewed  Constitutional:       Appearance: He is well-developed  HENT:      Head: Normocephalic and atraumatic  Eyes:      Conjunctiva/sclera: Conjunctivae normal       Pupils: Pupils are equal, round, and reactive to light  Cardiovascular:      Rate and Rhythm: Normal rate and regular rhythm  Heart sounds: Normal heart sounds  Pulmonary:      Effort: Pulmonary effort is normal  No respiratory distress  Breath sounds: Normal breath sounds  Musculoskeletal:         General: Normal range of motion  Cervical back: Normal range of motion and neck supple  Skin:     General: Skin is warm  Capillary Refill: Capillary refill takes less than 2 seconds  Neurological:      Mental Status: He is alert and oriented to person, place, and time                      Kelly Snellen, MD,   [facility]  3/24/2022

## 2022-06-02 ENCOUNTER — OFFICE VISIT (OUTPATIENT)
Dept: FAMILY MEDICINE CLINIC | Facility: CLINIC | Age: 72
End: 2022-06-02
Payer: COMMERCIAL

## 2022-06-02 VITALS
HEART RATE: 80 BPM | DIASTOLIC BLOOD PRESSURE: 60 MMHG | RESPIRATION RATE: 16 BRPM | TEMPERATURE: 97.9 F | WEIGHT: 148 LBS | SYSTOLIC BLOOD PRESSURE: 100 MMHG | HEIGHT: 62 IN | BODY MASS INDEX: 27.23 KG/M2

## 2022-06-02 DIAGNOSIS — M25.562 ACUTE PAIN OF LEFT KNEE: Primary | ICD-10-CM

## 2022-06-02 PROCEDURE — 3008F BODY MASS INDEX DOCD: CPT | Performed by: FAMILY MEDICINE

## 2022-06-02 PROCEDURE — 1036F TOBACCO NON-USER: CPT | Performed by: FAMILY MEDICINE

## 2022-06-02 PROCEDURE — 99214 OFFICE O/P EST MOD 30 MIN: CPT | Performed by: FAMILY MEDICINE

## 2022-06-02 PROCEDURE — 1160F RVW MEDS BY RX/DR IN RCRD: CPT | Performed by: FAMILY MEDICINE

## 2022-06-02 RX ORDER — MELOXICAM 15 MG/1
15 TABLET ORAL DAILY
Qty: 30 TABLET | Refills: 0 | Status: SHIPPED | OUTPATIENT
Start: 2022-06-02

## 2022-06-02 NOTE — PROGRESS NOTES
Chief Complaint   Patient presents with    Knee Pain        Patient ID: Kiki Brewer is a 73 y o  male  HPI  Pt is seeing for L knee pain x few wks -  No injury recalled -  H/o prior arthroscopic surgery few y ago  -  No swelling, taking Tylenol as needed     The following portions of the patient's history were reviewed and updated as appropriate: allergies, current medications, past family history, past medical history, past social history, past surgical history and problem list     Review of Systems   Constitutional: Negative  Respiratory: Negative  Cardiovascular: Negative  Gastrointestinal: Negative  Musculoskeletal: Positive for arthralgias  Negative for back pain, gait problem, joint swelling and myalgias  Current Outpatient Medications   Medication Sig Dispense Refill    aspirin (ECOTRIN LOW STRENGTH) 81 mg EC tablet Take 1 tablet (81 mg total) by mouth daily with breakfast 90 tablet 3    cyclobenzaprine (FLEXERIL) 5 mg tablet Take 1 tablet (5 mg total) by mouth daily at bedtime as needed for muscle spasms 30 tablet 0    hydrOXYzine HCL (ATARAX) 25 mg tablet Take 1 tablet at bedtime to sleep, no later then 8 pm 90 tablet 2    ketorolac (ACULAR) 0 5 % ophthalmic solution       pantoprazole (PROTONIX) 40 mg tablet Take 1 tablet (40 mg total) by mouth daily before breakfast 90 tablet 2    prednisoLONE acetate (PRED FORTE) 1 % ophthalmic suspension instill 1 drop into right eye four times a day      rosuvastatin (CRESTOR) 10 MG tablet Take 1 tablet (10 mg total) by mouth daily at bedtime 90 tablet 3    Silodosin 8 MG CAPS Take 1 capsule by mouth daily       No current facility-administered medications for this visit  Objective:    /60   Pulse 80   Temp 97 9 °F (36 6 °C)   Resp 16   Ht 5' 2" (1 575 m)   Wt 67 1 kg (148 lb)   BMI 27 07 kg/m²        Physical Exam  Constitutional:       Appearance: He is not ill-appearing     Musculoskeletal:      Left knee: No swelling, deformity, effusion, ecchymosis, lacerations or bony tenderness  Normal range of motion  Tenderness present over the medial joint line  No LCL laxity or MCL laxity  Normal meniscus and normal patellar mobility  Right lower leg: No edema  Left lower leg: No edema  Neurological:      Mental Status: He is alert  Assessment/Plan:         Diagnoses and all orders for this visit:    Acute pain of left knee  -     Ambulatory referral to Orthopedic Surgery; Future  -     XR knee 3 vw left non injury; Future  -     meloxicam (Mobic) 15 mg tablet;  Take 1 tablet (15 mg total) by mouth daily            rto prn                 Kita Ratliff MD

## 2022-06-22 ENCOUNTER — APPOINTMENT (OUTPATIENT)
Dept: RADIOLOGY | Facility: CLINIC | Age: 72
End: 2022-06-22
Payer: COMMERCIAL

## 2022-06-22 DIAGNOSIS — M25.562 ACUTE PAIN OF LEFT KNEE: ICD-10-CM

## 2022-06-22 PROCEDURE — 73562 X-RAY EXAM OF KNEE 3: CPT

## 2022-06-23 ENCOUNTER — TELEPHONE (OUTPATIENT)
Dept: FAMILY MEDICINE CLINIC | Facility: CLINIC | Age: 72
End: 2022-06-23

## 2022-06-23 NOTE — TELEPHONE ENCOUNTER
----- Message from Vickie Perez MD sent at 6/23/2022 10:39 AM EDT -----  Pl, advise pt -  no acute abnormalities on knee xray -  keep an steven with ortho in 2 wks

## 2022-07-06 ENCOUNTER — OFFICE VISIT (OUTPATIENT)
Dept: OBGYN CLINIC | Facility: CLINIC | Age: 72
End: 2022-07-06
Payer: COMMERCIAL

## 2022-07-06 VITALS
SYSTOLIC BLOOD PRESSURE: 101 MMHG | BODY MASS INDEX: 27.05 KG/M2 | HEIGHT: 62 IN | WEIGHT: 147 LBS | HEART RATE: 85 BPM | DIASTOLIC BLOOD PRESSURE: 65 MMHG

## 2022-07-06 DIAGNOSIS — M17.12 PRIMARY OSTEOARTHRITIS OF LEFT KNEE: ICD-10-CM

## 2022-07-06 PROCEDURE — 1160F RVW MEDS BY RX/DR IN RCRD: CPT | Performed by: ORTHOPAEDIC SURGERY

## 2022-07-06 PROCEDURE — 99214 OFFICE O/P EST MOD 30 MIN: CPT | Performed by: ORTHOPAEDIC SURGERY

## 2022-07-06 PROCEDURE — 20610 DRAIN/INJ JOINT/BURSA W/O US: CPT | Performed by: ORTHOPAEDIC SURGERY

## 2022-07-06 RX ORDER — LIDOCAINE HYDROCHLORIDE 10 MG/ML
4 INJECTION, SOLUTION INFILTRATION; PERINEURAL
Status: COMPLETED | OUTPATIENT
Start: 2022-07-06 | End: 2022-07-06

## 2022-07-06 RX ORDER — DEXAMETHASONE SODIUM PHOSPHATE 100 MG/10ML
40 INJECTION INTRAMUSCULAR; INTRAVENOUS
Status: COMPLETED | OUTPATIENT
Start: 2022-07-06 | End: 2022-07-06

## 2022-07-06 RX ADMIN — LIDOCAINE HYDROCHLORIDE 4 ML: 10 INJECTION, SOLUTION INFILTRATION; PERINEURAL at 18:06

## 2022-07-06 RX ADMIN — DEXAMETHASONE SODIUM PHOSPHATE 40 MG: 100 INJECTION INTRAMUSCULAR; INTRAVENOUS at 18:06

## 2022-07-06 NOTE — PROGRESS NOTES
Assessment/Plan:  1  Primary osteoarthritis of left knee  Ambulatory referral to Orthopedic Surgery    Large joint arthrocentesis: L knee       Scribe Attestation    I,:  Wellington Elizabethmohamud am acting as a scribe while in the presence of the attending physician :       I,:  Cristiano Singh MD personally performed the services described in this documentation    as scribed in my presence :         Sunil Min upon examination review of the x-rays of the left knee does demonstrate moderate tricompartmental osteoarthritis  I did explain the most severe arthritic changes at the medial patellofemoral compartments  His clinical exam is consistent with the findings with point tenderness the medial joint line as well as the medial patellar facet  However, he does demonstrate functional range of motion and stable in all planes  I did encourage him to continue to be active to maintain range of motion strength of the lower extremity  I did also offer him an intra-articular steroid injection to help alleviate inflammation with the hopes of reducing his painful symptoms  Sunil Min was amenable to undergo an intra-articular steroid injection  An injection was provided to him today  He tolerated the injection well no complications  Post injection instructions were provided  If he fails have symptomatic relief with an intra-articular steroid injection he can consider viscosupplementation in the future  Otherwise, he may follow up with me on an as-needed basis  Large joint arthrocentesis: L knee  Universal Protocol:  Consent: Verbal consent not obtained  Risks and benefits: risks, benefits and alternatives were discussed  Consent given by: patient  Time out: Immediately prior to procedure a "time out" was called to verify the correct patient, procedure, equipment, support staff and site/side marked as required    Timeout called at: 7/6/2022 6:03 PM   Patient understanding: patient states understanding of the procedure being performed  Site marked: the operative site was marked  Radiology Images displayed and confirmed  If images not available, report reviewed: imaging studies available  Patient identity confirmed: verbally with patient    Supporting Documentation  Indications: pain   Procedure Details  Location: knee - L knee  Preparation: Patient was prepped and draped in the usual sterile fashion  Needle size: 22 G  Ultrasound guidance: no  Approach: anterolateral  Medications administered: 4 mL lidocaine 1 %; 40 mg dexamethasone 100 mg/10 mL    Patient tolerance: patient tolerated the procedure well with no immediate complications  Dressing:  Sterile dressing applied          Subjective:   Richie Joseph is a 73 y o  male who presents to the office today for evaluation of his left knee  He has been experiencing a recent onset of pain to the anterior and medial aspect of his knee  He denies a traumatic injury that has resulted in his painful symptoms  He notes that ascending and descending stairs are problematic and do cause exacerbation of pain  He notes that he did have swelling of the knee a few weeks ago as well as some range of motion deficits  He states his range of motion has returned and the swelling has improved  However, he is still experiencing pain  Today denies any distal paresthesias  He did have a left knee arthroscopy with medial meniscectomy performed approximately 2 years ago  He does not recall ever having an intra-articular steroid injection provided to his knee  Review of Systems   Constitutional: Negative for chills, fever and unexpected weight change  HENT: Negative for hearing loss, nosebleeds and sore throat  Eyes: Negative for pain, redness and visual disturbance  Respiratory: Negative for cough, shortness of breath and wheezing  Cardiovascular: Negative for chest pain, palpitations and leg swelling  Gastrointestinal: Negative for abdominal pain, nausea and vomiting     Endocrine: Negative for polyphagia and polyuria  Genitourinary: Negative for dysuria and hematuria  Musculoskeletal: Positive for arthralgias and myalgias  See HPI   Skin: Negative for rash and wound  Neurological: Negative for dizziness, numbness and headaches  Psychiatric/Behavioral: Negative for decreased concentration and suicidal ideas  The patient is not nervous/anxious  Past Medical History:   Diagnosis Date    Arthritis     shoulder    Ascending aortic aneurysm ([address] )     Cataract     both eyes-to have the right cataract removed on 1/25/22    Colon polyp     GERD (gastroesophageal reflux disease)     Insomnia     Leg cramps     Memory changes     Restless legs        Past Surgical History:   Procedure Laterality Date    APPENDECTOMY      CHOLECYSTECTOMY      CHOLECYSTECTOMY LAPAROSCOPIC N/A 10/27/2020    Procedure: CHOLECYSTECTOMY LAPAROSCOPIC;  Surgeon: Malia Denny MD;  Location: [address] [place] [address];  Service: General    COLONOSCOPY N/A 12/12/2016    Procedure: COLONOSCOPY;  Surgeon: Lyndsay Lewis MD;  Location: [place] [address] [facility];   Service:     ELBOW SURGERY Bilateral     HERNIA REPAIR Right 2001    inguinal    KNEE ARTHROSCOPY Right     VA KNEE SCOPE,MED/LAT MENISECTOMY Left 6/18/2020    Procedure: ARTHROSCOPY KNEE MEDIAL MENISCECTOMY;  Surgeon: Laura Mancera MD;  [facility];  Service: Orthopedics    ROTATOR CUFF REPAIR Bilateral 2011       Family History   Problem Relation Age of Onset    No Known Problems Mother     No Known Problems Father     No Known Problems Sister     No Known Problems Brother     No Known Problems Maternal Aunt     No Known Problems Maternal Uncle     No Known Problems Paternal Aunt     No Known Problems Paternal Uncle     No Known Problems Maternal Grandmother     No Known Problems Maternal Grandfather     No Known Problems Paternal Grandmother     No Known Problems Paternal Grandfather     ADD / ADHD Neg Hx     Anesthesia problems Neg Hx     Cancer Neg Hx     Clotting disorder Neg Hx     Collagen disease Neg Hx     Diabetes Neg Hx     Dislocations Neg Hx     Learning disabilities Neg Hx     Neurological problems Neg Hx     Osteoporosis Neg Hx     Rheumatologic disease Neg Hx     Scoliosis Neg Hx     Vascular Disease Neg Hx        Social History     Occupational History    Not on file   Tobacco Use    Smoking status: Never Smoker    Smokeless tobacco: Never Used   Vaping Use    Vaping Use: Never used   Substance and Sexual Activity    Alcohol use: Not Currently    Drug use: No    Sexual activity: Not Currently         Current Outpatient Medications:     aspirin (ECOTRIN LOW STRENGTH) 81 mg EC tablet, Take 1 tablet (81 mg total) by mouth daily with breakfast, Disp: 90 tablet, Rfl: 3    cyclobenzaprine (FLEXERIL) 5 mg tablet, Take 1 tablet (5 mg total) by mouth daily at bedtime as needed for muscle spasms, Disp: 30 tablet, Rfl: 0    hydrOXYzine HCL (ATARAX) 25 mg tablet, Take 1 tablet at bedtime to sleep, no later then 8 pm, Disp: 90 tablet, Rfl: 2    ketorolac (ACULAR) 0 5 % ophthalmic solution, , Disp: , Rfl:     meloxicam (Mobic) 15 mg tablet, Take 1 tablet (15 mg total) by mouth daily, Disp: 30 tablet, Rfl: 0    pantoprazole (PROTONIX) 40 mg tablet, Take 1 tablet (40 mg total) by mouth daily before breakfast, Disp: 90 tablet, Rfl: 2    prednisoLONE acetate (PRED FORTE) 1 % ophthalmic suspension, instill 1 drop into right eye four times a day, Disp: , Rfl:     rosuvastatin (CRESTOR) 10 MG tablet, Take 1 tablet (10 mg total) by mouth daily at bedtime, Disp: 90 tablet, Rfl: 3    Silodosin 8 MG CAPS, Take 1 capsule by mouth daily, Disp: , Rfl:     No Known Allergies    Objective:  Vitals:    07/06/22 1731   BP: 101/65   Pulse: 85       Left Knee Exam     Tenderness   The patient is experiencing tenderness in the medial joint line (Medial patellar facet)      Range of Motion   Extension: 0   Flexion: 120     Tests Varus: negative Valgus: negative  Drawer:  Anterior - negative     Posterior - negative    Other   Erythema: absent  Scars: present (well healed arthroscopy scars)  Sensation: normal  Pulse: present  Swelling: none  Effusion: no effusion present          Observations   Left Knee   Negative for effusion  Physical Exam  Vitals reviewed  Constitutional:       Appearance: He is well-developed  HENT:      Head: Normocephalic and atraumatic  Eyes:      General:         Right eye: No discharge  Left eye: No discharge  Conjunctiva/sclera: Conjunctivae normal    Cardiovascular:      Rate and Rhythm: Normal rate  Pulmonary:      Effort: Pulmonary effort is normal  No respiratory distress  Musculoskeletal:      Cervical back: Normal range of motion and neck supple  Left knee: No effusion  Comments: As noted in HPI   Skin:     General: Skin is warm and dry  Neurological:      Mental Status: He is alert and oriented to person, place, and time  Psychiatric:         Behavior: Behavior normal          Thought Content: Thought content normal          Judgment: Judgment normal            I have personally reviewed pertinent films in PACS and my interpretation is as follows:    X-rays of the left knee demonstrate no acute fracture  Moderate tricompartmental osteoarthritis demonstrated with the most severe arthritis change at the medial and patellofemoral joint

## 2022-08-11 ENCOUNTER — RA CDI HCC (OUTPATIENT)
Dept: OTHER | Facility: HOSPITAL | Age: 72
End: 2022-08-11

## 2022-08-11 NOTE — PROGRESS NOTES
[facility]  coding opportunities       Chart reviewed, no opportunity found:   [address]        Patients Insurance     Medicare Insurance: [facility]

## 2022-08-18 ENCOUNTER — OFFICE VISIT (OUTPATIENT)
Dept: FAMILY MEDICINE CLINIC | Facility: CLINIC | Age: 72
End: 2022-08-18
Payer: COMMERCIAL

## 2022-08-18 VITALS
RESPIRATION RATE: 14 BRPM | BODY MASS INDEX: 27.97 KG/M2 | HEART RATE: 80 BPM | SYSTOLIC BLOOD PRESSURE: 120 MMHG | HEIGHT: 62 IN | WEIGHT: 152 LBS | DIASTOLIC BLOOD PRESSURE: 70 MMHG

## 2022-08-18 DIAGNOSIS — K21.9 GASTROESOPHAGEAL REFLUX DISEASE WITHOUT ESOPHAGITIS: ICD-10-CM

## 2022-08-18 DIAGNOSIS — R09.02 HYPOXIA: Primary | ICD-10-CM

## 2022-08-18 DIAGNOSIS — R07.89 CHEST PRESSURE: ICD-10-CM

## 2022-08-18 DIAGNOSIS — I73.9 PAD (PERIPHERAL ARTERY DISEASE) (HCC): ICD-10-CM

## 2022-08-18 DIAGNOSIS — I71.20 THORACIC AORTIC ANEURYSM WITHOUT RUPTURE: ICD-10-CM

## 2022-08-18 PROCEDURE — 99214 OFFICE O/P EST MOD 30 MIN: CPT | Performed by: FAMILY MEDICINE

## 2022-08-18 RX ORDER — ROSUVASTATIN CALCIUM 10 MG/1
10 TABLET, COATED ORAL
Qty: 90 TABLET | Refills: 3 | Status: SHIPPED | OUTPATIENT
Start: 2022-08-18

## 2022-08-18 RX ORDER — PANTOPRAZOLE SODIUM 40 MG/1
40 TABLET, DELAYED RELEASE ORAL
Qty: 90 TABLET | Refills: 2 | Status: SHIPPED | OUTPATIENT
Start: 2022-08-18

## 2022-08-18 RX ORDER — DEXTROMETHORPHAN HYDROBROMIDE AND PROMETHAZINE HYDROCHLORIDE 15; 6.25 MG/5ML; MG/5ML
5 SOLUTION ORAL 4 TIMES DAILY PRN
Qty: 118 ML | Refills: 3 | Status: SHIPPED | OUTPATIENT
Start: 2022-08-18 | End: 2022-08-22 | Stop reason: SDUPTHER

## 2022-08-18 RX ORDER — METHYLPREDNISOLONE 4 MG/1
TABLET ORAL
Qty: 21 EACH | Refills: 0 | OUTPATIENT
Start: 2022-08-18 | End: 2022-10-20

## 2022-08-18 RX ORDER — AZITHROMYCIN 250 MG/1
TABLET, FILM COATED ORAL
Qty: 6 TABLET | Refills: 0 | Status: SHIPPED | OUTPATIENT
Start: 2022-08-18 | End: 2022-08-23

## 2022-08-18 NOTE — PROGRESS NOTES
Amaya Forrest 1950 male MRN: [id number]    [facility] - [address]      ASSESSMENT/PLAN  Amaya Forrest is a 81 y o  male presents to the office for    Diagnoses and all orders for this visit:    Hypoxia  -     methylPREDNISolone 4 MG tablet therapy pack; Use as directed on package  -     azithromycin (ZITHROMAX) 250 mg tablet; Take 2 tablets today then 1 tablet daily x 4 days  -     Promethazine-DM (PHENERGAN-DM) 6 25-15 mg/5 mL oral syrup; Take 5 mL by mouth 4 (four) times a day as needed for cough    Gastroesophageal reflux disease without esophagitis  -     pantoprazole (PROTONIX) 40 mg tablet; Take 1 tablet (40 mg total) by mouth daily before breakfast    Chest pressure  -     rosuvastatin (CRESTOR) 10 MG tablet; Take 1 tablet (10 mg total) by mouth daily at bedtime    Thoracic aortic aneurysm without rupture (HCC)  -     rosuvastatin (CRESTOR) 10 MG tablet; Take 1 tablet (10 mg total) by mouth daily at bedtime    PAD (peripheral artery disease) (HCC)  -     rosuvastatin (CRESTOR) 10 MG tablet; Take 1 tablet (10 mg total) by mouth daily at bedtime     Patient's saturation remained between 90% and 91% the only time it went down to 89% was when the patient was speaking a lot  We did do a walk around the office and he maintained his oxygen level  Unfortunately I did do a COVID test is negative given that this is likely a viral infection  Explain to the patient the usage of steroids  Would like a recheck on Monday  Continue all medications as shown above  Reviewed the note from orthopedics and advised the patient that he should contact the orthopedic again         Future Appointments   Date Time Provider Kaialsh Perkins   8/22/2022  2:15 PM Maryam Chavis MD [facility]          SUBJECTIVE  CC: Knee Pain (Left knee pain )      HPI:  Amaya Forrest is a 81 y o  male who presents for an acute appointment    Patient states that he continues to have a left knee pain states of the specialist for steroid injection and it did not help  Patient states that there was no other options of treatment according to him  Patient states that he has been having chest pressure and chest tightness with coughing spells for the last 2 weeks  States that he needs a refill on his acid reflux and cholesterol meds having no side effects with this  Review of Systems   Constitutional: Negative for activity change, appetite change, chills, fatigue and fever  HENT: Negative for congestion  Respiratory: Positive for cough, chest tightness and shortness of breath  Cardiovascular: Negative for chest pain and leg swelling  Gastrointestinal: Negative for abdominal distention, abdominal pain, constipation, diarrhea, nausea and vomiting  Musculoskeletal: Positive for arthralgias  All other systems reviewed and are negative        Historical Information   The patient history was reviewed as follows:  Past Medical History:   Diagnosis Date    Arthritis     shoulder    Ascending aortic aneurysm ([address] )     Cataract     both eyes-to have the right cataract removed on 1/25/22    Colon polyp     GERD (gastroesophageal reflux disease)     Insomnia     Leg cramps     Memory changes     Restless legs          Medications:     Current Outpatient Medications:     aspirin (ECOTRIN LOW STRENGTH) 81 mg EC tablet, Take 1 tablet (81 mg total) by mouth daily with breakfast, Disp: 90 tablet, Rfl: 3    azithromycin (ZITHROMAX) 250 mg tablet, Take 2 tablets today then 1 tablet daily x 4 days, Disp: 6 tablet, Rfl: 0    cyclobenzaprine (FLEXERIL) 5 mg tablet, Take 1 tablet (5 mg total) by mouth daily at bedtime as needed for muscle spasms, Disp: 30 tablet, Rfl: 0    hydrOXYzine HCL (ATARAX) 25 mg tablet, Take 1 tablet at bedtime to sleep, no later then 8 pm, Disp: 90 tablet, Rfl: 2    ketorolac (ACULAR) 0 5 % ophthalmic solution, , Disp: , Rfl:     meloxicam (Mobic) 15 mg tablet, Take 1 tablet (15 mg total) by mouth daily, Disp: 30 tablet, Rfl: 0    methylPREDNISolone 4 MG tablet therapy pack, Use as directed on package, Disp: 21 each, Rfl: 0    pantoprazole (PROTONIX) 40 mg tablet, Take 1 tablet (40 mg total) by mouth daily before breakfast, Disp: 90 tablet, Rfl: 2    prednisoLONE acetate (PRED FORTE) 1 % ophthalmic suspension, instill 1 drop into right eye four times a day, Disp: , Rfl:     Promethazine-DM (PHENERGAN-DM) 6 25-15 mg/5 mL oral syrup, Take 5 mL by mouth 4 (four) times a day as needed for cough, Disp: 118 mL, Rfl: 3    rosuvastatin (CRESTOR) 10 MG tablet, Take 1 tablet (10 mg total) by mouth daily at bedtime, Disp: 90 tablet, Rfl: 3    Silodosin 8 MG CAPS, Take 1 capsule by mouth daily, Disp: , Rfl:     No Known Allergies    OBJECTIVE  Vitals:   Vitals:    08/18/22 1449   BP: 120/70   BP Location: Left arm   Patient Position: Sitting   Cuff Size: Standard   Pulse: 80   Resp: 14   Weight: 68 9 kg (152 lb)   Height: 5' 2" (1 575 m)         Physical Exam  Vitals reviewed  Constitutional:       Appearance: He is well-developed  HENT:      Head: Normocephalic and atraumatic  Eyes:      Conjunctiva/sclera: Conjunctivae normal       Pupils: Pupils are equal, round, and reactive to light  Cardiovascular:      Rate and Rhythm: Normal rate and regular rhythm  Heart sounds: Normal heart sounds  Pulmonary:      Effort: Pulmonary effort is normal  No respiratory distress  Breath sounds: Wheezing (Diffusely) present  Musculoskeletal:         General: Normal range of motion  Cervical back: Normal range of motion and neck supple  Skin:     General: Skin is warm  Capillary Refill: Capillary refill takes less than 2 seconds  Neurological:      Mental Status: He is alert and oriented to person, place, and time                      Alfornia Cushing, MD,   [facility]  8/18/2022

## 2022-08-22 ENCOUNTER — OFFICE VISIT (OUTPATIENT)
Dept: FAMILY MEDICINE CLINIC | Facility: CLINIC | Age: 72
End: 2022-08-22
Payer: COMMERCIAL

## 2022-08-22 VITALS
OXYGEN SATURATION: 94 % | RESPIRATION RATE: 14 BRPM | HEART RATE: 94 BPM | TEMPERATURE: 98 F | HEIGHT: 62 IN | DIASTOLIC BLOOD PRESSURE: 70 MMHG | WEIGHT: 146 LBS | SYSTOLIC BLOOD PRESSURE: 120 MMHG | BODY MASS INDEX: 26.87 KG/M2

## 2022-08-22 DIAGNOSIS — J06.9 UPPER RESPIRATORY TRACT INFECTION, UNSPECIFIED TYPE: Primary | ICD-10-CM

## 2022-08-22 DIAGNOSIS — R09.02 HYPOXIA: ICD-10-CM

## 2022-08-22 DIAGNOSIS — N40.0 BENIGN PROSTATIC HYPERPLASIA WITHOUT LOWER URINARY TRACT SYMPTOMS: ICD-10-CM

## 2022-08-22 PROCEDURE — 1160F RVW MEDS BY RX/DR IN RCRD: CPT | Performed by: FAMILY MEDICINE

## 2022-08-22 PROCEDURE — 99214 OFFICE O/P EST MOD 30 MIN: CPT | Performed by: FAMILY MEDICINE

## 2022-08-22 RX ORDER — DEXTROMETHORPHAN HYDROBROMIDE AND PROMETHAZINE HYDROCHLORIDE 15; 6.25 MG/5ML; MG/5ML
5 SOLUTION ORAL 4 TIMES DAILY PRN
Qty: 118 ML | Refills: 3 | Status: SHIPPED | OUTPATIENT
Start: 2022-08-22

## 2022-08-22 RX ORDER — PREDNISONE 10 MG/1
10 TABLET ORAL DAILY
Qty: 5 TABLET | Refills: 0 | OUTPATIENT
Start: 2022-08-22 | End: 2022-10-20

## 2022-08-22 NOTE — PROGRESS NOTES
Lucia Moreno 1950 male MRN: [id number]    [facility] [facility] - [address]      ASSESSMENT/PLAN  Lucia Moreno is a 81 y o  male presents to the office for    Diagnoses and all orders for this visit:    Upper respiratory tract infection, unspecified type  -     predniSONE 10 mg tablet; Take 1 tablet (10 mg total) by mouth daily    Benign prostatic hyperplasia without lower urinary tract symptoms  -     Ambulatory Referral to Urology; Future    Hypoxia  -     Promethazine-DM (PHENERGAN-DM) 6 25-15 mg/5 mL oral syrup; Take 5 mL by mouth 4 (four) times a day as needed for cough  -     predniSONE 10 mg tablet; Take 1 tablet (10 mg total) by mouth daily     Referred patient to a different urologist per his request  Upper respiratory infection consider another round of steroids  Hypoxia is improving  After discussing with his son I do believe the patient likely had COVID-19  6 minute walk passed today         No future appointments  SUBJECTIVE  CC: No chief complaint on file  HPI:  Lucia Moreno is a 81 y o  male who presents for follow-up appointment  Patient's son came to this appointment  States that he was ill prior to going to [facility] ago and that is when he became worse with his lungs  Patient states that since starting the treatment he has been feeling a lot better  He has not had any hypoxia but does still have a cough  Patient states when he walks he is slightly short of breath but improved from her last appointment  Patient would like to get a new doctor and would like a referral   Review of Systems   Constitutional: Negative for activity change, appetite change, chills, fatigue and fever  HENT: Negative for congestion  Respiratory: Positive for cough, shortness of breath and wheezing  Negative for chest tightness  Cardiovascular: Negative for chest pain and leg swelling     Gastrointestinal: Negative for abdominal distention, abdominal pain, constipation, diarrhea, nausea and vomiting  All other systems reviewed and are negative        Historical Information   The patient history was reviewed as follows:  Past Medical History:   Diagnosis Date    Arthritis     shoulder    Ascending aortic aneurysm ([address] )     Cataract     both eyes-to have the right cataract removed on 1/25/22    Colon polyp     GERD (gastroesophageal reflux disease)     Insomnia     Leg cramps     Memory changes     Restless legs          Medications:     Current Outpatient Medications:     aspirin (ECOTRIN LOW STRENGTH) 81 mg EC tablet, Take 1 tablet (81 mg total) by mouth daily with breakfast, Disp: 90 tablet, Rfl: 3    cyclobenzaprine (FLEXERIL) 5 mg tablet, Take 1 tablet (5 mg total) by mouth daily at bedtime as needed for muscle spasms, Disp: 30 tablet, Rfl: 0    hydrOXYzine HCL (ATARAX) 25 mg tablet, Take 1 tablet at bedtime to sleep, no later then 8 pm, Disp: 90 tablet, Rfl: 2    meloxicam (Mobic) 15 mg tablet, Take 1 tablet (15 mg total) by mouth daily, Disp: 30 tablet, Rfl: 0    methylPREDNISolone 4 MG tablet therapy pack, Use as directed on package, Disp: 21 each, Rfl: 0    pantoprazole (PROTONIX) 40 mg tablet, Take 1 tablet (40 mg total) by mouth daily before breakfast, Disp: 90 tablet, Rfl: 2    predniSONE 10 mg tablet, Take 1 tablet (10 mg total) by mouth daily, Disp: 5 tablet, Rfl: 0    Promethazine-DM (PHENERGAN-DM) 6 25-15 mg/5 mL oral syrup, Take 5 mL by mouth 4 (four) times a day as needed for cough, Disp: 118 mL, Rfl: 3    rosuvastatin (CRESTOR) 10 MG tablet, Take 1 tablet (10 mg total) by mouth daily at bedtime, Disp: 90 tablet, Rfl: 3    Silodosin 8 MG CAPS, Take 1 capsule by mouth daily, Disp: , Rfl:     No Known Allergies    OBJECTIVE  Vitals:   Vitals:    08/22/22 1427   BP: 120/70   BP Location: Left arm   Patient Position: Sitting   Cuff Size: Standard   Pulse: 94   Resp: 14   Temp: 98 °F (36 7 °C)   SpO2: 94%   Weight: 66 2 kg (146 lb)   Height: 5' 2" (1 575 m)         Physical Exam  Vitals reviewed  Constitutional:       Appearance: He is well-developed  HENT:      Head: Normocephalic and atraumatic  Eyes:      Conjunctiva/sclera: Conjunctivae normal       Pupils: Pupils are equal, round, and reactive to light  Cardiovascular:      Rate and Rhythm: Normal rate and regular rhythm  Heart sounds: Normal heart sounds  Pulmonary:      Effort: Pulmonary effort is normal  No respiratory distress  Breath sounds: Wheezing present  Musculoskeletal:         General: Normal range of motion  Cervical back: Normal range of motion and neck supple  Skin:     General: Skin is warm  Capillary Refill: Capillary refill takes less than 2 seconds  Neurological:      Mental Status: He is alert and oriented to person, place, and time                      Og eMna MD,   [facility]  8/24/2022

## 2022-09-19 DIAGNOSIS — G47.00 INSOMNIA, UNSPECIFIED TYPE: ICD-10-CM

## 2022-09-19 RX ORDER — HYDROXYZINE HYDROCHLORIDE 25 MG/1
TABLET, FILM COATED ORAL
Qty: 90 TABLET | Refills: 2 | Status: SHIPPED | OUTPATIENT
Start: 2022-09-19

## 2022-09-28 NOTE — PROGRESS NOTES
Fiona Jain 1950 male MRN: [id number]    [address] [place] [address]      ASSESSMENT/PLAN  Fiona Jain is a 78 y o  male presents to the office for    Diagnoses and all orders for this visit:    Anxiety  -     escitalopram (LEXAPRO) 10 mg tablet; Take 1 tablet (10 mg total) by mouth daily at bedtime    Frequent headaches  -     naproxen (NAPROSYN) 500 mg tablet; Take 1 tablet (500 mg total) by mouth 2 (two) times a day as needed for headaches    Other orders  -     acetaminophen (TYLENOL) 325 mg tablet; Take 650 mg by mouth every 6 (six) hours as needed for mild pain     Started on lexapro 10mg at bedtime  Continue Atarax as needed  Naproxen use as needed for headaches  Once he has insurance recommend that the patient obtain an MRI of the brain   He agrees           Future Appointments   Date Time Provider Kailash Perkins   2/5/2021 11:00 AM Mike Rodriguez PA-C [facility]          SUBJECTIVE  CC: Follow-up (pt here for f/u labs, stress test and complains of head pain) and Headache      HPI:  Fiona Jain is a 78 y o  male who presents for an acute appointment  Patient states that he has been having a lot of frequent headaches  He is unsure the etiology  Unfortunately was unable to perform the MRI by the neurologist given that his insurance ran out  He seeking a refill of an NSAID  Patient also states his anxiety has been very uncontrolled  He went for complete cardiac workup which was negative  He continues to have chest tightness at times and believes that it secondary to his worries and is anxiety  Believes he is going to need of stronger medication than Atarax as needed  Review of Systems   Constitutional: Negative for activity change, appetite change, chills, fatigue and fever  HENT: Negative for congestion  Respiratory: Negative for cough, chest tightness and shortness of breath      Cardiovascular: Negative for chest pain and leg swelling  Gastrointestinal: Negative for abdominal distention, abdominal pain, constipation, diarrhea, nausea and vomiting  Neurological: Positive for headaches  Psychiatric/Behavioral: The patient is nervous/anxious  All other systems reviewed and are negative        Historical Information   The patient history was reviewed as follows:  Past Medical History:   Diagnosis Date    Arthritis     shoulder    Ascending aortic aneurysm ([facility] [address] )     Memory changes          Medications:     Current Outpatient Medications:     acetaminophen (TYLENOL) 325 mg tablet, Take 650 mg by mouth every 6 (six) hours as needed for mild pain, Disp: , Rfl:     aspirin (ECOTRIN LOW STRENGTH) 81 mg EC tablet, Take 1 tablet (81 mg total) by mouth daily with breakfast, Disp: 90 tablet, Rfl: 3    gabapentin (NEURONTIN) 300 mg capsule, Take 1 capsule (300 mg total) by mouth daily at bedtime, Disp: 90 capsule, Rfl: 1    hydrOXYzine HCL (ATARAX) 25 mg tablet, Take 1 tablet (25 mg total) by mouth daily at bedtime as needed (Insomnia), Disp: 90 tablet, Rfl: 2    rosuvastatin (CRESTOR) 10 MG tablet, Take 1 tablet (10 mg total) by mouth daily at bedtime, Disp: 90 tablet, Rfl: 3    tamsulosin (FLOMAX) 0 4 mg, daily with dinner , Disp: , Rfl: 1    escitalopram (LEXAPRO) 10 mg tablet, Take 1 tablet (10 mg total) by mouth daily at bedtime, Disp: 90 tablet, Rfl: 3    ibuprofen (MOTRIN) 200 mg tablet, Take by mouth every 6 (six) hours as needed for mild pain, Disp: , Rfl:     naproxen (NAPROSYN) 500 mg tablet, Take 1 tablet (500 mg total) by mouth 2 (two) times a day as needed for headaches, Disp: 30 tablet, Rfl: 0    No Known Allergies    OBJECTIVE  Vitals:   Vitals:    01/26/21 1056   BP: 92/64   BP Location: Left arm   Patient Position: Sitting   Cuff Size: Standard   Pulse: 90   Resp: 18   Temp: 97 8 °F (36 6 °C)   TempSrc: Temporal   SpO2: 96%   Weight: 63 4 kg (139 lb 12 8 oz)   Height: 5' 2" (1 575 m) Physical Exam  Vitals signs reviewed  Constitutional:       Appearance: Normal appearance  He is well-developed  HENT:      Head: Normocephalic and atraumatic  Eyes:      Conjunctiva/sclera: Conjunctivae normal       Pupils: Pupils are equal, round, and reactive to light  Neck:      Musculoskeletal: Normal range of motion and neck supple  Cardiovascular:      Rate and Rhythm: Normal rate and regular rhythm  Heart sounds: Normal heart sounds  Pulmonary:      Effort: Pulmonary effort is normal  No respiratory distress  Breath sounds: Normal breath sounds  Musculoskeletal: Normal range of motion  Skin:     General: Skin is warm  Capillary Refill: Capillary refill takes less than 2 seconds  Neurological:      General: No focal deficit present  Mental Status: He is alert and oriented to person, place, and time  Cranial Nerves: No cranial nerve deficit  Motor: No weakness  Coordination: Coordination normal    Psychiatric:         Mood and Affect: Mood normal          Behavior: Behavior normal          Thought Content:  Thought content normal          Judgment: Judgment normal                     Tom Armendariz MD,   [facility]  1/26/2021 Libtayo Counseling- I discussed with the patient the risks of Libtayo including but not limited to nausea, vomiting, diarrhea, and bone or muscle pain.  The patient verbalized understanding of the proper use and possible adverse effects of Libtayo.  All of the patient's questions and concerns were addressed.

## 2022-10-12 ENCOUNTER — OFFICE VISIT (OUTPATIENT)
Dept: OBGYN CLINIC | Facility: CLINIC | Age: 72
End: 2022-10-12
Payer: COMMERCIAL

## 2022-10-12 ENCOUNTER — APPOINTMENT (OUTPATIENT)
Dept: RADIOLOGY | Facility: CLINIC | Age: 72
End: 2022-10-12
Payer: COMMERCIAL

## 2022-10-12 VITALS
BODY MASS INDEX: 27.79 KG/M2 | SYSTOLIC BLOOD PRESSURE: 101 MMHG | HEART RATE: 76 BPM | WEIGHT: 151 LBS | DIASTOLIC BLOOD PRESSURE: 64 MMHG | HEIGHT: 62 IN

## 2022-10-12 DIAGNOSIS — M25.561 RIGHT KNEE PAIN, UNSPECIFIED CHRONICITY: ICD-10-CM

## 2022-10-12 DIAGNOSIS — M17.11 PRIMARY OSTEOARTHRITIS OF RIGHT KNEE: Primary | ICD-10-CM

## 2022-10-12 DIAGNOSIS — M17.12 PRIMARY OSTEOARTHRITIS OF LEFT KNEE: ICD-10-CM

## 2022-10-12 PROCEDURE — 99214 OFFICE O/P EST MOD 30 MIN: CPT | Performed by: ORTHOPAEDIC SURGERY

## 2022-10-12 PROCEDURE — 73562 X-RAY EXAM OF KNEE 3: CPT

## 2022-10-12 PROCEDURE — 20610 DRAIN/INJ JOINT/BURSA W/O US: CPT | Performed by: ORTHOPAEDIC SURGERY

## 2022-10-12 RX ORDER — LIDOCAINE HYDROCHLORIDE 10 MG/ML
1 INJECTION, SOLUTION INFILTRATION; PERINEURAL
Status: COMPLETED | OUTPATIENT
Start: 2022-10-12 | End: 2022-10-12

## 2022-10-12 RX ORDER — DEXAMETHASONE SODIUM PHOSPHATE 100 MG/10ML
40 INJECTION INTRAMUSCULAR; INTRAVENOUS
Status: COMPLETED | OUTPATIENT
Start: 2022-10-12 | End: 2022-10-12

## 2022-10-12 RX ADMIN — LIDOCAINE HYDROCHLORIDE 1 ML: 10 INJECTION, SOLUTION INFILTRATION; PERINEURAL at 14:25

## 2022-10-12 RX ADMIN — DEXAMETHASONE SODIUM PHOSPHATE 40 MG: 100 INJECTION INTRAMUSCULAR; INTRAVENOUS at 14:25

## 2022-10-12 NOTE — PROGRESS NOTES
Assessment/Plan:  1  Primary osteoarthritis of right knee  XR knee 3 vw right non injury    Large joint arthrocentesis: R knee    CANCELED: Large joint arthrocentesis: bilateral knee   2  Primary osteoarthritis of left knee  Large joint arthrocentesis: L knee    CANCELED: Large joint arthrocentesis: bilateral knee     Scribe Attestation    I,:  Eleanor Davis am acting as a scribe while in the presence of the attending physician :       I,:  Carlos Fairbanks MD personally performed the services described in this documentation    as scribed in my presence :         Jeremiah Wolfe open examination and review of the x-rays of the right knee does demonstrate moderate tricompartmental osteoarthritis  Patient already has known osteoarthritis of the left knee  His clinical exam is consistent with the findings with point tenderness along the medial joint space as well as the medial patellar facet  Patient does have full range of motion bilaterally  Patient states he had great symptomatic relief from a corticosteroid injection into the left knee 3 months ago  He he would like a repeat injection for the left knee and an initial injection for the right knee  Injections were performed in clinic today, and the procedures were tolerated well  Post-injection protocol advised  We also discussed limiting steroid injections to at most 3 a year per knee  He will follow-up in February or March of 2023 if he would like another evaluation and possible injection  Large joint arthrocentesis: R knee  Universal Protocol:  Consent: Verbal consent obtained    Risks and benefits: risks, benefits and alternatives were discussed  Consent given by: patient  Timeout called at: 10/12/2022 2:20 PM   Patient understanding: patient states understanding of the procedure being performed  Patient identity confirmed: verbally with patient    Supporting Documentation  Indications: pain and diagnostic evaluation   Procedure Details  Location: knee - R knee  Needle size: 22 G  Ultrasound guidance: no  Medications administered: 1 mL lidocaine 1 %; 40 mg dexamethasone 100 mg/10 mL    Patient tolerance: patient tolerated the procedure well with no immediate complications  Dressing:  Sterile dressing applied    Large joint arthrocentesis: L knee  Universal Protocol:  Consent: Verbal consent obtained  Risks and benefits: risks, benefits and alternatives were discussed  Consent given by: patient  Timeout called at: 10/12/2022 2:21 PM   Patient understanding: patient states understanding of the procedure being performed  Patient identity confirmed: verbally with patient    Supporting Documentation  Indications: pain and diagnostic evaluation   Procedure Details  Location: knee - L knee  Needle size: 22 G  Ultrasound guidance: no  Medications administered: 1 mL lidocaine 1 %; 40 mg dexamethasone 100 mg/10 mL    Patient tolerance: patient tolerated the procedure well with no immediate complications  Dressing:  Sterile dressing applied          Subjective:   Nde Estevez is a 81 y o  male who presents today for follow-up evaluation of left and initial evaluation of right knee  Patient has known primary osteoarthritis in his left knee  He had symptomatic relief from a corticosteroid injection 07/06/2022  He is hoping for repeat injection for his left knee and an injection for his right knee  Today he states he has been experiencing an increase in pain  He indicates the anterior and medial aspect of his knees as locations of his pain bilaterally  He denies any traumatic injury that has resulted in his painful symptoms  He notes that ascending and descending stairs are problematic and do cause exacerbation of pain  He states that at times his knees swell up at the end of the day  He denies any distal paresthesias  Review of Systems   Constitutional: Negative for chills and fever  HENT: Negative for ear pain and sore throat      Eyes: Negative for pain and visual disturbance  Respiratory: Negative for cough and shortness of breath  Cardiovascular: Negative for chest pain and palpitations  Gastrointestinal: Negative for abdominal pain and vomiting  Genitourinary: Negative for dysuria and hematuria  Musculoskeletal: Negative for arthralgias and back pain  Skin: Negative for color change and rash  Neurological: Negative for seizures and syncope  All other systems reviewed and are negative  Past Medical History:   Diagnosis Date   • Arthritis     shoulder   • Ascending aortic aneurysm    • Cataract     both eyes-to have the right cataract removed on 1/25/22   • Colon polyp    • GERD (gastroesophageal reflux disease)    • Insomnia    • Leg cramps    • Memory changes    • Restless legs        Past Surgical History:   Procedure Laterality Date   • APPENDECTOMY     • CHOLECYSTECTOMY     • CHOLECYSTECTOMY LAPAROSCOPIC N/A 10/27/2020    Procedure: CHOLECYSTECTOMY LAPAROSCOPIC;  Surgeon: Maribell Barney MD;  Location: [address] [place] [address];  Service: General   • COLONOSCOPY N/A 12/12/2016    Procedure: COLONOSCOPY;  Surgeon: Mindy Lockwood MD;  Location: [facility];   Service:    • ELBOW SURGERY Bilateral    • HERNIA REPAIR Right 2001    inguinal   • KNEE ARTHROSCOPY Right    • SC KNEE SCOPE,MED/LAT MENISECTOMY Left 6/18/2020    Procedure: ARTHROSCOPY KNEE MEDIAL MENISCECTOMY;  Surgeon: Luan Camp MD;  Location: [address] [place] [address];  Service: Orthopedics   • ROTATOR CUFF REPAIR Bilateral 2011       Family History   Problem Relation Age of Onset   • No Known Problems Mother    • No Known Problems Father    • No Known Problems Sister    • No Known Problems Brother    • No Known Problems Maternal Aunt    • No Known Problems Maternal Uncle    • No Known Problems Paternal Aunt    • No Known Problems Paternal Uncle    • No Known Problems Maternal Grandmother    • No Known Problems Maternal Grandfather    • No Known Problems Paternal Grandmother    • No Known Problems Paternal Grandfather    • ADD / ADHD Neg Hx    • Anesthesia problems Neg Hx    • Cancer Neg Hx    • Clotting disorder Neg Hx    • Collagen disease Neg Hx    • Diabetes Neg Hx    • Dislocations Neg Hx    • Learning disabilities Neg Hx    • Neurological problems Neg Hx    • Osteoporosis Neg Hx    • Rheumatologic disease Neg Hx    • Scoliosis Neg Hx    • Vascular Disease Neg Hx        Social History     Occupational History   • Not on file   Tobacco Use   • Smoking status: Never Smoker   • Smokeless tobacco: Never Used   Vaping Use   • Vaping Use: Never used   Substance and Sexual Activity   • Alcohol use: Not Currently   • Drug use: No   • Sexual activity: Not Currently         Current Outpatient Medications:   •  aspirin (ECOTRIN LOW STRENGTH) 81 mg EC tablet, Take 1 tablet (81 mg total) by mouth daily with breakfast, Disp: 90 tablet, Rfl: 3  •  hydrOXYzine HCL (ATARAX) 25 mg tablet, Take 1 tablet at bedtime to sleep, no later then 8 pm, Disp: 90 tablet, Rfl: 2  •  meloxicam (Mobic) 15 mg tablet, Take 1 tablet (15 mg total) by mouth daily, Disp: 30 tablet, Rfl: 0  •  methylPREDNISolone 4 MG tablet therapy pack, Use as directed on package, Disp: 21 each, Rfl: 0  •  pantoprazole (PROTONIX) 40 mg tablet, Take 1 tablet (40 mg total) by mouth daily before breakfast, Disp: 90 tablet, Rfl: 2  •  predniSONE 10 mg tablet, Take 1 tablet (10 mg total) by mouth daily, Disp: 5 tablet, Rfl: 0  •  Promethazine-DM (PHENERGAN-DM) 6 25-15 mg/5 mL oral syrup, Take 5 mL by mouth 4 (four) times a day as needed for cough, Disp: 118 mL, Rfl: 3  •  rosuvastatin (CRESTOR) 10 MG tablet, Take 1 tablet (10 mg total) by mouth daily at bedtime, Disp: 90 tablet, Rfl: 3  •  Silodosin 8 MG CAPS, Take 1 capsule by mouth daily, Disp: , Rfl:   •  cyclobenzaprine (FLEXERIL) 5 mg tablet, Take 1 tablet (5 mg total) by mouth daily at bedtime as needed for muscle spasms (Patient not taking: Reported on 10/12/2022), Disp: 30 tablet, Rfl: 0    No Known Allergies    Objective:  Vitals:    10/12/22 1357   BP: 101/64   Pulse: 76       Right Knee Exam     Tenderness   The patient is experiencing tenderness in the medial joint line  Range of Motion   The patient has normal right knee ROM  Tests   Varus: negative     Other   Erythema: absent  Sensation: normal  Pulse: present  Swelling: none  Effusion: no effusion present    Comments:  Unstable to valgus stress      Left Knee Exam     Tenderness   The patient is experiencing tenderness in the medial joint line  Range of Motion   The patient has normal left knee ROM  Tests   Varus: negative     Other   Erythema: absent  Sensation: normal  Pulse: present  Swelling: none  Effusion: no effusion present    Comments:  Unstable to valgus stress          Observations   Left Knee   Negative for effusion  Right Knee   Negative for effusion  Physical Exam  HENT:      Head: Normocephalic  Eyes:      Pupils: Pupils are equal, round, and reactive to light  Cardiovascular:      Rate and Rhythm: Normal rate  Pulmonary:      Effort: Pulmonary effort is normal    Musculoskeletal:         General: Normal range of motion  Cervical back: Normal range of motion  Right knee: No effusion  Left knee: No effusion  Skin:     General: Skin is warm and dry  Neurological:      General: No focal deficit present  Mental Status: He is alert  Psychiatric:         Behavior: Behavior normal          I have personally reviewed pertinent films in PACS and my interpretation is as follows:  3 view taken of X-ray today of the right knee demonstrate tricompartmental osteoarthritis with significant medial joint space narrowing    This document was created using speech voice recognition software  Grammatical errors, random word insertions, pronoun errors, and incomplete sentences are an occasional consequence of this system due to software limitations, ambient noise, and hardware issues     Any formal questions or concerns about content, text, or information contained within the body of this dictation should be directly addressed to the provider for clarification

## 2022-10-20 ENCOUNTER — APPOINTMENT (EMERGENCY)
Dept: RADIOLOGY | Facility: HOSPITAL | Age: 72
End: 2022-10-20
Payer: COMMERCIAL

## 2022-10-20 ENCOUNTER — HOSPITAL ENCOUNTER (EMERGENCY)
Facility: HOSPITAL | Age: 72
Discharge: HOME | End: 2022-10-20
Attending: EMERGENCY MEDICINE
Payer: COMMERCIAL

## 2022-10-20 VITALS
WEIGHT: 151 LBS | TEMPERATURE: 98.1 F | HEIGHT: 62 IN | OXYGEN SATURATION: 98 % | SYSTOLIC BLOOD PRESSURE: 120 MMHG | HEART RATE: 62 BPM | BODY MASS INDEX: 27.79 KG/M2 | DIASTOLIC BLOOD PRESSURE: 62 MMHG | RESPIRATION RATE: 16 BRPM

## 2022-10-20 DIAGNOSIS — K29.00 ACUTE GASTRITIS WITHOUT HEMORRHAGE, UNSPECIFIED GASTRITIS TYPE: ICD-10-CM

## 2022-10-20 DIAGNOSIS — K22.70 BARRETT'S ESOPHAGUS WITHOUT DYSPLASIA: Primary | ICD-10-CM

## 2022-10-20 DIAGNOSIS — K21.9 GASTROESOPHAGEAL REFLUX DISEASE WITHOUT ESOPHAGITIS: ICD-10-CM

## 2022-10-20 LAB
2HR DELTA HS TROPONIN: -1 NG/L
ALBUMIN SERPL BCP-MCNC: 4.2 G/DL (ref 3.5–5)
ALP SERPL-CCNC: 86 U/L (ref 46–116)
ALT SERPL W P-5'-P-CCNC: 29 U/L (ref 12–78)
ANION GAP SERPL CALCULATED.3IONS-SCNC: 5 MMOL/L (ref 4–13)
APTT PPP: 32 SECONDS (ref 23–37)
AST SERPL W P-5'-P-CCNC: 20 U/L (ref 5–45)
BACTERIA UR QL AUTO: NORMAL /HPF
BASOPHILS # BLD AUTO: 0.01 THOUSANDS/ÂΜL (ref 0–0.1)
BASOPHILS NFR BLD AUTO: 0 % (ref 0–1)
BILIRUB SERPL-MCNC: 0.8 MG/DL (ref 0.2–1)
BILIRUB UR QL STRIP: NEGATIVE
BUN SERPL-MCNC: 14 MG/DL (ref 5–25)
CALCIUM SERPL-MCNC: 8.7 MG/DL (ref 8.3–10.1)
CARDIAC TROPONIN I PNL SERPL HS: 2 NG/L
CARDIAC TROPONIN I PNL SERPL HS: 3 NG/L
CHLORIDE SERPL-SCNC: 105 MMOL/L (ref 96–108)
CLARITY UR: CLEAR
CO2 SERPL-SCNC: 29 MMOL/L (ref 21–32)
COLOR UR: ABNORMAL
CREAT SERPL-MCNC: 0.9 MG/DL (ref 0.6–1.3)
EOSINOPHIL # BLD AUTO: 0.04 THOUSAND/ÂΜL (ref 0–0.61)
EOSINOPHIL NFR BLD AUTO: 1 % (ref 0–6)
ERYTHROCYTE [DISTWIDTH] IN BLOOD BY AUTOMATED COUNT: 12.1 % (ref 11.6–15.1)
FLUAV RNA RESP QL NAA+PROBE: NEGATIVE
FLUBV RNA RESP QL NAA+PROBE: NEGATIVE
GFR SERPL CREATININE-BSD FRML MDRD: 86 ML/MIN/1.73SQ M
GLUCOSE SERPL-MCNC: 105 MG/DL (ref 65–140)
GLUCOSE UR STRIP-MCNC: NEGATIVE MG/DL
HCT VFR BLD AUTO: 46.4 % (ref 36.5–49.3)
HGB BLD-MCNC: 15.7 G/DL (ref 12–17)
HGB UR QL STRIP.AUTO: ABNORMAL
IMM GRANULOCYTES # BLD AUTO: 0.02 THOUSAND/UL (ref 0–0.2)
IMM GRANULOCYTES NFR BLD AUTO: 0 % (ref 0–2)
INR PPP: 1.05 (ref 0.84–1.19)
KETONES UR STRIP-MCNC: NEGATIVE MG/DL
LEUKOCYTE ESTERASE UR QL STRIP: NEGATIVE
LIPASE SERPL-CCNC: 174 U/L (ref 73–393)
LYMPHOCYTES # BLD AUTO: 1.87 THOUSANDS/ÂΜL (ref 0.6–4.47)
LYMPHOCYTES NFR BLD AUTO: 21 % (ref 14–44)
MAGNESIUM SERPL-MCNC: 2.3 MG/DL (ref 1.6–2.6)
MCH RBC QN AUTO: 31.2 PG (ref 26.8–34.3)
MCHC RBC AUTO-ENTMCNC: 33.8 G/DL (ref 31.4–37.4)
MCV RBC AUTO: 92 FL (ref 82–98)
MONOCYTES # BLD AUTO: 0.63 THOUSAND/ÂΜL (ref 0.17–1.22)
MONOCYTES NFR BLD AUTO: 7 % (ref 4–12)
NEUTROPHILS # BLD AUTO: 6.28 THOUSANDS/ÂΜL (ref 1.85–7.62)
NEUTS SEG NFR BLD AUTO: 71 % (ref 43–75)
NITRITE UR QL STRIP: NEGATIVE
NON-SQ EPI CELLS URNS QL MICRO: NORMAL /HPF
NRBC BLD AUTO-RTO: 0 /100 WBCS
NT-PROBNP SERPL-MCNC: 59 PG/ML
PH UR STRIP.AUTO: 7.5 [PH]
PLATELET # BLD AUTO: 239 THOUSANDS/UL (ref 149–390)
PMV BLD AUTO: 9 FL (ref 8.9–12.7)
POTASSIUM SERPL-SCNC: 4 MMOL/L (ref 3.5–5.3)
PROT SERPL-MCNC: 7.7 G/DL (ref 6.4–8.4)
PROT UR STRIP-MCNC: NEGATIVE MG/DL
PROTHROMBIN TIME: 13.8 SECONDS (ref 11.6–14.5)
RBC # BLD AUTO: 5.03 MILLION/UL (ref 3.88–5.62)
RBC #/AREA URNS AUTO: NORMAL /HPF
RSV RNA RESP QL NAA+PROBE: NEGATIVE
SARS-COV-2 RNA RESP QL NAA+PROBE: NEGATIVE
SODIUM SERPL-SCNC: 139 MMOL/L (ref 135–147)
SP GR UR STRIP.AUTO: 1.01 (ref 1–1.03)
UROBILINOGEN UR QL STRIP.AUTO: 0.2 E.U./DL
WBC # BLD AUTO: 8.85 THOUSAND/UL (ref 4.31–10.16)
WBC #/AREA URNS AUTO: NORMAL /HPF

## 2022-10-20 PROCEDURE — 83735 ASSAY OF MAGNESIUM: CPT | Performed by: PHYSICIAN ASSISTANT

## 2022-10-20 PROCEDURE — 84484 ASSAY OF TROPONIN QUANT: CPT | Performed by: PHYSICIAN ASSISTANT

## 2022-10-20 PROCEDURE — 96366 THER/PROPH/DIAG IV INF ADDON: CPT

## 2022-10-20 PROCEDURE — 71275 CT ANGIOGRAPHY CHEST: CPT

## 2022-10-20 PROCEDURE — 83690 ASSAY OF LIPASE: CPT | Performed by: PHYSICIAN ASSISTANT

## 2022-10-20 PROCEDURE — 83880 ASSAY OF NATRIURETIC PEPTIDE: CPT | Performed by: PHYSICIAN ASSISTANT

## 2022-10-20 PROCEDURE — 0241U HB NFCT DS VIR RESP RNA 4 TRGT: CPT | Performed by: PHYSICIAN ASSISTANT

## 2022-10-20 PROCEDURE — C9113 INJ PANTOPRAZOLE SODIUM, VIA: HCPCS | Performed by: PHYSICIAN ASSISTANT

## 2022-10-20 PROCEDURE — 81001 URINALYSIS AUTO W/SCOPE: CPT | Performed by: PHYSICIAN ASSISTANT

## 2022-10-20 PROCEDURE — 93005 ELECTROCARDIOGRAM TRACING: CPT

## 2022-10-20 PROCEDURE — 96375 TX/PRO/DX INJ NEW DRUG ADDON: CPT

## 2022-10-20 PROCEDURE — 85025 COMPLETE CBC W/AUTO DIFF WBC: CPT | Performed by: PHYSICIAN ASSISTANT

## 2022-10-20 PROCEDURE — 74174 CTA ABD&PLVS W/CONTRAST: CPT

## 2022-10-20 PROCEDURE — 85730 THROMBOPLASTIN TIME PARTIAL: CPT | Performed by: PHYSICIAN ASSISTANT

## 2022-10-20 PROCEDURE — 85610 PROTHROMBIN TIME: CPT | Performed by: PHYSICIAN ASSISTANT

## 2022-10-20 PROCEDURE — 80053 COMPREHEN METABOLIC PANEL: CPT | Performed by: PHYSICIAN ASSISTANT

## 2022-10-20 PROCEDURE — 99285 EMERGENCY DEPT VISIT HI MDM: CPT | Performed by: PHYSICIAN ASSISTANT

## 2022-10-20 PROCEDURE — 96365 THER/PROPH/DIAG IV INF INIT: CPT

## 2022-10-20 PROCEDURE — 99284 EMERGENCY DEPT VISIT MOD MDM: CPT

## 2022-10-20 PROCEDURE — 36415 COLL VENOUS BLD VENIPUNCTURE: CPT | Performed by: PHYSICIAN ASSISTANT

## 2022-10-20 RX ORDER — SUCRALFATE 1 G/1
1 TABLET ORAL ONCE
Status: COMPLETED | OUTPATIENT
Start: 2022-10-20 | End: 2022-10-20

## 2022-10-20 RX ORDER — MAGNESIUM HYDROXIDE/ALUMINUM HYDROXICE/SIMETHICONE 120; 1200; 1200 MG/30ML; MG/30ML; MG/30ML
30 SUSPENSION ORAL ONCE
Status: COMPLETED | OUTPATIENT
Start: 2022-10-20 | End: 2022-10-20

## 2022-10-20 RX ORDER — HYDROMORPHONE HCL/PF 1 MG/ML
0.5 SYRINGE (ML) INJECTION ONCE
Status: COMPLETED | OUTPATIENT
Start: 2022-10-20 | End: 2022-10-20

## 2022-10-20 RX ORDER — LIDOCAINE HYDROCHLORIDE 20 MG/ML
15 SOLUTION OROPHARYNGEAL ONCE
Status: COMPLETED | OUTPATIENT
Start: 2022-10-20 | End: 2022-10-20

## 2022-10-20 RX ORDER — SUCRALFATE 1 G/1
1 TABLET ORAL 4 TIMES DAILY
Qty: 120 TABLET | Refills: 0 | Status: SHIPPED | OUTPATIENT
Start: 2022-10-20 | End: 2022-11-19

## 2022-10-20 RX ORDER — PANTOPRAZOLE SODIUM 20 MG/1
40 TABLET, DELAYED RELEASE ORAL 2 TIMES DAILY
Qty: 56 TABLET | Refills: 0 | Status: SHIPPED | OUTPATIENT
Start: 2022-10-20 | End: 2022-11-03

## 2022-10-20 RX ADMIN — ALUMINUM HYDROXIDE, MAGNESIUM HYDROXIDE, AND SIMETHICONE 30 ML: 200; 200; 20 SUSPENSION ORAL at 17:13

## 2022-10-20 RX ADMIN — SODIUM CHLORIDE, SODIUM LACTATE, POTASSIUM CHLORIDE, AND CALCIUM CHLORIDE 1000 ML: .6; .31; .03; .02 INJECTION, SOLUTION INTRAVENOUS at 15:26

## 2022-10-20 RX ADMIN — SUCRALFATE 1 G: 1 TABLET ORAL at 16:29

## 2022-10-20 RX ADMIN — IOHEXOL 100 ML: 350 INJECTION, SOLUTION INTRAVENOUS at 15:40

## 2022-10-20 RX ADMIN — LIDOCAINE HYDROCHLORIDE 15 ML: 20 SOLUTION ORAL; TOPICAL at 17:15

## 2022-10-20 RX ADMIN — MORPHINE SULFATE 2 MG: 2 INJECTION, SOLUTION INTRAMUSCULAR; INTRAVENOUS at 16:29

## 2022-10-20 RX ADMIN — SODIUM CHLORIDE 80 MG: 9 INJECTION, SOLUTION INTRAVENOUS at 16:42

## 2022-10-20 RX ADMIN — HYDROMORPHONE HYDROCHLORIDE 0.5 MG: 1 INJECTION, SOLUTION INTRAMUSCULAR; INTRAVENOUS; SUBCUTANEOUS at 15:25

## 2022-10-20 NOTE — ED PROVIDER NOTES
History  Chief Complaint   Patient presents with   • Abdominal Pain     Abd pain epigastric area since last night  Has hx of same thing and called his PCP and was sent here for CT scan     PT is a 82 YO [place]inyarw[place] Speaking M with PMH of eduardo's esophagus, ascending aortic aneurysm,  Cholecystectomy one year ago, remote hx of appendectomy and hernia repair, HLD, osteoarthritis who presents for evaluation of acute epigastric pain and bloating  Pt states that the pain awakened him from sleep, he rates it as a 8/10, unchanged from onset, located in the epigastric area  He describes the pain as gnawing, sharp, stabbing without radiation  HE states that he has had similar symptoms in the past   He has not noted any relapsing or remitting factors  He has  He specifically denies back pain, n/v, diarrhea, melena, hematemesis, hematochezia, hematuria, urinary frequency, fever, chills, dizziness, fatigue, SOB, or chest pain  PT spoke with his PCP this am, who directed him to the ED for evaluation 2/2 to medical hx and age        History provided by:  Patient   used: Yes (PTs son is a  and was utilized via telephone)    Abdominal Pain  Pain location:  Epigastric  Pain quality: gnawing, sharp and stabbing    Pain radiates to:  Does not radiate  Pain severity:  Severe  Onset quality:  Sudden  Duration:  1 day  Timing:  Constant  Progression:  Unchanged  Chronicity:  New  Context: awakening from sleep    Context: not alcohol use, not diet changes, not eating and not laxative use    Relieved by:  Nothing  Worsened by:  Palpation  Ineffective treatments:  Eating and position changes  Associated symptoms: no anorexia, no belching, no chest pain, no chills, no constipation, no cough, no diarrhea, no dysuria, no fatigue, no fever, no flatus, no hematemesis, no hematochezia, no hematuria, no melena, no nausea, no shortness of breath, no sore throat and no vomiting    Risk factors: being elderly and multiple surgeries    Risk factors: no alcohol abuse, no aspirin use, no NSAID use, not obese and no recent hospitalization        Prior to Admission Medications   Prescriptions Last Dose Informant Patient Reported? Taking?    Promethazine-DM (PHENERGAN-DM) 6 25-15 mg/5 mL oral syrup   No No   Sig: Take 5 mL by mouth 4 (four) times a day as needed for cough   Silodosin 8 MG CAPS   Yes No   Sig: Take 1 capsule by mouth daily   aspirin (ECOTRIN LOW STRENGTH) 81 mg EC tablet   No No   Sig: Take 1 tablet (81 mg total) by mouth daily with breakfast   cyclobenzaprine (FLEXERIL) 5 mg tablet   No No   Sig: Take 1 tablet (5 mg total) by mouth daily at bedtime as needed for muscle spasms   Patient not taking: Reported on 10/12/2022   hydrOXYzine HCL (ATARAX) 25 mg tablet   No No   Sig: Take 1 tablet at bedtime to sleep, no later then 8 pm   meloxicam (Mobic) 15 mg tablet   No No   Sig: Take 1 tablet (15 mg total) by mouth daily   methylPREDNISolone 4 MG tablet therapy pack   No No   Sig: Use as directed on package   pantoprazole (PROTONIX) 40 mg tablet   No No   Sig: Take 1 tablet (40 mg total) by mouth daily before breakfast   predniSONE 10 mg tablet   No No   Sig: Take 1 tablet (10 mg total) by mouth daily   rosuvastatin (CRESTOR) 10 MG tablet   No No   Sig: Take 1 tablet (10 mg total) by mouth daily at bedtime      Facility-Administered Medications: None       Past Medical History:   Diagnosis Date   • Arthritis     shoulder   • Ascending aortic aneurysm    • Cataract     both eyes-to have the right cataract removed on 1/25/22   • Colon polyp    • GERD (gastroesophageal reflux disease)    • Insomnia    • Leg cramps    • Memory changes    • Restless legs        Past Surgical History:   Procedure Laterality Date   • APPENDECTOMY     • CHOLECYSTECTOMY     • CHOLECYSTECTOMY LAPAROSCOPIC N/A 10/27/2020    Procedure: CHOLECYSTECTOMY LAPAROSCOPIC;  Surgeon: Karan Aragon MD;  Location: [address] [place] [address];  Service: General   • COLONOSCOPY N/A 12/12/2016    Procedure: COLONOSCOPY;  Surgeon: Elsa Nielsen MD;  Location: [facility] GI LAB; Service:    • ELBOW SURGERY Bilateral    • HERNIA REPAIR Right 2001    inguinal   • KNEE ARTHROSCOPY Right    • NM KNEE SCOPE,MED/LAT MENISECTOMY Left 6/18/2020    Procedure: ARTHROSCOPY KNEE MEDIAL MENISCECTOMY;  Surgeon: Saba Grey MD;  Location: [address] [place] [address];  Service: Orthopedics   • ROTATOR CUFF REPAIR Bilateral 2011       Family History   Problem Relation Age of Onset   • No Known Problems Mother    • No Known Problems Father    • No Known Problems Sister    • No Known Problems Brother    • No Known Problems Maternal Aunt    • No Known Problems Maternal Uncle    • No Known Problems Paternal Aunt    • No Known Problems Paternal Uncle    • No Known Problems Maternal Grandmother    • No Known Problems Maternal Grandfather    • No Known Problems Paternal Grandmother    • No Known Problems Paternal Grandfather    • ADD / ADHD Neg Hx    • Anesthesia problems Neg Hx    • Cancer Neg Hx    • Clotting disorder Neg Hx    • Collagen disease Neg Hx    • Diabetes Neg Hx    • Dislocations Neg Hx    • Learning disabilities Neg Hx    • Neurological problems Neg Hx    • Osteoporosis Neg Hx    • Rheumatologic disease Neg Hx    • Scoliosis Neg Hx    • Vascular Disease Neg Hx      I have reviewed and agree with the history as documented  E-Cigarette/Vaping   • E-Cigarette Use Never User      E-Cigarette/Vaping Substances     Social History     Tobacco Use   • Smoking status: Never Smoker   • Smokeless tobacco: Never Used   Vaping Use   • Vaping Use: Never used   Substance Use Topics   • Alcohol use: Not Currently   • Drug use: No       Review of Systems   Constitutional: Negative for chills, fatigue and fever  HENT: Negative for ear pain and sore throat  Eyes: Negative for pain and visual disturbance  Respiratory: Negative for cough and shortness of breath      Cardiovascular: Negative for chest pain and palpitations  Gastrointestinal: Positive for abdominal pain  Negative for anorexia, constipation, diarrhea, flatus, hematemesis, hematochezia, melena, nausea and vomiting  Endocrine: Negative  Genitourinary: Negative for dysuria and hematuria  Musculoskeletal: Negative for arthralgias and back pain  Skin: Negative for color change and rash  Allergic/Immunologic: Negative  Neurological: Negative for seizures and syncope  Hematological: Negative  Psychiatric/Behavioral: Negative  All other systems reviewed and are negative  Physical Exam  Physical Exam  Vitals and nursing note reviewed  Constitutional:       Appearance: He is well-developed  HENT:      Head: Normocephalic and atraumatic  Eyes:      Conjunctiva/sclera: Conjunctivae normal    Cardiovascular:      Rate and Rhythm: Normal rate and regular rhythm  Heart sounds: No murmur heard  Pulmonary:      Effort: Pulmonary effort is normal  No respiratory distress  Breath sounds: Normal breath sounds  Abdominal:      General: Abdomen is protuberant  A surgical scar is present  Bowel sounds are normal  There is no distension  Palpations: Abdomen is soft  Tenderness: There is abdominal tenderness in the epigastric area  There is no right CVA tenderness, left CVA tenderness or rebound  Negative signs include Michelle's sign and McBurney's sign  Hernia: No hernia is present  Genitourinary:     Rectum: Guaiac result negative  Musculoskeletal:      Cervical back: Neck supple  Skin:     General: Skin is warm and dry  Capillary Refill: Capillary refill takes less than 2 seconds  Neurological:      General: No focal deficit present  Mental Status: He is alert and oriented to person, place, and time  Cranial Nerves: No cranial nerve deficit     Psychiatric:         Mood and Affect: Mood normal          Behavior: Behavior normal          Vital Signs  ED Triage Vitals   Temperature Pulse Respirations Blood Pressure SpO2   10/20/22 1312 10/20/22 1312 10/20/22 1312 10/20/22 1312 10/20/22 1312   98 1 °F (36 7 °C) 84 18 132/76 96 %      Temp src Heart Rate Source Patient Position - Orthostatic VS BP Location FiO2 (%)   -- 10/20/22 1527 10/20/22 1527 10/20/22 1527 --    Monitor Lying Right arm       Pain Score       10/20/22 1312       8           Vitals:    10/20/22 1527 10/20/22 1600 10/20/22 1715 10/20/22 1742   BP: 115/65   120/62   Pulse: 70 62 66 62   Patient Position - Orthostatic VS: Lying   Sitting         Visual Acuity      ED Medications  Medications   HYDROmorphone (DILAUDID) injection 0 5 mg (0 5 mg Intravenous Given 10/20/22 1525)   lactated ringers bolus 1,000 mL (0 mL Intravenous Stopped 10/20/22 1732)   iohexol (OMNIPAQUE) 350 MG/ML injection (SINGLE-DOSE) 100 mL (100 mL Intravenous Given 10/20/22 1540)   pantoprazole (PROTONIX) 80 mg in sodium chloride 0 9 % 100 mL IVPB (0 mg Intravenous Stopped 10/20/22 1657)   sucralfate (CARAFATE) tablet 1 g (1 g Oral Given 10/20/22 1629)   morphine injection 2 mg (2 mg Intravenous Given 10/20/22 1629)   Lidocaine Viscous HCl (XYLOCAINE) 2 % mucosal solution 15 mL (15 mL Swish & Swallow Given 10/20/22 1715)     And   aluminum-magnesium hydroxide-simethicone (MYLANTA) oral suspension 30 mL (30 mL Oral Given 10/20/22 1713)       Diagnostic Studies  Results Reviewed     Procedure Component Value Units Date/Time    HS Troponin I 2hr [[id number]]  (Normal) Collected: 10/20/22 1720    Lab Status: Final result Specimen: Blood from Arm, Left Updated: 10/20/22 1748     hs TnI 2hr 2 ng/L      Delta 2hr hsTnI -1 ng/L     Urine Microscopic [[id number]]  (Normal) Collected: 10/20/22 1609    Lab Status: Final result Specimen: Urine, Other Updated: 10/20/22 1735     RBC, UA 1-2 /hpf      WBC, UA 0-1 /hpf      Epithelial Cells None Seen /hpf      Bacteria, UA None Seen /hpf     FLU/RSV/COVID - if FLU/RSV clinically relevant [[id number]]  (Normal) Collected: 10/20/22 1520    Lab Status: Final result Specimen: Nares from Nose Updated: 10/20/22 1631     SARS-CoV-2 Negative     INFLUENZA A PCR Negative     INFLUENZA B PCR Negative     RSV PCR Negative    Narrative:      FOR PEDIATRIC PATIENTS - copy/paste COVID Guidelines URL to browser: [email or web address]  ashx    SARS-CoV-2 assay is a Nucleic Acid Amplification assay intended for the  qualitative detection of nucleic acid from SARS-CoV-2 in nasopharyngeal  swabs  Results are for the presumptive identification of SARS-CoV-2 RNA  Positive results are indicative of infection with SARS-CoV-2, the virus  causing COVID-19, but do not rule out bacterial infection or co-infection  with other viruses  Laboratories within the [place] and its  territories are required to report all positive results to the appropriate  public health authorities  Negative results do not preclude SARS-CoV-2  infection and should not be used as the sole basis for treatment or other  patient management decisions  Negative results must be combined with  clinical observations, patient history, and epidemiological information  This test has not been FDA cleared or approved  This test has been authorized by FDA under an Emergency Use Authorization  (EUA)  This test is only authorized for the duration of time the  declaration that circumstances exist justifying the authorization of the  emergency use of an in vitro diagnostic tests for detection of SARS-CoV-2  virus and/or diagnosis of COVID-19 infection under section 564(b)(1) of  the Act, 21 U  S C  289BDX-7(U)(7), unless the authorization is terminated  or revoked sooner  The test has been validated but independent review by FDA  and CLIA is pending  Test performed using [employer] GeneXpert: This RT-PCR assay targets N2,  a region unique to SARS-CoV-2   A conserved region in the E-gene was chosen  for pan-Sarbecovirus detection which includes SARS-CoV-2  According to CMS-2020-01-R, this platform meets the definition of high-throughput technology      UA w Reflex to Microscopic w Reflex to Culture [[id number]]  (Abnormal) Collected: 10/20/22 1609    Lab Status: Final result Specimen: Urine, Other Updated: 10/20/22 1618     Color, UA Light Yellow     Clarity, UA Clear     Specific Gravity, UA 1 015     pH, UA 7 5     Leukocytes, UA Negative     Nitrite, UA Negative     Protein, UA Negative mg/dl      Glucose, UA Negative mg/dl      Ketones, UA Negative mg/dl      Urobilinogen, UA 0 2 E U /dl      Bilirubin, UA Negative     Occult Blood, UA Trace-lysed    HS Troponin 0hr (reflex protocol) [[id number]]  (Normal) Collected: 10/20/22 1520    Lab Status: Final result Specimen: Blood from Arm, Left Updated: 10/20/22 1553     hs TnI 0hr 3 ng/L     Magnesium [[id number]]  (Normal) Collected: 10/20/22 1520    Lab Status: Final result Specimen: Blood from Arm, Left Updated: 10/20/22 1553     Magnesium 2 3 mg/dL     Lipase [[id number]]  (Normal) Collected: 10/20/22 1520    Lab Status: Final result Specimen: Blood from Arm, Left Updated: 10/20/22 1553     Lipase 174 u/L     NT-BNP PRO [[id number]]  (Normal) Collected: 10/20/22 1520    Lab Status: Final result Specimen: Blood from Arm, Left Updated: 10/20/22 1553     NT-proBNP 59 pg/mL     CMP [[id number]] Collected: 10/20/22 1520    Lab Status: Final result Specimen: Blood from Arm, Left Updated: 10/20/22 1547     Sodium 139 mmol/L      Potassium 4 0 mmol/L      Chloride 105 mmol/L      CO2 29 mmol/L      ANION GAP 5 mmol/L      BUN 14 mg/dL      Creatinine 0 90 mg/dL      Glucose 105 mg/dL      Calcium 8 7 mg/dL      AST 20 U/L      ALT 29 U/L      Alkaline Phosphatase 86 U/L      Total Protein 7 7 g/dL      Albumin 4 2 g/dL      Total Bilirubin 0 80 mg/dL      eGFR 86 ml/min/1 73sq m     Narrative:      [place] guidelines for Chronic Kidney Disease (CKD):   •  Stage 1 with normal or high GFR (GFR > 90 mL/min/1 73 square meters)  •  Stage 2 Mild CKD (GFR = 60-89 mL/min/1 73 square meters)  •  Stage 3A Moderate CKD (GFR = 45-59 mL/min/1 73 square meters)  •  Stage 3B Moderate CKD (GFR = 30-44 mL/min/1 73 square meters)  •  Stage 4 Severe CKD (GFR = 15-29 mL/min/1 73 square meters)  •  Stage 5 End Stage CKD (GFR <15 mL/min/1 73 square meters)  Note: GFR calculation is accurate only with a steady state creatinine    Protime-INR [[id number]]  (Normal) Collected: 10/20/22 1520    Lab Status: Final result Specimen: Blood from Arm, Left Updated: 10/20/22 1541     Protime 13 8 seconds      INR 1 05    APTT [[id number]]  (Normal) Collected: 10/20/22 1520    Lab Status: Final result Specimen: Blood from Arm, Left Updated: 10/20/22 1541     PTT 32 seconds     CBC and differential [[id number]] Collected: 10/20/22 1520    Lab Status: Final result Specimen: Blood from Arm, Left Updated: 10/20/22 1530     WBC 8 85 Thousand/uL      RBC 5 03 Million/uL      Hemoglobin 15 7 g/dL      Hematocrit 46 4 %      MCV 92 fL      MCH 31 2 pg      MCHC 33 8 g/dL      RDW 12 1 %      MPV 9 0 fL      Platelets 952 Thousands/uL      nRBC 0 /100 WBCs      Neutrophils Relative 71 %      Immat GRANS % 0 %      Lymphocytes Relative 21 %      Monocytes Relative 7 %      Eosinophils Relative 1 %      Basophils Relative 0 %      Neutrophils Absolute 6 28 Thousands/µL      Immature Grans Absolute 0 02 Thousand/uL      Lymphocytes Absolute 1 87 Thousands/µL      Monocytes Absolute 0 63 Thousand/µL      Eosinophils Absolute 0 04 Thousand/µL      Basophils Absolute 0 01 Thousands/µL                  CTA dissection protocol chest/abdomen/pelvis   Final Result by Allison Leal MD (10/20 1609)      Unchanged 40 mm ascending aortic aneurysm measured at the level of the right pulmonary artery  No acute findings in the chest, abdomen or pelvis                 Workstation performed: [id number]                    Procedures  ECG 12 Lead Documentation Only    Date/Time: 10/20/2022 7:42 PM  Performed by: Katie Jones PA-C  Authorized by: Katie Jones PA-C     Patient location:  ED  Interpretation:     Interpretation: normal    Rate:     ECG rate:  63    ECG rate assessment: normal    Rhythm:     Rhythm: sinus rhythm               ED Course  ED Course as of 10/21/22 0741   u Oct 20, 2022   1618 Lipase: 174                                             MDM  Number of Diagnoses or Management Options  Acute gastritis without hemorrhage, unspecified gastritis type: new and requires workup  Yeboah's esophagus without dysplasia: new and requires workup  Diagnosis management comments: PT with acute gastritis  CTA without aortic abnormality, no significant abdominal pathology seen  Laboratory evaluation unremarkable   Symptoms improved with GI cocktail   D/c with increased protonix and carafate  Follow up with GI for further evaluation  Discussed findings at length with patient and son at bedside       Amount and/or Complexity of Data Reviewed  Clinical lab tests: ordered and reviewed  Tests in the radiology section of CPT®: ordered and reviewed  Tests in the medicine section of CPT®: ordered and reviewed  Decide to obtain previous medical records or to obtain history from someone other than the patient: yes  Review and summarize past medical records: yes  Independent visualization of images, tracings, or specimens: yes    Risk of Complications, Morbidity, and/or Mortality  Presenting problems: moderate  Diagnostic procedures: moderate  Management options: moderate    Patient Progress  Patient progress: stable      Disposition  Final diagnoses:   Yeboah's esophagus without dysplasia   Acute gastritis without hemorrhage, unspecified gastritis type     Time reflects when diagnosis was documented in both MDM as applicable and the Disposition within this note     Time User Action Codes Description Comment    10/20/2022  4:44 PM Lenore  Hefty Add [K21 9] Gastroesophageal reflux disease without esophagitis     10/20/2022  4:44 PM Arelia Sago Modify [K21 9] Gastroesophageal reflux disease without esophagitis     10/20/2022  4:46 PM Arelia Sago Add [K22 70] Yeboah's esophagus without dysplasia     10/20/2022  4:46 PM Lenore, Pastor Evansy Add [K29 00] Acute gastritis without hemorrhage, unspecified gastritis type       ED Disposition     ED Disposition   Discharge    Condition   Stable    Date/Time   Thu Oct 20, 2022  5:19 PM    Mai Gaitan discharge to home/self care  Follow-up Information     Follow up With Specialties Details Why Contact Info Additional Information    Maya Treadwell MD Family Medicine Go to  As needed [address]       Bess Romeo MD Gastroenterology Schedule an appointment as soon as possible for a visit   [address] [facility] [address]  [facility] Emergency Department Emergency Medicine Go to  If symptoms worsen [address] [address] [address] [address] Emergency Department, [place], [place], [address]          Discharge Medication List as of 10/20/2022  5:20 PM      START taking these medications    Details   ! ! pantoprazole (PROTONIX) 20 mg tablet Take 2 tablets (40 mg total) by mouth 2 (two) times a day for 14 days, Starting Thu 10/20/2022, Until Thu 11/3/2022, Normal      sucralfate (CARAFATE) 1 g tablet Take 1 tablet (1 g total) by mouth 4 (four) times a day, Starting Thu 10/20/2022, Until Sat 11/19/2022, Normal       !! - Potential duplicate medications found  Please discuss with provider        CONTINUE these medications which have NOT CHANGED    Details   aspirin (ECOTRIN LOW STRENGTH) 81 mg EC tablet Take 1 tablet (81 mg total) by mouth daily with breakfast, Starting Fri 1/28/2022, Normal hydrOXYzine HCL (ATARAX) 25 mg tablet Take 1 tablet at bedtime to sleep, no later then 8 pm, Normal      meloxicam (Mobic) 15 mg tablet Take 1 tablet (15 mg total) by mouth daily, Starting Thu 6/2/2022, Normal      !! pantoprazole (PROTONIX) 40 mg tablet Take 1 tablet (40 mg total) by mouth daily before breakfast, Starting Thu 8/18/2022, Normal      Promethazine-DM (PHENERGAN-DM) 6 25-15 mg/5 mL oral syrup Take 5 mL by mouth 4 (four) times a day as needed for cough, Starting Mon 8/22/2022, Normal      rosuvastatin (CRESTOR) 10 MG tablet Take 1 tablet (10 mg total) by mouth daily at bedtime, Starting Thu 8/18/2022, Normal      Silodosin 8 MG CAPS Take 1 capsule by mouth daily, Starting Tue 11/30/2021, Historical Med       !! - Potential duplicate medications found  Please discuss with provider  STOP taking these medications       cyclobenzaprine (FLEXERIL) 5 mg tablet Comments:   Reason for Stopping:         methylPREDNISolone 4 MG tablet therapy pack Comments:   Reason for Stopping:         predniSONE 10 mg tablet Comments:   Reason for Stopping:               No discharge procedures on file      PDMP Review     None          ED Provider  Electronically Signed by           Katie Jones PA-C  10/21/22 3870

## 2022-10-21 LAB
ATRIAL RATE: 63 BPM
P AXIS: -15 DEGREES
PR INTERVAL: 156 MS
QRS AXIS: -30 DEGREES
QRSD INTERVAL: 108 MS
QT INTERVAL: 412 MS
QTC INTERVAL: 421 MS
T WAVE AXIS: 2 DEGREES
VENTRICULAR RATE: 63 BPM

## 2022-10-21 PROCEDURE — 93010 ELECTROCARDIOGRAM REPORT: CPT | Performed by: INTERNAL MEDICINE

## 2022-11-14 ENCOUNTER — OFFICE VISIT (OUTPATIENT)
Dept: FAMILY MEDICINE CLINIC | Facility: CLINIC | Age: 72
End: 2022-11-14

## 2022-11-14 VITALS
HEART RATE: 87 BPM | WEIGHT: 147 LBS | TEMPERATURE: 97.8 F | SYSTOLIC BLOOD PRESSURE: 120 MMHG | RESPIRATION RATE: 14 BRPM | DIASTOLIC BLOOD PRESSURE: 80 MMHG | BODY MASS INDEX: 27.75 KG/M2 | HEIGHT: 61 IN | OXYGEN SATURATION: 96 %

## 2022-11-14 DIAGNOSIS — I71.20 THORACIC AORTIC ANEURYSM WITHOUT RUPTURE: ICD-10-CM

## 2022-11-14 DIAGNOSIS — Z77.21 EXPOSURE TO POTENTIALLY HAZARDOUS BODY FLUIDS: ICD-10-CM

## 2022-11-14 DIAGNOSIS — Z00.00 MEDICARE ANNUAL WELLNESS VISIT, INITIAL: Primary | ICD-10-CM

## 2022-11-14 DIAGNOSIS — R73.09 ABNORMAL GLUCOSE: ICD-10-CM

## 2022-11-14 DIAGNOSIS — E78.5 HYPERLIPIDEMIA, UNSPECIFIED HYPERLIPIDEMIA TYPE: ICD-10-CM

## 2022-11-14 DIAGNOSIS — Z11.59 NEED FOR HEPATITIS C SCREENING TEST: ICD-10-CM

## 2022-11-14 DIAGNOSIS — Z12.5 SCREENING PSA (PROSTATE SPECIFIC ANTIGEN): ICD-10-CM

## 2022-11-14 DIAGNOSIS — Z23 NEED FOR INFLUENZA VACCINATION: ICD-10-CM

## 2022-11-14 DIAGNOSIS — K21.9 GASTROESOPHAGEAL REFLUX DISEASE WITHOUT ESOPHAGITIS: ICD-10-CM

## 2022-11-14 DIAGNOSIS — G47.00 INSOMNIA, UNSPECIFIED TYPE: ICD-10-CM

## 2022-11-14 RX ORDER — PANTOPRAZOLE SODIUM 40 MG/1
40 TABLET, DELAYED RELEASE ORAL DAILY
Qty: 90 TABLET | Refills: 2 | Status: SHIPPED | OUTPATIENT
Start: 2022-11-14

## 2022-11-14 RX ORDER — ROSUVASTATIN CALCIUM 10 MG/1
10 TABLET, COATED ORAL
Qty: 90 TABLET | Refills: 3 | Status: SHIPPED | OUTPATIENT
Start: 2022-11-14

## 2022-11-14 RX ORDER — HYDROXYZINE 50 MG/1
TABLET, FILM COATED ORAL
Qty: 90 TABLET | Refills: 2 | Status: SHIPPED | OUTPATIENT
Start: 2022-11-14

## 2022-11-14 NOTE — PROGRESS NOTES
Assessment and Plan:     Problem List Items Addressed This Visit        Digestive    Gastroesophageal reflux disease without esophagitis    Relevant Medications    pantoprazole (PROTONIX) 40 mg tablet   Other Visit Diagnoses     Medicare annual wellness visit, initial    -  Primary    Relevant Orders    Hemoglobin A1C (Completed)    Comprehensive metabolic panel (Completed)    CBC and differential (Completed)    Lipid panel (Completed)    Need for hepatitis C screening test        Relevant Orders    Hepatitis C Antibody ([facility]) (Completed)    Insomnia, unspecified type        Relevant Medications    hydrOXYzine HCL (ATARAX) 50 mg tablet    Other Relevant Orders    Ambulatory Referral to Sleep Medicine    Exposure to potentially hazardous body fluids        Relevant Orders    Human Immunodeficiency Virus 1/2 Antigen / Antibody ( Fourth Generation) with Reflex Testing    RPR (Completed)    Herpes I/II IgG SAROJ w Reflex to HSV-2 (Completed)    Chlamydia/GC amplified DNA by PCR (Completed)    Screening PSA (prostate specific antigen)        Relevant Orders    PSA, Total Screen (Completed)    Abnormal glucose        Relevant Orders    Hemoglobin A1C (Completed)    Hyperlipidemia, unspecified hyperlipidemia type        Relevant Medications    rosuvastatin (CRESTOR) 10 MG tablet    Other Relevant Orders    Lipid panel (Completed)    Thoracic aortic aneurysm without rupture        Relevant Medications    rosuvastatin (CRESTOR) 10 MG tablet    Need for influenza vaccination        Relevant Orders    FLUZONE HIGH-DOSE: influenza vaccine, high-dose, preservative-free 0 7 mL (Completed)      Patient would like all STD testing  Screening labs all sent  Recommend using Atarax for insomnia  Hepatitis C testing  Acid reflux with epigastric tenderness started the patient on Protonix x1 month    If no improvement endoscopy to be performed   Thoracic aortic aneurysm currently stable reviewed CT scan       Preventive health issues were discussed with patient, and age appropriate screening tests were ordered as noted in patient's After Visit Summary  Personalized health advice and appropriate referrals for health education or preventive services given if needed, as noted in patient's After Visit Summary  History of Present Illness:     Patient presents for a Medicare Wellness Visit  Patient states that he would like STD testing  Patient states that he has had epigastric tenderness  Does have some acid reflux at times  Recently just had a CT scan at the emergency room  Was advised that his thoracic aneurysm is stable  Patient is taking all his medications for cholesterol as prescribed  States that he ran out of his insomnia medication would like a refill possible  HPI   Patient Care Team:  Katharine Bah MD as PCP - General (Family Medicine)  MD Laurie Vargas MD Luwana Ramp, MD as Endoscopist     Review of Systems:     Review of Systems   Constitutional: Negative for activity change, appetite change, chills, fatigue and fever  HENT: Negative for congestion  Respiratory: Negative for cough, chest tightness and shortness of breath  Cardiovascular: Negative for chest pain and leg swelling  Gastrointestinal: Positive for abdominal pain  Negative for abdominal distention, constipation, diarrhea, nausea and vomiting  All other systems reviewed and are negative         Problem List:     Patient Active Problem List   Diagnosis   • Cervical radiculopathy   • Colon polyps   • Obstructive sleep apnea of adult   • Peripheral artery disease (HCC)   • Peripheral neuropathy   • Restless legs syndrome   • Benign prostatic hyperplasia without lower urinary tract symptoms   • Gastroesophageal reflux disease without esophagitis   • Cholelithiasis   • Ectatic aorta   • Abdominal adhesions   • Other insomnia   • Acute cholecystitis due to biliary calculus   • Bilateral leg cramps   • Yeboah's esophagus without dysplasia      Past Medical and Surgical History:     Past Medical History:   Diagnosis Date   • Arthritis     shoulder   • Ascending aortic aneurysm    • Cataract     both eyes-to have the right cataract removed on 1/25/22   • Colon polyp    • GERD (gastroesophageal reflux disease)    • Insomnia    • Leg cramps    • Memory changes    • Restless legs      Past Surgical History:   Procedure Laterality Date   • APPENDECTOMY     • CHOLECYSTECTOMY     • CHOLECYSTECTOMY LAPAROSCOPIC N/A 10/27/2020    Procedure: CHOLECYSTECTOMY LAPAROSCOPIC;  Surgeon: Isabelle Overton MD;  Location: [address] [place] [address];  Service: General   • COLONOSCOPY N/A 12/12/2016    Procedure: COLONOSCOPY;  Surgeon: Adam Sorensen MD;  Location: [facility];   Service:    • ELBOW SURGERY Bilateral    • HERNIA REPAIR Right 2001    inguinal   • KNEE ARTHROSCOPY Right    • RI KNEE SCOPE,MED/LAT MENISECTOMY Left 6/18/2020    Procedure: ARTHROSCOPY KNEE MEDIAL MENISCECTOMY;  Surgeon: George Sorensen MD;  Location: [address] [place] [address];  Service: Orthopedics   • ROTATOR CUFF REPAIR Bilateral 2011      Family History:     Family History   Problem Relation Age of Onset   • No Known Problems Mother    • No Known Problems Father    • No Known Problems Sister    • No Known Problems Brother    • No Known Problems Maternal Aunt    • No Known Problems Maternal Uncle    • No Known Problems Paternal Aunt    • No Known Problems Paternal Uncle    • No Known Problems Maternal Grandmother    • No Known Problems Maternal Grandfather    • No Known Problems Paternal Grandmother    • No Known Problems Paternal Grandfather    • ADD / ADHD Neg Hx    • Anesthesia problems Neg Hx    • Cancer Neg Hx    • Clotting disorder Neg Hx    • Collagen disease Neg Hx    • Diabetes Neg Hx    • Dislocations Neg Hx    • Learning disabilities Neg Hx    • Neurological problems Neg Hx    • Osteoporosis Neg Hx    • Rheumatologic disease Neg Hx    • Scoliosis Neg Hx    • Vascular Disease Neg Hx Social History:     Social History     Socioeconomic History   • Marital status: /Civil Union     Spouse name: None   • Number of children: None   • Years of education: None   • Highest education level: None   Occupational History   • None   Tobacco Use   • Smoking status: Never   • Smokeless tobacco: Never   Vaping Use   • Vaping Use: Never used   Substance and Sexual Activity   • Alcohol use: Not Currently   • Drug use: No   • Sexual activity: Not Currently   Other Topics Concern   • None   Social History Narrative   • None     Social Determinants of Health     Financial Resource Strain: Low Risk    • Difficulty of Paying Living Expenses: Not hard at all   Food Insecurity: Not on file   Transportation Needs: No Transportation Needs   • Lack of Transportation (Medical): No   • Lack of Transportation (Non-Medical):  No   Physical Activity: Not on file   Stress: Not on file   Social Connections: Not on file   Intimate Partner Violence: Not on file   Housing Stability: Not on file      Medications and Allergies:     Current Outpatient Medications   Medication Sig Dispense Refill   • aspirin (ECOTRIN LOW STRENGTH) 81 mg EC tablet Take 1 tablet (81 mg total) by mouth daily with breakfast 90 tablet 3   • hydrOXYzine HCL (ATARAX) 50 mg tablet Take 1 tablet at bedtime to sleep, no later then 8 pm 90 tablet 2   • meloxicam (Mobic) 15 mg tablet Take 1 tablet (15 mg total) by mouth daily 30 tablet 0   • pantoprazole (PROTONIX) 40 mg tablet Take 1 tablet (40 mg total) by mouth daily 90 tablet 2   • Promethazine-DM (PHENERGAN-DM) 6 25-15 mg/5 mL oral syrup Take 5 mL by mouth 4 (four) times a day as needed for cough 118 mL 3   • rosuvastatin (CRESTOR) 10 MG tablet Take 1 tablet (10 mg total) by mouth daily at bedtime 90 tablet 3   • Silodosin 8 MG CAPS Take 1 capsule by mouth daily     • valACYclovir (VALTREX) 1,000 mg tablet Take 2 tablets (2,000 mg total) by mouth 2 (two) times a day for 1 day 4 tablet 5     No current facility-administered medications for this visit  No Known Allergies   Immunizations:     Immunization History   Administered Date(s) Administered   • COVID-19 MODERNA VACC 0 5 ML IM 01/06/2021, 02/02/2021, 10/25/2021   • H1N1, All Formulations 05/14/2010   • INFLUENZA 10/09/2020   • Influenza Quadrivalent Preservative Free 3 years and older IM 02/03/2015, 11/01/2016   • Influenza, high dose seasonal 0 7 mL 02/04/2019, 10/11/2021, 11/14/2022   • Influenza, seasonal, injectable 01/30/2009, 05/14/2010, 10/22/2010, 02/13/2012, 01/23/2013   • Tdap 01/30/2009   • Tuberculin Skin Test-PPD Intradermal 10/21/2009      Health Maintenance:         Topic Date Due   • Colorectal Cancer Screening  01/30/2027   • Hepatitis C Screening  Completed         Topic Date Due   • Hepatitis B Vaccine (1 of 3 - 3-dose series) Never done   • Pneumococcal Vaccine: 65+ Years (1 - PCV) Never done   • COVID-19 Vaccine (4 - Booster for Ankur Plaster series) 02/25/2022      Medicare Screening Tests and Risk Assessments:     Lucila López is here for his Subsequent Wellness visit  Health Risk Assessment:   Patient rates overall health as good  Patient feels that their physical health rating is same  Patient is satisfied with their life  Eyesight was rated as slightly worse  Hearing was rated as same  Patient feels that their emotional and mental health rating is same  Patients states they are never, rarely angry  Patient states they are sometimes unusually tired/fatigued  Pain experienced in the last 7 days has been some  Patient's pain rating has been 6/10  Chandler knee pain, injection in the shot int he right knee    Depression Screening:   PHQ-2 Score: 0      Fall Risk Screening: In the past year, patient has experienced: no history of falling in past year      Home Safety:  Patient does not have trouble with stairs inside or outside of their home  Patient has working smoke alarms and has working carbon monoxide detector   Home safety hazards include: none      Nutrition:   Current diet is Regular and Low Cholesterol  Medications:   Patient is currently taking over-the-counter supplements  OTC medications include: see medication list  Patient is able to manage medications  Activities of Daily Living (ADLs)/Instrumental Activities of Daily Living (IADLs):   Walk and transfer into and out of bed and chair?: Yes  Dress and groom yourself?: Yes    Bathe or shower yourself?: Yes    Do your laundry/housekeeping?: Yes  Manage your money, pay your bills and track your expenses?: Yes  Make your own meals?: Yes    Do your own shopping?: Yes    Previous Hospitalizations:   Any hospitalizations or ED visits within the last 12 months?: Yes    How many hospitalizations have you had in the last year?: 1-2    Advance Care Planning:   Living will: Yes    Advanced directive: Yes      Comments: Al RAMOS    Cognitive Screening:   Provider or family/friend/caregiver concerned regarding cognition?: No    PREVENTIVE SCREENINGS      Cardiovascular Screening:    General: Screening Not Indicated and History Lipid Disorder    Due for: Lipid Panel      Diabetes Screening:     General: Screening Current    Due for: Blood Glucose      Colorectal Cancer Screening:     General: Screening Current      Prostate Cancer Screening:    General: Screening Current      Abdominal Aortic Aneurysm (AAA) Screening:    Risk factors include: age between 73-69 yo        General: Screening Not Indicated and History AAA      Lung Cancer Screening:     General: Screening Not Indicated      Hepatitis C Screening:    General: Screening Current    Screening, Brief Intervention, and Referral to Treatment (SBIRT)    Screening  Typical number of drinks in a day: 0  Typical number of drinks in a week: 0  Interpretation: Low risk drinking behavior      Single Item Drug Screening:  How often have you used an illegal drug (including marijuana) or a prescription medication for non-medical reasons in the past year? never    Single Item Drug Screen Score: 0  Interpretation: Negative screen for possible drug use disorder    No results found  Physical Exam:     /80 (BP Location: Left arm, Patient Position: Sitting, Cuff Size: Standard)   Pulse 87   Temp 97 8 °F (36 6 °C)   Resp 14   Ht 5' 1" (1 549 m)   Wt 66 7 kg (147 lb)   SpO2 96%   BMI 27 78 kg/m²     Physical Exam  Vitals reviewed  Constitutional:       Appearance: He is well-developed  HENT:      Head: Normocephalic and atraumatic  Right Ear: External ear normal       Left Ear: External ear normal       Nose: Nose normal    Eyes:      Conjunctiva/sclera: Conjunctivae normal       Pupils: Pupils are equal, round, and reactive to light  Cardiovascular:      Rate and Rhythm: Normal rate and regular rhythm  Heart sounds: Normal heart sounds  Pulmonary:      Effort: Pulmonary effort is normal       Breath sounds: Normal breath sounds  No wheezing  Abdominal:      General: Bowel sounds are normal  There is no distension  Palpations: Abdomen is soft  There is no mass  Tenderness: There is abdominal tenderness (Epigastric)  Genitourinary:     Penis: Normal     Musculoskeletal:         General: No tenderness  Normal range of motion  Cervical back: Normal range of motion and neck supple  Skin:     General: Skin is warm  Capillary Refill: Capillary refill takes less than 2 seconds  Neurological:      Mental Status: He is alert and oriented to person, place, and time  Cranial Nerves: No cranial nerve deficit  Sensory: No sensory deficit  Motor: No abnormal muscle tone  Coordination: Coordination normal       Deep Tendon Reflexes: Reflexes normal    Psychiatric:         Behavior: Behavior normal          Thought Content:  Thought content normal          Judgment: Judgment normal           Alfornia Cushing, MD

## 2022-11-15 ENCOUNTER — APPOINTMENT (OUTPATIENT)
Dept: LAB | Facility: CLINIC | Age: 72
End: 2022-11-15

## 2022-11-15 DIAGNOSIS — Z77.21 EXPOSURE TO POTENTIALLY HAZARDOUS BODY FLUIDS: ICD-10-CM

## 2022-11-15 DIAGNOSIS — Z12.5 SCREENING PSA (PROSTATE SPECIFIC ANTIGEN): ICD-10-CM

## 2022-11-15 DIAGNOSIS — E78.5 HYPERLIPIDEMIA, UNSPECIFIED HYPERLIPIDEMIA TYPE: ICD-10-CM

## 2022-11-15 DIAGNOSIS — Z11.59 NEED FOR HEPATITIS C SCREENING TEST: ICD-10-CM

## 2022-11-15 DIAGNOSIS — R73.09 ABNORMAL GLUCOSE: ICD-10-CM

## 2022-11-15 DIAGNOSIS — Z00.00 MEDICARE ANNUAL WELLNESS VISIT, INITIAL: ICD-10-CM

## 2022-11-15 LAB
ALBUMIN SERPL BCP-MCNC: 3.8 G/DL (ref 3.5–5)
ALP SERPL-CCNC: 91 U/L (ref 46–116)
ALT SERPL W P-5'-P-CCNC: 31 U/L (ref 12–78)
ANION GAP SERPL CALCULATED.3IONS-SCNC: 4 MMOL/L (ref 4–13)
AST SERPL W P-5'-P-CCNC: 17 U/L (ref 5–45)
BASOPHILS # BLD AUTO: 0.01 THOUSANDS/ÂΜL (ref 0–0.1)
BASOPHILS NFR BLD AUTO: 0 % (ref 0–1)
BILIRUB SERPL-MCNC: 0.82 MG/DL (ref 0.2–1)
BUN SERPL-MCNC: 14 MG/DL (ref 5–25)
CALCIUM SERPL-MCNC: 8.9 MG/DL (ref 8.3–10.1)
CHLORIDE SERPL-SCNC: 109 MMOL/L (ref 96–108)
CHOLEST SERPL-MCNC: 116 MG/DL
CO2 SERPL-SCNC: 26 MMOL/L (ref 21–32)
CREAT SERPL-MCNC: 0.86 MG/DL (ref 0.6–1.3)
EOSINOPHIL # BLD AUTO: 0.08 THOUSAND/ÂΜL (ref 0–0.61)
EOSINOPHIL NFR BLD AUTO: 1 % (ref 0–6)
ERYTHROCYTE [DISTWIDTH] IN BLOOD BY AUTOMATED COUNT: 12.4 % (ref 11.6–15.1)
EST. AVERAGE GLUCOSE BLD GHB EST-MCNC: 105 MG/DL
GFR SERPL CREATININE-BSD FRML MDRD: 87 ML/MIN/1.73SQ M
GLUCOSE P FAST SERPL-MCNC: 119 MG/DL (ref 65–99)
HBA1C MFR BLD: 5.3 %
HCT VFR BLD AUTO: 45.7 % (ref 36.5–49.3)
HCV AB SER QL: NORMAL
HDLC SERPL-MCNC: 37 MG/DL
HGB BLD-MCNC: 15.5 G/DL (ref 12–17)
IMM GRANULOCYTES # BLD AUTO: 0.02 THOUSAND/UL (ref 0–0.2)
IMM GRANULOCYTES NFR BLD AUTO: 0 % (ref 0–2)
LDLC SERPL CALC-MCNC: 60 MG/DL (ref 0–100)
LYMPHOCYTES # BLD AUTO: 2.01 THOUSANDS/ÂΜL (ref 0.6–4.47)
LYMPHOCYTES NFR BLD AUTO: 28 % (ref 14–44)
MCH RBC QN AUTO: 31.7 PG (ref 26.8–34.3)
MCHC RBC AUTO-ENTMCNC: 33.9 G/DL (ref 31.4–37.4)
MCV RBC AUTO: 94 FL (ref 82–98)
MONOCYTES # BLD AUTO: 0.67 THOUSAND/ÂΜL (ref 0.17–1.22)
MONOCYTES NFR BLD AUTO: 9 % (ref 4–12)
NEUTROPHILS # BLD AUTO: 4.44 THOUSANDS/ÂΜL (ref 1.85–7.62)
NEUTS SEG NFR BLD AUTO: 62 % (ref 43–75)
NONHDLC SERPL-MCNC: 79 MG/DL
NRBC BLD AUTO-RTO: 0 /100 WBCS
PLATELET # BLD AUTO: 232 THOUSANDS/UL (ref 149–390)
PMV BLD AUTO: 9.3 FL (ref 8.9–12.7)
POTASSIUM SERPL-SCNC: 3.8 MMOL/L (ref 3.5–5.3)
PROT SERPL-MCNC: 7 G/DL (ref 6.4–8.4)
PSA SERPL-MCNC: 1.6 NG/ML (ref 0–4)
RBC # BLD AUTO: 4.89 MILLION/UL (ref 3.88–5.62)
SODIUM SERPL-SCNC: 139 MMOL/L (ref 135–147)
TRIGL SERPL-MCNC: 95 MG/DL
WBC # BLD AUTO: 7.23 THOUSAND/UL (ref 4.31–10.16)

## 2022-11-16 LAB
C TRACH DNA SPEC QL NAA+PROBE: NEGATIVE
HIV 1+2 AB+HIV1 P24 AG SERPL QL IA: NORMAL
HSV1 IGG SER IA-ACNC: 52.2 INDEX (ref 0–0.9)
HSV2 IGG SER IA-ACNC: 15.2 INDEX (ref 0–0.9)
N GONORRHOEA DNA SPEC QL NAA+PROBE: NEGATIVE
RPR SER QL: NORMAL

## 2022-11-17 ENCOUNTER — TELEPHONE (OUTPATIENT)
Dept: FAMILY MEDICINE CLINIC | Facility: CLINIC | Age: 72
End: 2022-11-17

## 2022-11-17 DIAGNOSIS — B00.9 HERPES: Primary | ICD-10-CM

## 2022-11-17 RX ORDER — VALACYCLOVIR HYDROCHLORIDE 1 G/1
2000 TABLET, FILM COATED ORAL 2 TIMES DAILY
Qty: 4 TABLET | Refills: 5 | Status: SHIPPED | OUTPATIENT
Start: 2022-11-17 | End: 2023-06-15

## 2022-11-17 NOTE — TELEPHONE ENCOUNTER
Spoke to the patient in detail  Advised him that he was positive for herpes  Wife came on the phone and said she is positive as well and was given Valtrex as needed    I have prescribed this for the patient and advised to only use for flares

## 2022-12-01 ENCOUNTER — TELEPHONE (OUTPATIENT)
Dept: FAMILY MEDICINE CLINIC | Facility: CLINIC | Age: 72
End: 2022-12-01

## 2022-12-02 NOTE — TELEPHONE ENCOUNTER
Dr Janine Khan:    Patient called to let you know that his last day of work is 11/30 for the forms that he dropped off

## 2022-12-02 NOTE — TELEPHONE ENCOUNTER
Please write a letter stating that given his history of osteoarthritis of bilateral knees he requires disability leave

## 2023-01-19 ENCOUNTER — OFFICE VISIT (OUTPATIENT)
Dept: GASTROENTEROLOGY | Facility: CLINIC | Age: 73
End: 2023-01-19

## 2023-01-19 VITALS
DIASTOLIC BLOOD PRESSURE: 65 MMHG | SYSTOLIC BLOOD PRESSURE: 117 MMHG | BODY MASS INDEX: 26.92 KG/M2 | WEIGHT: 142.6 LBS | HEART RATE: 70 BPM | HEIGHT: 61 IN

## 2023-01-19 DIAGNOSIS — Z12.11 COLON CANCER SCREENING: ICD-10-CM

## 2023-01-19 DIAGNOSIS — Z86.010 HX OF ADENOMATOUS COLONIC POLYPS: ICD-10-CM

## 2023-01-19 DIAGNOSIS — K21.9 GASTROESOPHAGEAL REFLUX DISEASE WITHOUT ESOPHAGITIS: ICD-10-CM

## 2023-01-19 DIAGNOSIS — K22.70 BARRETT'S ESOPHAGUS WITHOUT DYSPLASIA: Primary | ICD-10-CM

## 2023-01-19 DIAGNOSIS — R12 HEARTBURN: ICD-10-CM

## 2023-01-19 DIAGNOSIS — R10.10 PAIN OF UPPER ABDOMEN: ICD-10-CM

## 2023-01-19 PROBLEM — K29.50 ANTRITIS OF STOMACH: Status: ACTIVE | Noted: 2023-01-19

## 2023-01-19 PROBLEM — Z86.0101 HX OF ADENOMATOUS COLONIC POLYPS: Status: ACTIVE | Noted: 2023-01-19

## 2023-01-19 RX ORDER — SUCRALFATE 1 G/1
1 TABLET ORAL
Qty: 120 TABLET | Refills: 5 | Status: SHIPPED | OUTPATIENT
Start: 2023-01-19 | End: 2023-02-18

## 2023-01-19 RX ORDER — TADALAFIL 20 MG/1
20 TABLET ORAL
COMMUNITY
Start: 2022-11-16 | End: 2023-11-16

## 2023-01-19 RX ORDER — SUCRALFATE 1 G/1
1 TABLET ORAL 4 TIMES DAILY
COMMUNITY
Start: 2022-10-24 | End: 2023-01-19

## 2023-01-19 NOTE — PATIENT INSTRUCTIONS
Continue to follow antireflux diet and measures    Scheduled date of EGD(as of today): 3/20/23  Physician performing EGD: Dr Sergio Mccauley  Location of EGD: [place] [id number]  Instructions reviewed with patient by:  Clearances: N/A  Pt is vaccinated

## 2023-01-19 NOTE — PROGRESS NOTES
[facility] - Outpatient Follow-up Note  Matt Ballard 81 y o  male MRN: [id number]  Encounter: [id number]          ASSESSMENT AND PLAN:      1  Yeboah's esophagus without dysplasia  2  Gastroesophageal reflux disease without esophagitis  3  Heartburn  4  Pain of upper abdomen  Patient reports since November he has been experiencing acid reflux, heartburn, and pain in upper abdomen  Patient was started on pantoprazole 40 Mg daily in the a m  by his PCP  in November but continues to experience symptoms despite being on medication  EGD done 1/31/2022 showed short segment Yeboah's esophagus, hiatal hernia, small polypoid area in the esophagus 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, and mild antritis  Differential diagnosis include GERD, gastritis, esophagitis, ulceration, H  pylori, or less likely lesion   -Follow antireflux diet and measures  -Continue pantoprazole 40 Mg daily in a m  prior to breakfast with  - sucralfate (CARAFATE) 1 g tablet; Take 1 tablet (1 g total) by mouth 4 (four) times a day (before meals and at bedtime)  Dispense: 120 tablet; Refill: 5  - EGD; schedule for EGD  Prep and procedure explained to patient in detail  Further recommendations pending results of EGD  5  Hx of adenomatous colonic polyps  6  Colon cancer screening  7  Diverticulosis   Colon cancer screening up-to-date  Patient has history tubular adenoma polyps  Colonoscopy done 1/31/2022 showed 2 polyps removed, internal hemorrhoids, and left-sided diverticulosis  Biopsy showed tubular adenoma     ______________________________________________________________________    SUBJECTIVE:  Roise Apa old male who presents to the office for follow-up  Patient has history of GERD, adenomatous colonic polyp and Yeboah's esophagus  Patient reports since November he has been experiencing acid reflux, heartburn, and pain in upper abdomen    Patient was started on pantoprazole 40 Mg daily in the a m  by his PCP  in November but continues to experience symptoms despite being on medication  Patient denies nausea, vomiting, dysphagia, and lower abdomen pain  Patient denies blood in stool, blood from rectal area, or black tarry stool  Bowel pattern is regular  Tender to palpation in upper abdomen on examination  Patient had a previous cholecystectomy  Patient reports he only drinks carbonated beverages very rare and only drinks coffee 1 day a week  CTA dissection protocol chest, abdomen, and pelvis was done 10/20/2022 due to abdomen pain showed unchanged 40 mm a sending aortic aneurysm measured at the level of the right pulmonary artery  No acute findings in the chest, abdomen, or pelvis  Reviewed lab work done in November which showed LFTs within normal limit and CBC within normal limits  Hepatitis C negative  HIV negative  EGD done 1/31/2022 showed short segment Yeboah's esophagus, hiatal hernia, small polypoid area in the esophagus 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, and mild antritis  Colonoscopy done 1/31/2022 showed 2 polyps removed, internal hemorrhoids, and left-sided diverticulosis  Biopsy showed tubular adenoma  Patient does not smoke  Patient does not drink alcohol  No family history of gastric or colon cancer  REVIEW OF SYSTEMS IS OTHERWISE NEGATIVE        Historical Information   Past Medical History:   Diagnosis Date   • Arthritis     shoulder   • Ascending aortic aneurysm    • Cataract     both eyes-to have the right cataract removed on 1/25/22   • Colon polyp    • GERD (gastroesophageal reflux disease)    • Insomnia    • Leg cramps    • Memory changes    • Restless legs      Past Surgical History:   Procedure Laterality Date   • APPENDECTOMY     • CHOLECYSTECTOMY     • CHOLECYSTECTOMY LAPAROSCOPIC N/A 10/27/2020    Procedure: CHOLECYSTECTOMY LAPAROSCOPIC;  Surgeon: Henrik Coy MD;  Location: [address] [place] [address];  Service: General   • COLONOSCOPY N/A 12/12/2016    Procedure: COLONOSCOPY;  Surgeon: Kerry Ortiz Lauren Holland MD;  Location: [facility];   Service:    • ELBOW SURGERY Bilateral    • HERNIA REPAIR Right 2001    inguinal   • KNEE ARTHROSCOPY Right    • CO ARTHRS KNE SURG W/MENISCECTOMY MED/LAT W/SHVG Left 6/18/2020    Procedure: ARTHROSCOPY KNEE MEDIAL MENISCECTOMY;  Surgeon: Grecia Patel MD;  Location: [address] [place] [address];  Service: Orthopedics   • ROTATOR CUFF REPAIR Bilateral 2011     Social History   Social History     Substance and Sexual Activity   Alcohol Use Not Currently     Social History     Substance and Sexual Activity   Drug Use No     Social History     Tobacco Use   Smoking Status Never   Smokeless Tobacco Never     Family History   Problem Relation Age of Onset   • No Known Problems Mother    • No Known Problems Father    • No Known Problems Sister    • No Known Problems Brother    • No Known Problems Maternal Aunt    • No Known Problems Maternal Uncle    • No Known Problems Paternal Aunt    • No Known Problems Paternal Uncle    • No Known Problems Maternal Grandmother    • No Known Problems Maternal Grandfather    • No Known Problems Paternal Grandmother    • No Known Problems Paternal Grandfather    • ADD / ADHD Neg Hx    • Anesthesia problems Neg Hx    • Cancer Neg Hx    • Clotting disorder Neg Hx    • Collagen disease Neg Hx    • Diabetes Neg Hx    • Dislocations Neg Hx    • Learning disabilities Neg Hx    • Neurological problems Neg Hx    • Osteoporosis Neg Hx    • Rheumatologic disease Neg Hx    • Scoliosis Neg Hx    • Vascular Disease Neg Hx        Meds/Allergies       Current Outpatient Medications:   •  aspirin (ECOTRIN LOW STRENGTH) 81 mg EC tablet  •  hydrOXYzine HCL (ATARAX) 50 mg tablet  •  meloxicam (Mobic) 15 mg tablet  •  pantoprazole (PROTONIX) 40 mg tablet  •  Promethazine-DM (PHENERGAN-DM) 6 25-15 mg/5 mL oral syrup  •  rosuvastatin (CRESTOR) 10 MG tablet  •  Silodosin 8 MG CAPS  •  sucralfate (CARAFATE) 1 g tablet  •  tadalafil (CIALIS) 20 MG tablet  •  valACYclovir (VALTREX) 1,000 mg tablet    No Known Allergies        Objective     Blood pressure 117/65, pulse 70, height 5' 1" (1 549 m), weight 64 7 kg (142 lb 9 6 oz)  Body mass index is 26 94 kg/m²  PHYSICAL EXAM:      General Appearance:   Alert, cooperative, no distress   HEENT:   Normocephalic, atraumatic, anicteric      Neck:  Supple, symmetrical, trachea midline   Lungs:   Clear to auscultation bilaterally; no rales, rhonchi or wheezing; respirations unlabored    Heart[de-identified]   Regular rate and rhythm; no murmur, rub, or gallop  Abdomen:   Soft,  upper abdomen tender to palpation, non-distended; normal bowel sounds; no masses, no organomegaly    Genitalia:   Deferred    Rectal:   Deferred    Extremities:  No cyanosis, clubbing or edema    Pulses:  2+ and symmetric    Skin:  No jaundice, rashes, or lesions    Lymph nodes:  No palpable cervical lymphadenopathy        Lab Results:   No visits with results within 1 Day(s) from this visit     Latest known visit with results is:   Appointment on 11/15/2022   Component Date Value   • Hemoglobin A1C 11/15/2022 5 3    • EAG 11/15/2022 105    • Sodium 11/15/2022 139    • Potassium 11/15/2022 3 8    • Chloride 11/15/2022 109 (H)    • CO2 11/15/2022 26    • ANION GAP 11/15/2022 4    • BUN 11/15/2022 14    • Creatinine 11/15/2022 0 86    • Glucose, Fasting 11/15/2022 119 (H)    • Calcium 11/15/2022 8 9    • AST 11/15/2022 17    • ALT 11/15/2022 31    • Alkaline Phosphatase 11/15/2022 91    • Total Protein 11/15/2022 7 0    • Albumin 11/15/2022 3 8    • Total Bilirubin 11/15/2022 0 82    • eGFR 11/15/2022 87    • WBC 11/15/2022 7 23    • RBC 11/15/2022 4 89    • Hemoglobin 11/15/2022 15 5    • Hematocrit 11/15/2022 45 7    • MCV 11/15/2022 94    • MCH 11/15/2022 31 7    • MCHC 11/15/2022 33 9    • RDW 11/15/2022 12 4    • MPV 11/15/2022 9 3    • Platelets 66/86/3274 232    • nRBC 11/15/2022 0    • Neutrophils Relative 11/15/2022 62    • Immat GRANS % 11/15/2022 0    • Lymphocytes Relative 11/15/2022 28    • Monocytes Relative 11/15/2022 9    • Eosinophils Relative 11/15/2022 1    • Basophils Relative 11/15/2022 0    • Neutrophils Absolute 11/15/2022 4 44    • Immature Grans Absolute 11/15/2022 0 02    • Lymphocytes Absolute 11/15/2022 2 01    • Monocytes Absolute 11/15/2022 0 67    • Eosinophils Absolute 11/15/2022 0 08    • Basophils Absolute 11/15/2022 0 01    • Cholesterol 11/15/2022 116    • Triglycerides 11/15/2022 95    • HDL, Direct 11/15/2022 37 (L)    • LDL Calculated 11/15/2022 60    • Non-HDL-Chol (CHOL-HDL) 11/15/2022 79    • RPR 11/15/2022 Non-Reactive    • HSV 1 IgG 11/15/2022 52 20 (H)    • HSV 2 IGG, TYPE SPEC 11/15/2022 15 20 (H)    • N gonorrhoeae, DNA Probe 11/15/2022 Negative    • Chlamydia trachomatis, D* 11/15/2022 Negative    • Hepatitis C Ab 11/15/2022 Non-reactive    • PSA 11/15/2022 1 6    • HIV-1/HIV-2 Ab 11/15/2022 Non-Reactive          Radiology Results:   No results found

## 2023-02-14 ENCOUNTER — CONSULT (OUTPATIENT)
Dept: PULMONOLOGY | Facility: MEDICAL CENTER | Age: 73
End: 2023-02-14

## 2023-02-14 VITALS
TEMPERATURE: 97.8 F | HEART RATE: 85 BPM | DIASTOLIC BLOOD PRESSURE: 60 MMHG | OXYGEN SATURATION: 97 % | SYSTOLIC BLOOD PRESSURE: 110 MMHG | BODY MASS INDEX: 27.38 KG/M2 | WEIGHT: 145 LBS | HEIGHT: 61 IN | RESPIRATION RATE: 12 BRPM

## 2023-02-14 DIAGNOSIS — M79.2 NEUROPATHIC PAIN: ICD-10-CM

## 2023-02-14 DIAGNOSIS — G47.30 SLEEP APNEA, UNSPECIFIED TYPE: ICD-10-CM

## 2023-02-14 DIAGNOSIS — G47.19 EXCESSIVE DAYTIME SLEEPINESS: ICD-10-CM

## 2023-02-14 DIAGNOSIS — G25.81 RESTLESS LEG SYNDROME: ICD-10-CM

## 2023-02-14 DIAGNOSIS — G47.00 INSOMNIA, UNSPECIFIED TYPE: ICD-10-CM

## 2023-02-14 DIAGNOSIS — R06.83 SNORING: Primary | ICD-10-CM

## 2023-02-14 RX ORDER — GABAPENTIN 100 MG/1
300 CAPSULE ORAL
Qty: 90 CAPSULE | Refills: 1 | Status: SHIPPED | OUTPATIENT
Start: 2023-02-14

## 2023-02-14 NOTE — LETTER
February 14, 2023     Saba Jack, [address] [facility]    Patient: Robert Hough   YOB: 1950   Date of Visit: 2/14/2023       Dear Dr Dorothea Mendoza: Thank you for referring Betty Vásquez to me for evaluation  Below are my notes for this consultation  If you have questions, please do not hesitate to call me  I look forward to following your patient along with you  Sincerely,        Yumiko Keith DO        CC: No Recipients  Yumiko Keith,   2/14/2023  7:14 PM  Sign when Signing Visit  Sleep Consultation   Robert Hough 81 y o  male MRN: [id number]      Reason for consultation: PAULINA management    Requesting physician: Dr Dorothea Mendoza    Assessment/Plan  81 y o  M with PMHx of GERD, Yeboah's esophagus with dysplasia, peripheral artery disease, BPH who comes in for management of previously diagnosed PAULINA  1   Previously diagnosed PAULINA not currently on CPAP therapy       -  Check a home study to assess for obstructive sleep apnea      -  I discussed in depth the diagnostic studies and treatment options involved with obstructive sleep apnea      -  I also discussed in depth the risk of leaving sleep apnea untreated including hypertension, heart failure, arrhythmia, MI and stroke  2  Neuropathy/Restless legs       -  Treat PAULINA as above       -  Will trial gabapentin to help neuropathy and insomnia  3   Sleep onset insomnia - he is currently on atarax with little benefit  Will trial gabapentin as above  History of Present Illness   HPI:  Robert Hough is a 81 y o  male with PMHx as below who comes in for evaluation of PAULINA  Patient notes weight gain and symptoms of difficulty falling asleep, difficulty staying asleep, snoring, excessive daytime sleepiness despite an [place] score of 5, awakenings with gasping, witnessed apneas, morning headaches, and awakenings with dry mouth  he note symptoms of restless legs    he denies symptoms of cataplexy, sleep paralysis, hypnopompic or hypnagogic hallucinations  Sleep History:  he goes to bed at approximately 8pm, will get to sleep in 1-3 hrs despite the use of melatonin and atarax  He will get out of bed at 6-7 am   he will get up 3-4 times at night for unknown reason  It will then take a few minutes to fall back asleep    he does not typically nap during the day  ROS:   Review of Systems   Constitutional: Positive for fatigue  Negative for appetite change and unexpected weight change  HENT: Negative  Eyes: Negative  Respiratory: Positive for apnea  Negative for cough and stridor  Cardiovascular: Negative  Gastrointestinal: Negative  Endocrine: Negative  Genitourinary: Negative  Musculoskeletal: Negative  Allergic/Immunologic: Negative  Neurological: Negative  Hematological: Negative  Psychiatric/Behavioral: Positive for decreased concentration and sleep disturbance  Negative for behavioral problems  The patient is nervous/anxious  Historical Information   Past Medical History:   Diagnosis Date   • Arthritis     shoulder   • Ascending aortic aneurysm    • Cataract     both eyes-to have the right cataract removed on 1/25/22   • Colon polyp    • GERD (gastroesophageal reflux disease)    • Insomnia    • Leg cramps    • Memory changes    • Restless legs      Past Surgical History:   Procedure Laterality Date   • APPENDECTOMY     • CHOLECYSTECTOMY     • CHOLECYSTECTOMY LAPAROSCOPIC N/A 10/27/2020    Procedure: CHOLECYSTECTOMY LAPAROSCOPIC;  Surgeon: Lyman Schilder, MD;  Location: [address] [place] [address];  Service: General   • COLONOSCOPY N/A 12/12/2016    Procedure: COLONOSCOPY;  Surgeon: Katelynn Reaves MD;  Location: [facility] GI LAB;   Service:    • ELBOW SURGERY Bilateral    • HERNIA REPAIR Right 2001    inguinal   • KNEE ARTHROSCOPY Right    • ME ARTHRS KNE SURG W/MENISCECTOMY MED/LAT W/SHVG Left 6/18/2020    Procedure: ARTHROSCOPY KNEE MEDIAL MENISCECTOMY;  Surgeon: Eddie Guidry MD;  Location: [address] [place] [address];  Service: Orthopedics   • ROTATOR CUFF REPAIR Bilateral 2011     Family History   Problem Relation Age of Onset   • No Known Problems Mother    • No Known Problems Father    • No Known Problems Sister    • No Known Problems Brother    • No Known Problems Maternal Aunt    • No Known Problems Maternal Uncle    • No Known Problems Paternal Aunt    • No Known Problems Paternal Uncle    • No Known Problems Maternal Grandmother    • No Known Problems Maternal Grandfather    • No Known Problems Paternal Grandmother    • No Known Problems Paternal Grandfather    • ADD / ADHD Neg Hx    • Anesthesia problems Neg Hx    • Cancer Neg Hx    • Clotting disorder Neg Hx    • Collagen disease Neg Hx    • Diabetes Neg Hx    • Dislocations Neg Hx    • Learning disabilities Neg Hx    • Neurological problems Neg Hx    • Osteoporosis Neg Hx    • Rheumatologic disease Neg Hx    • Scoliosis Neg Hx    • Vascular Disease Neg Hx      Social History     Socioeconomic History   • Marital status: /Civil Union     Spouse name: Not on file   • Number of children: Not on file   • Years of education: Not on file   • Highest education level: Not on file   Occupational History   • Not on file   Tobacco Use   • Smoking status: Never   • Smokeless tobacco: Never   Vaping Use   • Vaping Use: Never used   Substance and Sexual Activity   • Alcohol use: Not Currently   • Drug use: No   • Sexual activity: Not Currently   Other Topics Concern   • Not on file   Social History Narrative   • Not on file     Social Determinants of Health     Financial Resource Strain: Low Risk    • Difficulty of Paying Living Expenses: Not hard at all   Food Insecurity: Not on file   Transportation Needs: No Transportation Needs   • Lack of Transportation (Medical): No   • Lack of Transportation (Non-Medical):  No   Physical Activity: Not on file   Stress: Not on file   Social Connections: Not on file   Intimate Partner Violence: Not on file   Housing Stability: Not on file       Occupational History: Driving     Meds/Allergies    No Known Allergies    Home medications:  Prior to Admission medications    Medication Sig Start Date End Date Taking?  Authorizing Provider   aspirin (ECOTRIN LOW STRENGTH) 81 mg EC tablet Take 1 tablet (81 mg total) by mouth daily with breakfast 1/28/22  Yes Bin Espino MD   gabapentin (Neurontin) 100 mg capsule Take 3 capsules (300 mg total) by mouth daily at bedtime 2/14/23  Yes Alexander Hedrick DO   hydrOXYzine HCL (ATARAX) 50 mg tablet Take 1 tablet at bedtime to sleep, no later then 8 pm 11/14/22  Yes Martinez Koch MD   meloxicam (Mobic) 15 mg tablet Take 1 tablet (15 mg total) by mouth daily 6/2/22  Yes Purvi Monterroso MD   pantoprazole (PROTONIX) 40 mg tablet Take 1 tablet (40 mg total) by mouth daily 11/14/22  Yes Martinez Koch MD   Promethazine-DM (PHENERGAN-DM) 6 25-15 mg/5 mL oral syrup Take 5 mL by mouth 4 (four) times a day as needed for cough 8/22/22  Yes Martinez Koch MD   rosuvastatin (CRESTOR) 10 MG tablet Take 1 tablet (10 mg total) by mouth daily at bedtime 11/14/22  Yes Martinez Koch MD   Silodosin 8 MG CAPS Take 1 capsule by mouth daily 11/30/21  Yes Historical Provider, MD   sucralfate (CARAFATE) 1 g tablet Take 1 tablet (1 g total) by mouth 4 (four) times a day (before meals and at bedtime) 1/19/23 2/18/23 Yes DIDI Swartz   tadalafil (CIALIS) 20 MG tablet Take 20 mg by mouth 11/16/22 11/16/23 Yes Historical Provider, MD   valACYclovir (VALTREX) 1,000 mg tablet Take 2 tablets (2,000 mg total) by mouth 2 (two) times a day for 1 day 11/17/22 11/18/22  Martinez Koch MD   cyclobenzaprine (FLEXERIL) 5 mg tablet Take 1 tablet (5 mg total) by mouth daily at bedtime as needed for muscle spasms  Patient not taking: Reported on 10/12/2022 1/5/22 10/20/22  Martinez Koch MD       Vitals:   Blood pressure 110/60, pulse 85, temperature 97 8 °F (36 6 °C), temperature source Temporal, resp  rate 12, height 5' 1" (1 549 m), weight 65 8 kg (145 lb), SpO2 97 % , RA, Body mass index is 27 4 kg/m²  Neck Circumference: 15 5    Physical Exam  General: Pleasant, Awake alert and oriented x 3, conversant without conversational dyspnea, NAD, normal affect  HEENT:  PERRL, Sclera noninjected, nonicteric OU, Nares patent,  no craniofacial abnormalities, Mucous membranes, moist, no oral lesions, normal dentition, Mallampati class 4  NECK:  Trachea midline, no accessory muscle use, no stridor, no cervical or supraclavicular adenopathy, JVP not elevated  CARDIAC: Reg, single s1/S2, no m/r/g  PULM: CTA bilaterally no wheezing, rhonchi or rales  ABD: Normoactive bowel sounds, soft nontender, nondistended, no rebound, no rigidity, no guarding  EXT: No cyanosis, no clubbing, no edema, normal capillary refill  NEURO: no focal neurologic deficits, AAOx3, moving all extremities appropriately    Labs: I have personally reviewed pertinent lab results  Lab Results   Component Value Date    WBC 7 23 11/15/2022    HGB 15 5 11/15/2022    HCT 45 7 11/15/2022    MCV 94 11/15/2022     11/15/2022      Lab Results   Component Value Date    GLUCOSE 98 10/14/2016    CALCIUM 8 9 11/15/2022     10/14/2016    K 3 8 11/15/2022    CO2 26 11/15/2022     (H) 11/15/2022    BUN 14 11/15/2022    CREATININE 0 86 11/15/2022     CTA chest abdomen and pelvis 10/20/22  CHEST  LUNGS:  Dependent hypoventilatory changes  Mild bilateral predominantly mid and lower lung zones subpleural reticular opacities similar in appearance to the comparison CT chest   PLEURA:  Unremarkable  HEART/PULMONARY ARTERIAL TREE:  Borderline heart size  No pericardial effusion  Coronary artery calcifications  MEDIASTINUM AND SUKHDEV:  Unremarkable  CHEST WALL AND LOWER NECK:  Unremarkable    IMPRESSION:  Unchanged 40 mm ascending aortic aneurysm measured at the level of the right pulmonary artery   No acute findings in the chest, abdomen or pelvis        DO Jhonatan Cline Sleep Physician

## 2023-02-14 NOTE — PATIENT INSTRUCTIONS
Apnea del sueño   CUIDADO AMBULATORIO:   La apnea del sueño es nito afección que provoca que usted deje de respirar a menudo mientras duerme  Tipos de apnea del sueño:  La apnea obstructiva del sueño (AOS) es la más común  Los músculos y tejidos alrededor de alonso garganta se relajan y obstruyen el paso del aire  La obesidad, el consumo de alcohol o cigarrillos o los antecedentes familiares son causas comunes  La AOS puede aumentar el riesgo de complicaciones después de [place]  La apnea central del sueño (ACS) significa que el cerebro no envía señales a los músculos que controlan la respiración  Usted no respira aunque vikas vías respiratorias estén abiertas  Las causas comunes incluyen afecciones médicas hang insuficiencia cardíaca, ser mayor de 36 años o el uso de opiáceos  La apnea del sueño compleja (o mixta) significa que usted tiene tanto apnea obstructiva hang apnea central del sueño  Los signos y síntomas comunes son:  Ronquidos amrita o largas pausas en la respiración    Sentirse soñoliento, lento y cansado carrington el día    Resoplos, jadeos o asfixia mientras duerme y despertarse repentinamente debido a esto    Sentirse irritable carrington el día    Sequedad en la boca o dolor de gabriela por las mañanas    Sudoración nocturna intensa    Dificultad para pensar, recordar o enfocarse en quehaceres para el día siguiente    Llame al [employer] de emergencias local (911 en los [place]) si:  Usted tiene dolor torácico y dificultad para respirar  Llame a alonso médico si:  Melissa Askew signos y síntomas, o estos Alcus Cumber  Usted tiene preguntas o inquietudes acerca de alonso condición o cuidado  Tratamiento depende del tipo de apnea que usted tenga  Un dispositivo bucal podría ser necesario si tiene apnea del sueño leve  Están diseñados para mantener la garganta abierta  Pregunte a alonso dentista o médico acerca del mejor dispositivo bucal para usted      Raisa Kale puede utilizarse para ayudarlo a maria fernanda más aire carrington el sueño  Se coloca nito máscara sobre la nariz y la boca, o solo en la nariz  La máscara se conecta a la máquina  Usted recibirá el aire por la [place]  Nito máquina para medir la presión positiva continua en las vías respiratorias (CPAP, en inglés) se [place] para mantener abiertas vikas vías respiratorias mientras usted duerme  La máquina sopla nito corriente de aire suave dentro de la máscara cuando usted respira  Ulen ayuda a mantener vikas vías respiratorias abiertas para que usted pueda respirar con más regularidad  Podrían darle oxígeno adicional a través de la Admire  La máquina de presión positiva de doble nivel en las vías respiratorias (BiPAP, en inglés) administra aire, odell disminuye la presión cuando exhala  Un servo-ventilador adaptativo (SVA) es nito máquina que aprende alonso patrón de respiración habitual  Sharmon Carbine, utiliza la presión para darle aire y evitar las interrupciones de la respiración  La cirugía para ampliar las vías respiratorias o eliminar los tejidos sobrantes puede ser necesaria  La cirugía suele considerarse solamente si otros tratamientos no funcionan  Controle la apnea del sueño:  [place] un peso saludable  Pregúntele a alonso médico cuál es el peso ideal para usted  Pídale que lo ayude a crear un plan seguro para bajar de peso si tiene sobrepeso  Incluso un pequeño objetivo de nito pérdida de peso del 10% puede mejorar los síntomas  No fume  La nicotina y otras sustancias químicas que contienen los cigarrillos y cigarros pueden dañar los pulmones  Pida información a alonso médico si usted actualmente fuma y necesita ayuda para dejar de fumar  Los cigarrillos electrónicos o el tabaco sin humo igualmente contienen nicotina  Consulte con alonso médico antes de [employer]  No ingiera alcohol ni tome sedantes antes de dormir  El alcohol y los sedantes pueden relajar los músculos y tejidos que están alrededor de alonso garganta   Ulen puede obstruir el flujo de aire a vikas pulmones  Duerma de un lado o use almohadas especiales diseñadas para evitar la apnea del sueño  [place] lisa que alonso lengua y otros tejidos obstruyan la garganta  Usted también puede intentar elevar la cabecera de alonso cama  Acuda a vikas consultas de control con alonso médico o especialista según le indicaron: Es posible que a usted le rosy exámenes de lucrecia carrington las citas de seguimiento  [place] con alonso médico para determinar el equipo respiratorio y los ajustes adecuados para usted  Anote vikas preguntas para que se acuerde de hacerlas carrington vikas visitas  © Copyright [employer] 2022 Information is for End User's use only and may not be sold, redistributed or otherwise used for commercial purposes  All illustrations and images included in CareNotes® are the copyrighted property of A D A W4  or [address] [place] [address] es sólo para uso en educación  Alonso intención no es darle un consejo médico sobre enfermedades o tratamientos  Colsulte con alonso Michelle Appl farmacéutico antes de seguir cualquier régimen médico para saber si es seguro y efectivo para usted

## 2023-02-14 NOTE — PROGRESS NOTES
Sleep Consultation   Monica Stewart 81 y o  male MRN: [id number]      Reason for consultation: PAULINA management    Requesting physician: Dr Leila Morocho    Assessment/Plan  81 y o  M with PMHx of GERD, Yeboah's esophagus with dysplasia, peripheral artery disease, BPH who comes in for management of previously diagnosed PAULINA  1   Previously diagnosed PAULINA not currently on CPAP therapy       -  Check a home study to assess for obstructive sleep apnea      -  I discussed in depth the diagnostic studies and treatment options involved with obstructive sleep apnea      -  I also discussed in depth the risk of leaving sleep apnea untreated including hypertension, heart failure, arrhythmia, MI and stroke  2  Neuropathy/Restless legs       -  Treat PAULINA as above       -  Will trial gabapentin to help neuropathy and insomnia  3   Sleep onset insomnia - he is currently on atarax with little benefit  Will trial gabapentin as above  History of Present Illness   HPI:  Monica Stewart is a 81 y o  male with PMHx as below who comes in for evaluation of PAULINA  Patient notes weight gain and symptoms of difficulty falling asleep, difficulty staying asleep, snoring, excessive daytime sleepiness despite an McFall score of 5, awakenings with gasping, witnessed apneas, morning headaches, and awakenings with dry mouth  he note symptoms of restless legs  he denies symptoms of cataplexy, sleep paralysis, hypnopompic or hypnagogic hallucinations  Sleep History:  he goes to bed at approximately 8pm, will get to sleep in 1-3 hrs despite the use of melatonin and atarax  He will get out of bed at 6-7 am   he will get up 3-4 times at night for unknown reason  It will then take a few minutes to fall back asleep    he does not typically nap during the day  ROS:   Review of Systems   Constitutional: Positive for fatigue  Negative for appetite change and unexpected weight change  HENT: Negative  Eyes: Negative  Respiratory: Positive for apnea  Negative for cough and stridor  Cardiovascular: Negative  Gastrointestinal: Negative  Endocrine: Negative  Genitourinary: Negative  Musculoskeletal: Negative  Allergic/Immunologic: Negative  Neurological: Negative  Hematological: Negative  Psychiatric/Behavioral: Positive for decreased concentration and sleep disturbance  Negative for behavioral problems  The patient is nervous/anxious  Historical Information   Past Medical History:   Diagnosis Date   • Arthritis     shoulder   • Ascending aortic aneurysm    • Cataract     both eyes-to have the right cataract removed on 1/25/22   • Colon polyp    • GERD (gastroesophageal reflux disease)    • Insomnia    • Leg cramps    • Memory changes    • Restless legs      Past Surgical History:   Procedure Laterality Date   • APPENDECTOMY     • CHOLECYSTECTOMY     • CHOLECYSTECTOMY LAPAROSCOPIC N/A 10/27/2020    Procedure: CHOLECYSTECTOMY LAPAROSCOPIC;  Surgeon: Jessica Haq MD;  Location: [address] [place] [address];  Service: General   • COLONOSCOPY N/A 12/12/2016    Procedure: COLONOSCOPY;  Surgeon: Ania Hull MD;  Location: [facility] GI LAB;   Service:    • ELBOW SURGERY Bilateral    • HERNIA REPAIR Right 2001    inguinal   • KNEE ARTHROSCOPY Right    • NH ARTHRS KNE SURG W/MENISCECTOMY MED/LAT W/SHVG Left 6/18/2020    Procedure: ARTHROSCOPY KNEE MEDIAL MENISCECTOMY;  Surgeon: Kwan Novak MD;  Location: [address] [place] [address];  Service: Orthopedics   • ROTATOR CUFF REPAIR Bilateral 2011     Family History   Problem Relation Age of Onset   • No Known Problems Mother    • No Known Problems Father    • No Known Problems Sister    • No Known Problems Brother    • No Known Problems Maternal Aunt    • No Known Problems Maternal Uncle    • No Known Problems Paternal Aunt    • No Known Problems Paternal Uncle    • No Known Problems Maternal Grandmother    • No Known Problems Maternal Grandfather    • No Known Problems Paternal Grandmother    • No Known Problems Paternal Grandfather    • ADD / ADHD Neg Hx    • Anesthesia problems Neg Hx    • Cancer Neg Hx    • Clotting disorder Neg Hx    • Collagen disease Neg Hx    • Diabetes Neg Hx    • Dislocations Neg Hx    • Learning disabilities Neg Hx    • Neurological problems Neg Hx    • Osteoporosis Neg Hx    • Rheumatologic disease Neg Hx    • Scoliosis Neg Hx    • Vascular Disease Neg Hx      Social History     Socioeconomic History   • Marital status: /Civil Union     Spouse name: Not on file   • Number of children: Not on file   • Years of education: Not on file   • Highest education level: Not on file   Occupational History   • Not on file   Tobacco Use   • Smoking status: Never   • Smokeless tobacco: Never   Vaping Use   • Vaping Use: Never used   Substance and Sexual Activity   • Alcohol use: Not Currently   • Drug use: No   • Sexual activity: Not Currently   Other Topics Concern   • Not on file   Social History Narrative   • Not on file     Social Determinants of Health     Financial Resource Strain: Low Risk    • Difficulty of Paying Living Expenses: Not hard at all   Food Insecurity: Not on file   Transportation Needs: No Transportation Needs   • Lack of Transportation (Medical): No   • Lack of Transportation (Non-Medical): No   Physical Activity: Not on file   Stress: Not on file   Social Connections: Not on file   Intimate Partner Violence: Not on file   Housing Stability: Not on file       Occupational History: Driving     Meds/Allergies   No Known Allergies    Home medications:  Prior to Admission medications    Medication Sig Start Date End Date Taking?  Authorizing Provider   aspirin (ECOTRIN LOW STRENGTH) 81 mg EC tablet Take 1 tablet (81 mg total) by mouth daily with breakfast 1/28/22  Yes Edi Bray MD   gabapentin (Neurontin) 100 mg capsule Take 3 capsules (300 mg total) by mouth daily at bedtime 2/14/23  Yes Joni Lee DO   hydrOXYzine HCL (ATARAX) 50 mg tablet Take 1 tablet at bedtime to sleep, no later then 8 pm 11/14/22  Yes Jeronimo Renee MD   meloxicam (Mobic) 15 mg tablet Take 1 tablet (15 mg total) by mouth daily 6/2/22  Yes Bethany Gomez MD   pantoprazole (PROTONIX) 40 mg tablet Take 1 tablet (40 mg total) by mouth daily 11/14/22  Yes Jeronimo Renee MD   Promethazine-[facility]) 6 25-15 mg/5 mL oral syrup Take 5 mL by mouth 4 (four) times a day as needed for cough 8/22/22  Yes Jeronimo Renee MD   rosuvastatin (CRESTOR) 10 MG tablet Take 1 tablet (10 mg total) by mouth daily at bedtime 11/14/22  Yes Jeronimo Renee MD   Silodosin 8 MG CAPS Take 1 capsule by mouth daily 11/30/21  Yes Historical Provider, MD   sucralfate (CARAFATE) 1 g tablet Take 1 tablet (1 g total) by mouth 4 (four) times a day (before meals and at bedtime) 1/19/23 2/18/23 Yes DIDI Rodriguez   tadalafil (CIALIS) 20 MG tablet Take 20 mg by mouth 11/16/22 11/16/23 Yes Historical Provider, MD   valACYclovir (VALTREX) 1,000 mg tablet Take 2 tablets (2,000 mg total) by mouth 2 (two) times a day for 1 day 11/17/22 11/18/22  Jeronimo Renee MD   cyclobenzaprine (FLEXERIL) 5 mg tablet Take 1 tablet (5 mg total) by mouth daily at bedtime as needed for muscle spasms  Patient not taking: Reported on 10/12/2022 1/5/22 10/20/22  Jeronimo Renee MD       Vitals:   Blood pressure 110/60, pulse 85, temperature 97 8 °F (36 6 °C), temperature source Temporal, resp  rate 12, height 5' 1" (1 549 m), weight 65 8 kg (145 lb), SpO2 97 % , RA, Body mass index is 27 4 kg/m²    Neck Circumference: 15 5    Physical Exam  General: Pleasant, Awake alert and oriented x 3, conversant without conversational dyspnea, NAD, normal affect  HEENT:  PERRL, Sclera noninjected, nonicteric OU, Nares patent,  no craniofacial abnormalities, Mucous membranes, moist, no oral lesions, normal dentition, Mallampati class 4  NECK:  Trachea midline, no accessory muscle use, no stridor, no cervical or supraclavicular adenopathy, JVP not elevated  CARDIAC: Reg, single s1/S2, no m/r/g  PULM: CTA bilaterally no wheezing, rhonchi or rales  ABD: Normoactive bowel sounds, soft nontender, nondistended, no rebound, no rigidity, no guarding  EXT: No cyanosis, no clubbing, no edema, normal capillary refill  NEURO: no focal neurologic deficits, AAOx3, moving all extremities appropriately    Labs: I have personally reviewed pertinent lab results  Lab Results   Component Value Date    WBC 7 23 11/15/2022    HGB 15 5 11/15/2022    HCT 45 7 11/15/2022    MCV 94 11/15/2022     11/15/2022      Lab Results   Component Value Date    GLUCOSE 98 10/14/2016    CALCIUM 8 9 11/15/2022     10/14/2016    K 3 8 11/15/2022    CO2 26 11/15/2022     (H) 11/15/2022    BUN 14 11/15/2022    CREATININE 0 86 11/15/2022     CTA chest abdomen and pelvis 10/20/22  CHEST  LUNGS:  Dependent hypoventilatory changes  Mild bilateral predominantly mid and lower lung zones subpleural reticular opacities similar in appearance to the comparison CT chest   PLEURA:  Unremarkable  HEART/PULMONARY ARTERIAL TREE:  Borderline heart size  No pericardial effusion  Coronary artery calcifications  MEDIASTINUM AND SUKHDEV:  Unremarkable  CHEST WALL AND LOWER NECK:  Unremarkable  IMPRESSION:  Unchanged 40 mm ascending aortic aneurysm measured at the level of the right pulmonary artery   No acute findings in the chest, abdomen or pelvis        DO Emeterio Orantes 75 Sleep Physician

## 2023-02-16 ENCOUNTER — RA CDI HCC (OUTPATIENT)
Dept: OTHER | Facility: HOSPITAL | Age: 73
End: 2023-02-16

## 2023-02-16 NOTE — PROGRESS NOTES
[facility]  coding opportunities       Chart reviewed, no opportunity found:   [address]        Patients Insurance     Medicare Insurance: [facility]

## 2023-02-23 ENCOUNTER — OFFICE VISIT (OUTPATIENT)
Dept: FAMILY MEDICINE CLINIC | Facility: CLINIC | Age: 73
End: 2023-02-23

## 2023-02-23 VITALS
RESPIRATION RATE: 16 BRPM | DIASTOLIC BLOOD PRESSURE: 80 MMHG | HEIGHT: 61 IN | HEART RATE: 80 BPM | WEIGHT: 147 LBS | TEMPERATURE: 97.5 F | BODY MASS INDEX: 27.75 KG/M2 | SYSTOLIC BLOOD PRESSURE: 120 MMHG

## 2023-02-23 DIAGNOSIS — R10.9 LEFT FLANK PAIN: Primary | ICD-10-CM

## 2023-02-23 DIAGNOSIS — H57.13 DISCOMFORT OF BOTH EYES: ICD-10-CM

## 2023-02-23 DIAGNOSIS — K21.9 GASTROESOPHAGEAL REFLUX DISEASE WITHOUT ESOPHAGITIS: ICD-10-CM

## 2023-02-23 NOTE — PROGRESS NOTES
Keshav Unger 1950 male MRN: [id number]    FAMILY PRACTICE OFFICE VISIT  [facility] - [address]      ASSESSMENT/PLAN  Keshav Unger is a 81 y o  male presents to the office for    Diagnoses and all orders for this visit:    Left flank pain  -     UA w Reflex to Microscopic w Reflex to Culture -Lab Collect; Future  -     Comprehensive metabolic panel; Future    Discomfort of both eyes  -     Ambulatory Referral to Plastic Surgery; Future    Gastroesophageal reflux disease without esophagitis     We will send the patient for blood work and a UA  If the patient continues to have persistent pain and exam is normal will recommend possible bladder ultrasound  Referred patient to plastic surgery given that the patient's skin under his eyes is concerning him  Recommend taking Protonix and sucralfate together  CHest pain currently not present           Future Appointments   Date Time Provider Kailash Perkins   3/1/2023 12:45 PM Jose Isidro MD [facility]   3/2/2023  3:00 PM rBandie Meyers MD [facility]   3/20/2023  1:15 PM [place] [address] GI ROOM 01 [place] [address] [place] ALYSSA   3/31/2023 10:00 AM AKILA Mcwilliams [facility]   4/4/2023 11:30 AM [place] [facility] [place] Sleep lab [id number]  CC: Follow-up (Acid reflux)      HPI:  Keshav Unger is a 81 y o  male who presents for   Acid reflux  2 weeks of chest paind and SOB but its more of pain coming from stomach and esophagueous  Drink water  Kidney pain left; peeing ok          Review of Systems   Constitutional: Negative for activity change, appetite change, chills, fatigue and fever  HENT: Negative for congestion  Respiratory: Positive for chest tightness  Negative for cough and shortness of breath  Cardiovascular: Negative for chest pain and leg swelling     Gastrointestinal: Negative for abdominal distention, abdominal pain, constipation, diarrhea, nausea and vomiting  Genitourinary: Positive for flank pain  All other systems reviewed and are negative        Historical Information   The patient history was reviewed as follows:  Past Medical History:   Diagnosis Date   • Arthritis     shoulder   • Ascending aortic aneurysm    • Cataract     both eyes-to have the right cataract removed on 1/25/22   • Colon polyp    • GERD (gastroesophageal reflux disease)    • Insomnia    • Leg cramps    • Memory changes    • Restless legs          Medications:     Current Outpatient Medications:   •  aspirin (ECOTRIN LOW STRENGTH) 81 mg EC tablet, Take 1 tablet (81 mg total) by mouth daily with breakfast, Disp: 90 tablet, Rfl: 3  •  gabapentin (Neurontin) 100 mg capsule, Take 3 capsules (300 mg total) by mouth daily at bedtime, Disp: 90 capsule, Rfl: 1  •  meloxicam (Mobic) 15 mg tablet, Take 1 tablet (15 mg total) by mouth daily, Disp: 30 tablet, Rfl: 0  •  rosuvastatin (CRESTOR) 10 MG tablet, Take 1 tablet (10 mg total) by mouth daily at bedtime, Disp: 90 tablet, Rfl: 3  •  Silodosin 8 MG CAPS, Take 1 capsule by mouth daily, Disp: , Rfl:   •  tadalafil (CIALIS) 20 MG tablet, Take 20 mg by mouth, Disp: , Rfl:   •  hydrOXYzine HCL (ATARAX) 50 mg tablet, Take 1 tablet at bedtime to sleep, no later then 8 pm (Patient not taking: Reported on 2/23/2023), Disp: 90 tablet, Rfl: 2  •  pantoprazole (PROTONIX) 40 mg tablet, Take 1 tablet (40 mg total) by mouth daily (Patient not taking: Reported on 2/23/2023), Disp: 90 tablet, Rfl: 2  •  Promethazine-DM (PHENERGAN-DM) 6 25-15 mg/5 mL oral syrup, Take 5 mL by mouth 4 (four) times a day as needed for cough (Patient not taking: Reported on 2/23/2023), Disp: 118 mL, Rfl: 3  •  sucralfate (CARAFATE) 1 g tablet, Take 1 tablet (1 g total) by mouth 4 (four) times a day (before meals and at bedtime), Disp: 120 tablet, Rfl: 5  •  valACYclovir (VALTREX) 1,000 mg tablet, Take 2 tablets (2,000 mg total) by mouth 2 (two) times a day for 1 day, Disp: 4 tablet, Rfl: 5    No Known Allergies    OBJECTIVE  Vitals:   Vitals:    02/23/23 1524   BP: 120/80   BP Location: Left arm   Patient Position: Sitting   Cuff Size: Standard   Pulse: 80   Resp: 16   Temp: 97 5 °F (36 4 °C)   Weight: 66 7 kg (147 lb)   Height: 5' 1" (1 549 m)         Physical Exam  Vitals reviewed  Constitutional:       Appearance: He is well-developed  HENT:      Head: Normocephalic and atraumatic  Eyes:      Conjunctiva/sclera: Conjunctivae normal       Pupils: Pupils are equal, round, and reactive to light  Cardiovascular:      Rate and Rhythm: Normal rate and regular rhythm  Heart sounds: Normal heart sounds  Pulmonary:      Effort: Pulmonary effort is normal  No respiratory distress  Breath sounds: Normal breath sounds  Abdominal:      Tenderness: There is left CVA tenderness  Musculoskeletal:         General: Normal range of motion  Cervical back: Normal range of motion and neck supple  Skin:     General: Skin is warm  Capillary Refill: Capillary refill takes less than 2 seconds  Neurological:      Mental Status: He is alert and oriented to person, place, and time                      Bernadette Bell MD,   [facility]  2/26/2023

## 2023-02-24 ENCOUNTER — APPOINTMENT (OUTPATIENT)
Dept: LAB | Facility: CLINIC | Age: 73
End: 2023-02-24

## 2023-02-24 DIAGNOSIS — R10.9 LEFT FLANK PAIN: ICD-10-CM

## 2023-02-24 LAB
ALBUMIN SERPL BCP-MCNC: 3.9 G/DL (ref 3.5–5)
ALP SERPL-CCNC: 78 U/L (ref 46–116)
ALT SERPL W P-5'-P-CCNC: 23 U/L (ref 12–78)
ANION GAP SERPL CALCULATED.3IONS-SCNC: 5 MMOL/L (ref 4–13)
AST SERPL W P-5'-P-CCNC: 18 U/L (ref 5–45)
BACTERIA UR QL AUTO: ABNORMAL /HPF
BILIRUB SERPL-MCNC: 0.69 MG/DL (ref 0.2–1)
BILIRUB UR QL STRIP: NEGATIVE
BUN SERPL-MCNC: 12 MG/DL (ref 5–25)
CALCIUM SERPL-MCNC: 9.2 MG/DL (ref 8.3–10.1)
CHLORIDE SERPL-SCNC: 111 MMOL/L (ref 96–108)
CLARITY UR: CLEAR
CO2 SERPL-SCNC: 26 MMOL/L (ref 21–32)
COLOR UR: ABNORMAL
CREAT SERPL-MCNC: 0.82 MG/DL (ref 0.6–1.3)
GFR SERPL CREATININE-BSD FRML MDRD: 89 ML/MIN/1.73SQ M
GLUCOSE P FAST SERPL-MCNC: 102 MG/DL (ref 65–99)
GLUCOSE UR STRIP-MCNC: NEGATIVE MG/DL
HGB UR QL STRIP.AUTO: ABNORMAL
KETONES UR STRIP-MCNC: NEGATIVE MG/DL
LEUKOCYTE ESTERASE UR QL STRIP: NEGATIVE
MUCOUS THREADS UR QL AUTO: ABNORMAL
NITRITE UR QL STRIP: NEGATIVE
NON-SQ EPI CELLS URNS QL MICRO: ABNORMAL /HPF
PH UR STRIP.AUTO: 6 [PH]
POTASSIUM SERPL-SCNC: 4.1 MMOL/L (ref 3.5–5.3)
PROT SERPL-MCNC: 7 G/DL (ref 6.4–8.4)
PROT UR STRIP-MCNC: NEGATIVE MG/DL
RBC #/AREA URNS AUTO: ABNORMAL /HPF
SODIUM SERPL-SCNC: 142 MMOL/L (ref 135–147)
SP GR UR STRIP.AUTO: 1.02 (ref 1–1.03)
UROBILINOGEN UR STRIP-ACNC: <2 MG/DL
WBC #/AREA URNS AUTO: ABNORMAL /HPF

## 2023-03-01 ENCOUNTER — OFFICE VISIT (OUTPATIENT)
Dept: OBGYN CLINIC | Facility: CLINIC | Age: 73
End: 2023-03-01

## 2023-03-01 VITALS
HEIGHT: 61 IN | WEIGHT: 147 LBS | DIASTOLIC BLOOD PRESSURE: 68 MMHG | BODY MASS INDEX: 27.75 KG/M2 | HEART RATE: 90 BPM | TEMPERATURE: 98.4 F | SYSTOLIC BLOOD PRESSURE: 105 MMHG

## 2023-03-01 DIAGNOSIS — M17.11 PRIMARY OSTEOARTHRITIS OF RIGHT KNEE: Primary | ICD-10-CM

## 2023-03-01 DIAGNOSIS — M17.12 PRIMARY OSTEOARTHRITIS OF LEFT KNEE: ICD-10-CM

## 2023-03-01 RX ORDER — BUPIVACAINE HYDROCHLORIDE 2.5 MG/ML
4 INJECTION, SOLUTION INFILTRATION; PERINEURAL
Status: COMPLETED | OUTPATIENT
Start: 2023-03-01 | End: 2023-03-01

## 2023-03-01 RX ORDER — DEXAMETHASONE SODIUM PHOSPHATE 10 MG/ML
40 INJECTION, SOLUTION INTRAMUSCULAR; INTRAVENOUS
Status: COMPLETED | OUTPATIENT
Start: 2023-03-01 | End: 2023-03-01

## 2023-03-01 RX ADMIN — BUPIVACAINE HYDROCHLORIDE 4 ML: 2.5 INJECTION, SOLUTION INFILTRATION; PERINEURAL at 12:45

## 2023-03-01 RX ADMIN — DEXAMETHASONE SODIUM PHOSPHATE 40 MG: 10 INJECTION, SOLUTION INTRAMUSCULAR; INTRAVENOUS at 12:45

## 2023-03-01 NOTE — PROGRESS NOTES
Assessment/Plan:  1  Primary osteoarthritis of right knee  Large joint arthrocentesis: R knee      2  Primary osteoarthritis of left knee  Large joint arthrocentesis: L knee        Scribe Attestation    I,:  Disha Hutchinson MA am acting as a scribe while in the presence of the attending physician :       I,:  Pooja Bustos MD personally performed the services described in this documentation    as scribed in my presence  :             70-year-old male who presents to the office today for follow up evaluation of his activity related bilateral knee pain secondary to his underlying osteoarthritis  The patient underwent bilateral knee steroid injections at his last visit which did provide him with good relief  We did discuss repeat injections in the office today which the patient was agreeable to  He consented and underwent bilateral knee steroid injections in the office today without any complications  He may follow up with me as needed  Large joint arthrocentesis: R knee  Universal Protocol:  Consent: Verbal consent obtained  Consent given by: patient  Patient identity confirmed: verbally with patient    Supporting Documentation  Indications: pain   Procedure Details  Location: knee - R knee  Preparation: Patient was prepped and draped in the usual sterile fashion  Needle size: 22 G  Ultrasound guidance: no  Medications administered: 4 mL bupivacaine 0 25 %; 40 mg dexamethasone 100 mg/10 mL    Patient tolerance: patient tolerated the procedure well with no immediate complications  Dressing:  Sterile dressing applied    Large joint arthrocentesis: L knee  Universal Protocol:  Consent: Verbal consent obtained    Consent given by: patient  Patient identity confirmed: verbally with patient    Supporting Documentation  Indications: pain   Procedure Details  Location: knee - L knee  Preparation: Patient was prepped and draped in the usual sterile fashion  Needle size: 22 G  Ultrasound guidance: no  Medications administered: 4 mL bupivacaine 0 25 %; 40 mg dexamethasone 100 mg/10 mL    Patient tolerance: patient tolerated the procedure well with no immediate complications  Dressing:  Sterile dressing applied          Subjective:   Shala Villarreal is a 81 y o  male who presents to the office today for follow up evaluation of his activity related bilateral knee pain secondary to his underlying osteoarthritis  The patient underwent bilateral knee steroid injections at his last visit on 10/12/22 which he states provided him with good relief  He states his knee pain started to return  He states his left knee is worse than his right  He notes increased pain with activities  The pain is sharp and moderate and intermittent  Review of Systems   Constitutional: Negative for chills and fever  HENT: Negative for drooling and sneezing  Eyes: Negative for redness  Respiratory: Negative for cough and wheezing  Gastrointestinal: Negative for nausea and vomiting  Musculoskeletal: Positive for arthralgias  Negative for joint swelling and myalgias  Neurological: Negative for weakness and numbness  Psychiatric/Behavioral: Negative for behavioral problems  The patient is not nervous/anxious  Past Medical History:   Diagnosis Date   • Arthritis     shoulder   • Ascending aortic aneurysm    • Cataract     both eyes-to have the right cataract removed on 1/25/22   • Colon polyp    • GERD (gastroesophageal reflux disease)    • Insomnia    • Leg cramps    • Memory changes    • Restless legs        Past Surgical History:   Procedure Laterality Date   • APPENDECTOMY     • CHOLECYSTECTOMY     • CHOLECYSTECTOMY LAPAROSCOPIC N/A 10/27/2020    Procedure: CHOLECYSTECTOMY LAPAROSCOPIC;  Surgeon: Henrik Coy MD;  Location: [address] [place] [address];  Service: General   • COLONOSCOPY N/A 12/12/2016    Procedure: COLONOSCOPY;  Surgeon: Camden Perez MD;  Location: [facility] GI LAB;   Service:    • ELBOW SURGERY Bilateral    • HERNIA REPAIR Right 2001    inguinal   • KNEE ARTHROSCOPY Right    • VA ARTHRS KNE SURG W/MENISCECTOMY MED/LAT W/SHVG Left 6/18/2020    Procedure: ARTHROSCOPY KNEE MEDIAL MENISCECTOMY;  Surgeon: Loraine Leventhal, MD;  Location: [address] [place] [address];  Service: Orthopedics   • ROTATOR CUFF REPAIR Bilateral 2011       Family History   Problem Relation Age of Onset   • No Known Problems Mother    • No Known Problems Father    • No Known Problems Sister    • No Known Problems Brother    • No Known Problems Maternal Aunt    • No Known Problems Maternal Uncle    • No Known Problems Paternal Aunt    • No Known Problems Paternal Uncle    • No Known Problems Maternal Grandmother    • No Known Problems Maternal Grandfather    • No Known Problems Paternal Grandmother    • No Known Problems Paternal Grandfather    • ADD / ADHD Neg Hx    • Anesthesia problems Neg Hx    • Cancer Neg Hx    • Clotting disorder Neg Hx    • Collagen disease Neg Hx    • Diabetes Neg Hx    • Dislocations Neg Hx    • Learning disabilities Neg Hx    • Neurological problems Neg Hx    • Osteoporosis Neg Hx    • Rheumatologic disease Neg Hx    • Scoliosis Neg Hx    • Vascular Disease Neg Hx        Social History     Occupational History   • Not on file   Tobacco Use   • Smoking status: Never   • Smokeless tobacco: Never   Vaping Use   • Vaping Use: Never used   Substance and Sexual Activity   • Alcohol use: Not Currently   • Drug use: No   • Sexual activity: Not Currently         Current Outpatient Medications:   •  aspirin (ECOTRIN LOW STRENGTH) 81 mg EC tablet, Take 1 tablet (81 mg total) by mouth daily with breakfast, Disp: 90 tablet, Rfl: 3  •  gabapentin (Neurontin) 100 mg capsule, Take 3 capsules (300 mg total) by mouth daily at bedtime, Disp: 90 capsule, Rfl: 1  •  meloxicam (Mobic) 15 mg tablet, Take 1 tablet (15 mg total) by mouth daily, Disp: 30 tablet, Rfl: 0  •  rosuvastatin (CRESTOR) 10 MG tablet, Take 1 tablet (10 mg total) by mouth daily at bedtime, Disp: 90 tablet, Rfl: 3  •  Silodosin 8 MG CAPS, Take 1 capsule by mouth daily, Disp: , Rfl:   •  tadalafil (CIALIS) 20 MG tablet, Take 20 mg by mouth, Disp: , Rfl:   •  hydrOXYzine HCL (ATARAX) 50 mg tablet, Take 1 tablet at bedtime to sleep, no later then 8 pm (Patient not taking: Reported on 2/23/2023), Disp: 90 tablet, Rfl: 2  •  pantoprazole (PROTONIX) 40 mg tablet, Take 1 tablet (40 mg total) by mouth daily (Patient not taking: Reported on 2/23/2023), Disp: 90 tablet, Rfl: 2  •  Promethazine-DM (PHENERGAN-DM) 6 25-15 mg/5 mL oral syrup, Take 5 mL by mouth 4 (four) times a day as needed for cough (Patient not taking: Reported on 2/23/2023), Disp: 118 mL, Rfl: 3  •  sucralfate (CARAFATE) 1 g tablet, Take 1 tablet (1 g total) by mouth 4 (four) times a day (before meals and at bedtime), Disp: 120 tablet, Rfl: 5  •  valACYclovir (VALTREX) 1,000 mg tablet, Take 2 tablets (2,000 mg total) by mouth 2 (two) times a day for 1 day, Disp: 4 tablet, Rfl: 5    No Known Allergies    Objective:  Vitals:    03/01/23 1210   BP: 105/68   Pulse: 90   Temp: 98 4 °F (36 9 °C)       Right Knee Exam     Tenderness   The patient is experiencing tenderness in the medial joint line  Range of Motion   Extension: 0   Flexion: 120     Tests   Varus: negative Valgus: negative  Lachman:  Anterior - negative    Posterior - negative  Drawer:  Anterior - negative    Posterior - negative    Other   Erythema: absent  Sensation: normal  Pulse: present  Swelling: none  Effusion: no effusion present      Left Knee Exam     Tenderness   The patient is experiencing tenderness in the medial joint line      Range of Motion   Extension: 0   Flexion: 120     Tests   Varus: negative Valgus: negative  Lachman:  Anterior - negative    Posterior - negative  Drawer:  Anterior - negative     Posterior - negative    Other   Erythema: absent  Sensation: normal  Pulse: present  Swelling: none  Effusion: no effusion present          Observations   Left Knee Negative for effusion  Right Knee   Negative for effusion  Physical Exam  Constitutional:       Appearance: He is well-developed  HENT:      Head: Normocephalic and atraumatic  Eyes:      General:         Right eye: No discharge  Left eye: No discharge  Conjunctiva/sclera: Conjunctivae normal    Cardiovascular:      Rate and Rhythm: Normal rate  Pulmonary:      Effort: Pulmonary effort is normal  No respiratory distress  Musculoskeletal:      Cervical back: Normal range of motion and neck supple  Right knee: No effusion  Left knee: No effusion  Comments: As noted in HPI   Skin:     General: Skin is warm and dry  Neurological:      Mental Status: He is alert and oriented to person, place, and time  Psychiatric:         Behavior: Behavior normal          Thought Content: Thought content normal          Judgment: Judgment normal          I have personally reviewed pertinent films in PACS and my interpretation is as follows: No new images reviewed in the office today  This document was created using speech voice recognition software  Grammatical errors, random word insertions, pronoun errors, and incomplete sentences are an occasional consequence of this system due to software limitations, ambient noise, and hardware issues  Any formal questions or concerns about content, text, or information contained within the body of this dictation should be directly addressed to the provider for clarification

## 2023-03-02 ENCOUNTER — OFFICE VISIT (OUTPATIENT)
Dept: CARDIOLOGY CLINIC | Facility: CLINIC | Age: 73
End: 2023-03-02

## 2023-03-02 VITALS
BODY MASS INDEX: 27.94 KG/M2 | OXYGEN SATURATION: 95 % | DIASTOLIC BLOOD PRESSURE: 60 MMHG | WEIGHT: 148 LBS | HEART RATE: 86 BPM | SYSTOLIC BLOOD PRESSURE: 118 MMHG | HEIGHT: 61 IN

## 2023-03-02 DIAGNOSIS — I71.20 THORACIC AORTIC ANEURYSM WITHOUT RUPTURE, UNSPECIFIED PART: ICD-10-CM

## 2023-03-02 DIAGNOSIS — I71.20 THORACIC AORTIC ANEURYSM WITHOUT RUPTURE: ICD-10-CM

## 2023-03-02 DIAGNOSIS — I73.9 PERIPHERAL ARTERY DISEASE (HCC): ICD-10-CM

## 2023-03-02 DIAGNOSIS — I73.9 PAD (PERIPHERAL ARTERY DISEASE) (HCC): ICD-10-CM

## 2023-03-02 DIAGNOSIS — R07.89 CHEST PRESSURE: Primary | ICD-10-CM

## 2023-03-02 RX ORDER — ROSUVASTATIN CALCIUM 10 MG/1
10 TABLET, COATED ORAL
Qty: 90 TABLET | Refills: 3 | Status: SHIPPED | OUTPATIENT
Start: 2023-03-02

## 2023-03-02 NOTE — PROGRESS NOTES
[facility]  [address] [place] [address] [address]   [place], [address]  Cardiology Follow-up    Ashok Carballo  [id number]  1950      Consult for:Atypical chest pain  Appreciate consult by: Anisha Renee MD    1  Chest pressure  POCT ECG      2  Peripheral artery disease (HCC)  POCT ECG      3  Thoracic aortic aneurysm without rupture, unspecified part  POCT ECG      4  Thoracic aortic aneurysm without rupture  rosuvastatin (CRESTOR) 10 MG tablet         Discussion/Summary:   Atypical chest pain- no major ekg changes  a ruled out for an acute coronary syndrome in the ER  He has atypical features  Stress test negative  Benign physical examination  Cannot rule out acid reflux  Followed by GI on gi cocktail  Peripheral vascular disease- last CT scan with contrast of the abdomen showed 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  Also noted with peripheral vascular disease  His blood pressures have been controlled  He is a nonsmoker  Will put him on low-dose statin  Also recommend aspirin 81 mg  Followed by vascular    Thoracic aortic aneurysm- mild  Denies having family history for abdominal rupture  Nonsmoker  Blood pressure is controlled  Continue to monitor  Followed by vascular    HPI:   29-year-old gentleman with no prior cardiac events, mild thoracic aortic aneurysm presents with recent ER visit secondary to chest discomfort  He states having sharp chest discomfort at times  Not completely related to exercise  No family history for myocardial infarction  He denies having smoking history  He does have a history of peripheral vascular disease  Sharp pain with deep breath  Not related to eating  Some pain is positional       07/18/2021: He denies having chest discomfort  His blood pressures are at goal   His last LDL was controlled  01/28/2022:  He continues to have the left-sided and epigastric chest discomfort  He association with food    He denies any major worsening with exertion  His last EKG reviewed without any major EKG changes  His blood pressures remain controlled  He is compliant on low-dose aspirin and statin  3/2/2023: He denies having major chest heaviness or shortness of breath  He is compliant with his cholesterol medication  He is very active  He is doing more than 5-10,000 steps without any troubles  No major EKG changes  Past Medical History:   Diagnosis Date   • Arthritis     shoulder   • Ascending aortic aneurysm    • Cataract     both eyes-to have the right cataract removed on 1/25/22   • Colon polyp    • GERD (gastroesophageal reflux disease)    • Insomnia    • Leg cramps    • Memory changes    • Restless legs      Social History     Socioeconomic History   • Marital status: /Civil Union     Spouse name: Not on file   • Number of children: Not on file   • Years of education: Not on file   • Highest education level: Not on file   Occupational History   • Not on file   Tobacco Use   • Smoking status: Never   • Smokeless tobacco: Never   Vaping Use   • Vaping Use: Never used   Substance and Sexual Activity   • Alcohol use: Not Currently   • Drug use: No   • Sexual activity: Not Currently   Other Topics Concern   • Not on file   Social History Narrative   • Not on file     Social Determinants of Health     Financial Resource Strain: Low Risk    • Difficulty of Paying Living Expenses: Not hard at all   Food Insecurity: Not on file   Transportation Needs: No Transportation Needs   • Lack of Transportation (Medical): No   • Lack of Transportation (Non-Medical):  No   Physical Activity: Not on file   Stress: Not on file   Social Connections: Not on file   Intimate Partner Violence: Not on file   Housing Stability: Not on file      Family History   Problem Relation Age of Onset   • No Known Problems Mother    • No Known Problems Father    • No Known Problems Sister    • No Known Problems Brother    • No Known Problems Maternal Aunt    • No Known Problems Maternal Uncle    • No Known Problems Paternal Aunt    • No Known Problems Paternal Uncle    • No Known Problems Maternal Grandmother    • No Known Problems Maternal Grandfather    • No Known Problems Paternal Grandmother    • No Known Problems Paternal Grandfather    • ADD / ADHD Neg Hx    • Anesthesia problems Neg Hx    • Cancer Neg Hx    • Clotting disorder Neg Hx    • Collagen disease Neg Hx    • Diabetes Neg Hx    • Dislocations Neg Hx    • Learning disabilities Neg Hx    • Neurological problems Neg Hx    • Osteoporosis Neg Hx    • Rheumatologic disease Neg Hx    • Scoliosis Neg Hx    • Vascular Disease Neg Hx      Past Surgical History:   Procedure Laterality Date   • APPENDECTOMY     • CHOLECYSTECTOMY     • CHOLECYSTECTOMY LAPAROSCOPIC N/A 10/27/2020    Procedure: CHOLECYSTECTOMY LAPAROSCOPIC;  Surgeon: Dea Echevarria MD;  Location: [facility];  Service: General   • COLONOSCOPY N/A 12/12/2016    Procedure: COLONOSCOPY;  Surgeon: Makayla Parikh MD;  Location: [facility] GI LAB;   Service:    • ELBOW SURGERY Bilateral    • HERNIA REPAIR Right 2001    inguinal   • KNEE ARTHROSCOPY Right    • [place] ARTHRS KNE SURG W/MENISCECTOMY MED/LAT W/SHVG Left 6/18/2020    Procedure: ARTHROSCOPY KNEE MEDIAL MENISCECTOMY;  Surgeon: Payam Berger MD;  Location: [address] [place] [address];  Service: Orthopedics   • ROTATOR CUFF REPAIR Bilateral 2011       Current Outpatient Medications:   •  aspirin (ECOTRIN LOW STRENGTH) 81 mg EC tablet, Take 1 tablet (81 mg total) by mouth daily with breakfast, Disp: 90 tablet, Rfl: 3  •  gabapentin (Neurontin) 100 mg capsule, Take 3 capsules (300 mg total) by mouth daily at bedtime, Disp: 90 capsule, Rfl: 1  •  hydrOXYzine HCL (ATARAX) 50 mg tablet, Take 1 tablet at bedtime to sleep, no later then 8 pm, Disp: 90 tablet, Rfl: 2  •  meloxicam (Mobic) 15 mg tablet, Take 1 tablet (15 mg total) by mouth daily, Disp: 30 tablet, Rfl: 0  •  pantoprazole (PROTONIX) 40 mg tablet, Take 1 tablet (40 mg total) by mouth daily, Disp: 90 tablet, Rfl: 2  •  rosuvastatin (CRESTOR) 10 MG tablet, Take 1 tablet (10 mg total) by mouth daily at bedtime, Disp: 90 tablet, Rfl: 3  •  Silodosin 8 MG CAPS, Take 1 capsule by mouth daily, Disp: , Rfl:   •  tadalafil (CIALIS) 20 MG tablet, Take 20 mg by mouth, Disp: , Rfl:   •  Promethazine-DM (PHENERGAN-DM) 6 25-15 mg/5 mL oral syrup, Take 5 mL by mouth 4 (four) times a day as needed for cough (Patient not taking: Reported on 3/2/2023), Disp: 118 mL, Rfl: 3  •  sucralfate (CARAFATE) 1 g tablet, Take 1 tablet (1 g total) by mouth 4 (four) times a day (before meals and at bedtime), Disp: 120 tablet, Rfl: 5  •  valACYclovir (VALTREX) 1,000 mg tablet, Take 2 tablets (2,000 mg total) by mouth 2 (two) times a day for 1 day, Disp: 4 tablet, Rfl: 5  No Known Allergies  Vitals:    03/02/23 1430   BP: 118/60   BP Location: Right arm   Patient Position: Sitting   Cuff Size: Standard   Pulse: 86   SpO2: 95%   Weight: 67 1 kg (148 lb)   Height: 5' 1" (1 549 m)       Review of Systems:   Review of Systems   Constitutional: Negative  HENT: Negative  Eyes: Negative  Respiratory: Negative  Cardiovascular: Negative for chest pain  Gastrointestinal: Negative  Endocrine: Negative  Genitourinary: Negative  Musculoskeletal: Negative  Skin: Negative  Allergic/Immunologic: Negative  Neurological: Negative  Hematological: Negative  Psychiatric/Behavioral: Negative  Vitals:    03/02/23 1430   BP: 118/60   BP Location: Right arm   Patient Position: Sitting   Cuff Size: Standard   Pulse: 86   SpO2: 95%   Weight: 67 1 kg (148 lb)   Height: 5' 1" (1 549 m)     Physical Examination:   Physical Exam  Constitutional:       General: He is not in acute distress  Appearance: He is well-developed  He is not diaphoretic  HENT:      Head: Normocephalic and atraumatic        Right Ear: External ear normal       Left Ear: External ear normal    Eyes: General: No scleral icterus  Right eye: No discharge  Left eye: No discharge  Conjunctiva/sclera: Conjunctivae normal       Pupils: Pupils are equal, round, and reactive to light  Neck:      Thyroid: No thyromegaly  Vascular: No JVD  Trachea: No tracheal deviation  Cardiovascular:      Rate and Rhythm: Normal rate and regular rhythm  Heart sounds: No murmur heard  No friction rub  No gallop  Pulmonary:      Effort: Pulmonary effort is normal  No respiratory distress  Breath sounds: Normal breath sounds  No stridor  No wheezing or rales  Chest:      Chest wall: No tenderness  Abdominal:      General: Bowel sounds are normal  There is no distension  Palpations: Abdomen is soft  There is no mass  Tenderness: There is no abdominal tenderness  There is no guarding or rebound  Musculoskeletal:         General: No tenderness or deformity  Normal range of motion  Cervical back: Normal range of motion and neck supple  Skin:     General: Skin is warm and dry  Coloration: Skin is not pale  Findings: No erythema or rash  Neurological:      Mental Status: He is alert and oriented to person, place, and time  Cranial Nerves: No cranial nerve deficit  Motor: No abnormal muscle tone  Coordination: Coordination normal       Deep Tendon Reflexes: Reflexes are normal and symmetric  Reflexes normal    Psychiatric:         Behavior: Behavior normal          Thought Content:  Thought content normal          Judgment: Judgment normal          Labs:     Lab Results   Component Value Date    WBC 7 23 11/15/2022    HGB 15 5 11/15/2022    HCT 45 7 11/15/2022    MCV 94 11/15/2022    RDW 12 4 11/15/2022     11/15/2022     BMP:  Lab Results   Component Value Date    SODIUM 142 02/24/2023    K 4 1 02/24/2023     (H) 02/24/2023    CO2 26 02/24/2023    BUN 12 02/24/2023    CREATININE 0 82 02/24/2023    GLUC 105 10/20/2022    GLUF 102 (H) 02/24/2023    CALCIUM 9 2 02/24/2023    EGFR 89 02/24/2023    MG 2 3 10/20/2022     LFT:  Lab Results   Component Value Date    AST 18 02/24/2023    ALT 23 02/24/2023    ALKPHOS 78 02/24/2023    TP 7 0 02/24/2023    ALB 3 9 02/24/2023      No results found for: [facility]  Lab Results   Component Value Date    HGBA1C 5 3 11/15/2022     Lipid Profile:   Lab Results   Component Value Date    CHOLESTEROL 116 11/15/2022    HDL 37 (L) 11/15/2022    LDLCALC 60 11/15/2022    TRIG 95 11/15/2022     Lab Results   Component Value Date    CHOLESTEROL 116 11/15/2022    CHOLESTEROL 117 10/06/2021     Lab Results   Component Value Date    TROPONINI <0 02 10/27/2020     Lab Results   Component Value Date    NTBNP 59 10/20/2022      Recent Results (from the past 672 hour(s))   Comprehensive metabolic panel    Collection Time: 02/24/23  8:43 AM   Result Value Ref Range    Sodium 142 135 - 147 mmol/L    Potassium 4 1 3 5 - 5 3 mmol/L    Chloride 111 (H) 96 - 108 mmol/L    CO2 26 21 - 32 mmol/L    ANION GAP 5 4 - 13 mmol/L    BUN 12 5 - 25 mg/dL    Creatinine 0 82 0 60 - 1 30 mg/dL    Glucose, Fasting 102 (H) 65 - 99 mg/dL    Calcium 9 2 8 3 - 10 1 mg/dL    AST 18 5 - 45 U/L    ALT 23 12 - 78 U/L    Alkaline Phosphatase 78 46 - 116 U/L    Total Protein 7 0 6 4 - 8 4 g/dL    Albumin 3 9 3 5 - 5 0 g/dL    Total Bilirubin 0 69 0 20 - 1 00 mg/dL    eGFR 89 ml/min/1 73sq m   UA w Reflex to Microscopic w Reflex to Culture -Lab Collect    Collection Time: 02/24/23  8:43 AM    Specimen: Urine, Clean Catch   Result Value Ref Range    Color, UA Light Yellow     Clarity, UA Clear     Specific Gravity, UA 1 017 1 003 - 1 030    pH, UA 6 0 4 5, 5 0, 5 5, 6 0, 6 5, 7 0, 7 5, 8 0    Leukocytes, UA Negative Negative    Nitrite, UA Negative Negative    Protein, UA Negative Negative mg/dl    Glucose, UA Negative Negative mg/dl    Ketones, UA Negative Negative mg/dl    Urobilinogen, UA <2 0 <2 0 mg/dl mg/dl    Bilirubin, UA Negative Negative    Occult Blood, UA Trace (A) Negative   Urine Microscopic    Collection Time: 02/24/23  8:43 AM   Result Value Ref Range    RBC, UA 1-2 None Seen, 1-2 /hpf    WBC, UA None Seen None Seen, 1-2 /hpf    Epithelial Cells Occasional None Seen, Occasional /hpf    Bacteria, UA None Seen None Seen, Occasional /hpf    MUCUS THREADS Occasional (A) None Seen       Imaging & Testing   I have personally reviewed pertinent reports  Cardiac Testing     EKG: Personally reviewed  Mary Alice Jack MD [facility]  [phone]  Please call with any questions or suggestions    Counseling :  A description of the counseling:   Goals and Barriers:  Patient's ability to self care:  Medication side effect reviewed with patient in detail and all their questions answered  "Portions of the record may have been created with voice recognition software  Occasional wrong word or "sound a like" substitutions may have occurred due to the inherent limitations of voice recognition software  Read the chart carefully and recognize, using context, where substitutions have occurred   Please call if you have any questions  "

## 2023-03-06 ENCOUNTER — TELEPHONE (OUTPATIENT)
Dept: GASTROENTEROLOGY | Facility: CLINIC | Age: 73
End: 2023-03-06

## 2023-03-06 NOTE — TELEPHONE ENCOUNTER
Left message for patient reminding him of his EGD 3/20 with Dr Marybeth Khoury  He will need a , be called day before with arrival time and have nothing to eat after midnight and can have clear liquids 4 hours prior to procedure

## 2023-03-16 ENCOUNTER — OFFICE VISIT (OUTPATIENT)
Dept: VASCULAR SURGERY | Facility: CLINIC | Age: 73
End: 2023-03-16

## 2023-03-16 VITALS
SYSTOLIC BLOOD PRESSURE: 122 MMHG | BODY MASS INDEX: 27.75 KG/M2 | WEIGHT: 147 LBS | HEART RATE: 75 BPM | DIASTOLIC BLOOD PRESSURE: 66 MMHG | HEIGHT: 61 IN

## 2023-03-16 DIAGNOSIS — I73.9 PERIPHERAL ARTERY DISEASE (HCC): ICD-10-CM

## 2023-03-16 DIAGNOSIS — G25.81 RESTLESS LEGS SYNDROME: ICD-10-CM

## 2023-03-16 DIAGNOSIS — R25.2 BILATERAL LEG CRAMPS: Primary | ICD-10-CM

## 2023-03-16 DIAGNOSIS — I71.9 AORTIC ANEURYSM WITHOUT RUPTURE, UNSPECIFIED PORTION OF AORTA (HCC): ICD-10-CM

## 2023-03-16 NOTE — PROGRESS NOTES
Assessment/Plan:    Bilateral leg cramps  Restless legs syndrome  Peripheral neuropathy  -     Compression Stocking   -Chronic, L>R leg cramps, tightness, shaking  -Feet are warm and well-perfused; no leg edema; fem and distal pulses   -Recently placed on gabapentin 300 HS by other  -Trial of fresh compression stockings and conservative measures  -Follow up with PCP      Peripheral artery disease (HCC)  -     VAS TATIANA & waveform analysis, multiple levels; Future  -Asymptomatic PAD  -Walks 45" daily without leg/thigh/calf claudication    Exam:  -2+ femoral pulses and 1+ DP pulses bilaterally    -Toes are cool, but skin is otherwise normal color, warm and well-perfused   -No foot wounds  No edema  No chronic stasis changes  Plan:  -Continue with regular walking every day for 45 minutes  -Continue with aspirin and statin therapy  -Check TATIANA's for completeness (expect normal)      Atherosclerotic disease of the abdominal aorta  Dissection of the distal abdominal aorta  -CTA a/p 5/19/20 - Focal outpouching in the abdominal aorta suggesting focal dissection or penetrating ulcer  Renals, Celiac, SMA and AYSE are patent  Iliacs patent  -CTA 10/20/22 - aortic atherosclerotic disease  -Continue with aspirin and atorvastatin therapy  -Follow up in 1 year      Aortic aneurysm without rupture, unspecified portion of aorta (HCC)  -40 mm ascending aneurysm on CTA 10/20/22  -Aspirin/ atorvastatin therapy  -Management as per cardiology        Subjective:      Patient ID: Stu Giang is a 81 y o  male  Pt presents for eval of bilateral calf pain at night and leg swelling  Pt reports pain in calves @ night  Pt does wear compression      HPI    Stu Giang 69yo M BPH, GERD, Yeboah's esphagus, anxiety, PAULINA, cervical radiculopathy, restless legs, multiple orthopedic surgeries, hyperlipidemia, ascending thoracic aortic aneurysm and peripheral arterial disease who was originally seen in 2021 for evaluation of varicose veins and nighttime cramping  He is a non-smoker  He is maintained on aspirin 81 and statin therapy  Patient with R > L calf cramping which wakes him up in the night and wife rubs out the cramp  He also complains of intermittent tender and lower extremity veins  At times the legs ache and he gets minimal edema  No claudication  No foot pain or wounds  No hx of blood cts        3/16/23: Mr Israel Sapp returns with complains of restless legs and nighttime cramping/ tightness in the calves  Pain wakes him up in the night and he has to walk around  There is no foot pain  He was recently placed on gabapentin 300 HS for restless legs and neuropathy, but he feels that it doesn't help, so it is not clear if he is still taking it  He moisturizes the legs and intermittently wears compression stockings  He tells me that he drinks a lot of water  He walks everyday for exercise about 45 minutes without limitation  He has no leg, thigh or calf pain with ambulation  He has no exertional or rest foot pain  No foot wounds  No CP, SOB or palpitations  No diabetes  He was seen in the ED 10/20/22 for epigastric pain / gastritis  A CTA c/a/p was performed and is below  Of note, CTA a/p in 2020 showed atherosclerotic disease of the aorta and focal penetrating ulcer boluses dissection in the distal abdominal aorta  He is placed on aspirin and rosuvastatin 10 by cardiology  -CC: tightness, cramping B calves at night  -BUN/creatinine 12/0 2, EGFR 89; K 4 1  -40 mm ascending aortic aneurysm 10/2022  -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    The following portions of the patient's history were reviewed and updated as appropriate: allergies, current medications, past family history, past medical history, past social history, past surgical history and problem list     Review of Systems   Constitutional: Negative  HENT: Negative  Eyes: Negative  Respiratory: Negative  Cardiovascular: Negative  Gastrointestinal: Negative  Endocrine: Negative  Genitourinary: Negative  Musculoskeletal: Negative  Skin: Negative  Allergic/Immunologic: Negative  Neurological: Negative  Hematological: Negative  Psychiatric/Behavioral: Negative  Objective:      /66 (BP Location: Left arm, Patient Position: Sitting, Cuff Size: Standard)   Pulse 75   Ht 5' 1" (1 549 m)   Wt 66 7 kg (147 lb)   BMI 27 78 kg/m²     2+ femoral pulses bilaterally  Toes are cool, but skin is otherwise normal color, warm and well-perfused  No foot wounds  No edema  No chronic stasis changes  Physical Exam  Vitals and nursing note reviewed  Constitutional:       Appearance: He is well-developed  HENT:      Head: Normocephalic and atraumatic  Eyes:      Pupils: Pupils are equal, round, and reactive to light  Neck:      Vascular: No carotid bruit or JVD  Cardiovascular:      Rate and Rhythm: Normal rate and regular rhythm  Pulses: Decreased pulses  Carotid pulses are 2+ on the right side and 2+ on the left side  Radial pulses are 2+ on the right side and 2+ on the left side  Femoral pulses are 2+ on the right side and 2+ on the left side  Dorsalis pedis pulses are 1+ on the right side and 1+ on the left side  Posterior tibial pulses are 2+ on the left side  Heart sounds: Normal heart sounds, S1 normal and S2 normal  No murmur heard  No friction rub  No gallop  Pulmonary:      Effort: Pulmonary effort is normal  No accessory muscle usage or respiratory distress  Breath sounds: Normal breath sounds  No wheezing or rales  Abdominal:      General: Bowel sounds are normal  There is no distension  Palpations: Abdomen is soft  Tenderness: There is no abdominal tenderness  Musculoskeletal:         General: No deformity  Normal range of motion  Cervical back: Neck supple  Right lower leg: No edema  Left lower leg: No edema     Skin:     General: Skin is warm and dry  Findings: No lesion or rash  Nails: There is no clubbing  Neurological:      Mental Status: He is alert and oriented to person, place, and time  Comments: Grossly normal    Psychiatric:         Behavior: Behavior is cooperative  CTA c/a/p 10/20/22  FINDINGS:      AORTA:  There is no aortic dissection or intramural hematoma  Acute nondisplaced right nasal bone fracture  Unchanged 40 mm ascending aortic aneurysm measured at the level of the right pulmonary artery      CHEST     LUNGS:  Dependent hypoventilatory changes  Mild bilateral predominantly mid and lower lung zones subpleural reticular opacities similar in appearance to the comparison CT chest      PLEURA:  Unremarkable      HEART/PULMONARY ARTERIAL TREE:  Borderline heart size  No pericardial effusion  Coronary artery calcifications      MEDIASTINUM AND SUKHDEV:  Unremarkable      CHEST WALL AND LOWER NECK:  Unremarkable      ABDOMEN     LIVER/BILIARY TREE:  Unremarkable      GALLBLADDER:  Gallbladder is surgically absent      SPLEEN:  Unremarkable      PANCREAS:  Unremarkable      ADRENAL GLANDS:  Unremarkable      KIDNEYS/URETERS:  One or more simple renal cyst(s) is noted  Otherwise unremarkable kidneys  No hydronephrosis      STOMACH AND BOWEL:  There is colonic diverticulosis without evidence of acute diverticulitis      APPENDIX:  No findings to suggest appendicitis      ABDOMINOPELVIC CAVITY:  No ascites or free intraperitoneal air   No lymphadenopathy      PELVIS     REPRODUCTIVE ORGANS:  Prostamegaly      URINARY BLADDER:  Unremarkable      ABDOMINAL WALL/INGUINAL REGIONS:  Unremarkable      OSSEOUS STRUCTURES:  No acute fracture or destructive osseous lesion      IMPRESSION:     Unchanged 40 mm ascending aortic aneurysm measured at the level of the right pulmonary artery      No acute findings in the chest, abdomen or pelvis             CTA abd with runoff 6/19/20    FINDINGS:     VASCULAR STRUCTURES: The distal thoracic aorta is mildly tortuous and atherosclerotic without any aneurysm      The abdominal aorta is minimally atherosclerotic  A focal outpouching in the distal abdominal aorta along the right anterolateral wall is present with a linear defect suggesting a focal dissection or penetrating ulcer  The appearance is unchanged from   the previous study  The aorta is nonaneurysmal measuring a maximal 19 mm  The celiac artery, superior mesenteric artery, and inferior mesenteric artery are patent  Single renal arteries are patent bilaterally  The right and left iliofemoral segments   are mildly tortuous, but widely patent and the internal iliac arteries are patent        OTHER FINDINGS:     ABDOMEN  LOWER CHEST: Prominent hypoventilatory changes similar to the previous study      LIVER/BILIARY TREE:  Unremarkable      GALLBLADDER:  There are gallstone(s) within the gallbladder, without pericholecystic inflammatory changes      SPLEEN:  Unremarkable      PANCREAS:  Unremarkable      ADRENAL GLANDS:  Unremarkable      KIDNEYS/URETERS:  One or more simple renal cyst(s) is noted  Otherwise unremarkable kidneys  No hydronephrosis      STOMACH AND BOWEL:  There is colonic diverticulosis without evidence of acute diverticulitis      APPENDIX:  No findings to suggest appendicitis      ABDOMINOPELVIC CAVITY:  No ascites or free intraperitoneal air  No lymphadenopathy      PELVIS     REPRODUCTIVE ORGANS:  Unremarkable for patient's age      URINARY BLADDER:  Unremarkable      ABDOMINAL WALL/INGUINAL REGIONS:  Unremarkable      OSSEOUS STRUCTURES:  No acute fracture or destructive osseous lesion      IMPRESSION:     No abdominal aortic aneurysm     Focal penetrating ulcer boluses dissection in the distal abdominal aorta     Cholelithiasis     Diverticulosis      I have reviewed and made appropriate changes to the review of systems input by the medical assistant      Vitals:    03/16/23 1114   BP: 122/66 BP Location: Left arm   Patient Position: Sitting   Cuff Size: Standard   Pulse: 75   Weight: 66 7 kg (147 lb)   Height: 5' 1" (1 549 m)       Patient Active Problem List   Diagnosis   • Cervical radiculopathy   • Colon polyps   • Obstructive sleep apnea of adult   • Peripheral artery disease (HCC)   • Peripheral neuropathy   • Restless legs syndrome   • Benign prostatic hyperplasia without lower urinary tract symptoms   • Gastroesophageal reflux disease without esophagitis   • Cholelithiasis   • Ectatic aorta   • Abdominal adhesions   • Other insomnia   • Acute cholecystitis due to biliary calculus   • Bilateral leg cramps   • Yeboah's esophagus without dysplasia   • Antritis of stomach   • Hx of adenomatous colonic polyps   • Colon cancer screening   • Heartburn   • Pain of upper abdomen       Past Surgical History:   Procedure Laterality Date   • APPENDECTOMY     • CHOLECYSTECTOMY     • CHOLECYSTECTOMY LAPAROSCOPIC N/A 10/27/2020    Procedure: CHOLECYSTECTOMY LAPAROSCOPIC;  Surgeon: Baron Kelli MD;  Location: [address] [place] [address];  Service: General   • COLONOSCOPY N/A 12/12/2016    Procedure: COLONOSCOPY;  Surgeon: Bam Hernández MD;  Location: [facility];   Service:    • ELBOW SURGERY Bilateral    • HERNIA REPAIR Right 2001    inguinal   • KNEE ARTHROSCOPY Right    • ME ARTHRS KNE SURG W/MENISCECTOMY MED/LAT W/SHVG Left 6/18/2020    Procedure: ARTHROSCOPY KNEE MEDIAL MENISCECTOMY;  Surgeon: Brandy Baker MD;  Location: [address] [place] [address];  Service: Orthopedics   • ROTATOR CUFF REPAIR Bilateral 2011       Family History   Problem Relation Age of Onset   • No Known Problems Mother    • No Known Problems Father    • No Known Problems Sister    • No Known Problems Brother    • No Known Problems Maternal Aunt    • No Known Problems Maternal Uncle    • No Known Problems Paternal Aunt    • No Known Problems Paternal Uncle    • No Known Problems Maternal Grandmother    • No Known Problems Maternal Grandfather    • No Known Problems Paternal Grandmother    • No Known Problems Paternal Grandfather    • ADD / ADHD Neg Hx    • Anesthesia problems Neg Hx    • Cancer Neg Hx    • Clotting disorder Neg Hx    • Collagen disease Neg Hx    • Diabetes Neg Hx    • Dislocations Neg Hx    • Learning disabilities Neg Hx    • Neurological problems Neg Hx    • Osteoporosis Neg Hx    • Rheumatologic disease Neg Hx    • Scoliosis Neg Hx    • Vascular Disease Neg Hx        Social History     Socioeconomic History   • Marital status: /Civil Union     Spouse name: Not on file   • Number of children: Not on file   • Years of education: Not on file   • Highest education level: Not on file   Occupational History   • Not on file   Tobacco Use   • Smoking status: Never   • Smokeless tobacco: Never   Vaping Use   • Vaping Use: Never used   Substance and Sexual Activity   • Alcohol use: Not Currently   • Drug use: No   • Sexual activity: Not Currently   Other Topics Concern   • Not on file   Social History Narrative   • Not on file     Social Determinants of Health     Financial Resource Strain: Low Risk    • Difficulty of Paying Living Expenses: Not hard at all   Food Insecurity: Not on file   Transportation Needs: No Transportation Needs   • Lack of Transportation (Medical): No   • Lack of Transportation (Non-Medical):  No   Physical Activity: Not on file   Stress: Not on file   Social Connections: Not on file   Intimate Partner Violence: Not on file   Housing Stability: Not on file       No Known Allergies      Current Outpatient Medications:   •  aspirin (ECOTRIN LOW STRENGTH) 81 mg EC tablet, Take 1 tablet (81 mg total) by mouth daily with breakfast, Disp: 90 tablet, Rfl: 3  •  gabapentin (Neurontin) 100 mg capsule, Take 3 capsules (300 mg total) by mouth daily at bedtime, Disp: 90 capsule, Rfl: 1  •  hydrOXYzine HCL (ATARAX) 50 mg tablet, Take 1 tablet at bedtime to sleep, no later then 8 pm, Disp: 90 tablet, Rfl: 2  •  meloxicam (Mobic) 15 mg tablet, Take 1 tablet (15 mg total) by mouth daily, Disp: 30 tablet, Rfl: 0  •  pantoprazole (PROTONIX) 40 mg tablet, Take 1 tablet (40 mg total) by mouth daily, Disp: 90 tablet, Rfl: 2  •  rosuvastatin (CRESTOR) 10 MG tablet, Take 1 tablet (10 mg total) by mouth daily at bedtime, Disp: 90 tablet, Rfl: 3  •  Silodosin 8 MG CAPS, Take 1 capsule by mouth daily, Disp: , Rfl:   •  tadalafil (CIALIS) 20 MG tablet, Take 20 mg by mouth, Disp: , Rfl:   •  Promethazine-DM (PHENERGAN-DM) 6 25-15 mg/5 mL oral syrup, Take 5 mL by mouth 4 (four) times a day as needed for cough (Patient not taking: Reported on 3/2/2023), Disp: 118 mL, Rfl: 3  •  sucralfate (CARAFATE) 1 g tablet, Take 1 tablet (1 g total) by mouth 4 (four) times a day (before meals and at bedtime), Disp: 120 tablet, Rfl: 5  •  valACYclovir (VALTREX) 1,000 mg tablet, Take 2 tablets (2,000 mg total) by mouth 2 (two) times a day for 1 day (Patient not taking: Reported on 3/16/2023), Disp: 4 tablet, Rfl: 5

## 2023-03-16 NOTE — PATIENT INSTRUCTIONS
Peripheral artery disease (HCC)  -     VAS TATIANA & waveform analysis, multiple levels;  Future  -Continue with regular walking every day for 45 minutes  -Continue with aspirin and statin therapy      Bilateral leg cramps  Restless legs syndrome  -     Compression Stocking   -Feet are warm and well-perfused  -Trial of fresh compression stockings

## 2023-03-20 ENCOUNTER — HOSPITAL ENCOUNTER (OUTPATIENT)
Dept: GASTROENTEROLOGY | Facility: AMBULARY SURGERY CENTER | Age: 73
Setting detail: OUTPATIENT SURGERY
Discharge: HOME | End: 2023-03-20
Attending: INTERNAL MEDICINE

## 2023-03-20 ENCOUNTER — ANESTHESIA EVENT (OUTPATIENT)
Dept: GASTROENTEROLOGY | Facility: AMBULARY SURGERY CENTER | Age: 73
End: 2023-03-20

## 2023-03-20 ENCOUNTER — ANESTHESIA (OUTPATIENT)
Dept: GASTROENTEROLOGY | Facility: AMBULARY SURGERY CENTER | Age: 73
End: 2023-03-20

## 2023-03-20 VITALS
SYSTOLIC BLOOD PRESSURE: 107 MMHG | TEMPERATURE: 97 F | OXYGEN SATURATION: 98 % | HEART RATE: 72 BPM | RESPIRATION RATE: 18 BRPM | DIASTOLIC BLOOD PRESSURE: 63 MMHG

## 2023-03-20 DIAGNOSIS — K21.9 GASTROESOPHAGEAL REFLUX DISEASE WITHOUT ESOPHAGITIS: ICD-10-CM

## 2023-03-20 DIAGNOSIS — R10.10 PAIN OF UPPER ABDOMEN: ICD-10-CM

## 2023-03-20 DIAGNOSIS — R12 HEARTBURN: ICD-10-CM

## 2023-03-20 PROBLEM — Z12.11 COLON CANCER SCREENING: Status: RESOLVED | Noted: 2023-01-19 | Resolved: 2023-03-20

## 2023-03-20 RX ORDER — PROPOFOL 10 MG/ML
INJECTION, EMULSION INTRAVENOUS AS NEEDED
Status: DISCONTINUED | OUTPATIENT
Start: 2023-03-20 | End: 2023-03-20

## 2023-03-20 RX ORDER — SODIUM CHLORIDE, SODIUM LACTATE, POTASSIUM CHLORIDE, CALCIUM CHLORIDE 600; 310; 30; 20 MG/100ML; MG/100ML; MG/100ML; MG/100ML
INJECTION, SOLUTION INTRAVENOUS CONTINUOUS PRN
Status: DISCONTINUED | OUTPATIENT
Start: 2023-03-20 | End: 2023-03-20

## 2023-03-20 RX ADMIN — PROPOFOL 100 MG: 10 INJECTION, EMULSION INTRAVENOUS at 13:20

## 2023-03-20 RX ADMIN — PROPOFOL 50 MG: 10 INJECTION, EMULSION INTRAVENOUS at 13:28

## 2023-03-20 RX ADMIN — SODIUM CHLORIDE, SODIUM LACTATE, POTASSIUM CHLORIDE, AND CALCIUM CHLORIDE: .6; .31; .03; .02 INJECTION, SOLUTION INTRAVENOUS at 13:14

## 2023-03-20 RX ADMIN — PROPOFOL 40 MG: 10 INJECTION, EMULSION INTRAVENOUS at 13:38

## 2023-03-20 NOTE — ANESTHESIA POSTPROCEDURE EVALUATION
Post-Op Assessment Note    CV Status:  Stable  Pain Score: 0    Pain management: adequate     Mental Status:  Alert and awake   Hydration Status:  Stable   PONV Controlled:  None   Airway Patency:  Patent and adequate      Post Op Vitals Reviewed: Yes      Staff: CRNA         No notable events documented      BP 92/50 (03/20/23 1338)    Temp     Pulse 74 (03/20/23 1338)   Resp 16 (03/20/23 1338)    SpO2 99 % (03/20/23 1338)

## 2023-03-20 NOTE — ANESTHESIA PREPROCEDURE EVALUATION
Procedure:  EGD    Relevant Problems   ANESTHESIA (within normal limits)      CARDIO   (+) Ectatic aorta   (+) Peripheral artery disease (HCC)      ENDO (within normal limits)      GI/HEPATIC   (+) Gastroesophageal reflux disease without esophagitis      /RENAL   (+) Benign prostatic hyperplasia without lower urinary tract symptoms      NEURO/PSYCH   (+) Hx of adenomatous colonic polyps      PULMONARY   (+) Obstructive sleep apnea of adult      Digestive   (+) Antritis of stomach   (+) Yeboah's esophagus without dysplasia      Nervous and Auditory   (+) Cervical radiculopathy   (+) Peripheral neuropathy      Other   (+) Restless legs syndrome        Physical Exam    Airway    Mallampati score: II  TM Distance: >3 FB  Neck ROM: full     Dental   No notable dental hx     Cardiovascular      Pulmonary      Other Findings        Anesthesia Plan  ASA Score- 2     Anesthesia Type- IV sedation with anesthesia with ASA Monitors  Additional Monitors:   Airway Plan:           Plan Factors-Exercise tolerance (METS): >4 METS  Chart reviewed  EKG reviewed  Existing labs reviewed  Patient summary reviewed  Patient is not a current smoker  Induction-     Postoperative Plan-     Informed Consent- Anesthetic plan and risks discussed with patient  I personally reviewed this patient with the CRNA  Discussed and agreed on the Anesthesia Plan with the CRNA  Silvestre Rodriguez

## 2023-03-20 NOTE — H&P
History and Physical -  [facility]  Madai Fish 81 y o  male MRN: [id number]                  HPI: Madai Fish is a 81y o  year old male who presents for upper endoscopy due to history of Yeboah's esophagus      REVIEW OF SYSTEMS: Per the HPI, and otherwise unremarkable  Historical Information   Past Medical History:   Diagnosis Date   • Arthritis     shoulder   • Ascending aortic aneurysm    • Cataract     both eyes-to have the right cataract removed on 1/25/22   • Colon polyp    • GERD (gastroesophageal reflux disease)    • Insomnia    • Leg cramps    • Memory changes    • Restless legs      Past Surgical History:   Procedure Laterality Date   • APPENDECTOMY     • CHOLECYSTECTOMY     • CHOLECYSTECTOMY LAPAROSCOPIC N/A 10/27/2020    Procedure: CHOLECYSTECTOMY LAPAROSCOPIC;  Surgeon: Alba Patel MD;  Location: [address] [place] [address];  Service: General   • COLONOSCOPY N/A 12/12/2016    Procedure: COLONOSCOPY;  Surgeon: Octaviano Nathan MD;  Location: [facility];   Service:    • ELBOW SURGERY Bilateral    • HERNIA REPAIR Right 2001    inguinal   • KNEE ARTHROSCOPY Right    • VA ARTHRS KNE SURG W/MENISCECTOMY MED/LAT W/SHVG Left 6/18/2020    Procedure: ARTHROSCOPY KNEE MEDIAL MENISCECTOMY;  Surgeon: Gale French MD;  Location: [facility];  Service: Orthopedics   • ROTATOR CUFF REPAIR Bilateral 2011     Social History   Social History     Substance and Sexual Activity   Alcohol Use Not Currently     Social History     Substance and Sexual Activity   Drug Use No     Social History     Tobacco Use   Smoking Status Never   Smokeless Tobacco Never     Family History   Problem Relation Age of Onset   • No Known Problems Mother    • No Known Problems Father    • No Known Problems Sister    • No Known Problems Brother    • No Known Problems Maternal Aunt    • No Known Problems Maternal Uncle    • No Known Problems Paternal Aunt    • No Known Problems Paternal Uncle    • No Known Problems Maternal Grandmother    • No Known Problems Maternal Grandfather    • No Known Problems Paternal Grandmother    • No Known Problems Paternal Grandfather    • ADD / ADHD Neg Hx    • Anesthesia problems Neg Hx    • Cancer Neg Hx    • Clotting disorder Neg Hx    • Collagen disease Neg Hx    • Diabetes Neg Hx    • Dislocations Neg Hx    • Learning disabilities Neg Hx    • Neurological problems Neg Hx    • Osteoporosis Neg Hx    • Rheumatologic disease Neg Hx    • Scoliosis Neg Hx    • Vascular Disease Neg Hx        Meds/Allergies       Current Outpatient Medications:   •  aspirin (ECOTRIN LOW STRENGTH) 81 mg EC tablet  •  gabapentin (Neurontin) 100 mg capsule  •  hydrOXYzine HCL (ATARAX) 50 mg tablet  •  meloxicam (Mobic) 15 mg tablet  •  pantoprazole (PROTONIX) 40 mg tablet  •  Promethazine-DM (PHENERGAN-DM) 6 25-15 mg/5 mL oral syrup  •  rosuvastatin (CRESTOR) 10 MG tablet  •  Silodosin 8 MG CAPS  •  sucralfate (CARAFATE) 1 g tablet  •  tadalafil (CIALIS) 20 MG tablet  •  valACYclovir (VALTREX) 1,000 mg tablet    No Known Allergies    Objective     There were no vitals taken for this visit  PHYSICAL EXAM    Gen: NAD  Head: NCAT  CV: RRR  CHEST: Clear  ABD: soft, NT/ND  EXT: no edema      ASSESSMENT/PLAN:  This is a 81y o  year old male here for Yeboah's esophagus, and he is stable and optimized for his procedure

## 2023-03-23 ENCOUNTER — TELEPHONE (OUTPATIENT)
Dept: FAMILY MEDICINE CLINIC | Facility: CLINIC | Age: 73
End: 2023-03-23

## 2023-03-23 NOTE — TELEPHONE ENCOUNTER
Dr Wendi Reyez:    Patient called asking if you were able to complete form from his insurance company? Please c/b to discuss further

## 2023-03-27 NOTE — RESULT ENCOUNTER NOTE
Patient was informed via my chart biopsies were benign  Repeat EGD 5 years due to history of Yeboah's esophagus

## 2023-03-28 ENCOUNTER — APPOINTMENT (EMERGENCY)
Dept: RADIOLOGY | Facility: HOSPITAL | Age: 73
End: 2023-03-28

## 2023-03-28 ENCOUNTER — HOSPITAL ENCOUNTER (EMERGENCY)
Facility: HOSPITAL | Age: 73
Discharge: HOME | End: 2023-03-29
Attending: EMERGENCY MEDICINE

## 2023-03-28 VITALS
HEART RATE: 64 BPM | SYSTOLIC BLOOD PRESSURE: 116 MMHG | WEIGHT: 144.2 LBS | OXYGEN SATURATION: 97 % | TEMPERATURE: 97.2 F | BODY MASS INDEX: 27.25 KG/M2 | RESPIRATION RATE: 21 BRPM | DIASTOLIC BLOOD PRESSURE: 71 MMHG

## 2023-03-28 DIAGNOSIS — I71.21 ASCENDING AORTIC ANEURYSM (HCC): ICD-10-CM

## 2023-03-28 DIAGNOSIS — R42 VERTIGO: Primary | ICD-10-CM

## 2023-03-28 DIAGNOSIS — K57.90 DIVERTICULOSIS: ICD-10-CM

## 2023-03-28 LAB
2HR DELTA HS TROPONIN: 0 NG/L
ALBUMIN SERPL BCP-MCNC: 4.6 G/DL (ref 3.5–5)
ALP SERPL-CCNC: 67 U/L (ref 34–104)
ALT SERPL W P-5'-P-CCNC: 14 U/L (ref 7–52)
ANION GAP SERPL CALCULATED.3IONS-SCNC: 8 MMOL/L (ref 4–13)
AST SERPL W P-5'-P-CCNC: 16 U/L (ref 13–39)
BASOPHILS # BLD AUTO: 0.01 THOUSANDS/ÂΜL (ref 0–0.1)
BASOPHILS NFR BLD AUTO: 0 % (ref 0–1)
BILIRUB SERPL-MCNC: 0.58 MG/DL (ref 0.2–1)
BUN SERPL-MCNC: 11 MG/DL (ref 5–25)
CALCIUM SERPL-MCNC: 8.7 MG/DL (ref 8.4–10.2)
CARDIAC TROPONIN I PNL SERPL HS: 2 NG/L
CARDIAC TROPONIN I PNL SERPL HS: 2 NG/L
CHLORIDE SERPL-SCNC: 107 MMOL/L (ref 96–108)
CO2 SERPL-SCNC: 24 MMOL/L (ref 21–32)
CREAT SERPL-MCNC: 0.9 MG/DL (ref 0.6–1.3)
EOSINOPHIL # BLD AUTO: 0.01 THOUSAND/ÂΜL (ref 0–0.61)
EOSINOPHIL NFR BLD AUTO: 0 % (ref 0–6)
ERYTHROCYTE [DISTWIDTH] IN BLOOD BY AUTOMATED COUNT: 12.2 % (ref 11.6–15.1)
GFR SERPL CREATININE-BSD FRML MDRD: 86 ML/MIN/1.73SQ M
GLUCOSE SERPL-MCNC: 108 MG/DL (ref 65–140)
HCT VFR BLD AUTO: 45.2 % (ref 36.5–49.3)
HGB BLD-MCNC: 15.5 G/DL (ref 12–17)
IMM GRANULOCYTES # BLD AUTO: 0.01 THOUSAND/UL (ref 0–0.2)
IMM GRANULOCYTES NFR BLD AUTO: 0 % (ref 0–2)
LYMPHOCYTES # BLD AUTO: 1.87 THOUSANDS/ÂΜL (ref 0.6–4.47)
LYMPHOCYTES NFR BLD AUTO: 30 % (ref 14–44)
MCH RBC QN AUTO: 31.8 PG (ref 26.8–34.3)
MCHC RBC AUTO-ENTMCNC: 34.3 G/DL (ref 31.4–37.4)
MCV RBC AUTO: 93 FL (ref 82–98)
MONOCYTES # BLD AUTO: 0.56 THOUSAND/ÂΜL (ref 0.17–1.22)
MONOCYTES NFR BLD AUTO: 9 % (ref 4–12)
NEUTROPHILS # BLD AUTO: 3.82 THOUSANDS/ÂΜL (ref 1.85–7.62)
NEUTS SEG NFR BLD AUTO: 61 % (ref 43–75)
NRBC BLD AUTO-RTO: 0 /100 WBCS
PLATELET # BLD AUTO: 231 THOUSANDS/UL (ref 149–390)
PMV BLD AUTO: 8.9 FL (ref 8.9–12.7)
POTASSIUM SERPL-SCNC: 4.1 MMOL/L (ref 3.5–5.3)
PROT SERPL-MCNC: 7.2 G/DL (ref 6.4–8.4)
RBC # BLD AUTO: 4.88 MILLION/UL (ref 3.88–5.62)
SODIUM SERPL-SCNC: 139 MMOL/L (ref 135–147)
WBC # BLD AUTO: 6.28 THOUSAND/UL (ref 4.31–10.16)

## 2023-03-28 RX ORDER — DIAZEPAM 5 MG/ML
2.5 INJECTION, SOLUTION INTRAMUSCULAR; INTRAVENOUS ONCE
Status: COMPLETED | OUTPATIENT
Start: 2023-03-28 | End: 2023-03-28

## 2023-03-28 RX ORDER — MECLIZINE HYDROCHLORIDE 25 MG/1
25 TABLET ORAL ONCE
Status: COMPLETED | OUTPATIENT
Start: 2023-03-28 | End: 2023-03-28

## 2023-03-28 RX ORDER — MECLIZINE HYDROCHLORIDE 25 MG/1
25 TABLET ORAL 3 TIMES DAILY PRN
Qty: 15 TABLET | Refills: 0 | Status: SHIPPED | OUTPATIENT
Start: 2023-03-28

## 2023-03-28 RX ADMIN — MECLIZINE HYDROCHLORIDE 25 MG: 25 TABLET ORAL at 20:11

## 2023-03-28 RX ADMIN — IOHEXOL 100 ML: 350 INJECTION, SOLUTION INTRAVENOUS at 21:28

## 2023-03-28 RX ADMIN — DIAZEPAM 2.5 MG: 10 INJECTION, SOLUTION INTRAMUSCULAR; INTRAVENOUS at 20:11

## 2023-03-28 NOTE — ED PROVIDER NOTES
History  Chief Complaint   Patient presents with   • Chest Pain     [facility]   States chest pain left chest that started 4-5 days ago and he cannot sleep  EKG done in triage and given to physician  Patient also has a headache and took medicine  Patient [place]  He has been complaining of migraine type headache that he had for for 5 days associated with room spinning dizziness which is worse with changing position or even turning his head  He states this is caused him much anxiety  Patient has been taking OTC meds without relief  At about the same time he also developed pain which he is unable to describe in the center of his chest radiating to his back  He has a known aortic aneurysm and is quite concerned  He was last checked in September or October of last year according to him  Patient is requesting a CAT scan  Prior to Admission Medications   Prescriptions Last Dose Informant Patient Reported? Taking?    Promethazine-DM (PHENERGAN-DM) 6 25-15 mg/5 mL oral syrup   No No   Sig: Take 5 mL by mouth 4 (four) times a day as needed for cough   Patient not taking: Reported on 3/2/2023   Silodosin 8 MG CAPS   Yes No   Sig: Take 1 capsule by mouth daily   aspirin (ECOTRIN LOW STRENGTH) 81 mg EC tablet   No No   Sig: Take 1 tablet (81 mg total) by mouth daily with breakfast   gabapentin (Neurontin) 100 mg capsule   No No   Sig: Take 3 capsules (300 mg total) by mouth daily at bedtime   hydrOXYzine HCL (ATARAX) 50 mg tablet   No No   Sig: Take 1 tablet at bedtime to sleep, no later then 8 pm   meloxicam (Mobic) 15 mg tablet   No No   Sig: Take 1 tablet (15 mg total) by mouth daily   pantoprazole (PROTONIX) 40 mg tablet   No No   Sig: Take 1 tablet (40 mg total) by mouth daily   rosuvastatin (CRESTOR) 10 MG tablet   No No   Sig: Take 1 tablet (10 mg total) by mouth daily at bedtime   sucralfate (CARAFATE) 1 g tablet   No No   Sig: Take 1 tablet (1 g total) by mouth 4 (four) times a day (before meals and at bedtime)   tadalafil (CIALIS) 20 MG tablet   Yes No   Sig: Take 20 mg by mouth   valACYclovir (VALTREX) 1,000 mg tablet   No No   Sig: Take 2 tablets (2,000 mg total) by mouth 2 (two) times a day for 1 day   Patient not taking: Reported on 3/16/2023      Facility-Administered Medications: None       Past Medical History:   Diagnosis Date   • Arthritis     shoulder   • Ascending aortic aneurysm    • Cataract     both eyes-to have the right cataract removed on 1/25/22   • Colon polyp    • GERD (gastroesophageal reflux disease)    • Insomnia    • Leg cramps    • Memory changes    • Restless legs        Past Surgical History:   Procedure Laterality Date   • APPENDECTOMY     • CHOLECYSTECTOMY     • CHOLECYSTECTOMY LAPAROSCOPIC N/A 10/27/2020    Procedure: CHOLECYSTECTOMY LAPAROSCOPIC;  Surgeon: Tanvi Beaulieu MD;  Location: [address] [place] [address];  Service: General   • COLONOSCOPY N/A 12/12/2016    Procedure: COLONOSCOPY;  Surgeon: George Hyde MD;  Location: [facility] GI LAB;   Service:    • ELBOW SURGERY Bilateral    • HERNIA REPAIR Right 2001    inguinal   • KNEE ARTHROSCOPY Right    • NE ARTHRS KNE SURG W/MENISCECTOMY MED/LAT W/SHVG Left 6/18/2020    Procedure: ARTHROSCOPY KNEE MEDIAL MENISCECTOMY;  Surgeon: Becca Estevez MD;  Location: [address] [place] [address];  Service: Orthopedics   • ROTATOR CUFF REPAIR Bilateral 2011       Family History   Problem Relation Age of Onset   • No Known Problems Mother    • No Known Problems Father    • No Known Problems Sister    • No Known Problems Brother    • No Known Problems Maternal Aunt    • No Known Problems Maternal Uncle    • No Known Problems Paternal Aunt    • No Known Problems Paternal Uncle    • No Known Problems Maternal Grandmother    • No Known Problems Maternal Grandfather    • No Known Problems Paternal Grandmother    • No Known Problems Paternal Grandfather    • ADD / ADHD Neg Hx    • Anesthesia problems Neg Hx    • Cancer Neg Hx    • Clotting disorder Neg Hx    • Collagen disease Neg Hx    • Diabetes Neg Hx    • Dislocations Neg Hx    • Learning disabilities Neg Hx    • Neurological problems Neg Hx    • Osteoporosis Neg Hx    • Rheumatologic disease Neg Hx    • Scoliosis Neg Hx    • Vascular Disease Neg Hx      I have reviewed and agree with the history as documented  E-Cigarette/Vaping   • E-Cigarette Use Never User      E-Cigarette/Vaping Substances   • Nicotine No    • THC No    • CBD No    • Flavoring No    • Other No    • Unknown No      Social History     Tobacco Use   • Smoking status: Never   • Smokeless tobacco: Never   Vaping Use   • Vaping Use: Never used   Substance Use Topics   • Alcohol use: Not Currently   • Drug use: No       Review of Systems   Constitutional: Negative for chills and fever  HENT: Negative for congestion and sore throat  Eyes: Negative for visual disturbance  Respiratory: Negative for cough and shortness of breath  Cardiovascular: Positive for chest pain  Gastrointestinal: Negative for abdominal pain, nausea and vomiting  Genitourinary: Negative for dysuria  Musculoskeletal: Positive for arthralgias, back pain and myalgias  Leg cramps   Skin: Negative for rash  Neurological: Positive for dizziness, light-headedness and headaches  Negative for syncope, weakness and numbness  Hematological: Does not bruise/bleed easily  Psychiatric/Behavioral: Negative for confusion  The patient is nervous/anxious  All other systems reviewed and are negative  Physical Exam  Physical Exam  Vitals and nursing note reviewed  HENT:      Head: Normocephalic  Eyes:      Pupils: Pupils are equal, round, and reactive to light  Cardiovascular:      Rate and Rhythm: Normal rate and regular rhythm  Heart sounds: Normal heart sounds  Pulmonary:      Effort: Pulmonary effort is normal       Breath sounds: Normal breath sounds  Chest:      Chest wall: No tenderness     Abdominal:      General: Bowel sounds are normal       Palpations: Abdomen is soft  Tenderness: There is no abdominal tenderness  Musculoskeletal:         General: Normal range of motion  Cervical back: Normal range of motion and neck supple  Right lower leg: No tenderness  No edema  Left lower leg: No tenderness  No edema  Skin:     General: Skin is warm and dry  Capillary Refill: Capillary refill takes less than 2 seconds  Neurological:      General: No focal deficit present  Mental Status: He is alert and oriented to person, place, and time     Psychiatric:         Mood and Affect: Mood normal          Behavior: Behavior normal          Vital Signs  ED Triage Vitals [03/28/23 1912]   Temperature Pulse Respirations Blood Pressure SpO2   (!) 97 2 °F (36 2 °C) 90 20 125/74 94 %      Temp Source Heart Rate Source Patient Position - Orthostatic VS BP Location FiO2 (%)   Tympanic Monitor Sitting Left arm --      Pain Score       8           Vitals:    03/28/23 1912   BP: 125/74   Pulse: 90   Patient Position - Orthostatic VS: Sitting         Visual Acuity      ED Medications  Medications   meclizine (ANTIVERT) tablet 25 mg (has no administration in time range)   diazepam (VALIUM) injection 2 5 mg (has no administration in time range)       Diagnostic Studies  Results Reviewed     Procedure Component Value Units Date/Time    CBC and differential [[id number]] Collected: 03/28/23 1937    Lab Status: Final result Specimen: Blood from Arm, Left Updated: 03/28/23 1959     WBC 6 28 Thousand/uL      RBC 4 88 Million/uL      Hemoglobin 15 5 g/dL      Hematocrit 45 2 %      MCV 93 fL      MCH 31 8 pg      MCHC 34 3 g/dL      RDW 12 2 %      MPV 8 9 fL      Platelets 869 Thousands/uL      nRBC 0 /100 WBCs      Neutrophils Relative 61 %      Immat GRANS % 0 %      Lymphocytes Relative 30 %      Monocytes Relative 9 %      Eosinophils Relative 0 %      Basophils Relative 0 %      Neutrophils Absolute 3 82 Thousands/µL Immature Grans Absolute 0 01 Thousand/uL      Lymphocytes Absolute 1 87 Thousands/µL      Monocytes Absolute 0 56 Thousand/µL      Eosinophils Absolute 0 01 Thousand/µL      Basophils Absolute 0 01 Thousands/µL     HS Troponin 0hr (reflex protocol) [[id number]] Collected: 03/28/23 1937    Lab Status: In process Specimen: Blood from Arm, Left Updated: 03/28/23 1955    Comprehensive metabolic panel [[id number]] Collected: 03/28/23 1937    Lab Status: In process Specimen: Blood from Arm, Left Updated: 03/28/23 1955                 XR chest 1 view portable    (Results Pending)   CTA head and neck with and without contrast    (Results Pending)   CTA dissection protocol chest abdomen pelvis w wo contrast    (Results Pending)              Procedures  ECG 12 Lead Documentation Only    Date/Time: 3/28/2023 7:07 PM  Performed by: Sybil Ye MD  Authorized by: Sybil Ye MD     Indications / Diagnosis:  Chest pain  ECG reviewed by me, the ED Provider: yes    Patient location:  ED  Interpretation:     Interpretation: abnormal    Rate:     ECG rate:  81    ECG rate assessment: normal    Rhythm:     Rhythm: sinus rhythm    Ectopy:     Ectopy: none    QRS:     QRS axis:  Normal    QRS intervals:  Normal  Conduction:     Conduction: normal    ST segments:     ST segments:  Normal  T waves:     T waves: normal    Q waves:     Q waves:  III and aVF             ED Course                               SBIRT 20yo+    Flowsheet Row Most Recent Value   SBIRT (23 yo +)    In order to provide better care to our patients, we are screening all of our patients for alcohol and drug use  Would it be okay to ask you these screening questions? No Filed at: 03/28/2023 1941                    Medical Decision Making  Patient has vertiginous dizziness associated with severe headache for 5 days  We will check a CTA of the head    He is also quite concerned about his aortic aneurysm, and although he has no prior episode of coronary artery disease, we will do chest pain protocol and CTA for dissection protocol  Amount and/or Complexity of Data Reviewed  Labs: ordered  Radiology: ordered  Risk  Prescription drug management  Disposition  Final diagnoses:   None     ED Disposition     None      Follow-up Information    None         Patient's Medications   Discharge Prescriptions    No medications on file       No discharge procedures on file      PDMP Review     None          ED Provider  Electronically Signed by           Yoana Eller MD  03/28/23 2004

## 2023-03-31 LAB
ATRIAL RATE: 78 BPM
ATRIAL RATE: 81 BPM
P AXIS: 23 DEGREES
P AXIS: 40 DEGREES
PR INTERVAL: 166 MS
PR INTERVAL: 174 MS
QRS AXIS: -27 DEGREES
QRS AXIS: -65 DEGREES
QRSD INTERVAL: 102 MS
QRSD INTERVAL: 112 MS
QT INTERVAL: 390 MS
QT INTERVAL: 396 MS
QTC INTERVAL: 451 MS
QTC INTERVAL: 453 MS
T WAVE AXIS: 20 DEGREES
T WAVE AXIS: 44 DEGREES
VENTRICULAR RATE: 78 BPM
VENTRICULAR RATE: 81 BPM

## 2023-05-03 ENCOUNTER — HOSPITAL ENCOUNTER (OUTPATIENT)
Dept: SLEEP CENTER | Facility: CLINIC | Age: 73
Discharge: HOME | End: 2023-05-03

## 2023-05-03 ENCOUNTER — OFFICE VISIT (OUTPATIENT)
Dept: OTOLARYNGOLOGY | Facility: CLINIC | Age: 73
End: 2023-05-03

## 2023-05-03 VITALS — HEIGHT: 61 IN | WEIGHT: 148 LBS | BODY MASS INDEX: 27.94 KG/M2 | TEMPERATURE: 97.9 F

## 2023-05-03 DIAGNOSIS — R06.83 SNORING: ICD-10-CM

## 2023-05-03 DIAGNOSIS — G47.19 EXCESSIVE DAYTIME SLEEPINESS: ICD-10-CM

## 2023-05-03 DIAGNOSIS — G47.30 SLEEP APNEA, UNSPECIFIED TYPE: ICD-10-CM

## 2023-05-03 DIAGNOSIS — H61.23 BILATERAL IMPACTED CERUMEN: Primary | ICD-10-CM

## 2023-05-03 DIAGNOSIS — R09.81 NASAL CONGESTION: ICD-10-CM

## 2023-05-03 NOTE — PROGRESS NOTES
Home Sleep Study Documentation    HOME STUDY DEVICE: Noxturnal yes                                           Priscila G3 no      Pre-Sleep Home Study:    Set-up and instructions performed by: [facility]     Technician performed demonstration for Patient: yes    Return demonstration performed by Patient: yes    Written instructions provided to Patient: yes    Patient signed consent form: yes        Post-Sleep Home Study:    Additional comments by Patient: pending    Home Sleep Study Failed:pending    Failure reason: pending    Reported or Detected: pending    Scored by: pending

## 2023-05-03 NOTE — PROGRESS NOTES
"Assessment/Plan:    Nasal congestion  Briefly discussed nasal congestion and runny nose  Recommend nasal steroids  Monitor progression      Bilateral impacted cerumen    On exam noted bilateral cerumen impaction and unable to fully view tympanic membrane  Cerumen impaction removed bilateral eac with alligator forceps and suction, pt tolerated procedure well  Upon removal, improved hearing and decreased clogged sensation of bilateral ears  Discussed routine cerumen care including avoidance of q-tips, may use cerumen softeners every one to two months  Hydrocortisone cream pea sized amount on finger as needed for itching in ears  Encourage ongoing follow up prn to monitor for cerumen and hearing  Audiogram if symptoms worsen  Diagnoses and all orders for this visit:    Bilateral impacted cerumen    Nasal congestion          Subjective:      Patient ID: Isela Arellano is a 81 y o  male  Presents today as a new patient due to ear concerns  Hearing gradually worsening  Bilateral ears feel blocked  Ringing tinnitus  No otalgia or otorrhea  No history of ear surgery  No current hearing aids  The following portions of the patient's history were reviewed and updated as appropriate: allergies, current medications, past family history, past medical history, past social history, past surgical history and problem list     Review of Systems   Constitutional: Negative  HENT: Negative for congestion, ear discharge, ear pain, hearing loss, nosebleeds, postnasal drip, rhinorrhea, sinus pressure, sinus pain, sore throat, tinnitus and voice change  Respiratory: Negative for chest tightness and shortness of breath  Skin: Negative for color change  Neurological: Negative for dizziness, numbness and headaches  Psychiatric/Behavioral: Negative            Objective:      Temp 97 9 °F (36 6 °C) (Temporal)   Ht 5' 1\" (1 549 m)   Wt 67 1 kg (148 lb)   BMI 27 96 kg/m²          Physical " Exam  Constitutional:       Appearance: He is well-developed  HENT:      Head: Normocephalic  Right Ear: Hearing, tympanic membrane, ear canal and external ear normal  No decreased hearing noted  No drainage or tenderness  There is impacted cerumen  Tympanic membrane is not perforated or erythematous  Left Ear: Hearing, tympanic membrane, ear canal and external ear normal  No decreased hearing noted  No drainage or tenderness  There is impacted cerumen  Tympanic membrane is not perforated or erythematous  Nose: Nose normal  No nasal deformity or septal deviation  Mouth/Throat:      Mouth: Mucous membranes are not pale and not dry  No oral lesions  Dentition: Normal dentition  Pharynx: Uvula midline  No oropharyngeal exudate  Neck:      Trachea: No tracheal deviation  Pulmonary:      Effort: No accessory muscle usage or respiratory distress  Musculoskeletal:      Cervical back: Neck supple  Lymphadenopathy:      Cervical: No cervical adenopathy  Skin:     General: Skin is warm and dry  Neurological:      Mental Status: He is alert and oriented to person, place, and time  Cranial Nerves: No cranial nerve deficit  Sensory: No sensory deficit  Psychiatric:         Behavior: Behavior is cooperative  Ear cerumen removal    Date/Time: 5/3/2023 11:00 AM  Performed by: DIDI Tate  Authorized by: DIDI Tate   Universal Protocol:  Consent: Verbal consent obtained  Risks and benefits: risks, benefits and alternatives were discussed  Consent given by: patient  Patient understanding: patient states understanding of the procedure being performed      Patient location:  Clinic  Procedure details:     Local anesthetic:  None    Location:  L ear and R ear    Approach:  External  Post-procedure details:     Complication:  None    Hearing quality:  Normal    Patient tolerance of procedure:   Tolerated well, no immediate complications

## 2023-05-03 NOTE — PATIENT INSTRUCTIONS
Fluticasone nasal spray one spray to each nostril twice per day - may use every day for nasal congestion  Can be found on store shelves in pharmacy section    Wax care at home - avoidance of q-tips, may use cerumen softeners every one to two months  Hydrocortisone cream pea sized amount on finger as needed for itching in ears       Hearing test if hearing worsens

## 2023-05-03 NOTE — ASSESSMENT & PLAN NOTE
On exam noted bilateral cerumen impaction and unable to fully view tympanic membrane  Cerumen impaction removed bilateral eac with alligator forceps and suction, pt tolerated procedure well  Upon removal, improved hearing and decreased clogged sensation of bilateral ears  Discussed routine cerumen care including avoidance of q-tips, may use cerumen softeners every one to two months  Hydrocortisone cream pea sized amount on finger as needed for itching in ears  Encourage ongoing follow up prn to monitor for cerumen and hearing  Audiogram if symptoms worsen

## 2023-05-12 ENCOUNTER — TELEPHONE (OUTPATIENT)
Dept: SLEEP CENTER | Facility: CLINIC | Age: 73
End: 2023-05-12

## 2023-05-12 DIAGNOSIS — G47.33 OBSTRUCTIVE SLEEP APNEA: Primary | ICD-10-CM

## 2023-05-12 NOTE — TELEPHONE ENCOUNTER
Patient previously saw Dr Souleymane West in the [facility] pulmonary office  Will need to follow up in the sleep center  Left message for patient to call office to review sleep study results  Sleep study showed mild PAULINA and APAP ordered  Needs DME set up and compliance follow up in the sleep center

## 2023-06-01 ENCOUNTER — APPOINTMENT (OUTPATIENT)
Dept: RADIOLOGY | Facility: CLINIC | Age: 73
End: 2023-06-01
Payer: COMMERCIAL

## 2023-06-01 ENCOUNTER — OFFICE VISIT (OUTPATIENT)
Dept: OBGYN CLINIC | Facility: CLINIC | Age: 73
End: 2023-06-01

## 2023-06-01 VITALS
DIASTOLIC BLOOD PRESSURE: 60 MMHG | HEART RATE: 76 BPM | HEIGHT: 61 IN | BODY MASS INDEX: 27.08 KG/M2 | SYSTOLIC BLOOD PRESSURE: 102 MMHG | WEIGHT: 143.4 LBS

## 2023-06-01 DIAGNOSIS — G89.29 CHRONIC PAIN OF BOTH KNEES: ICD-10-CM

## 2023-06-01 DIAGNOSIS — M17.11 PRIMARY OSTEOARTHRITIS OF RIGHT KNEE: ICD-10-CM

## 2023-06-01 DIAGNOSIS — M17.0 PRIMARY OSTEOARTHRITIS OF BOTH KNEES: Primary | ICD-10-CM

## 2023-06-01 DIAGNOSIS — M17.12 PRIMARY OSTEOARTHRITIS OF LEFT KNEE: ICD-10-CM

## 2023-06-01 DIAGNOSIS — M25.561 CHRONIC PAIN OF BOTH KNEES: ICD-10-CM

## 2023-06-01 DIAGNOSIS — M25.562 CHRONIC PAIN OF BOTH KNEES: ICD-10-CM

## 2023-06-01 PROCEDURE — 73562 X-RAY EXAM OF KNEE 3: CPT

## 2023-06-01 RX ORDER — TRIAMCINOLONE ACETONIDE 40 MG/ML
80 INJECTION, SUSPENSION INTRA-ARTICULAR; INTRAMUSCULAR
Status: COMPLETED | OUTPATIENT
Start: 2023-06-01 | End: 2023-06-01

## 2023-06-01 RX ORDER — BUPIVACAINE HYDROCHLORIDE 7.5 MG/ML
2 INJECTION, SOLUTION EPIDURAL; RETROBULBAR
Status: COMPLETED | OUTPATIENT
Start: 2023-06-01 | End: 2023-06-01

## 2023-06-01 RX ADMIN — BUPIVACAINE HYDROCHLORIDE 2 ML: 7.5 INJECTION, SOLUTION EPIDURAL; RETROBULBAR at 10:15

## 2023-06-01 RX ADMIN — TRIAMCINOLONE ACETONIDE 80 MG: 40 INJECTION, SUSPENSION INTRA-ARTICULAR; INTRAMUSCULAR at 10:15

## 2023-06-01 NOTE — PROGRESS NOTES
Assessment/Plan:  1  Primary osteoarthritis of both knees  XR knee 3 vw right non injury      2  Chronic pain of both knees  XR knee 3 vw left non injury        Scribe Attestation    I,:  [place] am acting as a scribe while in the presence of the attending physician :       I,:  Malia Downing, DO personally performed the services described in this documentation    as scribed in my presence :         Sara Ye is a pleasant 63-year-old gentleman who presents today for initial evaluation of his bilateral knee pain  After reviewing his imaging and performing a thorough history and physical exam I explained that he is symptomatic of his moderate underlying osteoarthritis  He has been doing well with corticosteroid injection therapy and I offered to repeat this for him today  He consented to and underwent bilateral knee injections as documented below without issue or complication  This was well-tolerated by the patient  Postinjection instructions were provided  He understands this can be repeated no sooner than 3 months  We will plan to see him back as needed  Large joint arthrocentesis: L knee  Universal Protocol:  Consent: Verbal consent obtained    Risks and benefits: risks, benefits and alternatives were discussed  Consent given by: patient  Patient understanding: patient states understanding of the procedure being performed  Site marked: the operative site was marked  Patient identity confirmed: verbally with patient    Supporting Documentation  Indications: pain   Procedure Details  Location: knee - L knee  Preparation: Patient was prepped and draped in the usual sterile fashion  Needle size: 20 G  Ultrasound guidance: no  Approach: anterolateral  Medications administered: 80 mg triamcinolone acetonide 40 mg/mL; 2 mL bupivacaine (PF) 0 75 %    Patient tolerance: patient tolerated the procedure well with no immediate complications  Dressing:  Sterile dressing applied    Large joint arthrocentesis: R knee  Universal Protocol:  Consent: Verbal consent obtained  Risks and benefits: risks, benefits and alternatives were discussed  Consent given by: patient  Patient understanding: patient states understanding of the procedure being performed  Site marked: the operative site was marked  Patient identity confirmed: verbally with patient    Supporting Documentation  Indications: pain   Procedure Details  Location: knee - R knee  Preparation: Patient was prepped and draped in the usual sterile fashion  Needle size: 20 G  Ultrasound guidance: no  Approach: anterolateral  Medications administered: 80 mg triamcinolone acetonide 40 mg/mL; 2 mL bupivacaine (PF) 0 75 %    Patient tolerance: patient tolerated the procedure well with no immediate complications  Dressing:  Sterile dressing applied          Subjective: Initial evaluation for bilateral knee pain    Patient ID: Anabel Mathur is a 81 y o  male who presents today for initial evaluation of his bilateral knee pain  He has been receiving care with Dr Judythe Schirmer, most recently bilateral corticosteroid injections administered on 3/1/2023  He also has a history of medial meniscectomy on the left knee performed 3 years ago  At today's visit, he reports receiving good relief from the injection therapy he has been receiving with Dr Judythe Schirmer  He reports a recent return of his pain  He describes aching pain about the medial and anteromedial knee  This pain can be moderate to severe and is especially pronounced when walking downstairs  He does use Tylenol occasionally which provides benefit for him  He also reports start up stiffness  He is retired but enjoys working around his house  Review of Systems   Constitutional: Positive for activity change  Negative for chills, fever and unexpected weight change  HENT: Negative for hearing loss, nosebleeds and sore throat  Eyes: Negative for pain, redness and visual disturbance     Respiratory: Negative for cough, shortness of breath and wheezing  Cardiovascular: Negative for chest pain, palpitations and leg swelling  Gastrointestinal: Negative for abdominal pain, nausea and vomiting  Endocrine: Negative for polyphagia and polyuria  Genitourinary: Negative for dysuria and hematuria  Musculoskeletal: Positive for arthralgias and myalgias  Negative for joint swelling  See HPI   Skin: Negative for rash and wound  Neurological: Negative for dizziness, numbness and headaches  Psychiatric/Behavioral: Negative for decreased concentration and suicidal ideas  The patient is not nervous/anxious  Past Medical History:   Diagnosis Date   • Arthritis     shoulder   • Ascending aortic aneurysm [facility])    • Cataract     both eyes-to have the right cataract removed on 1/25/22   • Colon polyp    • GERD (gastroesophageal reflux disease)    • Insomnia    • Leg cramps    • Memory changes    • Restless legs        Past Surgical History:   Procedure Laterality Date   • APPENDECTOMY     • CHOLECYSTECTOMY     • CHOLECYSTECTOMY LAPAROSCOPIC N/A 10/27/2020    Procedure: CHOLECYSTECTOMY LAPAROSCOPIC;  Surgeon: Familia Rivas MD;  Location: [address] [place] [address];  Service: General   • COLONOSCOPY N/A 12/12/2016    Procedure: COLONOSCOPY;  Surgeon: Genesis Amaral MD;  Location: [facility] GI [facility];   Service:    • ELBOW SURGERY Bilateral    • HERNIA REPAIR Right 2001    inguinal   • KNEE ARTHROSCOPY Right    • MA ARTHRS KNE SURG W/MENISCECTOMY MED/LAT W/SHVG Left 6/18/2020    Procedure: ARTHROSCOPY KNEE MEDIAL MENISCECTOMY;  Surgeon: Abena Gutierrez MD;  Location: [address] [place] [address];  Service: Orthopedics   • ROTATOR CUFF REPAIR Bilateral 2011       Family History   Problem Relation Age of Onset   • No Known Problems Mother    • No Known Problems Father    • No Known Problems Sister    • No Known Problems Brother    • No Known Problems Maternal Aunt    • No Known Problems Maternal Uncle    • No Known Problems Paternal Aunt    • No Known Problems Paternal Uncle    • No Known Problems Maternal Grandmother    • No Known Problems Maternal Grandfather    • No Known Problems Paternal Grandmother    • No Known Problems Paternal Grandfather    • ADD / ADHD Neg Hx    • Anesthesia problems Neg Hx    • Cancer Neg Hx    • Clotting disorder Neg Hx    • Collagen disease Neg Hx    • Diabetes Neg Hx    • Dislocations Neg Hx    • Learning disabilities Neg Hx    • Neurological problems Neg Hx    • Osteoporosis Neg Hx    • Rheumatologic disease Neg Hx    • Scoliosis Neg Hx    • Vascular Disease Neg Hx        Social History     Occupational History   • Not on file   Tobacco Use   • Smoking status: Never   • Smokeless tobacco: Never   Vaping Use   • Vaping Use: Never used   Substance and Sexual Activity   • Alcohol use: Not Currently   • Drug use: No   • Sexual activity: Not Currently         Current Outpatient Medications:   •  aspirin (ECOTRIN LOW STRENGTH) 81 mg EC tablet, Take 1 tablet (81 mg total) by mouth daily with breakfast, Disp: 90 tablet, Rfl: 3  •  hydrOXYzine HCL (ATARAX) 50 mg tablet, Take 1 tablet at bedtime to sleep, no later then 8 pm, Disp: 90 tablet, Rfl: 2  •  pantoprazole (PROTONIX) 40 mg tablet, Take 1 tablet (40 mg total) by mouth daily, Disp: 90 tablet, Rfl: 2  •  rosuvastatin (CRESTOR) 10 MG tablet, Take 1 tablet (10 mg total) by mouth daily at bedtime, Disp: 90 tablet, Rfl: 3  •  gabapentin (Neurontin) 100 mg capsule, Take 3 capsules (300 mg total) by mouth daily at bedtime (Patient not taking: Reported on 5/3/2023), Disp: 90 capsule, Rfl: 1  •  meclizine (ANTIVERT) 25 mg tablet, Take 1 tablet (25 mg total) by mouth 3 (three) times a day as needed for dizziness (Patient not taking: Reported on 5/3/2023), Disp: 15 tablet, Rfl: 0  •  meloxicam (Mobic) 15 mg tablet, Take 1 tablet (15 mg total) by mouth daily (Patient not taking: Reported on 5/3/2023), Disp: 30 tablet, Rfl: 0  •  Promethazine-DM (PHENERGAN-DM) 6 25-15 mg/5 mL oral syrup, Take 5 mL by mouth 4 (four) times a day as needed for cough (Patient not taking: Reported on 3/2/2023), Disp: 118 mL, Rfl: 3  •  Silodosin 8 MG CAPS, Take 1 capsule by mouth daily (Patient not taking: Reported on 5/3/2023), Disp: , Rfl:   •  sucralfate (CARAFATE) 1 g tablet, Take 1 tablet (1 g total) by mouth 4 (four) times a day (before meals and at bedtime) (Patient not taking: Reported on 6/1/2023), Disp: 120 tablet, Rfl: 5  •  tadalafil (CIALIS) 20 MG tablet, Take 20 mg by mouth (Patient not taking: Reported on 6/1/2023), Disp: , Rfl:   •  valACYclovir (VALTREX) 1,000 mg tablet, Take 2 tablets (2,000 mg total) by mouth 2 (two) times a day for 1 day (Patient not taking: Reported on 3/16/2023), Disp: 4 tablet, Rfl: 5    No Known Allergies    Objective:  Vitals:    06/01/23 1011   BP: 102/60   Pulse: 76       Body mass index is 26 87 kg/m²  Right Knee Exam     Tenderness   The patient is experiencing tenderness in the medial joint line  Range of Motion   Extension: 0   Flexion: 130     Tests   Varus: negative Valgus: negative  Drawer:  Anterior - negative    Posterior - negative    Other   Erythema: absent  Scars: absent  Sensation: normal  Pulse: present  Swelling: none  Effusion: no effusion present    Comments:  Stable at 0, 30 and 90 degrees  Neurovascularly in tact distally  No warmth or erythema  Parapatellar crepitance noted  Patellofemoral grind: negative      Left Knee Exam     Tenderness   The patient is experiencing tenderness in the medial joint line      Range of Motion   Extension: 0   Flexion: 130     Tests   Varus: negative Valgus: negative  Drawer:  Anterior - negative     Posterior - negative    Other   Erythema: absent  Scars: absent  Sensation: normal  Pulse: present  Swelling: none  Effusion: no effusion present    Comments:  Stable at 0, 30 and 90 degrees  Neurovascularly in tact distally  No warmth or erythema  Parapatellar crepitance noted  Patellofemoral grind: negative          Observations   Left Knee   Negative for effusion  Right Knee   Negative for effusion  Physical Exam  Vitals and nursing note reviewed  Constitutional:       Appearance: Normal appearance  He is well-developed  HENT:      Head: Normocephalic and atraumatic  Right Ear: External ear normal       Left Ear: External ear normal    Eyes:      General: No scleral icterus  Extraocular Movements: Extraocular movements intact  Conjunctiva/sclera: Conjunctivae normal    Cardiovascular:      Rate and Rhythm: Normal rate  Pulmonary:      Effort: Pulmonary effort is normal  No respiratory distress  Musculoskeletal:      Cervical back: Normal range of motion and neck supple  Right knee: No effusion  Left knee: No effusion  Comments: See Ortho exam   Skin:     General: Skin is warm and dry  Neurological:      Mental Status: He is alert and oriented to person, place, and time  Psychiatric:         Behavior: Behavior normal          I have personally reviewed pertinent films in PACS  Bilateral knee x-rays obtained on 6/1/2023 reviewed demonstrating moderate to severe degenerative change with medial narrowing  There is tricompartmental sclerosis and osteophytosis  There is no acute fracture, dislocation, lytic or blastic lesion  This document was created using speech voice recognition software  Grammatical errors, random word insertions, pronoun errors, and incomplete sentences are an occasional consequence of this system due to software limitations, ambient noise, and hardware issues  Any formal questions or concerns about content, text, or information contained within the body of this dictation should be directly addressed to the provider for clarification

## 2023-06-14 ENCOUNTER — HOSPITAL ENCOUNTER (OUTPATIENT)
Dept: RADIOLOGY | Facility: HOSPITAL | Age: 73
Discharge: HOME | End: 2023-06-14
Payer: COMMERCIAL

## 2023-06-14 DIAGNOSIS — I73.9 PERIPHERAL ARTERY DISEASE (HCC): ICD-10-CM

## 2023-06-14 PROCEDURE — 93923 UPR/LXTR ART STDY 3+ LVLS: CPT

## 2023-06-15 ENCOUNTER — OFFICE VISIT (OUTPATIENT)
Dept: FAMILY MEDICINE CLINIC | Facility: CLINIC | Age: 73
End: 2023-06-15
Payer: COMMERCIAL

## 2023-06-15 VITALS
BODY MASS INDEX: 26.43 KG/M2 | SYSTOLIC BLOOD PRESSURE: 120 MMHG | HEIGHT: 61 IN | DIASTOLIC BLOOD PRESSURE: 80 MMHG | TEMPERATURE: 97.5 F | WEIGHT: 140 LBS | OXYGEN SATURATION: 97 % | HEART RATE: 73 BPM | RESPIRATION RATE: 16 BRPM

## 2023-06-15 DIAGNOSIS — G47.30 MILD SLEEP APNEA: Primary | ICD-10-CM

## 2023-06-15 DIAGNOSIS — G47.00 INSOMNIA, UNSPECIFIED TYPE: ICD-10-CM

## 2023-06-15 PROCEDURE — 99214 OFFICE O/P EST MOD 30 MIN: CPT | Performed by: FAMILY MEDICINE

## 2023-06-15 PROCEDURE — 93923 UPR/LXTR ART STDY 3+ LVLS: CPT | Performed by: SURGERY

## 2023-06-15 RX ORDER — TRAZODONE HYDROCHLORIDE 50 MG/1
TABLET ORAL
Qty: 30 TABLET | Refills: 0 | Status: SHIPPED | OUTPATIENT
Start: 2023-06-15

## 2023-06-15 NOTE — PROGRESS NOTES
Mell Ellington 1950 male MRN: [id number]    [facility] - [address]      ASSESSMENT/PLAN  Mell Ellington is a 81 y o  male presents to the office for    Diagnoses and all orders for this visit:    Mild sleep apnea  -     Ambulatory Referral to Pulmonology; Future    Insomnia, unspecified type  -     traZODone (DESYREL) 50 mg tablet; 1 tablet at bedtime for insomnia, if no improvement take 2 tablets  Per documentation patient is to see pulmonary for sleep study placement patient was positive with mild sleep apnea seen on exam   Patient is to start trazodone to help with insomnia  All questions answered for the patient today         Future Appointments   Date Time Provider Kailash Perkins   9/11/2023  3:30 PM Jamil Dietz MD [facility]UR[facility]   10/13/2023 10:15 AM DO JARRELL Reveles [facility]   5/1/2024 10:00 AM DIDI Culver [facility]          SUBJECTIVE  CC: Follow-up (Sleep study results )      HPI:  Mell lElington is a 81 y o  male who presents for a follow-up appointment  States he never received the sleep study results but was traveling in [facility] for some time  States he only sleeps about 3 to 4 hours and the medication Atarax no longer helps him  Patient is hoping to see if he needs a machine placement  Review of Systems   Constitutional: Negative for activity change, appetite change, chills, fatigue and fever  HENT: Negative for congestion  Respiratory: Negative for cough, chest tightness and shortness of breath  Cardiovascular: Negative for chest pain and leg swelling  Gastrointestinal: Negative for abdominal distention, abdominal pain, constipation, diarrhea, nausea and vomiting  Psychiatric/Behavioral: Positive for sleep disturbance  All other systems reviewed and are negative        Historical Information   The patient history was reviewed as follows:  Past Medical History: "  Diagnosis Date   • Arthritis     shoulder   • Ascending aortic aneurysm (HCC)    • Cataract     both eyes-to have the right cataract removed on 1/25/22   • Colon polyp    • GERD (gastroesophageal reflux disease)    • Insomnia    • Leg cramps    • Memory changes    • Restless legs          Medications:     Current Outpatient Medications:   •  aspirin (ECOTRIN LOW STRENGTH) 81 mg EC tablet, Take 1 tablet (81 mg total) by mouth daily with breakfast, Disp: 90 tablet, Rfl: 3  •  pantoprazole (PROTONIX) 40 mg tablet, Take 1 tablet (40 mg total) by mouth daily, Disp: 90 tablet, Rfl: 2  •  rosuvastatin (CRESTOR) 10 MG tablet, Take 1 tablet (10 mg total) by mouth daily at bedtime, Disp: 90 tablet, Rfl: 3  •  traZODone (DESYREL) 50 mg tablet, 1 tablet at bedtime for insomnia, if no improvement take 2 tablets  , Disp: 30 tablet, Rfl: 0  •  gabapentin (Neurontin) 100 mg capsule, Take 3 capsules (300 mg total) by mouth daily at bedtime (Patient not taking: Reported on 5/3/2023), Disp: 90 capsule, Rfl: 1  •  meclizine (ANTIVERT) 25 mg tablet, Take 1 tablet (25 mg total) by mouth 3 (three) times a day as needed for dizziness (Patient not taking: Reported on 5/3/2023), Disp: 15 tablet, Rfl: 0  •  Silodosin 8 MG CAPS, Take 1 capsule by mouth daily (Patient not taking: Reported on 5/3/2023), Disp: , Rfl:   •  tadalafil (CIALIS) 20 MG tablet, Take 20 mg by mouth (Patient not taking: Reported on 6/1/2023), Disp: , Rfl:     No Known Allergies    OBJECTIVE  Vitals:   Vitals:    06/15/23 0853   BP: 120/80   BP Location: Left arm   Patient Position: Sitting   Cuff Size: Standard   Pulse: 73   Resp: 16   Temp: 97 5 °F (36 4 °C)   SpO2: 97%   Weight: 63 5 kg (140 lb)   Height: 5' 1 25\" (1 556 m)         Physical Exam  Vitals reviewed  Constitutional:       Appearance: He is well-developed  HENT:      Head: Normocephalic and atraumatic     Eyes:      Conjunctiva/sclera: Conjunctivae normal       Pupils: Pupils are equal, round, and " reactive to light  Cardiovascular:      Rate and Rhythm: Normal rate and regular rhythm  Heart sounds: Normal heart sounds  Pulmonary:      Effort: Pulmonary effort is normal  No respiratory distress  Breath sounds: Normal breath sounds  Musculoskeletal:         General: Normal range of motion  Cervical back: Normal range of motion and neck supple  Skin:     General: Skin is warm  Capillary Refill: Capillary refill takes less than 2 seconds  Neurological:      Mental Status: He is alert and oriented to person, place, and time                      Alexander Lazo MD,   [facility]  6/15/2023

## 2023-06-16 ENCOUNTER — TELEPHONE (OUTPATIENT)
Dept: VASCULAR SURGERY | Facility: CLINIC | Age: 73
End: 2023-06-16

## 2023-06-16 NOTE — TELEPHONE ENCOUNTER
Attempted to contact patient to schedule appointment(s) listed below  Requested patient call [phone] option 3 to schedule appointment(s)  Patient's appointment(s) are due now  Dopplers  [] Abdominal Aorta Iliac (AOIL)  [] Carotid (CV)   [] Celiac and/or Mesenteric  [] Endovascular Aortic Repair (EVAR)   [] Hemodialysis Access (HD)   [] Lower Limb Arterial (GIOVANNY)  [] Lower Limb Venous (LEV)  [] Lower Limb Venous Duplex with Reflux (LEVDR)  [] Renal Artery  [] Upper Limb Arterial (UEA)    [] Upper Limb Venous (UEV)              [x] TATIANA and Waveform analysis 6/14/2023  Advanced Imaging   [] CTA head/neck    [] CTA abdomen    [] CTA abdomen & pelvis    [] CT abdomen with/ without contrast  [] CT abdomen with contrast  [] CT abdomen without contrast    [] CT abdomen & pelvis with/ without contrast  [] CT abdomen & pelvis with contrast  [] CT abdomen & pelvis without contrast    Office Visit   [] New patient, patient last seen over 3 years ago  [] Risk factor modification (RFM)   [x] Follow up 3 mos  F/u    Due 6/14/2023  [] Lost to follow up (LTFU)

## 2023-06-28 ENCOUNTER — OFFICE VISIT (OUTPATIENT)
Dept: PULMONOLOGY | Facility: MEDICAL CENTER | Age: 73
End: 2023-06-28
Payer: COMMERCIAL

## 2023-06-28 VITALS
TEMPERATURE: 97.6 F | SYSTOLIC BLOOD PRESSURE: 100 MMHG | HEART RATE: 74 BPM | OXYGEN SATURATION: 96 % | RESPIRATION RATE: 12 BRPM | BODY MASS INDEX: 26.43 KG/M2 | WEIGHT: 140 LBS | DIASTOLIC BLOOD PRESSURE: 60 MMHG | HEIGHT: 61 IN

## 2023-06-28 DIAGNOSIS — G47.30 MILD SLEEP APNEA: ICD-10-CM

## 2023-06-28 DIAGNOSIS — G47.33 OBSTRUCTIVE SLEEP APNEA: Primary | ICD-10-CM

## 2023-06-28 PROCEDURE — 99213 OFFICE O/P EST LOW 20 MIN: CPT

## 2023-06-28 RX ORDER — HYDROXYZINE 50 MG/1
50 TABLET, FILM COATED ORAL 3 TIMES DAILY PRN
COMMUNITY

## 2023-06-28 NOTE — ASSESSMENT & PLAN NOTE
I am unable to view Tenzin's compliance at today's visit because he has not received his CPAP machine yet, although an order was placed on 5/12/23  I will have our office follow through with regards to his machine so that he can start therapy as soon as possible  I discussed with Gisselle Monroe and his daughter that if he does not hear from the [address], that he needs to call the office and let us know  They verbalized understanding

## 2023-06-28 NOTE — PROGRESS NOTES
Pulmonary Follow Up Note   Lindsey Cedeño 81 y o  male MRN: [id number]  6/28/2023      Assessment/Plan:     Obstructive sleep apnea  I am unable to view Tenzin's compliance at today's visit because he has not received his CPAP machine yet, although an order was placed on 5/12/23  I will have our office follow through with regards to his machine so that he can start therapy as soon as possible  I discussed with Noe Morrissey and his daughter that if he does not hear from the [address], that he needs to call the office and let us know  They verbalized understanding  Visit orders:    Problem List Items Addressed This Visit        Respiratory    Obstructive sleep apnea - Primary     I am unable to view Tenzin's compliance at today's visit because he has not received his CPAP machine yet, although an order was placed on 5/12/23  I will have our office follow through with regards to his machine so that he can start therapy as soon as possible  I discussed with Noe Morrissey and his daughter that if he does not hear from the [address], that he needs to call the office and let us know  They verbalized understanding  Other Visit Diagnoses     Mild sleep apnea              Return in about 3 months (around 9/28/2023), or if symptoms worsen or fail to improve  History of Present Illness   HPI:  Lindsey Cedeño is a 81 y o  male who presents to the office today for follow-up regarding sleep apnea and compliance  Noe Morrissey completed a home sleep study on 5/9/2023 which demonstrated an MARY LOU of 10 0 consistent with mild obstructive sleep apnea  It was recommended that he start auto CPAP with a range of 5 to 15 cmH2O  The order was placed however the patient still has not received his machine, so we are unable to view his compliance today  Noe Morrissey is complaining of being unable to sleep at night secondary to leg cramping, specifically his right calf  He states that he only slept 2 hours last night    He was given hydroxyzine to help him however this is not working  He does have follow-up on July 11 with a vascular physician regarding his legs  Review of Systems   Constitutional: Positive for fatigue  Negative for activity change  Respiratory: Negative for chest tightness  Cardiovascular: Negative for chest pain  Musculoskeletal: Positive for myalgias  Psychiatric/Behavioral: Positive for sleep disturbance  All other systems reviewed and are negative  Medical, Family and Social history reviewed and updated as appropriate    Historical Information   Past Medical History:   Diagnosis Date   • Arthritis     shoulder   • Ascending aortic aneurysm ([address] )    • Cataract     both eyes-to have the right cataract removed on 1/25/22   • Colon polyp    • GERD (gastroesophageal reflux disease)    • Insomnia    • Leg cramps    • Memory changes    • Restless legs      Past Surgical History:   Procedure Laterality Date   • APPENDECTOMY     • CHOLECYSTECTOMY     • CHOLECYSTECTOMY LAPAROSCOPIC N/A 10/27/2020    Procedure: CHOLECYSTECTOMY LAPAROSCOPIC;  Surgeon: Scar Brewer MD;  Location: [address] [place] [address];  Service: General   • COLONOSCOPY N/A 12/12/2016    Procedure: COLONOSCOPY;  Surgeon: Rachid Kevin MD;  Location: [place] [address] [facility];   Service:    • ELBOW SURGERY Bilateral    • HERNIA REPAIR Right 2001    inguinal   • KNEE ARTHROSCOPY Right    • AK ARTHRS KNE SURG W/MENISCECTOMY MED/LAT W/SHVG Left 6/18/2020    Procedure: ARTHROSCOPY KNEE MEDIAL MENISCECTOMY;  Surgeon: Missy Connelly MD;  Location: [address] [place] [address];  Service: Orthopedics   • ROTATOR CUFF REPAIR Bilateral 2011     Family History   Problem Relation Age of Onset   • No Known Problems Mother    • No Known Problems Father    • No Known Problems Sister    • No Known Problems Brother    • No Known Problems Maternal Aunt    • No Known Problems Maternal Uncle    • No Known Problems Paternal Aunt    • No Known Problems Paternal Uncle    • No Known Problems Maternal Grandmother    • No Known Problems Maternal Grandfather    • No Known Problems Paternal Grandmother    • No Known Problems Paternal Grandfather    • ADD / ADHD Neg Hx    • Anesthesia problems Neg Hx    • Cancer Neg Hx    • Clotting disorder Neg Hx    • Collagen disease Neg Hx    • Diabetes Neg Hx    • Dislocations Neg Hx    • Learning disabilities Neg Hx    • Neurological problems Neg Hx    • Osteoporosis Neg Hx    • Rheumatologic disease Neg Hx    • Scoliosis Neg Hx    • Vascular Disease Neg Hx        Social History     Tobacco Use   Smoking Status Never   Smokeless Tobacco Never         Meds/Allergies     Current Outpatient Medications:   •  aspirin (ECOTRIN LOW STRENGTH) 81 mg EC tablet, Take 1 tablet (81 mg total) by mouth daily with breakfast, Disp: 90 tablet, Rfl: 3  •  hydrOXYzine HCL (ATARAX) 50 mg tablet, Take 50 mg by mouth 3 (three) times a day as needed for itching, Disp: , Rfl:   •  pantoprazole (PROTONIX) 40 mg tablet, Take 1 tablet (40 mg total) by mouth daily, Disp: 90 tablet, Rfl: 2  •  rosuvastatin (CRESTOR) 10 MG tablet, Take 1 tablet (10 mg total) by mouth daily at bedtime, Disp: 90 tablet, Rfl: 3  •  gabapentin (Neurontin) 100 mg capsule, Take 3 capsules (300 mg total) by mouth daily at bedtime (Patient not taking: Reported on 5/3/2023), Disp: 90 capsule, Rfl: 1  •  meclizine (ANTIVERT) 25 mg tablet, Take 1 tablet (25 mg total) by mouth 3 (three) times a day as needed for dizziness (Patient not taking: Reported on 5/3/2023), Disp: 15 tablet, Rfl: 0  •  Silodosin 8 MG CAPS, Take 1 capsule by mouth daily (Patient not taking: Reported on 5/3/2023), Disp: , Rfl:   •  tadalafil (CIALIS) 20 MG tablet, Take 20 mg by mouth (Patient not taking: Reported on 6/1/2023), Disp: , Rfl:   •  traZODone (DESYREL) 50 mg tablet, 1 tablet at bedtime for insomnia, if no improvement take 2 tablets   (Patient not taking: Reported on 6/28/2023), Disp: 30 tablet, Rfl: 0  No Known Allergies    Vitals: Blood pressure 100/60, pulse 74, "temperature 97 6 °F (36 4 °C), temperature source Tympanic, resp  rate 12, height 5' 1\" (1 549 m), weight 63 5 kg (140 lb), SpO2 96 %  Body mass index is 26 45 kg/m²  Oxygen Therapy  SpO2: 96 %    Physical Exam  Vitals reviewed  Constitutional:       General: He is not in acute distress  Appearance: Normal appearance  He is not ill-appearing or toxic-appearing  HENT:      Head: Normocephalic and atraumatic  Nose: Nose normal       Mouth/Throat:      Pharynx: Oropharynx is clear  Eyes:      Conjunctiva/sclera: Conjunctivae normal    Cardiovascular:      Rate and Rhythm: Normal rate and regular rhythm  Heart sounds: Normal heart sounds  Pulmonary:      Effort: Pulmonary effort is normal  No respiratory distress  Breath sounds: Normal breath sounds  No stridor  No wheezing, rhonchi or rales  Chest:      Chest wall: No tenderness  Abdominal:      Palpations: Abdomen is soft  Musculoskeletal:         General: Normal range of motion  Cervical back: Normal range of motion  Skin:     General: Skin is warm and dry  Neurological:      General: No focal deficit present  Mental Status: He is alert and oriented to person, place, and time  Psychiatric:         Mood and Affect: Mood normal          Behavior: Behavior normal          Labs: I have personally reviewed pertinent lab results    Lab Results   Component Value Date    WBC 6 28 03/28/2023    HGB 15 5 03/28/2023    HCT 45 2 03/28/2023    MCV 93 03/28/2023     03/28/2023     Lab Results   Component Value Date    GLUCOSE 98 10/14/2016    CALCIUM 8 7 03/28/2023     10/14/2016    K 4 1 03/28/2023    CO2 24 03/28/2023     03/28/2023    BUN 11 03/28/2023    CREATININE 0 90 03/28/2023     No results found for: \"IGE\"  Lab Results   Component Value Date    ALT 14 03/28/2023    AST 16 03/28/2023    ALKPHOS 67 03/28/2023    BILITOT 0 4 10/14/2016       Imaging and other studies: I have personally reviewed pertinent " reports  Other Studies: I have personally reviewed pertinent reports  Home sleep study 5/9/23: MARY LOU of 10 0  Test is consistent with mild obstructive sleep apnea

## 2023-07-02 PROBLEM — H61.23 BILATERAL IMPACTED CERUMEN: Status: RESOLVED | Noted: 2023-05-03 | Resolved: 2023-07-02

## 2023-07-07 DIAGNOSIS — G47.00 INSOMNIA, UNSPECIFIED TYPE: ICD-10-CM

## 2023-07-07 RX ORDER — TRAZODONE HYDROCHLORIDE 50 MG/1
TABLET ORAL
Qty: 30 TABLET | Refills: 0 | Status: SHIPPED | OUTPATIENT
Start: 2023-07-07

## 2023-07-11 NOTE — PROGRESS NOTES
Cpap order processed via [place] to [address] [place] [address] in [place]. Patient has Cpap Set up appt on 07/31/23 w/Ember at the [facility] office.

## 2023-07-12 LAB
DME PARACHUTE DELIVERY DATE EXPECTED: NORMAL
DME PARACHUTE DELIVERY DATE REQUESTED: NORMAL
DME PARACHUTE DELIVERY NOTE: NORMAL
DME PARACHUTE ITEM DESCRIPTION: NORMAL
DME PARACHUTE ORDER STATUS: NORMAL
DME PARACHUTE SUPPLIER NAME: NORMAL
DME PARACHUTE SUPPLIER PHONE: NORMAL

## 2023-07-21 DIAGNOSIS — G47.00 INSOMNIA, UNSPECIFIED TYPE: ICD-10-CM

## 2023-07-21 RX ORDER — TRAZODONE HYDROCHLORIDE 50 MG/1
TABLET ORAL
Qty: 180 TABLET | Refills: 1 | Status: SHIPPED | OUTPATIENT
Start: 2023-07-21

## 2023-07-24 ENCOUNTER — TELEPHONE (OUTPATIENT)
Dept: VASCULAR SURGERY | Facility: CLINIC | Age: 73
End: 2023-07-24

## 2023-07-31 ENCOUNTER — TELEPHONE (OUTPATIENT)
Dept: SLEEP CENTER | Facility: CLINIC | Age: 73
End: 2023-07-31

## 2023-07-31 LAB

## 2023-07-31 NOTE — TELEPHONE ENCOUNTER
I set this pt. up today in [facility]. on a ResMed S11 set at 5-20cm and gave the [id number] [address] () FFM mask. Son was on the phone interpreting everything.

## 2023-08-04 ENCOUNTER — OFFICE VISIT (OUTPATIENT)
Dept: VASCULAR SURGERY | Facility: CLINIC | Age: 73
End: 2023-08-04
Payer: COMMERCIAL

## 2023-08-04 VITALS
WEIGHT: 143 LBS | HEIGHT: 61 IN | HEART RATE: 81 BPM | SYSTOLIC BLOOD PRESSURE: 122 MMHG | DIASTOLIC BLOOD PRESSURE: 72 MMHG | BODY MASS INDEX: 27 KG/M2

## 2023-08-04 DIAGNOSIS — G62.9 PERIPHERAL POLYNEUROPATHY: ICD-10-CM

## 2023-08-04 DIAGNOSIS — G25.81 RESTLESS LEG SYNDROME: ICD-10-CM

## 2023-08-04 DIAGNOSIS — M79.2 NEUROPATHIC PAIN: ICD-10-CM

## 2023-08-04 DIAGNOSIS — R25.2 BILATERAL LEG CRAMPS: ICD-10-CM

## 2023-08-04 DIAGNOSIS — I73.9 PERIPHERAL ARTERY DISEASE (HCC): Primary | ICD-10-CM

## 2023-08-04 DIAGNOSIS — G47.00 INSOMNIA, UNSPECIFIED TYPE: ICD-10-CM

## 2023-08-04 PROCEDURE — 99213 OFFICE O/P EST LOW 20 MIN: CPT | Performed by: PHYSICIAN ASSISTANT

## 2023-08-04 RX ORDER — GABAPENTIN 100 MG/1
300 CAPSULE ORAL
Qty: 90 CAPSULE | Refills: 0 | Status: SHIPPED | OUTPATIENT
Start: 2023-08-04

## 2023-08-04 NOTE — PROGRESS NOTES
Assessment/Plan:    Bilateral leg cramps  Small varicosities  -     Compression Stocking  -Continued chronic, nighttime L>R leg pain, tightness, cramping and abnormal sensations (bugs on skin)  -Exam: Skin overall healthy and intact; no chronic stasis changes; no large varicose veins; DP pulses intact  -Recommend trial of fresh compression stockings to be worn daily and remove at night which may help nighttime cramping  -Recently placed on gabapentin 300 HS by other which partially helped control symptoms  -We will follow-up in about 6 months after he has had a trial of compression and has seen neurology      Neuropathic pain  Restless leg syndrome  Insomnia, unspecified type  -     gabapentin (Neurontin) 100 mg capsule; Take 3 capsules (300 mg total) by mouth daily at bedtime  -Renewed gabapentin as it was partially helping legs  -Explained to patient/daughter that refills should be made by PCP  -Has upcoming OV with neurology who can re-evaluate this medication       Peripheral artery disease ([address])  -     VAS TATIANA & waveform analysis, multiple levels; Future  -Asymptomatic PAD  -Walks 45" daily without leg/thigh/calf claudication    -TATIANA 6/15/23:     R 1.31/+240/93; triphasic waveforms at the ankles, poorly compressible    L 1.14/153/106; triphasic waveforms at the ankles, normal     Plan:  -Neuropathy unlikely due to PAD  -Continue with regular walking every day for 45 minutes  -Continue with aspirin and statin therapy  -Will monitor clinically; no further testing at this time        Atherosclerotic disease of the abdominal aorta  Dissection of the distal abdominal aorta  -CTA a/p 5/19/20 - Focal outpouching in the abdominal aorta suggesting focal dissection or penetrating ulcer. Renals, Celiac, SMA and AYSE are patent. Iliacs patent.   -CTA 10/20/22 - aortic atherosclerotic disease  -Continue with aspirin and atorvastatin therapy  -Follow up in 1 year        Aortic aneurysm without rupture, unspecified portion of aorta (HCC)  -40 mm ascending aneurysm on CTA 10/20/22  -Aspirin/ atorvastatin therapy  -Reminded patient that this should be followed by cardiology      Subjective:      Patient ID: Lupe Wolf is a 81 y.o. male. Patient presents to review TATIANA done 6/14. Patient reports severe neuropathy. HPI   Brittni Luizmann BPH, GERD, Yeboah's esphagus, anxiety, PAULINA, cervical radiculopathy, restless legs, multiple orthopedic surgeries, hyperlipidemia, ascending thoracic aortic aneurysm and peripheral arterial disease who was originally seen in 2021 for evaluation of varicose veins and nighttime cramping. Patient with R > L calf cramping which wakes him up in the night and wife rubs out the cramp. He also complains of intermittent tender and lower extremity veins. At times the legs ache and he gets minimal edema. No claudication. No foot pain or wounds.  No hx of blood cts. He is a non-smoker. 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 A repeat CTA c/a/p was performed in the ED 10/20/22 for epigastric pain / gastritis which demonstrates atherosclerotic disease. He was placed on aspirin and rosuvastatin 10 by cardiology. 3/16/23: Mr. Hermina Prader returns with complains of restless legs and nighttime cramping/ tightness in the calves. Pain wakes him up in the night and he has to walk around. There is no foot pain. He was recently placed on gabapentin 300 HS for restless legs and neuropathy, but he feels that it doesn't help, so it is not clear if he is still taking it. He moisturizes the legs and intermittently wears compression stockings. He tells me that he drinks a lot of water. He walks everyday for exercise about 45 minutes without limitation. He has no leg, thigh or calf pain with ambulation. He has no exertional or rest foot pain. No foot wounds. No CP, SOB or palpitations.  No diabetes.      8/4/23:  Mr. Hermina Prader returns for vascular follow-up. He has his daughter on the phone during the visit also helps with translation. Patient/daughter report that he continues to have trouble sleeping. He complains bitterly of tingling, throbbing and abnormal sensations (like bugs crawling on the skin) during the night from the ankles to the knees/thighs, but apparently not affecting the feet. He has to get up and walk around or have his legs massaged. He was previously wearing compression, but not recently over the summer. He does ask for another prescription for fresh compression. He feels that the gabapentin may actually have been partially helping him with leg discomfort at night and sleeping but ran out of it in June. Recommended that he follow-up with primary care for refills. Daughter asked that it be re-filled today which I did, but future refills should be done by PCP. Also, he does have an upcoming appointment with neurology. Since he was last seen, he states that he has a CPAP machine but this has not helped him sleep better either. We reviewed his ABIs which are essentially normal.  On the right, the TATIANA was noncompressible so he may have some atherosclerotic disease, but there are triphasic waveforms at the ankles with great toe pressure of 93. On the left, TATIANA 1.14 which is normal with triphasic waveforms at the ankle with metatarsal pressure 153 and great toe pressure of 106. Toe pressures are above the healing level. The following portions of the patient's history were reviewed and updated as appropriate: allergies, current medications, past family history, past medical history, past social history, past surgical history and problem list.    Review of Systems   Constitutional: Negative. HENT: Negative. Eyes: Negative. Respiratory: Negative. Cardiovascular: Negative. Gastrointestinal: Negative. Endocrine: Negative. Genitourinary: Negative. Musculoskeletal: Negative.     Skin: Negative. Allergic/Immunologic: Negative. Neurological: Negative. Hematological: Negative. Psychiatric/Behavioral: Negative. Objective:    /72 (BP Location: Left arm, Patient Position: Sitting, Cuff Size: Standard)   Pulse 81   Ht 5' 1" (1.549 m)   Wt 64.9 kg (143 lb)   BMI 27.02 kg/m²     B LE small varicose veins up to 2-5 mm wide  Skin healthy and intact; no chronic stasis changes  No evidence of venous hypertension  Decreased hair on the distal third of the legs  And feet warm and well-perfused with DP pulses bilaterally  No lower extremity edema       Physical Exam  Vitals and nursing note reviewed. Constitutional:       Appearance: He is well-developed. HENT:      Head: Normocephalic and atraumatic. Eyes:      Pupils: Pupils are equal, round, and reactive to light. Neck:      Thyroid: No thyromegaly. Vascular: No JVD. Trachea: Trachea normal.   Cardiovascular:      Rate and Rhythm: Normal rate and regular rhythm. Pulses:           Carotid pulses are 2+ on the right side and 2+ on the left side. Radial pulses are 2+ on the right side and 2+ on the left side. Dorsalis pedis pulses are 1+ on the right side and 2+ on the left side. Heart sounds: Normal heart sounds, S1 normal and S2 normal. No murmur heard. No friction rub. No gallop. Pulmonary:      Effort: Pulmonary effort is normal. No accessory muscle usage or respiratory distress. Breath sounds: Normal breath sounds. No wheezing or rales. Abdominal:      General: Bowel sounds are normal. There is no distension. Palpations: Abdomen is soft. Tenderness: There is no abdominal tenderness. Musculoskeletal:         General: No deformity. Normal range of motion. Cervical back: Neck supple. Skin:     General: Skin is warm and dry. Findings: No lesion or rash. Nails: There is no clubbing.    Neurological:      Mental Status: He is alert and oriented to person, place, and time. Comments: Grossly normal    Psychiatric:         Behavior: Behavior is cooperative. TATIANA 6/15/23  CLINICAL:  Indications: The patient presents with a history of PAD. The patient has pain in bilateral legs while resting at night. Operative History:  Patient denies any cardiovascular surgeries  Risk Factors  The patient has history of PAD. AAA  Clinical  Right Pressure:  108/ mm Hg, Left Pressure:  106/ mm Hg. FINDINGS:     Segment       Rig  Left                            P    P    Ant. Tibial   125  123    Post. Tibial  142  112    Ankle         142  123    Metatarsal    240  153    Great Toe      93  106             CONCLUSION:  Impression  RIGHT LOWER LIMB  Ankle/Brachial Index: 1.31 which is in the supra normal category suggestive of  poorly compressible vessels at the ankle. (Prior none)  PPG/PVR Tracings are normal.  Triphasic waveform's at the ankle. Metatarsal Pressure  +240mmHg  Great Toe Pressure: 93mmHg, within the normal healing range. LEFT LOWER LIMB  Ankle/Brachial Index: 1.14  which is in the normal range. (Prior none)  PPG/PVR Tracings are normal.  Triphasic waveform's at the ankle. Metatarsal Pressure 153mmHg  Great Toe Pressure:106mmHg, within the normal healing range. I have reviewed and made appropriate changes to the review of systems input by the medical assistant.     Vitals:    08/04/23 1255   BP: 122/72   BP Location: Left arm   Patient Position: Sitting   Cuff Size: Standard   Pulse: 81   Weight: 64.9 kg (143 lb)   Height: 5' 1" (1.549 m)       Patient Active Problem List   Diagnosis   • Cervical radiculopathy   • Colon polyps   • Obstructive sleep apnea   • Peripheral artery disease (HCC)   • Peripheral neuropathy   • Restless legs syndrome   • Benign prostatic hyperplasia without lower urinary tract symptoms   • Gastroesophageal reflux disease without esophagitis   • Cholelithiasis   • Ectatic aorta   • Abdominal adhesions   • Other insomnia   • Acute cholecystitis due to biliary calculus   • Bilateral leg cramps   • Yeboah's esophagus without dysplasia   • Antritis of stomach   • Hx of adenomatous colonic polyps   • Heartburn   • Pain of upper abdomen   • Nasal congestion   • Sleep apnea   • Snoring   • Excessive daytime sleepiness       Past Surgical History:   Procedure Laterality Date   • APPENDECTOMY     • CHOLECYSTECTOMY     • CHOLECYSTECTOMY LAPAROSCOPIC N/A 10/27/2020    Procedure: CHOLECYSTECTOMY LAPAROSCOPIC;  Surgeon: Derrek Vergara MD;  Location: [facility];  Service: General   • COLONOSCOPY N/A 12/12/2016    Procedure: COLONOSCOPY;  Surgeon: Heber Reynolds MD;  Location: [address] [place] [address] [facility];   Service:    • ELBOW SURGERY Bilateral    • HERNIA REPAIR Right 2001    inguinal   • KNEE ARTHROSCOPY Right    • MA ARTHRS KNE SURG W/MENISCECTOMY MED/LAT W/SHVG Left 6/18/2020    Procedure: ARTHROSCOPY KNEE MEDIAL MENISCECTOMY;  Surgeon: Ct Starks MD;  Location: [facility];  Service: Orthopedics   • ROTATOR CUFF REPAIR Bilateral 2011       Family History   Problem Relation Age of Onset   • No Known Problems Mother    • No Known Problems Father    • No Known Problems Sister    • No Known Problems Brother    • No Known Problems Maternal Aunt    • No Known Problems Maternal Uncle    • No Known Problems Paternal Aunt    • No Known Problems Paternal Uncle    • No Known Problems Maternal Grandmother    • No Known Problems Maternal Grandfather    • No Known Problems Paternal Grandmother    • No Known Problems Paternal Grandfather    • ADD / ADHD Neg Hx    • Anesthesia problems Neg Hx    • Cancer Neg Hx    • Clotting disorder Neg Hx    • Collagen disease Neg Hx    • Diabetes Neg Hx    • Dislocations Neg Hx    • Learning disabilities Neg Hx    • Neurological problems Neg Hx    • Osteoporosis Neg Hx    • Rheumatologic disease Neg Hx    • Scoliosis Neg Hx    • Vascular Disease Neg Hx        Social History     Socioeconomic History   • Marital status: /Civil [employer]     Spouse name: Not on file   • Number of children: Not on file   • Years of education: Not on file   • Highest education level: Not on file   Occupational History   • Not on file   Tobacco Use   • Smoking status: Never   • Smokeless tobacco: Never   Vaping Use   • Vaping Use: Never used   Substance and Sexual Activity   • Alcohol use: Not Currently   • Drug use: No   • Sexual activity: Not Currently   Other Topics Concern   • Not on file   Social History Narrative   • Not on file     Social Determinants of Health     Financial Resource Strain: Low Risk  (11/14/2022)    Overall Financial Resource Strain (CARDIA)    • Difficulty of Paying Living Expenses: Not hard at all   Food Insecurity: Not on file   Transportation Needs: No Transportation Needs (11/14/2022)    PRAPARE - Transportation    • Lack of Transportation (Medical): No    • Lack of Transportation (Non-Medical):  No   Physical Activity: Not on file   Stress: Not on file   Social Connections: Not on file   Intimate Partner Violence: Not on file   Housing Stability: Not on file       No Known Allergies      Current Outpatient Medications:   •  aspirin (ECOTRIN LOW STRENGTH) 81 mg EC tablet, Take 1 tablet (81 mg total) by mouth daily with breakfast, Disp: 90 tablet, Rfl: 3  •  gabapentin (Neurontin) 100 mg capsule, Take 3 capsules (300 mg total) by mouth daily at bedtime, Disp: 90 capsule, Rfl: 1  •  hydrOXYzine HCL (ATARAX) 50 mg tablet, Take 50 mg by mouth 3 (three) times a day as needed for itching, Disp: , Rfl:   •  pantoprazole (PROTONIX) 40 mg tablet, Take 1 tablet (40 mg total) by mouth daily, Disp: 90 tablet, Rfl: 2  •  rosuvastatin (CRESTOR) 10 MG tablet, Take 1 tablet (10 mg total) by mouth daily at bedtime, Disp: 90 tablet, Rfl: 3  •  traZODone (DESYREL) 50 mg tablet, TAKE 1 TABLET BY MOUTH AT BEDTIME FOR INSOMNIA, IF NO IMPROVEMENT TAKE 2 TABLETS., Disp: 180 tablet, Rfl: 1  •  meclizine (ANTIVERT) 25 mg tablet, Take 1 tablet (25 mg total) by mouth 3 (three) times a day as needed for dizziness (Patient not taking: Reported on 5/3/2023), Disp: 15 tablet, Rfl: 0  •  Silodosin 8 MG CAPS, Take 1 capsule by mouth daily (Patient not taking: Reported on 5/3/2023), Disp: , Rfl:   •  tadalafil (CIALIS) 20 MG tablet, Take 20 mg by mouth (Patient not taking: Reported on 6/1/2023), Disp: , Rfl:

## 2023-08-08 ENCOUNTER — TELEPHONE (OUTPATIENT)
Dept: PULMONOLOGY | Facility: MEDICAL CENTER | Age: 73
End: 2023-08-08

## 2023-08-21 DIAGNOSIS — G47.00 INSOMNIA, UNSPECIFIED TYPE: ICD-10-CM

## 2023-08-21 RX ORDER — TRAZODONE HYDROCHLORIDE 50 MG/1
TABLET ORAL
Qty: 180 TABLET | Refills: 3 | Status: SHIPPED | OUTPATIENT
Start: 2023-08-21

## 2023-08-29 ENCOUNTER — TELEPHONE (OUTPATIENT)
Dept: NEUROLOGY | Facility: CLINIC | Age: 73
End: 2023-08-29

## 2023-09-09 DIAGNOSIS — R12 HEARTBURN: ICD-10-CM

## 2023-09-09 DIAGNOSIS — G47.00 INSOMNIA, UNSPECIFIED TYPE: ICD-10-CM

## 2023-09-09 DIAGNOSIS — K21.9 GASTROESOPHAGEAL REFLUX DISEASE WITHOUT ESOPHAGITIS: Primary | ICD-10-CM

## 2023-09-09 DIAGNOSIS — K22.70 BARRETT'S ESOPHAGUS WITHOUT DYSPLASIA: ICD-10-CM

## 2023-09-09 DIAGNOSIS — M79.2 NEUROPATHIC PAIN: ICD-10-CM

## 2023-09-09 DIAGNOSIS — G25.81 RESTLESS LEG SYNDROME: ICD-10-CM

## 2023-09-10 RX ORDER — GABAPENTIN 100 MG/1
300 CAPSULE ORAL
Qty: 90 CAPSULE | Refills: 0 | Status: SHIPPED | OUTPATIENT
Start: 2023-09-10

## 2023-09-11 ENCOUNTER — TELEPHONE (OUTPATIENT)
Dept: FAMILY MEDICINE CLINIC | Facility: CLINIC | Age: 73
End: 2023-09-11

## 2023-09-11 DIAGNOSIS — B00.9 HERPES: Primary | ICD-10-CM

## 2023-09-11 RX ORDER — VALACYCLOVIR HYDROCHLORIDE 1 G/1
1000 TABLET, FILM COATED ORAL 2 TIMES DAILY
Qty: 4 TABLET | Refills: 0 | Status: SHIPPED | OUTPATIENT
Start: 2023-09-11 | End: 2023-09-13

## 2023-09-11 RX ORDER — SUCRALFATE 1 G/1
TABLET ORAL
Qty: 360 TABLET | Refills: 1 | Status: SHIPPED | OUTPATIENT
Start: 2023-09-11

## 2023-09-11 NOTE — TELEPHONE ENCOUNTER
Patient requests valacyclovir HCL sent  to [facility].  Not on his current medication list.

## 2023-10-13 ENCOUNTER — OFFICE VISIT (OUTPATIENT)
Dept: OBGYN CLINIC | Facility: CLINIC | Age: 73
End: 2023-10-13
Payer: COMMERCIAL

## 2023-10-13 VITALS
BODY MASS INDEX: 27.4 KG/M2 | TEMPERATURE: 98.2 F | DIASTOLIC BLOOD PRESSURE: 77 MMHG | HEART RATE: 58 BPM | WEIGHT: 145 LBS | SYSTOLIC BLOOD PRESSURE: 123 MMHG

## 2023-10-13 DIAGNOSIS — M17.0 PRIMARY OSTEOARTHRITIS OF BOTH KNEES: Primary | ICD-10-CM

## 2023-10-13 DIAGNOSIS — M25.561 CHRONIC PAIN OF BOTH KNEES: ICD-10-CM

## 2023-10-13 DIAGNOSIS — G89.29 CHRONIC PAIN OF BOTH KNEES: ICD-10-CM

## 2023-10-13 DIAGNOSIS — M25.562 CHRONIC PAIN OF BOTH KNEES: ICD-10-CM

## 2023-10-13 PROCEDURE — 99214 OFFICE O/P EST MOD 30 MIN: CPT | Performed by: ORTHOPAEDIC SURGERY

## 2023-10-13 PROCEDURE — 20610 DRAIN/INJ JOINT/BURSA W/O US: CPT | Performed by: ORTHOPAEDIC SURGERY

## 2023-10-13 RX ORDER — TRIAMCINOLONE ACETONIDE 40 MG/ML
80 INJECTION, SUSPENSION INTRA-ARTICULAR; INTRAMUSCULAR
Status: COMPLETED | OUTPATIENT
Start: 2023-10-13 | End: 2023-10-13

## 2023-10-13 RX ORDER — BUPIVACAINE HYDROCHLORIDE 5 MG/ML
2 INJECTION, SOLUTION EPIDURAL; INTRACAUDAL
Status: COMPLETED | OUTPATIENT
Start: 2023-10-13 | End: 2023-10-13

## 2023-10-13 RX ADMIN — TRIAMCINOLONE ACETONIDE 80 MG: 40 INJECTION, SUSPENSION INTRA-ARTICULAR; INTRAMUSCULAR at 10:15

## 2023-10-13 RX ADMIN — BUPIVACAINE HYDROCHLORIDE 2 ML: 5 INJECTION, SOLUTION EPIDURAL; INTRACAUDAL at 10:15

## 2023-10-13 NOTE — PROGRESS NOTES
Assessment/Plan:  1. Primary osteoarthritis of both knees  Large joint arthrocentesis: L knee    Large joint arthrocentesis: R knee      2. Chronic pain of both knees  Large joint arthrocentesis: L knee    Large joint arthrocentesis: R knee        Justin Austin is a pleasant 77-year-old gentleman who returns today for follow-up evaluation of his bilateral knee pain. He is still receiving relief from periodic cortisone injections. He consented to and underwent bilateral knee injections as detailed below, which she tolerated well without difficulty or complication. Postinjection instructions were provided. We will plan to see him back in 3 to 4 months for reevaluation. All questions addressed    Large joint arthrocentesis: L knee  Universal Protocol:  Consent: Verbal consent obtained. Risks and benefits: risks, benefits and alternatives were discussed  Consent given by: patient  Patient understanding: patient states understanding of the procedure being performed  Site marked: the operative site was marked  Patient identity confirmed: verbally with patient  Supporting Documentation  Indications: pain   Procedure Details  Location: knee - L knee  Preparation: Patient was prepped and draped in the usual sterile fashion  Needle size: 20 G  Ultrasound guidance: no  Approach: anterolateral  Medications administered: 80 mg triamcinolone acetonide 40 mg/mL; 2 mL bupivacaine (PF) 0.5 %    Patient tolerance: patient tolerated the procedure well with no immediate complications  Dressing:  Sterile dressing applied      Large joint arthrocentesis: R knee  Universal Protocol:  Consent: Verbal consent obtained.   Risks and benefits: risks, benefits and alternatives were discussed  Consent given by: patient  Patient understanding: patient states understanding of the procedure being performed  Site marked: the operative site was marked  Patient identity confirmed: verbally with patient  Supporting Documentation  Indications: pain Procedure Details  Location: knee - R knee  Preparation: Patient was prepped and draped in the usual sterile fashion  Needle size: 20 G  Ultrasound guidance: no  Approach: anterolateral  Medications administered: 80 mg triamcinolone acetonide 40 mg/mL; 2 mL bupivacaine (PF) 0.5 %    Patient tolerance: patient tolerated the procedure well with no immediate complications  Dressing:  Sterile dressing applied          Subjective: Follow-up evaluation for bilateral knee pain    Patient ID: Og Nieves is a 72 y.o. male who returns today for follow-up evaluation of his bilateral knee pain. He received corticosteroid injections for his knees on 6/1/2023. At today's visit, he reports that these were again beneficial for him. He complains of a recent return of his activity related bilateral knee pain. This pain can be severe. He would like to pursue repeat injections. He denies any new injury or trauma. Review of Systems   Constitutional:  Positive for activity change. Negative for chills, fever and unexpected weight change. HENT:  Negative for hearing loss, nosebleeds and sore throat. Eyes:  Negative for pain, redness and visual disturbance. Respiratory:  Negative for cough, shortness of breath and wheezing. Cardiovascular:  Negative for chest pain, palpitations and leg swelling. Gastrointestinal:  Negative for abdominal pain, nausea and vomiting. Endocrine: Negative for polyphagia and polyuria. Genitourinary:  Negative for dysuria and hematuria. Musculoskeletal:  Positive for arthralgias and myalgias. Negative for joint swelling. See HPI   Skin:  Negative for rash and wound. Neurological:  Negative for dizziness, numbness and headaches. Psychiatric/Behavioral:  Negative for decreased concentration and suicidal ideas. The patient is not nervous/anxious.           Past Medical History:   Diagnosis Date    Arthritis     shoulder    Ascending aortic aneurysm ([address])     Cataract     both eyes-to have the right cataract removed on 22    Colon polyp     GERD (gastroesophageal reflux disease)     Insomnia     Leg cramps     Memory changes     Restless legs        Past Surgical History:   Procedure Laterality Date    APPENDECTOMY      CHOLECYSTECTOMY      CHOLECYSTECTOMY LAPAROSCOPIC N/A 10/27/2020    Procedure: CHOLECYSTECTOMY LAPAROSCOPIC;  Surgeon: Genell Apgar, MD;  Location: [facility];  Service: General    COLONOSCOPY N/A 2016    Procedure: COLONOSCOPY;  Surgeon: Ben Hurd MD;  Location: [address] [place] [address] GI LAB;   Service:     ELBOW SURGERY Bilateral     HERNIA REPAIR Right     inguinal    KNEE ARTHROSCOPY Right     [place] ARTHRS KNE SURG W/MENISCECTOMY MED/LAT W/SHVG Left 2020    Procedure: ARTHROSCOPY KNEE MEDIAL MENISCECTOMY;  Surgeon: Lord Shelly MD;  Location: [facility];  Service: Orthopedics    ROTATOR CUFF REPAIR Bilateral        Family History   Problem Relation Age of Onset    No Known Problems Mother     No Known Problems Father     No Known Problems Sister     No Known Problems Brother     No Known Problems Maternal Aunt     No Known Problems Maternal Uncle     No Known Problems Paternal Aunt     No Known Problems Paternal Uncle     No Known Problems Maternal Grandmother     No Known Problems Maternal Grandfather     No Known Problems Paternal Grandmother     No Known Problems Paternal Grandfather     ADD / ADHD Neg Hx     Anesthesia problems Neg Hx     Cancer Neg Hx     Clotting disorder Neg Hx     Collagen disease Neg Hx     Diabetes Neg Hx     Dislocations Neg Hx     Learning disabilities Neg Hx     Neurological problems Neg Hx     Osteoporosis Neg Hx     Rheumatologic disease Neg Hx     Scoliosis Neg Hx     Vascular Disease Neg Hx        Social History     Occupational History    Not on file   Tobacco Use    Smoking status: Never    Smokeless tobacco: Never   Vaping Use    Vaping Use: Never used   Substance and Sexual Activity    Alcohol use: Not Currently Drug use: No    Sexual activity: Not Currently         Current Outpatient Medications:     aspirin (ECOTRIN LOW STRENGTH) 81 mg EC tablet, Take 1 tablet (81 mg total) by mouth daily with breakfast, Disp: 90 tablet, Rfl: 3    gabapentin (NEURONTIN) 100 mg capsule, TAKE 3 CAPSULES (300 MG TOTAL) BY MOUTH DAILY AT BEDTIME, Disp: 90 capsule, Rfl: 0    hydrOXYzine HCL (ATARAX) 50 mg tablet, Take 50 mg by mouth 3 (three) times a day as needed for itching, Disp: , Rfl:     pantoprazole (PROTONIX) 40 mg tablet, Take 1 tablet (40 mg total) by mouth daily, Disp: 90 tablet, Rfl: 2    rosuvastatin (CRESTOR) 10 MG tablet, Take 1 tablet (10 mg total) by mouth daily at bedtime, Disp: 90 tablet, Rfl: 3    sucralfate (CARAFATE) 1 g tablet, TAKE 1 TABLET BY MOUTH 4 TIMES DAILY BEFORE MEALS AND AT BEDTIME, Disp: 360 tablet, Rfl: 1    meclizine (ANTIVERT) 25 mg tablet, Take 1 tablet (25 mg total) by mouth 3 (three) times a day as needed for dizziness (Patient not taking: Reported on 5/3/2023), Disp: 15 tablet, Rfl: 0    Silodosin 8 MG CAPS, Take 1 capsule by mouth daily (Patient not taking: Reported on 5/3/2023), Disp: , Rfl:     tadalafil (CIALIS) 20 MG tablet, Take 20 mg by mouth (Patient not taking: Reported on 6/1/2023), Disp: , Rfl:     traZODone (DESYREL) 50 mg tablet, TAKE 1 TABLET BY MOUTH AT BEDTIME FOR INSOMNIA, IF NO IMPROVEMENT TAKE 2 TABLETS (Patient not taking: Reported on 10/13/2023), Disp: 180 tablet, Rfl: 3    valACYclovir (VALTREX) 1,000 mg tablet, Take 1 tablet (1,000 mg total) by mouth 2 (two) times a day for 2 days, Disp: 4 tablet, Rfl: 0    No Known Allergies    Objective:  Vitals:    10/13/23 1047   BP: 123/77   Pulse: 58   Temp: 98.2 °F (36.8 °C)       Body mass index is 27.4 kg/m². Right Knee Exam     Tenderness   The patient is experiencing tenderness in the medial joint line.     Range of Motion   Extension:  0   Flexion:  130     Tests   Varus: negative Valgus: negative  Drawer:  Anterior - negative Posterior - negative    Other   Erythema: absent  Scars: absent  Sensation: normal  Pulse: present  Swelling: none  Effusion: no effusion present    Comments:  Stable at 0, 30 and 90 degrees  Neurovascularly in tact distally  No warmth or erythema  Parapatellar crepitance noted  Patellofemoral grind: negative      Left Knee Exam     Tenderness   The patient is experiencing tenderness in the medial joint line. Range of Motion   Extension:  0   Flexion:  130     Tests   Varus: negative Valgus: negative  Drawer:  Anterior - negative     Posterior - negative    Other   Erythema: absent  Scars: absent  Sensation: normal  Pulse: present  Swelling: none  Effusion: no effusion present    Comments:  Stable at 0, 30 and 90 degrees  Neurovascularly in tact distally  No warmth or erythema  Parapatellar crepitance noted  Patellofemoral grind: negative          Observations   Left Knee   Negative for effusion. Right Knee   Negative for effusion. Physical Exam  Vitals and nursing note reviewed. Constitutional:       Appearance: Normal appearance. He is well-developed. HENT:      Head: Normocephalic and atraumatic. Right Ear: External ear normal.      Left Ear: External ear normal.      Nose: Nose normal.   Eyes:      General: No scleral icterus. Extraocular Movements: Extraocular movements intact. Conjunctiva/sclera: Conjunctivae normal.   Cardiovascular:      Rate and Rhythm: Normal rate. Pulmonary:      Effort: Pulmonary effort is normal. No respiratory distress. Musculoskeletal:      Cervical back: Normal range of motion and neck supple. Right knee: No effusion. Left knee: No effusion. Comments: See Ortho exam   Skin:     General: Skin is warm and dry. Neurological:      General: No focal deficit present. Mental Status: He is alert and oriented to person, place, and time.    Psychiatric:         Behavior: Behavior normal.         This document was created using speech voice recognition software. Grammatical errors, random word insertions, pronoun errors, and incomplete sentences are an occasional consequence of this system due to software limitations, ambient noise, and hardware issues. Any formal questions or concerns about content, text, or information contained within the body of this dictation should be directly addressed to the provider for clarification.

## 2023-10-16 ENCOUNTER — OFFICE VISIT (OUTPATIENT)
Dept: PULMONOLOGY | Facility: MEDICAL CENTER | Age: 73
End: 2023-10-16
Payer: COMMERCIAL

## 2023-10-16 VITALS
WEIGHT: 144 LBS | SYSTOLIC BLOOD PRESSURE: 142 MMHG | OXYGEN SATURATION: 96 % | DIASTOLIC BLOOD PRESSURE: 64 MMHG | HEART RATE: 81 BPM | RESPIRATION RATE: 12 BRPM | BODY MASS INDEX: 26.5 KG/M2 | HEIGHT: 62 IN | TEMPERATURE: 99.1 F

## 2023-10-16 DIAGNOSIS — G47.33 OBSTRUCTIVE SLEEP APNEA: Primary | ICD-10-CM

## 2023-10-16 DIAGNOSIS — G47.09 OTHER INSOMNIA: ICD-10-CM

## 2023-10-16 DIAGNOSIS — G25.81 RESTLESS LEGS SYNDROME: ICD-10-CM

## 2023-10-16 PROCEDURE — 99214 OFFICE O/P EST MOD 30 MIN: CPT | Performed by: INTERNAL MEDICINE

## 2023-10-16 RX ORDER — GABAPENTIN 300 MG/1
300 CAPSULE ORAL
Qty: 30 CAPSULE | Refills: 7 | Status: SHIPPED | OUTPATIENT
Start: 2023-10-16

## 2023-10-16 NOTE — ASSESSMENT & PLAN NOTE
Home sleep study done May 19, 2023 showed only mild PAULINA with AHI of 10.0 and oxygen svitlana of 88%. He tried auto CPAP but could not tolerate it. He is not having any excessive daytime somnolence and since AHI is below 15 at home that he should just try to maintain ideal body weight. Also talked about sleep hygiene.

## 2023-10-16 NOTE — PROGRESS NOTES
Assessment/Plan        Problem List Items Addressed This Visit          Respiratory    Obstructive sleep apnea - Primary     Home sleep study done May 19, 2023 showed only mild PAULINA with AHI of 10.0 and oxygen svitlana of 88%. He tried auto CPAP but could not tolerate it. He is not having any excessive daytime somnolence and since AHI is below 15 at home that he should just try to maintain ideal body weight. Also talked about sleep hygiene. Other    Restless legs syndrome     He tried gabapentin 100 mg and was to take 3 tablets at night with but was taking it 3 times a day for restless leg syndrome. He and son states it did help so I prescribed gabapentin 300 mg he will take at bedtime. Relevant Medications    gabapentin (Neurontin) 300 mg capsule    Other insomnia     He did try hydroxyzine but this did not help. Also was on trazodone previously but no longer taking. Does have some difficulty falling asleep at times if he wakes up. I did talk about good sleep hygiene. He is to not have any daytime somnolence related to this              Cc: He returned CPAP machine as he could not tolerate. Does have restless legs at bedtime      HPI    Michael Garvin had a home sleep study done May 19, 2023 which showed mild PAULINA with overall AHI of 10.0 with lowest oxygen saturation of 88%. He was prescribed auto CPAP. He did try to CPAP machine but could not tolerate it and return to [employer] company. He does not have any excessive daytime somnolence. He is not waking up short of breath during the day. Does have restless legs at night. I did speak with his son via phone at the same time of visit and he helped translate. Michael Garvin did use gabapentin 100 mg but was taking it 3 times a day for restless legs and this did help. At 1 point he was given hydroxyzine for restless legs but this did not help. He sometimes has some difficulty staying asleep. He does live is his wife.   He is not having any shortness of breath. He was seen by vascular surgery in August 4 does have some focal outpouching abdominal aorta. No significant peripheral vascular disease. Recommend that he take aspirin and atorvastatin therapy and follow-up with them in a year. Also has a 40 mm ascending aortic aneurysm seen on CT of chest on 10/20/2022. He does not have any shortness of breath or chest pain. Past Medical History:   Diagnosis Date    Arthritis     shoulder    Ascending aortic aneurysm ([address])     Cataract     both eyes-to have the right cataract removed on 1/25/22    Colon polyp     GERD (gastroesophageal reflux disease)     Insomnia     Leg cramps     Memory changes     Restless legs        Past Surgical History:   Procedure Laterality Date    APPENDECTOMY      CHOLECYSTECTOMY      CHOLECYSTECTOMY LAPAROSCOPIC N/A 10/27/2020    Procedure: CHOLECYSTECTOMY LAPAROSCOPIC;  Surgeon: Luis Jorge MD;  Location: [facility];  Service: General    COLONOSCOPY N/A 12/12/2016    Procedure: COLONOSCOPY;  Surgeon: Mar Acosta MD;  Location: [address] [place] [address] GI LAB;   Service:     ELBOW SURGERY Bilateral     HERNIA REPAIR Right 2001    inguinal    KNEE ARTHROSCOPY Right     IN ARTHRS KNE SURG W/MENISCECTOMY MED/LAT W/SHVG Left 6/18/2020    Procedure: ARTHROSCOPY KNEE MEDIAL MENISCECTOMY;  Surgeon: Santosh Singh MD;  Location: [facility];  Service: Orthopedics    ROTATOR CUFF REPAIR Bilateral 2011         Current Outpatient Medications:     aspirin (ECOTRIN LOW STRENGTH) 81 mg EC tablet, Take 1 tablet (81 mg total) by mouth daily with breakfast, Disp: 90 tablet, Rfl: 3    gabapentin (Neurontin) 300 mg capsule, Take 1 capsule (300 mg total) by mouth daily at bedtime, Disp: 30 capsule, Rfl: 7    rosuvastatin (CRESTOR) 10 MG tablet, Take 1 tablet (10 mg total) by mouth daily at bedtime, Disp: 90 tablet, Rfl: 3    sucralfate (CARAFATE) 1 g tablet, TAKE 1 TABLET BY MOUTH 4 TIMES DAILY BEFORE MEALS AND AT BEDTIME, Disp: 360 tablet, Rfl: 1    meclizine (ANTIVERT) 25 mg tablet, Take 1 tablet (25 mg total) by mouth 3 (three) times a day as needed for dizziness (Patient not taking: Reported on 5/3/2023), Disp: 15 tablet, Rfl: 0    pantoprazole (PROTONIX) 40 mg tablet, Take 1 tablet (40 mg total) by mouth daily (Patient not taking: Reported on 10/16/2023), Disp: 90 tablet, Rfl: 2    Silodosin 8 MG CAPS, Take 1 capsule by mouth daily (Patient not taking: Reported on 5/3/2023), Disp: , Rfl:     tadalafil (CIALIS) 20 MG tablet, Take 20 mg by mouth (Patient not taking: Reported on 6/1/2023), Disp: , Rfl:     traZODone (DESYREL) 50 mg tablet, TAKE 1 TABLET BY MOUTH AT BEDTIME FOR INSOMNIA, IF NO IMPROVEMENT TAKE 2 TABLETS (Patient not taking: Reported on 10/13/2023), Disp: 180 tablet, Rfl: 3    valACYclovir (VALTREX) 1,000 mg tablet, Take 1 tablet (1,000 mg total) by mouth 2 (two) times a day for 2 days, Disp: 4 tablet, Rfl: 0    No Known Allergies    Social History     Tobacco Use    Smoking status: Never    Smokeless tobacco: Never   Substance Use Topics    Alcohol use: Not Currently         Family History   Problem Relation Age of Onset    No Known Problems Mother     No Known Problems Father     No Known Problems Sister     No Known Problems Brother     No Known Problems Maternal Aunt     No Known Problems Maternal Uncle     No Known Problems Paternal Aunt     No Known Problems Paternal Uncle     No Known Problems Maternal Grandmother     No Known Problems Maternal Grandfather     No Known Problems Paternal Grandmother     No Known Problems Paternal Grandfather     ADD / ADHD Neg Hx     Anesthesia problems Neg Hx     Cancer Neg Hx     Clotting disorder Neg Hx     Collagen disease Neg Hx     Diabetes Neg Hx     Dislocations Neg Hx     Learning disabilities Neg Hx     Neurological problems Neg Hx     Osteoporosis Neg Hx     Rheumatologic disease Neg Hx     Scoliosis Neg Hx     Vascular Disease Neg Hx        Review of Systems   Constitutional:  Negative for chills, fever and unexpected weight change. HENT:  Negative for congestion, rhinorrhea and sore throat. Eyes:  Negative for discharge and redness. Respiratory:  Negative for shortness of breath. Cardiovascular:  Negative for chest pain, palpitations and leg swelling. Gastrointestinal:  Negative for abdominal distention, abdominal pain and nausea. Endocrine: Negative for polydipsia and polyphagia. Genitourinary:  Negative for dysuria. Musculoskeletal:  Negative for joint swelling and myalgias. Skin:  Negative for rash. Neurological:  Negative for light-headedness. Restless legs at bedtime           Vitals:    10/16/23 0957   BP: 142/64   Pulse: 81   Resp: 12   Temp: 99.1 °F (37.3 °C)   SpO2: 96%     Height: 5' 2" (157.5 cm)  IBW (Ideal Body Weight): 54.6 kg  Body mass index is 26.34 kg/m². Weight (last 2 days)       Date/Time Weight    10/16/23 0957 65.3 (144)                Physical Exam  Vitals reviewed. Constitutional:       General: He is not in acute distress. Appearance: Normal appearance. He is well-developed. HENT:      Head: Normocephalic. Nose: Nose normal.      Mouth/Throat:      Mouth: Mucous membranes are moist.      Pharynx: Oropharynx is clear. No oropharyngeal exudate. Comments: Mallampati score is 2  Eyes:      Conjunctiva/sclera: Conjunctivae normal.      Pupils: Pupils are equal, round, and reactive to light. Cardiovascular:      Rate and Rhythm: Normal rate and regular rhythm. Heart sounds: Normal heart sounds. Pulmonary:      Effort: Pulmonary effort is normal.   Abdominal:      General: There is no distension. Palpations: Abdomen is soft. Tenderness: There is no abdominal tenderness. Musculoskeletal:      Cervical back: Neck supple. Comments: No edema, cyanosis or clubbing   Lymphadenopathy:      Cervical: No cervical adenopathy. Skin:     General: Skin is warm and dry. Neurological:      General: No focal deficit present. Mental Status: He is alert and oriented to person, place, and time. Psychiatric:         Mood and Affect: Mood normal.         Behavior: Behavior normal.         Thought Content:  Thought content normal.

## 2023-10-16 NOTE — PATIENT INSTRUCTIONS
Take gabapentin 300 mg at bedtime for restless legs.   This is 1 capsule you take at bedtime    Follow-up in 6 months

## 2023-10-16 NOTE — ASSESSMENT & PLAN NOTE
He did try hydroxyzine but this did not help. Also was on trazodone previously but no longer taking. Does have some difficulty falling asleep at times if he wakes up. I did talk about good sleep hygiene.   He is to not have any daytime somnolence related to this

## 2023-10-16 NOTE — ASSESSMENT & PLAN NOTE
He tried gabapentin 100 mg and was to take 3 tablets at night with but was taking it 3 times a day for restless leg syndrome. He and son states it did help so I prescribed gabapentin 300 mg he will take at bedtime.

## 2023-10-18 ENCOUNTER — TELEPHONE (OUTPATIENT)
Dept: FAMILY MEDICINE CLINIC | Facility: CLINIC | Age: 73
End: 2023-10-18

## 2023-10-18 NOTE — TELEPHONE ENCOUNTER
[facility]- need to know reason for 10/19 appointment with Dr Marc Dash- just says follow up.

## 2023-10-19 ENCOUNTER — OFFICE VISIT (OUTPATIENT)
Dept: FAMILY MEDICINE CLINIC | Facility: CLINIC | Age: 73
End: 2023-10-19
Payer: COMMERCIAL

## 2023-10-19 VITALS
TEMPERATURE: 97.4 F | HEART RATE: 72 BPM | SYSTOLIC BLOOD PRESSURE: 102 MMHG | WEIGHT: 141 LBS | OXYGEN SATURATION: 95 % | RESPIRATION RATE: 14 BRPM | HEIGHT: 62 IN | DIASTOLIC BLOOD PRESSURE: 70 MMHG | BODY MASS INDEX: 25.95 KG/M2

## 2023-10-19 DIAGNOSIS — R73.09 ABNORMAL GLUCOSE: ICD-10-CM

## 2023-10-19 DIAGNOSIS — K21.9 GASTROESOPHAGEAL REFLUX DISEASE WITHOUT ESOPHAGITIS: Primary | ICD-10-CM

## 2023-10-19 DIAGNOSIS — E78.5 HYPERLIPIDEMIA, UNSPECIFIED HYPERLIPIDEMIA TYPE: ICD-10-CM

## 2023-10-19 DIAGNOSIS — Z87.19 S/P HERNIA REPAIR: ICD-10-CM

## 2023-10-19 DIAGNOSIS — Z23 ENCOUNTER FOR IMMUNIZATION: ICD-10-CM

## 2023-10-19 DIAGNOSIS — Z98.890 S/P HERNIA REPAIR: ICD-10-CM

## 2023-10-19 DIAGNOSIS — F41.9 ANXIETY: ICD-10-CM

## 2023-10-19 DIAGNOSIS — I71.20 THORACIC AORTIC ANEURYSM WITHOUT RUPTURE, UNSPECIFIED PART (HCC): ICD-10-CM

## 2023-10-19 PROCEDURE — G0008 ADMIN INFLUENZA VIRUS VAC: HCPCS | Performed by: FAMILY MEDICINE

## 2023-10-19 PROCEDURE — 90662 IIV NO PRSV INCREASED AG IM: CPT | Performed by: FAMILY MEDICINE

## 2023-10-19 PROCEDURE — 99214 OFFICE O/P EST MOD 30 MIN: CPT | Performed by: FAMILY MEDICINE

## 2023-10-19 RX ORDER — PANTOPRAZOLE SODIUM 40 MG/1
40 TABLET, DELAYED RELEASE ORAL DAILY
Qty: 90 TABLET | Refills: 2 | Status: SHIPPED | OUTPATIENT
Start: 2023-10-19

## 2023-10-19 NOTE — PROGRESS NOTES
Errol Nuñez 1950 male MRN: [id number]    [facility] PRACTICE OFFICE VISIT  [facility] - [address] [place] [address]      ASSESSMENT/PLAN  Errol Nuñez is a 72 y.o. male presents to the office for    Problem List Items Addressed This Visit        Digestive    Gastroesophageal reflux disease without esophagitis - Primary    Relevant Medications    pantoprazole (PROTONIX) 40 mg tablet       Cardiovascular and Mediastinum    Ectatic aorta   Other Visit Diagnoses     Encounter for immunization        Relevant Orders    FLUZONE HIGH-DOSE: influenza vaccine, high-dose, preservative-free 0.7 mL (Completed)    Abnormal glucose        Relevant Orders    Hemoglobin A1C (Completed)    Hyperlipidemia, unspecified hyperlipidemia type        Relevant Orders    Lipid panel (Completed)    Comprehensive metabolic panel (Completed)    Anxiety        Relevant Orders    Comprehensive metabolic panel (Completed)    CBC and differential (Completed)    TSH, 3rd generation with Free T4 reflex (Completed)    S/P hernia repair          Aneurysm protocol is not until next year advised the patient of this    All screening labs sent for the patient will be evaluated to see why his Anxiety and also very tired  History of hernia repair patient complains about scar tissue already seen by surgeon in the past.  Did advise the patient that if it continues to worsen we could send him for second opinion  Please see the rest of above for evaluation that was discussed today       Due for Medicare wellness visit in November      Future Appointments   Date Time Provider [address]   11/20/2023  2:00 PM Enid Driscoll MD [facility]   1/17/2024 10:15 AM DO JARRELL Grider [facility]   4/19/2024  9:45 Indy Foreman MD [facility]   5/1/2024 10:00 AM DIDI Galvan Trilla [facility]          SUBJECTIVE  CC: Follow-up (Stomach pain)      HPI:  Errol Nuñez is a 72 y.o. male who presents for an acute follow-up. Patient would like to be seen for his stomach pains would like a refill of his Protonix if possible. Does not understand why the pharmacy gave him the whole bottle of sucralfate states that this has not been something he has been taking in the past.  Patient would like all his blood work to be performed and he also wants to know when his aneurysm work-up should be done. Patient is taking his Crestor as prescribed without any difficulties. States his anxiety has not been the best but he does not want any medications at this time. Review of Systems   Constitutional:  Positive for fatigue. Gastrointestinal:  Positive for abdominal pain. Musculoskeletal:  Positive for arthralgias and gait problem. Psychiatric/Behavioral:  The patient is nervous/anxious.         Historical Information   The patient history was reviewed as follows:  Past Medical History:   Diagnosis Date   • Arthritis     shoulder   • Ascending aortic aneurysm ([address])    • Cataract     both eyes-to have the right cataract removed on 1/25/22   • Colon polyp    • GERD (gastroesophageal reflux disease)    • Insomnia    • Leg cramps    • Memory changes    • Restless legs          Medications:     Current Outpatient Medications:   •  aspirin (ECOTRIN LOW STRENGTH) 81 mg EC tablet, Take 1 tablet (81 mg total) by mouth daily with breakfast, Disp: 90 tablet, Rfl: 3  •  gabapentin (Neurontin) 300 mg capsule, Take 1 capsule (300 mg total) by mouth daily at bedtime, Disp: 30 capsule, Rfl: 7  •  pantoprazole (PROTONIX) 40 mg tablet, Take 1 tablet (40 mg total) by mouth daily, Disp: 90 tablet, Rfl: 2  •  rosuvastatin (CRESTOR) 10 MG tablet, Take 1 tablet (10 mg total) by mouth daily at bedtime, Disp: 90 tablet, Rfl: 3  •  meclizine (ANTIVERT) 25 mg tablet, Take 1 tablet (25 mg total) by mouth 3 (three) times a day as needed for dizziness (Patient not taking: Reported on 5/3/2023), Disp: 15 tablet, Rfl: 0  •  Silodosin 8 MG CAPS, Take 1 capsule by mouth daily (Patient not taking: Reported on 5/3/2023), Disp: , Rfl:   •  tadalafil (CIALIS) 20 MG tablet, Take 20 mg by mouth (Patient not taking: Reported on 6/1/2023), Disp: , Rfl:   •  traZODone (DESYREL) 50 mg tablet, TAKE 1 TABLET BY MOUTH AT BEDTIME FOR INSOMNIA, IF NO IMPROVEMENT TAKE 2 TABLETS (Patient not taking: Reported on 10/13/2023), Disp: 180 tablet, Rfl: 3  •  valACYclovir (VALTREX) 1,000 mg tablet, Take 1 tablet (1,000 mg total) by mouth 2 (two) times a day for 2 days, Disp: 4 tablet, Rfl: 0    No Known Allergies    OBJECTIVE  Vitals:   Vitals:    10/19/23 1422   BP: 102/70   BP Location: Left arm   Patient Position: Sitting   Cuff Size: Standard   Pulse: 72   Resp: 14   Temp: (!) 97.4 °F (36.3 °C)   TempSrc: Temporal   SpO2: 95%   Weight: 64 kg (141 lb)   Height: 5' 2" (1.575 m)         Physical Exam  Vitals reviewed. Constitutional:       Appearance: He is well-developed. HENT:      Head: Normocephalic and atraumatic. Eyes:      Conjunctiva/sclera: Conjunctivae normal.      Pupils: Pupils are equal, round, and reactive to light. Cardiovascular:      Rate and Rhythm: Normal rate and regular rhythm. Heart sounds: Normal heart sounds. Pulmonary:      Effort: Pulmonary effort is normal. No respiratory distress. Breath sounds: Normal breath sounds. Musculoskeletal:         General: Normal range of motion. Cervical back: Normal range of motion and neck supple. Skin:     General: Skin is warm. Capillary Refill: Capillary refill takes less than 2 seconds. Neurological:      Mental Status: He is alert and oriented to person, place, and time.                     Neftali Stevenson MD,   [facility]  10/23/2023

## 2023-10-20 ENCOUNTER — APPOINTMENT (OUTPATIENT)
Dept: LAB | Facility: CLINIC | Age: 73
End: 2023-10-20
Payer: COMMERCIAL

## 2023-10-20 DIAGNOSIS — F41.9 ANXIETY: ICD-10-CM

## 2023-10-20 DIAGNOSIS — R73.09 ABNORMAL GLUCOSE: ICD-10-CM

## 2023-10-20 DIAGNOSIS — E78.5 HYPERLIPIDEMIA, UNSPECIFIED HYPERLIPIDEMIA TYPE: ICD-10-CM

## 2023-10-20 LAB
ALBUMIN SERPL BCP-MCNC: 4.2 G/DL (ref 3.5–5)
ALP SERPL-CCNC: 73 U/L (ref 34–104)
ALT SERPL W P-5'-P-CCNC: 28 U/L (ref 7–52)
ANION GAP SERPL CALCULATED.3IONS-SCNC: 5 MMOL/L
AST SERPL W P-5'-P-CCNC: 18 U/L (ref 13–39)
BASOPHILS # BLD AUTO: 0.01 THOUSANDS/ÂΜL (ref 0–0.1)
BASOPHILS NFR BLD AUTO: 0 % (ref 0–1)
BILIRUB SERPL-MCNC: 1 MG/DL (ref 0.2–1)
BUN SERPL-MCNC: 17 MG/DL (ref 5–25)
CALCIUM SERPL-MCNC: 9.7 MG/DL (ref 8.4–10.2)
CHLORIDE SERPL-SCNC: 104 MMOL/L (ref 96–108)
CHOLEST SERPL-MCNC: 128 MG/DL
CO2 SERPL-SCNC: 30 MMOL/L (ref 21–32)
CREAT SERPL-MCNC: 0.77 MG/DL (ref 0.6–1.3)
EOSINOPHIL # BLD AUTO: 0.05 THOUSAND/ÂΜL (ref 0–0.61)
EOSINOPHIL NFR BLD AUTO: 1 % (ref 0–6)
ERYTHROCYTE [DISTWIDTH] IN BLOOD BY AUTOMATED COUNT: 11.6 % (ref 11.6–15.1)
EST. AVERAGE GLUCOSE BLD GHB EST-MCNC: 114 MG/DL
GFR SERPL CREATININE-BSD FRML MDRD: 91 ML/MIN/1.73SQ M
GLUCOSE P FAST SERPL-MCNC: 97 MG/DL (ref 65–99)
HBA1C MFR BLD: 5.6 %
HCT VFR BLD AUTO: 48.4 % (ref 36.5–49.3)
HDLC SERPL-MCNC: 52 MG/DL
HGB BLD-MCNC: 16.8 G/DL (ref 12–17)
IMM GRANULOCYTES # BLD AUTO: 0.09 THOUSAND/UL (ref 0–0.2)
IMM GRANULOCYTES NFR BLD AUTO: 1 % (ref 0–2)
LDLC SERPL CALC-MCNC: 60 MG/DL (ref 0–100)
LYMPHOCYTES # BLD AUTO: 2.24 THOUSANDS/ÂΜL (ref 0.6–4.47)
LYMPHOCYTES NFR BLD AUTO: 21 % (ref 14–44)
MCH RBC QN AUTO: 32.5 PG (ref 26.8–34.3)
MCHC RBC AUTO-ENTMCNC: 34.7 G/DL (ref 31.4–37.4)
MCV RBC AUTO: 94 FL (ref 82–98)
MONOCYTES # BLD AUTO: 0.86 THOUSAND/ÂΜL (ref 0.17–1.22)
MONOCYTES NFR BLD AUTO: 8 % (ref 4–12)
NEUTROPHILS # BLD AUTO: 7.49 THOUSANDS/ÂΜL (ref 1.85–7.62)
NEUTS SEG NFR BLD AUTO: 69 % (ref 43–75)
NONHDLC SERPL-MCNC: 76 MG/DL
NRBC BLD AUTO-RTO: 0 /100 WBCS
PLATELET # BLD AUTO: 259 THOUSANDS/UL (ref 149–390)
PMV BLD AUTO: 9.8 FL (ref 8.9–12.7)
POTASSIUM SERPL-SCNC: 4 MMOL/L (ref 3.5–5.3)
PROT SERPL-MCNC: 6.9 G/DL (ref 6.4–8.4)
RBC # BLD AUTO: 5.17 MILLION/UL (ref 3.88–5.62)
SODIUM SERPL-SCNC: 139 MMOL/L (ref 135–147)
T4 FREE SERPL-MCNC: 0.77 NG/DL (ref 0.61–1.12)
TRIGL SERPL-MCNC: 80 MG/DL
TSH SERPL DL<=0.05 MIU/L-ACNC: 5.12 UIU/ML (ref 0.45–4.5)
WBC # BLD AUTO: 10.74 THOUSAND/UL (ref 4.31–10.16)

## 2023-10-20 PROCEDURE — 36415 COLL VENOUS BLD VENIPUNCTURE: CPT

## 2023-10-20 PROCEDURE — 84443 ASSAY THYROID STIM HORMONE: CPT

## 2023-10-20 PROCEDURE — 80061 LIPID PANEL: CPT

## 2023-10-20 PROCEDURE — 85025 COMPLETE CBC W/AUTO DIFF WBC: CPT

## 2023-10-20 PROCEDURE — 83036 HEMOGLOBIN GLYCOSYLATED A1C: CPT

## 2023-10-20 PROCEDURE — 84439 ASSAY OF FREE THYROXINE: CPT

## 2023-10-20 PROCEDURE — 80053 COMPREHEN METABOLIC PANEL: CPT

## 2023-10-26 DIAGNOSIS — G47.00 INSOMNIA, UNSPECIFIED TYPE: ICD-10-CM

## 2023-10-26 DIAGNOSIS — R79.89 ABNORMAL TSH: Primary | ICD-10-CM

## 2023-11-01 ENCOUNTER — NEW PATIENT (OUTPATIENT)
Dept: URBAN - METROPOLITAN AREA CLINIC 6 | Facility: CLINIC | Age: 73
End: 2023-11-01

## 2023-11-01 DIAGNOSIS — Z96.1: ICD-10-CM

## 2023-11-01 DIAGNOSIS — H40.1131: ICD-10-CM

## 2023-11-01 PROCEDURE — 92133 CPTRZD OPH DX IMG PST SGM ON: CPT

## 2023-11-01 PROCEDURE — 92202 OPSCPY EXTND ON/MAC DRAW: CPT

## 2023-11-01 PROCEDURE — 92004 COMPRE OPH EXAM NEW PT 1/>: CPT

## 2023-11-01 ASSESSMENT — TONOMETRY
OD_IOP_MMHG: 15
OS_IOP_MMHG: 20

## 2023-11-01 ASSESSMENT — VISUAL ACUITY
OD_SC: 20/30
OS_SC: 20/25

## 2023-11-10 ENCOUNTER — RA CDI HCC (OUTPATIENT)
Dept: OTHER | Facility: HOSPITAL | Age: 73
End: 2023-11-10

## 2023-11-10 NOTE — PROGRESS NOTES
[address] coding opportunities       Chart reviewed, no opportunity found: [address]        Patients Insurance     Medicare Insurance: [facility]

## 2023-11-15 ENCOUNTER — APPOINTMENT (OUTPATIENT)
Dept: LAB | Facility: CLINIC | Age: 73
End: 2023-11-15
Payer: COMMERCIAL

## 2023-11-15 DIAGNOSIS — G47.00 INSOMNIA, UNSPECIFIED TYPE: ICD-10-CM

## 2023-11-15 DIAGNOSIS — R79.89 ABNORMAL TSH: ICD-10-CM

## 2023-11-15 LAB
BASOPHILS # BLD AUTO: 0.01 THOUSANDS/ÂΜL (ref 0–0.1)
BASOPHILS NFR BLD AUTO: 0 % (ref 0–1)
EOSINOPHIL # BLD AUTO: 0.03 THOUSAND/ÂΜL (ref 0–0.61)
EOSINOPHIL NFR BLD AUTO: 1 % (ref 0–6)
ERYTHROCYTE [DISTWIDTH] IN BLOOD BY AUTOMATED COUNT: 12.1 % (ref 11.6–15.1)
HCT VFR BLD AUTO: 46.2 % (ref 36.5–49.3)
HGB BLD-MCNC: 15.7 G/DL (ref 12–17)
IMM GRANULOCYTES # BLD AUTO: 0.01 THOUSAND/UL (ref 0–0.2)
IMM GRANULOCYTES NFR BLD AUTO: 0 % (ref 0–2)
LYMPHOCYTES # BLD AUTO: 1.94 THOUSANDS/ÂΜL (ref 0.6–4.47)
LYMPHOCYTES NFR BLD AUTO: 34 % (ref 14–44)
MCH RBC QN AUTO: 32.4 PG (ref 26.8–34.3)
MCHC RBC AUTO-ENTMCNC: 34 G/DL (ref 31.4–37.4)
MCV RBC AUTO: 95 FL (ref 82–98)
MONOCYTES # BLD AUTO: 0.59 THOUSAND/ÂΜL (ref 0.17–1.22)
MONOCYTES NFR BLD AUTO: 10 % (ref 4–12)
NEUTROPHILS # BLD AUTO: 3.2 THOUSANDS/ÂΜL (ref 1.85–7.62)
NEUTS SEG NFR BLD AUTO: 55 % (ref 43–75)
NRBC BLD AUTO-RTO: 0 /100 WBCS
PLATELET # BLD AUTO: 209 THOUSANDS/UL (ref 149–390)
PMV BLD AUTO: 9.2 FL (ref 8.9–12.7)
RBC # BLD AUTO: 4.85 MILLION/UL (ref 3.88–5.62)
TSH SERPL DL<=0.05 MIU/L-ACNC: 4.17 UIU/ML (ref 0.45–4.5)
WBC # BLD AUTO: 5.78 THOUSAND/UL (ref 4.31–10.16)

## 2023-11-15 PROCEDURE — 85025 COMPLETE CBC W/AUTO DIFF WBC: CPT

## 2023-11-15 PROCEDURE — 84443 ASSAY THYROID STIM HORMONE: CPT

## 2023-11-15 PROCEDURE — 36415 COLL VENOUS BLD VENIPUNCTURE: CPT

## 2023-11-16 ENCOUNTER — APPOINTMENT (OUTPATIENT)
Dept: LAB | Facility: CLINIC | Age: 73
End: 2023-11-16
Payer: COMMERCIAL

## 2023-11-16 DIAGNOSIS — D40.0 NEOPLASM OF UNCERTAIN BEHAVIOR OF PROSTATE: ICD-10-CM

## 2023-11-16 LAB — PSA SERPL-MCNC: 1.8 NG/ML (ref 0–4)

## 2023-11-16 PROCEDURE — G0103 PSA SCREENING: HCPCS

## 2023-11-16 PROCEDURE — 36415 COLL VENOUS BLD VENIPUNCTURE: CPT

## 2023-11-20 ENCOUNTER — OFFICE VISIT (OUTPATIENT)
Dept: FAMILY MEDICINE CLINIC | Facility: CLINIC | Age: 73
End: 2023-11-20
Payer: COMMERCIAL

## 2023-11-20 VITALS
DIASTOLIC BLOOD PRESSURE: 60 MMHG | HEART RATE: 64 BPM | HEIGHT: 62 IN | OXYGEN SATURATION: 98 % | BODY MASS INDEX: 26.68 KG/M2 | SYSTOLIC BLOOD PRESSURE: 102 MMHG | TEMPERATURE: 98 F | WEIGHT: 145 LBS | RESPIRATION RATE: 14 BRPM

## 2023-11-20 DIAGNOSIS — Z00.00 MEDICARE ANNUAL WELLNESS VISIT, SUBSEQUENT: Primary | ICD-10-CM

## 2023-11-20 DIAGNOSIS — I71.20 THORACIC AORTIC ANEURYSM WITHOUT RUPTURE, UNSPECIFIED PART (HCC): ICD-10-CM

## 2023-11-20 DIAGNOSIS — R07.9 CHEST PAIN, UNSPECIFIED TYPE: ICD-10-CM

## 2023-11-20 DIAGNOSIS — H40.9 GLAUCOMA OF BOTH EYES, UNSPECIFIED GLAUCOMA TYPE: ICD-10-CM

## 2023-11-20 DIAGNOSIS — Z23 ENCOUNTER FOR IMMUNIZATION: ICD-10-CM

## 2023-11-20 PROCEDURE — G0009 ADMIN PNEUMOCOCCAL VACCINE: HCPCS | Performed by: FAMILY MEDICINE

## 2023-11-20 PROCEDURE — G0439 PPPS, SUBSEQ VISIT: HCPCS | Performed by: FAMILY MEDICINE

## 2023-11-20 PROCEDURE — 90677 PCV20 VACCINE IM: CPT | Performed by: FAMILY MEDICINE

## 2023-11-20 RX ORDER — BIMATOPROST 0.1 MG/ML
SOLUTION/ DROPS OPHTHALMIC
COMMUNITY
Start: 2023-11-01

## 2023-11-20 NOTE — PROGRESS NOTES
Assessment and Plan:     Problem List Items Addressed This Visit        Cardiovascular and Mediastinum    Ectatic aorta   Other Visit Diagnoses     Medicare annual wellness visit, subsequent    -  Primary    Relevant Orders    POCT ECG (Completed)    Glaucoma of both eyes, unspecified glaucoma type        Relevant Medications    Lumigan 0.01 % ophthalmic drops    Chest pain, unspecified type        Relevant Orders    POCT ECG (Completed)    Encounter for immunization        Relevant Orders    Pneumococcal Conjugate Vaccine 20-valent (Pcv20) (Completed)        3/28/2024-> Repeat CTA  Advised the patient that I would like him to talk to his specialist about the glaucoma especially since his eyes are irritated he states he will contact the doctor today  EKG was normal today  Recommend continuing following up every year with CAT scan. BMI Counseling: Body mass index is 26.52 kg/m². The BMI is above normal. Nutrition recommendations include decreasing fast food intake and consuming healthier snacks. Exercise recommendations include exercising 3-5 times per week. Rationale for BMI follow-up plan is due to patient being overweight or obese. Depression Screening and Follow-up Plan: Patient was screened for depression during today's encounter. They screened negative with a PHQ-2 score of 1. Preventive health issues were discussed with patient, and age appropriate screening tests were ordered as noted in patient's After Visit Summary. Personalized health advice and appropriate referrals for health education or preventive services given if needed, as noted in patient's After Visit Summary. History of Present Illness:     Patient presents for a Medicare Wellness Visit  Headaches 4 tylenol sometimes it helps. States that he his eyes have been very red. Patient states its been very irritated since using glaucoma drops has not spoke to his eye doctor about this.   Patient states he has ongoing chest pain that comes and goes is concerned and would like a work-up if possible today. Patient also would like to review all his lab  HPI   Patient Care Team:  Yanira Min MD as PCP - General (Family Medicine)  MD Yuko Bahena MD Stanley Leeds, MD as Endoscopist     Review of Systems:     Review of Systems   Constitutional:  Negative for activity change, appetite change, chills, fatigue and fever. HENT:  Negative for congestion. Respiratory:  Negative for cough, chest tightness and shortness of breath. Cardiovascular:  Positive for chest pain. Negative for leg swelling. Gastrointestinal:  Negative for abdominal distention, abdominal pain, constipation, diarrhea, nausea and vomiting. Neurological:  Positive for headaches. All other systems reviewed and are negative.        Problem List:     Patient Active Problem List   Diagnosis   • Cervical radiculopathy   • Colon polyps   • Obstructive sleep apnea   • Peripheral artery disease (HCC)   • Peripheral neuropathy   • Restless legs syndrome   • Benign prostatic hyperplasia without lower urinary tract symptoms   • Gastroesophageal reflux disease without esophagitis   • Cholelithiasis   • Ectatic aorta   • Abdominal adhesions   • Other insomnia   • Acute cholecystitis due to biliary calculus   • Bilateral leg cramps   • Yeboah's esophagus without dysplasia   • Antritis of stomach   • Hx of adenomatous colonic polyps   • Heartburn   • Pain of upper abdomen   • Nasal congestion   • Sleep apnea   • Snoring   • Excessive daytime sleepiness      Past Medical and Surgical History:     Past Medical History:   Diagnosis Date   • Arthritis     shoulder   • Ascending aortic aneurysm ([address])    • Cataract     both eyes-to have the right cataract removed on 1/25/22   • Colon polyp    • GERD (gastroesophageal reflux disease)    • Insomnia    • Leg cramps    • Memory changes    • Restless legs      Past Surgical History:   Procedure Laterality Date   • APPENDECTOMY     • CHOLECYSTECTOMY     • CHOLECYSTECTOMY LAPAROSCOPIC N/A 10/27/2020    Procedure: CHOLECYSTECTOMY LAPAROSCOPIC;  Surgeon: Lilian Palomares MD;  Location: [facility];  Service: General   • COLONOSCOPY N/A 12/12/2016    Procedure: COLONOSCOPY;  Surgeon: Phani Finley MD;  Location: [address] [place] [address] GI LAB;   Service:    • ELBOW SURGERY Bilateral    • HERNIA REPAIR Right 2001    inguinal   • KNEE ARTHROSCOPY Right    • MN ARTHRS KNE SURG W/MENISCECTOMY MED/LAT W/SHVG Left 6/18/2020    Procedure: ARTHROSCOPY KNEE MEDIAL MENISCECTOMY;  Surgeon: Pamela Pires MD;  Location: [facility];  Service: Orthopedics   • ROTATOR CUFF REPAIR Bilateral 2011      Family History:     Family History   Problem Relation Age of Onset   • No Known Problems Mother    • No Known Problems Father    • No Known Problems Sister    • No Known Problems Brother    • No Known Problems Maternal Aunt    • No Known Problems Maternal Uncle    • No Known Problems Paternal Aunt    • No Known Problems Paternal Uncle    • No Known Problems Maternal Grandmother    • No Known Problems Maternal Grandfather    • No Known Problems Paternal Grandmother    • No Known Problems Paternal Grandfather    • ADD / ADHD Neg Hx    • Anesthesia problems Neg Hx    • Cancer Neg Hx    • Clotting disorder Neg Hx    • Collagen disease Neg Hx    • Diabetes Neg Hx    • Dislocations Neg Hx    • Learning disabilities Neg Hx    • Neurological problems Neg Hx    • Osteoporosis Neg Hx    • Rheumatologic disease Neg Hx    • Scoliosis Neg Hx    • Vascular Disease Neg Hx       Social History:     Social History     Socioeconomic History   • Marital status: /Civil Union     Spouse name: None   • Number of children: None   • Years of education: None   • Highest education level: None   Occupational History   • None   Tobacco Use   • Smoking status: Never   • Smokeless tobacco: Never   Vaping Use   • Vaping Use: Never used   Substance and Sexual Activity   • Alcohol use: Not Currently   • Drug use: No   • Sexual activity: Not Currently   Other Topics Concern   • None   Social History Narrative   • None     Social Determinants of Health     Financial Resource Strain: Low Risk  (11/14/2022)    Overall Financial Resource Strain (CARDIA)    • Difficulty of Paying Living Expenses: Not hard at all   Food Insecurity: Not on file   Transportation Needs: No Transportation Needs (11/14/2022)    PRAPARE - Transportation    • Lack of Transportation (Medical): No    • Lack of Transportation (Non-Medical):  No   Physical Activity: Not on file   Stress: Not on file   Social Connections: Not on file   Intimate Partner Violence: Not on file   Housing Stability: Not on file      Medications and Allergies:     Current Outpatient Medications   Medication Sig Dispense Refill   • aspirin (ECOTRIN LOW STRENGTH) 81 mg EC tablet Take 1 tablet (81 mg total) by mouth daily with breakfast 90 tablet 3   • gabapentin (Neurontin) 300 mg capsule Take 1 capsule (300 mg total) by mouth daily at bedtime 30 capsule 7   • Lumigan 0.01 % ophthalmic drops INSTILL 1 DROP INTO BOTH EYES IN THE EVENING     • pantoprazole (PROTONIX) 40 mg tablet Take 1 tablet (40 mg total) by mouth daily 90 tablet 2   • rosuvastatin (CRESTOR) 10 MG tablet Take 1 tablet (10 mg total) by mouth daily at bedtime 90 tablet 3   • meclizine (ANTIVERT) 25 mg tablet Take 1 tablet (25 mg total) by mouth 3 (three) times a day as needed for dizziness (Patient not taking: Reported on 5/3/2023) 15 tablet 0   • Silodosin 8 MG CAPS Take 1 capsule by mouth daily (Patient not taking: Reported on 5/3/2023)     • tadalafil (CIALIS) 20 MG tablet Take 20 mg by mouth (Patient not taking: Reported on 6/1/2023)     • traZODone (DESYREL) 50 mg tablet TAKE 1 TABLET BY MOUTH AT BEDTIME FOR INSOMNIA, IF NO IMPROVEMENT TAKE 2 TABLETS (Patient not taking: Reported on 10/13/2023) 180 tablet 3   • valACYclovir (VALTREX) 1,000 mg tablet Take 1 tablet (1,000 mg total) by mouth 2 (two) times a day for 2 days 4 tablet 0     No current facility-administered medications for this visit. No Known Allergies   Immunizations:     Immunization History   Administered Date(s) Administered   • COVID-19 MODERNA VACC 0.5 ML IM 01/06/2021, 02/02/2021, 10/25/2021   • H1N1, All Formulations 05/14/2010   • INFLUENZA 10/09/2020   • Influenza Quadrivalent Preservative Free 3 years and older IM 02/03/2015, 11/01/2016   • Influenza, high dose seasonal 0.7 mL 02/04/2019, 10/11/2021, 11/14/2022, 10/19/2023   • Influenza, seasonal, injectable 01/30/2009, 05/14/2010, 10/22/2010, 02/13/2012, 01/23/2013   • Pneumococcal Conjugate Vaccine 20-valent (Pcv20), Polysace 11/20/2023   • Tdap 01/30/2009   • Tuberculin Skin Test-PPD Intradermal 10/21/2009      Health Maintenance:         Topic Date Due   • Colorectal Cancer Screening  01/30/2027   • Hepatitis C Screening  Completed         Topic Date Due   • COVID-19 Vaccine (4 - Jerilynn Alu series) 12/20/2021      Medicare Screening Tests and Risk Assessments:     Richelle Andrade is here for his Subsequent Wellness visit. Health Risk Assessment:   Patient rates overall health as good. Patient feels that their physical health rating is same. Patient is satisfied with their life. Eyesight was rated as same. Hearing was rated as same. Patient feels that their emotional and mental health rating is same. Patients states they are never, rarely angry. Patient states they are sometimes unusually tired/fatigued. Pain experienced in the last 7 days has been none. Patient states that he has experienced no weight loss or gain in last 6 months. Depression Screening:   PHQ-2 Score: 1      Fall Risk Screening: In the past year, patient has experienced: no history of falling in past year      Home Safety:  Patient does not have trouble with stairs inside or outside of their home. Patient has working smoke alarms and has working carbon monoxide detector. Home safety hazards include: none. Nutrition:   Current diet is Regular. Medications:   Patient is not currently taking any over-the-counter supplements. Patient is able to manage medications. Activities of Daily Living (ADLs)/Instrumental Activities of Daily Living (IADLs):   Walk and transfer into and out of bed and chair?: Yes  Dress and groom yourself?: Yes    Bathe or shower yourself?: Yes    Feed yourself? Yes  Do your laundry/housekeeping?: Yes  Manage your money, pay your bills and track your expenses?: Yes  Make your own meals?: Yes    Do your own shopping?: Yes    Previous Hospitalizations:   Any hospitalizations or ED visits within the last 12 months?: No      Advance Care Planning:   Living will: No    Durable POA for healthcare: No    Advanced directive: No      Cognitive Screening:   Provider or family/friend/caregiver concerned regarding cognition?: No    PREVENTIVE SCREENINGS      Cardiovascular Screening:    General: Screening Not Indicated, History Lipid Disorder and Screening Current      Diabetes Screening:     General: Screening Current      Colorectal Cancer Screening:     General: Screening Current      Prostate Cancer Screening:    General: Screening Current      Osteoporosis Screening:    General: Risks and Benefits Discussed      Abdominal Aortic Aneurysm (AAA) Screening:    Risk factors include: age between 70-75 yo        General: Risks and Benefits Discussed      Lung Cancer Screening:     General: Screening Not Indicated      Hepatitis C Screening:    General: Screening Current    Screening, Brief Intervention, and Referral to Treatment (SBIRT)    Screening  Typical number of drinks in a day: 0  Typical number of drinks in a week: 0  Interpretation: Low risk drinking behavior.     Single Item Drug Screening:  How often have you used an illegal drug (including marijuana) or a prescription medication for non-medical reasons in the past year? never    Single Item Drug Screen Score: 0  Interpretation: Negative screen for possible drug use disorder    No results found. Physical Exam:     /60 (BP Location: Left arm, Patient Position: Sitting, Cuff Size: Adult)   Pulse 64   Temp 98 °F (36.7 °C) (Temporal)   Resp 14   Ht 5' 2" (1.575 m)   Wt 65.8 kg (145 lb)   SpO2 98%   BMI 26.52 kg/m²     Physical Exam  Vitals reviewed. Constitutional:       Appearance: He is well-developed. HENT:      Head: Normocephalic and atraumatic. Right Ear: Tympanic membrane, ear canal and external ear normal.      Left Ear: Tympanic membrane, ear canal and external ear normal.      Nose: Nose normal.      Mouth/Throat:      Mouth: Mucous membranes are moist.   Eyes:      Conjunctiva/sclera: Conjunctivae normal.      Pupils: Pupils are equal, round, and reactive to light. Cardiovascular:      Rate and Rhythm: Normal rate and regular rhythm. Heart sounds: Normal heart sounds. Pulmonary:      Effort: Pulmonary effort is normal.      Breath sounds: Normal breath sounds. No wheezing. Abdominal:      General: Bowel sounds are normal. There is no distension. Palpations: Abdomen is soft. There is no mass. Tenderness: There is no abdominal tenderness. Musculoskeletal:         General: No tenderness. Normal range of motion. Cervical back: Normal range of motion and neck supple. Skin:     General: Skin is warm. Capillary Refill: Capillary refill takes less than 2 seconds. Neurological:      Mental Status: He is alert and oriented to person, place, and time. Cranial Nerves: No cranial nerve deficit. Sensory: No sensory deficit. Motor: No abnormal muscle tone. Coordination: Coordination normal.      Deep Tendon Reflexes: Reflexes normal.   Psychiatric:         Behavior: Behavior normal.         Thought Content:  Thought content normal.         Judgment: Judgment normal.          Ray Colby MD

## 2023-11-20 NOTE — PATIENT INSTRUCTIONS
Medicare Preventive Visit Patient Instructions  Thank you for completing your Welcome to Medicare Visit or Medicare Annual Wellness Visit today. Your next wellness visit will be due in one year (11/20/2024). The screening/preventive services that you may require over the next 5-10 years are detailed below. Some tests may not apply to you based off risk factors and/or age. Screening tests ordered at today's visit but not completed yet may show as past due. Also, please note that scanned in results may not display below. Preventive Screenings:  Service Recommendations Previous Testing/Comments   Colorectal Cancer Screening  Colonoscopy    Fecal Occult Blood Test (FOBT)/Fecal Immunochemical Test (FIT)  Fecal DNA/Cologuard Test  Flexible Sigmoidoscopy Age: 43-73 years old   Colonoscopy: every 10 years (May be performed more frequently if at higher risk)  OR  FOBT/FIT: every 1 year  OR  Cologuard: every 3 years  OR  Sigmoidoscopy: every 5 years  Screening may be recommended earlier than age 39 if at higher risk for colorectal cancer. Also, an individualized decision between you and your healthcare provider will decide whether screening between the ages of 77-80 would be appropriate.  Colonoscopy: 01/31/2022  FOBT/FIT: Not on file  Cologuard: Not on file  Sigmoidoscopy: Not on file    Screening Current     Prostate Cancer Screening Individualized decision between patient and health care provider in men between ages of 53-66   Medicare will cover every 12 months beginning on the day after your 50th birthday PSA: 1.80 ng/mL     Screening Current     Hepatitis C Screening Once for adults born between 1945 and 1965  More frequently in patients at high risk for Hepatitis C Hep C Antibody: 11/15/2022    Screening Current   Diabetes Screening 1-2 times per year if you're at risk for diabetes or have pre-diabetes Fasting glucose: 97 mg/dL (10/20/2023)  A1C: 5.6 % (10/20/2023)  Screening Current   Cholesterol Screening Once every 5 years if you don't have a lipid disorder. May order more often based on risk factors. Lipid panel: 10/20/2023  Screening Not Indicated  History Lipid Disorder      Other Preventive Screenings Covered by Medicare:  Abdominal Aortic Aneurysm (AAA) Screening: covered once if your at risk. You're considered to be at risk if you have a family history of AAA or a male between the age of 70-76 who smoking at least 100 cigarettes in your lifetime. Lung Cancer Screening: covers low dose CT scan once per year if you meet all of the following conditions: (1) Age 48-67; (2) No signs or symptoms of lung cancer; (3) Current smoker or have quit smoking within the last 15 years; (4) You have a tobacco smoking history of at least 20 pack years (packs per day x number of years you smoked); (5) You get a written order from a healthcare provider. Glaucoma Screening: covered annually if you're considered high risk: (1) You have diabetes OR (2) Family history of glaucoma OR (3)  aged 48 and older OR (3)  American aged 72 and older  Osteoporosis Screening: covered every 2 years if you meet one of the following conditions: (1) Have a vertebral abnormality; (2) On glucocorticoid therapy for more than 3 months; (3) Have primary hyperparathyroidism; (4) On osteoporosis medications and need to assess response to drug therapy. HIV Screening: covered annually if you're between the age of 14-79. Also covered annually if you are younger than 13 and older than 72 with risk factors for HIV infection. For pregnant patients, it is covered up to 3 times per pregnancy.     Immunizations:  Immunization Recommendations   Influenza Vaccine Annual influenza vaccination during flu season is recommended for all persons aged >= 6 months who do not have contraindications   Pneumococcal Vaccine   * Pneumococcal conjugate vaccine = PCV13 (Prevnar 13), PCV15 (Vaxneuvance), PCV20 (Prevnar 20)  * Pneumococcal polysaccharide vaccine = PPSV23 (Pneumovax) Adults 23-48 yo with certain risk factors or if 69+ yo  If never received any pneumonia vaccine: recommend Prevnar 20 (PCV20)  Give PCV20 if previously received 1 dose of PCV13 or PPSV23   Hepatitis B Vaccine 3 dose series if at intermediate or high risk (ex: diabetes, end stage renal disease, liver disease)   Respiratory syncytial virus (RSV) Vaccine - COVERED BY MEDICARE PART D  * RSVPreF3 (Arexvy) CDC recommends that adults 61years of age and older may receive a single dose of RSV vaccine using shared clinical decision-making (SCDM)   Tetanus (Td) Vaccine - COST NOT COVERED BY MEDICARE PART B Following completion of primary series, a booster dose should be given every 10 years to maintain immunity against tetanus. Td may also be given as tetanus wound prophylaxis. Tdap Vaccine - COST NOT COVERED BY MEDICARE PART B Recommended at least once for all adults. For pregnant patients, recommended with each pregnancy. Shingles Vaccine (Shingrix) - COST NOT COVERED BY MEDICARE PART B  2 shot series recommended in those 19 years and older who have or will have weakened immune systems or those 50 years and older     Health Maintenance Due:      Topic Date Due   • Colorectal Cancer Screening  01/30/2027   • Hepatitis C Screening  Completed     Immunizations Due:      Topic Date Due   • Pneumococcal Vaccine: 65+ Years (1 - PCV) Never done   • COVID-19 Vaccine (4 - [place] Level series) 12/20/2021     Advance Directives   What are advance directives? Advance directives are legal documents that state your wishes and plans for medical care. These plans are made ahead of time in case you lose your ability to make decisions for yourself. Advance directives can apply to any medical decision, such as the treatments you want, and if you want to donate organs. What are the types of advance directives? There are many types of advance directives, and each state has rules about how to use them.  You may choose a combination of any of the following:  Living will: This is a written record of the treatment you want. You can also choose which treatments you do not want, which to limit, and which to stop at a certain time. This includes surgery, medicine, IV fluid, and tube feedings. Durable power of  for healthcare [facility]): This is a written record that states who you want to make healthcare choices for you when you are unable to make them for yourself. This person, called a proxy, is usually a family member or a friend. You may choose more than 1 proxy. Do not resuscitate (DNR) order:  A DNR order is used in case your heart stops beating or you stop breathing. It is a request not to have certain forms of treatment, such as CPR. A DNR order may be included in other types of advance directives. Medical directive: This covers the care that you want if you are in a coma, near death, or unable to make decisions for yourself. You can list the treatments you want for each condition. Treatment may include pain medicine, surgery, blood transfusions, dialysis, IV or tube feedings, and a ventilator (breathing machine). Values history: This document has questions about your views, beliefs, and how you feel and think about life. This information can help others choose the care that you would choose. Why are advance directives important? An advance directive helps you control your care. Although spoken wishes may be used, it is better to have your wishes written down. Spoken wishes can be misunderstood, or not followed. Treatments may be given even if you do not want them. An advance directive may make it easier for your family to make difficult choices about your care. Weight Management   Why it is important to manage your weight:  Being overweight increases your risk of health conditions such as heart disease, high blood pressure, type 2 diabetes, and certain types of cancer.  It can also increase your risk for osteoarthritis, sleep apnea, and other respiratory problems. Aim for a slow, steady weight loss. Even a small amount of weight loss can lower your risk of health problems. How to lose weight safely:  A safe and healthy way to lose weight is to eat fewer calories and get regular exercise. You can lose up about 1 pound a week by decreasing the number of calories you eat by 500 calories each day. Healthy meal plan for weight management:  A healthy meal plan includes a variety of foods, contains fewer calories, and helps you stay healthy. A healthy meal plan includes the following:  Eat whole-grain foods more often. A healthy meal plan should contain fiber. Fiber is the part of grains, fruits, and vegetables that is not broken down by your body. Whole-grain foods are healthy and provide extra fiber in your diet. Some examples of whole-grain foods are whole-wheat breads and pastas, oatmeal, brown rice, and bulgur. Eat a variety of vegetables every day. Include dark, leafy greens such as spinach, kale, kevin greens, and mustard greens. Eat yellow and orange vegetables such as carrots, sweet potatoes, and winter squash. Eat a variety of fruits every day. Choose fresh or canned fruit (canned in its own juice or light syrup) instead of juice. Fruit juice has very little or no fiber. Eat low-fat dairy foods. Drink fat-free (skim) milk or 1% milk. Eat fat-free yogurt and low-fat cottage cheese. Try low-fat cheeses such as mozzarella and other reduced-fat cheeses. Choose meat and other protein foods that are low in fat. Choose beans or other legumes such as split peas or lentils. Choose fish, skinless poultry (chicken or turkey), or lean cuts of red meat (beef or pork). Before you cook meat or poultry, cut off any visible fat. Use less fat and oil. Try baking foods instead of frying them. Add less fat, such as margarine, sour cream, regular salad dressing and mayonnaise to foods. Eat fewer high-fat foods.  Some examples of high-fat foods include french fries, doughnuts, ice cream, and cakes. Eat fewer sweets. Limit foods and drinks that are high in sugar. This includes candy, cookies, regular soda, and sweetened drinks. Exercise:  Exercise at least 30 minutes per day on most days of the week. Some examples of exercise include walking, biking, dancing, and swimming. You can also fit in more physical activity by taking the stairs instead of the elevator or parking farther away from stores. Ask your healthcare provider about the best exercise plan for you. © Copyright [employer] 2018 Information is for End User's use only and may not be sold, redistributed or otherwise used for commercial purposes.  All illustrations and images included in CareNotes® are the copyrighted property of [employer] Inc[employer] or [address] [place] [address]

## 2023-11-21 PROCEDURE — 93000 ELECTROCARDIOGRAM COMPLETE: CPT | Performed by: FAMILY MEDICINE

## 2023-12-01 ENCOUNTER — OFFICE VISIT (OUTPATIENT)
Dept: OBGYN CLINIC | Facility: CLINIC | Age: 73
End: 2023-12-01
Payer: COMMERCIAL

## 2023-12-01 VITALS
DIASTOLIC BLOOD PRESSURE: 68 MMHG | BODY MASS INDEX: 26.68 KG/M2 | HEART RATE: 86 BPM | HEIGHT: 62 IN | SYSTOLIC BLOOD PRESSURE: 99 MMHG | WEIGHT: 145 LBS

## 2023-12-01 DIAGNOSIS — G89.29 CHRONIC PAIN OF LEFT KNEE: ICD-10-CM

## 2023-12-01 DIAGNOSIS — M25.562 CHRONIC PAIN OF LEFT KNEE: ICD-10-CM

## 2023-12-01 DIAGNOSIS — M17.12 PRIMARY OSTEOARTHRITIS OF LEFT KNEE: Primary | ICD-10-CM

## 2023-12-01 PROCEDURE — 99213 OFFICE O/P EST LOW 20 MIN: CPT | Performed by: ORTHOPAEDIC SURGERY

## 2023-12-01 NOTE — PROGRESS NOTES
Assessment/Plan:  1. Primary osteoarthritis of left knee  Injection Procedure Prior Authorization      2. Chronic pain of left knee  Injection Procedure Prior Fani Alas is a pleasant 14-year-old presenting today for follow-up of his left knee osteoarthritis and activity related pain. Unfortunately, he only saw 4 weeks worth of relief from the most recent cortisone injection. His right knee is still responding well. He has an activity related pain up to 8 out of 10 on a daily basis in the left knee. As such, we submitted for authorization of viscosupplementation. He can continue with Tylenol in the interim. When the injections are approved, he can return to initiate the series. All questions addressed    Subjective: Left knee follow-up    Patient ID: Cj Herrmann is a 72 y.o. male presenting today for follow-up of his left knee. He underwent bilateral knee cortisone injections for his osteoarthritis 6 weeks ago. He reports that his right knee is still doing very well. Unfortunately, his left knee relief only lasts it for approximately 4 weeks and his activity the pain has returned. It is primarily in the medial compartment and is worse with weightbearing and activity such as stairs. He has tried Tylenol without significant relief. Pain is up to 8 out of 10 on a daily basis    Review of Systems   Constitutional: Negative. HENT: Negative. Eyes: Negative. Respiratory: Negative. Cardiovascular: Negative. Gastrointestinal: Negative. Endocrine: Negative. Genitourinary: Negative. Musculoskeletal:  Positive for arthralgias, gait problem, joint swelling and myalgias. Skin: Negative. Allergic/Immunologic: Negative. Hematological: Negative. Psychiatric/Behavioral: Negative.        Past Medical History:   Diagnosis Date    Arthritis     shoulder    Ascending aortic aneurysm ([address])     Cataract     both eyes-to have the right cataract removed on 1/25/22    Colon polyp     GERD (gastroesophageal reflux disease)     Insomnia     Leg cramps     Memory changes     Restless legs        Past Surgical History:   Procedure Laterality Date    APPENDECTOMY      CHOLECYSTECTOMY      CHOLECYSTECTOMY LAPAROSCOPIC N/A 10/27/2020    Procedure: CHOLECYSTECTOMY LAPAROSCOPIC;  Surgeon: Estrada Osorio MD;  [facility];  Service: General    COLONOSCOPY N/A 12/12/2016    Procedure: COLONOSCOPY;  Surgeon: Jessica Amaya MD;  Location: [address] [place] [address] GI LAB;   Service:     ELBOW SURGERY Bilateral     HERNIA REPAIR Right 2001    inguinal    KNEE ARTHROSCOPY Right     AL ARTHRS KNE SURG W/MENISCECTOMY MED/LAT W/SHVG Left 6/18/2020    Procedure: ARTHROSCOPY KNEE MEDIAL MENISCECTOMY;  Surgeon: Floresita Burk MD;  Location: [facility];  Service: Orthopedics    ROTATOR CUFF REPAIR Bilateral 2011       Family History   Problem Relation Age of Onset    No Known Problems Mother     No Known Problems Father     No Known Problems Sister     No Known Problems Brother     No Known Problems Maternal Aunt     No Known Problems Maternal Uncle     No Known Problems Paternal Aunt     No Known Problems Paternal Uncle     No Known Problems Maternal Grandmother     No Known Problems Maternal Grandfather     No Known Problems Paternal Grandmother     No Known Problems Paternal Grandfather     ADD / ADHD Neg Hx     Anesthesia problems Neg Hx     Cancer Neg Hx     Clotting disorder Neg Hx     Collagen disease Neg Hx     Diabetes Neg Hx     Dislocations Neg Hx     Learning disabilities Neg Hx     Neurological problems Neg Hx     Osteoporosis Neg Hx     Rheumatologic disease Neg Hx     Scoliosis Neg Hx     Vascular Disease Neg Hx        Social History     Occupational History    Not on file   Tobacco Use    Smoking status: Never    Smokeless tobacco: Never   Vaping Use    Vaping Use: Never used   Substance and Sexual Activity    Alcohol use: Not Currently    Drug use: No    Sexual activity: Not Currently Current Outpatient Medications:     gabapentin (Neurontin) 300 mg capsule, Take 1 capsule (300 mg total) by mouth daily at bedtime, Disp: 30 capsule, Rfl: 7    Lumigan 0.01 % ophthalmic drops, INSTILL 1 DROP INTO BOTH EYES IN THE EVENING, Disp: , Rfl:     pantoprazole (PROTONIX) 40 mg tablet, Take 1 tablet (40 mg total) by mouth daily, Disp: 90 tablet, Rfl: 2    rosuvastatin (CRESTOR) 10 MG tablet, Take 1 tablet (10 mg total) by mouth daily at bedtime, Disp: 90 tablet, Rfl: 3    aspirin (ECOTRIN LOW STRENGTH) 81 mg EC tablet, Take 1 tablet (81 mg total) by mouth daily with breakfast, Disp: 90 tablet, Rfl: 3    meclizine (ANTIVERT) 25 mg tablet, Take 1 tablet (25 mg total) by mouth 3 (three) times a day as needed for dizziness (Patient not taking: Reported on 5/3/2023), Disp: 15 tablet, Rfl: 0    Silodosin 8 MG CAPS, Take 1 capsule by mouth daily (Patient not taking: Reported on 5/3/2023), Disp: , Rfl:     tadalafil (CIALIS) 20 MG tablet, Take 20 mg by mouth (Patient not taking: Reported on 6/1/2023), Disp: , Rfl:     traZODone (DESYREL) 50 mg tablet, TAKE 1 TABLET BY MOUTH AT BEDTIME FOR INSOMNIA, IF NO IMPROVEMENT TAKE 2 TABLETS (Patient not taking: Reported on 10/13/2023), Disp: 180 tablet, Rfl: 3    valACYclovir (VALTREX) 1,000 mg tablet, Take 1 tablet (1,000 mg total) by mouth 2 (two) times a day for 2 days, Disp: 4 tablet, Rfl: 0    No Known Allergies    Objective:  Vitals:    12/01/23 1303   BP: 99/68   Pulse: 86       Body mass index is 26.52 kg/m². Left Knee Exam     Muscle Strength   The patient has normal left knee strength. Tenderness   The patient is experiencing tenderness in the medial joint line and patella.     Range of Motion   Extension:  0 normal   Flexion:  130 normal     Tests   Varus: negative Valgus: negative  Drawer:  Anterior - negative     Posterior - negative  Patellar apprehension: negative    Other   Erythema: absent  Scars: absent  Sensation: normal  Pulse: present  Swelling: none  Effusion: no effusion present    Comments:  Stable at 0, 30 and 90 degrees  Neurovascularly in tact distally  No warmth or erythema  Parapatellar crepitance noted  Patellofemoral grind: negative          Observations   Left Knee   Negative for effusion. Physical Exam  Vitals and nursing note reviewed. Constitutional:       Appearance: Normal appearance. He is well-developed. Comments: Body mass index is 26.52 kg/m². HENT:      Head: Normocephalic and atraumatic. Right Ear: External ear normal.      Left Ear: External ear normal.   Eyes:      Extraocular Movements: Extraocular movements intact. Conjunctiva/sclera: Conjunctivae normal.   Cardiovascular:      Rate and Rhythm: Normal rate. Pulses: Normal pulses. Pulmonary:      Effort: Pulmonary effort is normal.   Musculoskeletal:      Cervical back: Normal range of motion. Left knee: No effusion. Comments: See ortho exam   Skin:     General: Skin is warm and dry. Neurological:      General: No focal deficit present. Mental Status: He is alert and oriented to person, place, and time. Mental status is at baseline. Psychiatric:         Mood and Affect: Mood normal.         Behavior: Behavior normal.         Thought Content: Thought content normal.         Judgment: Judgment normal.         This document was created using speech voice recognition software. Grammatical errors, random word insertions, pronoun errors, and incomplete sentences are an occasional consequence of this system due to software limitations, ambient noise, and hardware issues. Any formal questions or concerns about content, text, or information contained within the body of this dictation should be directly addressed to the provider for clarification.

## 2023-12-02 DIAGNOSIS — B00.9 HERPES: ICD-10-CM

## 2023-12-04 RX ORDER — VALACYCLOVIR HYDROCHLORIDE 1 G/1
TABLET, FILM COATED ORAL
Qty: 4 TABLET | Refills: 0 | Status: SHIPPED | OUTPATIENT
Start: 2023-12-04 | End: 2023-12-06

## 2023-12-12 ENCOUNTER — FOLLOW UP (OUTPATIENT)
Dept: URBAN - METROPOLITAN AREA CLINIC 6 | Facility: CLINIC | Age: 73
End: 2023-12-12

## 2023-12-12 DIAGNOSIS — H40.1131: ICD-10-CM

## 2023-12-12 PROCEDURE — 92083 EXTENDED VISUAL FIELD XM: CPT

## 2023-12-12 PROCEDURE — 92250 FUNDUS PHOTOGRAPHY W/I&R: CPT

## 2023-12-12 PROCEDURE — 92012 INTRM OPH EXAM EST PATIENT: CPT

## 2023-12-12 ASSESSMENT — TONOMETRY
OD_IOP_MMHG: 15
OS_IOP_MMHG: 18

## 2023-12-12 ASSESSMENT — VISUAL ACUITY
OD_SC: 20/40
OS_SC: 20/40

## 2023-12-21 ENCOUNTER — TELEPHONE (OUTPATIENT)
Age: 73
End: 2023-12-21

## 2023-12-21 NOTE — TELEPHONE ENCOUNTER
Pt states he received a call he didn't understand in regards to injections. Requested [place] interp. But was unavailable.     States he does still ant the injection and apologizes he did not know it as coming from a different pharmacy than his usual.     Callback: [phone]

## 2023-12-29 ENCOUNTER — PROCEDURE ONLY (OUTPATIENT)
Dept: URBAN - METROPOLITAN AREA CLINIC 6 | Facility: CLINIC | Age: 73
End: 2023-12-29

## 2023-12-29 DIAGNOSIS — H40.1131: ICD-10-CM

## 2023-12-29 PROCEDURE — 65855 TRABECULOPLASTY LASER SURG: CPT

## 2023-12-29 ASSESSMENT — TONOMETRY
OS_IOP_MMHG: 15
OD_IOP_MMHG: 15

## 2023-12-29 ASSESSMENT — VISUAL ACUITY: OS_SC: 20/40

## 2024-01-03 ENCOUNTER — TELEPHONE (OUTPATIENT)
Age: 74
End: 2024-01-03

## 2024-01-03 NOTE — TELEPHONE ENCOUNTER
Pt called stating he has been having pain in the head x 2 weeks, it has been on and off. I tried to schedule apt. Pt declined to schedule at the time of call. Wanted to speak with Dr. Walters for possible referral. Did not want to speak with me further.  Please advise. Thank you!

## 2024-01-09 ENCOUNTER — OFFICE VISIT (OUTPATIENT)
Dept: FAMILY MEDICINE CLINIC | Facility: CLINIC | Age: 74
End: 2024-01-09
Payer: COMMERCIAL

## 2024-01-09 VITALS
RESPIRATION RATE: 16 BRPM | HEIGHT: 62 IN | OXYGEN SATURATION: 94 % | WEIGHT: 144 LBS | DIASTOLIC BLOOD PRESSURE: 70 MMHG | SYSTOLIC BLOOD PRESSURE: 122 MMHG | BODY MASS INDEX: 26.5 KG/M2 | TEMPERATURE: 98.1 F | HEART RATE: 78 BPM

## 2024-01-09 DIAGNOSIS — M54.12 CERVICAL RADICULOPATHY: Primary | ICD-10-CM

## 2024-01-09 DIAGNOSIS — M62.838 MUSCLE SPASMS OF NECK: ICD-10-CM

## 2024-01-09 DIAGNOSIS — S46.811A TRAPEZIUS STRAIN, RIGHT, INITIAL ENCOUNTER: ICD-10-CM

## 2024-01-09 DIAGNOSIS — I71.20 THORACIC AORTIC ANEURYSM WITHOUT RUPTURE, UNSPECIFIED PART (HCC): ICD-10-CM

## 2024-01-09 DIAGNOSIS — G44.229 CHRONIC TENSION-TYPE HEADACHE, NOT INTRACTABLE: ICD-10-CM

## 2024-01-09 PROCEDURE — 99214 OFFICE O/P EST MOD 30 MIN: CPT | Performed by: STUDENT IN AN ORGANIZED HEALTH CARE EDUCATION/TRAINING PROGRAM

## 2024-01-09 RX ORDER — CYCLOBENZAPRINE HCL 5 MG
5 TABLET ORAL
Qty: 30 TABLET | Refills: 0 | Status: SHIPPED | OUTPATIENT
Start: 2024-01-09

## 2024-01-09 NOTE — PROGRESS NOTES
Clinic Visit Note  Tenzin Baird 73 y.o. male   MRN: [id number]    Assessment and Plan      Diagnoses and all orders for this visit:    Cervical radiculopathy  -     Ambulatory Referral to Physical Therapy; Future    Thoracic aortic aneurysm without rupture, unspecified part (HCC)  Repeat imaging March 2024, 4.2 mm ascending aortic aneurysm    Chronic tension-type headache, not intractable  Symptoms consistent with tension headache, recommend physical therapy given significant hypertonicity trapezius muscle, muscle relaxer as needed at night, conservative measures including heat, stretching/strengthening, physical therapy follow-up.  Referral to neurology if symptoms do not improve.  -     Ambulatory Referral to Physical Therapy; Future  -     Ambulatory Referral to Neurology; Future    Muscle spasms of neck  -     cyclobenzaprine (FLEXERIL) 5 mg tablet; Take 1 tablet (5 mg total) by mouth daily at bedtime as needed for muscle spasms  -     Ambulatory Referral to Physical Therapy; Future    Trapezius strain, right, initial encounter  -     Diclofenac Sodium (VOLTAREN) 1 %; Apply 2 g topically 4 (four) times a day    My impressions and treatment recommendations were discussed in detail with the patient who verbalized understanding and had no further questions.  Discharge instructions were provided.    Subjective     Chief Complaint: F/U    History of Present Illness:    Patient is a pleasant 73-year-old male coming in for acute on chronic headache symptoms, wrapping around headache with neck pain.    The following portions of the patient's history were reviewed and updated as appropriate: allergies, current medications, past family history, past medical history, past social history, past surgical history and problem list.    REVIEW OF SYSTEMS:  A complete 12-point review of systems is negative other than that noted in the HPI.    Review of Systems   Constitutional:  Negative for chills, fatigue and fever.   Eyes:   Negative for photophobia and visual disturbance.   Respiratory:  Negative for cough, shortness of breath and wheezing.    Cardiovascular:  Negative for chest pain, palpitations and leg swelling.   Musculoskeletal:  Positive for neck pain and neck stiffness.         Current Outpatient Medications:   •  aspirin (ECOTRIN LOW STRENGTH) 81 mg EC tablet, Take 1 tablet (81 mg total) by mouth daily with breakfast, Disp: 90 tablet, Rfl: 3  •  cyclobenzaprine (FLEXERIL) 5 mg tablet, Take 1 tablet (5 mg total) by mouth daily at bedtime as needed for muscle spasms, Disp: 30 tablet, Rfl: 0  •  Diclofenac Sodium (VOLTAREN) 1 %, Apply 2 g topically 4 (four) times a day, Disp: 100 g, Rfl: 1  •  gabapentin (Neurontin) 300 mg capsule, Take 1 capsule (300 mg total) by mouth daily at bedtime, Disp: 30 capsule, Rfl: 7  •  Lumigan 0.01 % ophthalmic drops, INSTILL 1 DROP INTO BOTH EYES IN THE EVENING, Disp: , Rfl:   •  pantoprazole (PROTONIX) 40 mg tablet, Take 1 tablet (40 mg total) by mouth daily, Disp: 90 tablet, Rfl: 2  •  rosuvastatin (CRESTOR) 10 MG tablet, Take 1 tablet (10 mg total) by mouth daily at bedtime, Disp: 90 tablet, Rfl: 3  Past Medical History:   Diagnosis Date   • Arthritis     shoulder   • Ascending aortic aneurysm (HCC)    • Cataract     both eyes-to have the right cataract removed on 1/25/22   • Colon polyp    • GERD (gastroesophageal reflux disease)    • Insomnia    • Leg cramps    • Memory changes    • Restless legs      Past Surgical History:   Procedure Laterality Date   • APPENDECTOMY     • CHOLECYSTECTOMY     • CHOLECYSTECTOMY LAPAROSCOPIC N/A 10/27/2020    Procedure: CHOLECYSTECTOMY LAPAROSCOPIC;  Surgeon: Liam Iniguez MD;  Location: [facility];  Service: General   • COLONOSCOPY N/A 12/12/2016    Procedure: COLONOSCOPY;  Surgeon: Ian Arana MD;  Location: [facility];  Service:    • ELBOW SURGERY Bilateral    • HERNIA REPAIR Right 2001    inguinal   • KNEE ARTHROSCOPY Right    • VT ARTHRS KNE SURG  W/MENISCECTOMY MED/LAT W/SHVG Left 6/18/2020    Procedure: ARTHROSCOPY KNEE MEDIAL MENISCECTOMY;  Surgeon: Brodie Muñoz MD;  Location: [facility];  Service: Orthopedics   • ROTATOR CUFF REPAIR Bilateral 2011     Social History     Socioeconomic History   • Marital status: /Civil Union     Spouse name: Not on file   • Number of children: Not on file   • Years of education: Not on file   • Highest education level: Not on file   Occupational History   • Not on file   Tobacco Use   • Smoking status: Never   • Smokeless tobacco: Never   Vaping Use   • Vaping status: Never Used   Substance and Sexual Activity   • Alcohol use: Not Currently   • Drug use: No   • Sexual activity: Not Currently   Other Topics Concern   • Not on file   Social History Narrative   • Not on file     Social Determinants of Health     Financial Resource Strain: Low Risk  (11/14/2022)    Overall Financial Resource Strain (CARDIA)    • Difficulty of Paying Living Expenses: Not hard at all   Food Insecurity: Not on file   Transportation Needs: No Transportation Needs (11/14/2022)    PRAPARE - Transportation    • Lack of Transportation (Medical): No    • Lack of Transportation (Non-Medical): No   Physical Activity: Not on file   Stress: Not on file   Social Connections: Not on file   Intimate Partner Violence: Not on file   Housing Stability: Not on file     Family History   Problem Relation Age of Onset   • No Known Problems Mother    • No Known Problems Father    • No Known Problems Sister    • No Known Problems Brother    • No Known Problems Maternal Aunt    • No Known Problems Maternal Uncle    • No Known Problems Paternal Aunt    • No Known Problems Paternal Uncle    • No Known Problems Maternal Grandmother    • No Known Problems Maternal Grandfather    • No Known Problems Paternal Grandmother    • No Known Problems Paternal Grandfather    • ADD / ADHD Neg Hx    • Anesthesia problems Neg Hx    • Cancer Neg Hx    • Clotting disorder  "Neg Hx    • Collagen disease Neg Hx    • Diabetes Neg Hx    • Dislocations Neg Hx    • Learning disabilities Neg Hx    • Neurological problems Neg Hx    • Osteoporosis Neg Hx    • Rheumatologic disease Neg Hx    • Scoliosis Neg Hx    • Vascular Disease Neg Hx      No Known Allergies    Objective     Vitals:    01/09/24 1003   BP: 122/70   BP Location: Left arm   Patient Position: Sitting   Cuff Size: Standard   Pulse: 78   Resp: 16   Temp: 98.1 °F (36.7 °C)   SpO2: 94%   Weight: 65.3 kg (144 lb)   Height: 5' 2\" (1.575 m)       Physical Exam:     GENERAL: NAD, pleasant   HEENT:  NC/AT, PERRL, EOMI, no scleral icterus  CARDIAC:  RRR, +S1/S2, no S3/S4 appreciated, no m/g/r  PULMONARY:  CTA B/L, no wheezing/rales/rhonci, non-labored breathing  ABDOMEN:  Soft, NT/ND, no rebound/guarding/rigidity  Extremities:. No edema, cyanosis, or clubbing  Musculoskeletal:  Full range of motion intact in all extremities   NEUROLOGIC: Grossly intact, no focal deficits  SKIN:  No rashes or erythema noted on exposed skin  Psych: Normal affect, mood stable    Significant hypertonicity trapezius muscle right greater than left, tenderness to palpation    ==  PLEASE NOTE:  This encounter was completed utilizing the [employer] Direct Speech Voice Recognition Software. Grammatical errors, random word insertions, pronoun errors and incomplete sentences are occasional consequences of the system due to software limitations, ambient noise and hardware issues.These may be missed by proof reading prior to affixing electronic signature. Any questions or concerns about the content, text or information contained within the body of this dictation should be directly addressed to the physician for clarification. Please do not hesitate to call me directly if you have any any questions or concerns.    Pancho Willis, DO  [facility]   [facility]     "

## 2024-01-10 ENCOUNTER — TELEPHONE (OUTPATIENT)
Age: 74
End: 2024-01-10

## 2024-01-10 NOTE — TELEPHONE ENCOUNTER
Caller: Patient    Doctor: Jason    Reason for call: Patient speaks Gibraltarian, I connected the  on the line and patient refused to use an  and asked that someone that speaks Gibraltarian from the office reach out to him directly.    Call back#: [phone]

## 2024-01-10 NOTE — TELEPHONE ENCOUNTER
I spoke with patient in regards to his VISCO medication ,     His copay is 286.80 and is coming from a specialty pharmacy , after explaining that his insurance is the one who picks that and decides his appointment based off what they pay, he is left with that balance, Patient understood and asked for me to give him a call around 3pm after he looks into his credit card to see if this can be a form of payment

## 2024-01-12 ENCOUNTER — EVALUATION (OUTPATIENT)
Facility: CLINIC | Age: 74
End: 2024-01-12
Payer: COMMERCIAL

## 2024-01-12 DIAGNOSIS — M54.12 CERVICAL RADICULOPATHY: ICD-10-CM

## 2024-01-12 DIAGNOSIS — M62.838 MUSCLE SPASMS OF NECK: ICD-10-CM

## 2024-01-12 DIAGNOSIS — G44.229 CHRONIC TENSION-TYPE HEADACHE, NOT INTRACTABLE: ICD-10-CM

## 2024-01-12 PROCEDURE — 97162 PT EVAL MOD COMPLEX 30 MIN: CPT

## 2024-01-12 NOTE — PROGRESS NOTES
PT Evaluation     Today's date: 2024  Patient name: Tenzin Baird  : 1950  MRN: [id number]  Referring provider: Pancho Willis*  Dx:   Encounter Diagnosis     ICD-10-CM    1. Cervical radiculopathy  M54.12 Ambulatory Referral to Physical Therapy      2. Chronic tension-type headache, not intractable  G44.229 Ambulatory Referral to Physical Therapy      3. Muscle spasms of neck  M62.838 Ambulatory Referral to Physical Therapy          Start Time: 930  Stop Time: 101  Total time in clinic (min): 45 minutes    Assessment  Assessment details: Tenzin Baird is a 73 y.o. male who presents with signs and symptoms consistent with cervical derangement with pain, decreased strength, decreased ROM, decreased joint mobility, and postural dysfunction. Due to these impairments, patient has difficulty performing ADL's, lifting/carrying, reaching. Patient's clinical presentation is consistent with their referring diagnosis of Cervical radiculopathy, Chronic tension-type headache, not intractable, and Muscle spasms of neck. Patient has been educated in home exercise program with written copy provided in Kiswahili and plan of care. Patient would benefit from skilled physical therapy services to address their aforementioned functional limitations and progress towards prior level of function and independence with home exercise program and self symptom management/resolution.     Impairments: abnormal or restricted ROM, abnormal movement, activity intolerance, impaired physical strength, lacks appropriate home exercise program, pain with function, scapular dyskinesis, poor posture  and poor body mechanics  Other impairment: poor tolerance to prolonged static positions  Functional limitations: limited tolerance to driving, using computer, disturbed sleep  Symptom irritability: moderateUnderstanding of Dx/Px/POC: good   Prognosis: good    Goals  Short Term Goals:  Target Date 24  1. Independent with initial HEP  "toward self symptom reduction/resolution.  2. Improve postural awareness and demonstrate self postural correction.  3. Improve AROM cervical spine to min ambrosio or better t/o.  4. Able to reach overhead without pain reported.     Long Term Goals:  Target Date 24  1. Indep with comprehensive HEP and self symptom prevention.  2. Achieve cervical AROM to WFL w/o c/o.  3. Able to perform household tasks without pain including lifting and carrying items.  4. Demonstrate good self-correction of posture.       Plan  Patient would benefit from: skilled PT  Planned modality interventions: thermotherapy: hydrocollator packs and unattended electrical stimulation  Planned therapy interventions: joint mobilization, manual therapy, patient education, postural training, activity modification, body mechanics training, home exercise program, neuromuscular re-education, strengthening, stretching, therapeutic activities and therapeutic exercise  Other planned therapy interventions: mechanical assessment  Frequency: 2x week  Duration in weeks: 12  Plan of Care beginning date: 2024  Plan of Care expiration date: 2024  Treatment plan discussed with: patient        Subjective Evaluation    History of Present Illness  Mechanism of injury: Patient reports onset of pain 2 months ago, with pt seeing Dr. Willis a few weeks ago, with pt referred to PT at that time. Pt notes two weeks ago, the pain was worse than it is now. Notes pain is worse with any lifting and carrying, or work around the house. Has been using a \"machine\" that his son brought over that he says has been helping him.          Not a recurrent problem   Quality of life: good    Patient Goals  Patient goals for therapy: decreased pain, increased strength and increased motion  Patient goal: To do things without pain, to have full motion in my neck again without pain  Pain  Current pain ratin  At best pain ratin  At worst pain rating: 10  Location: neck and " shoulders, both sides but R>L  Quality: tight (heaviness in shoulders)  Relieving factors: rest, heat and change in position  Aggravating factors: lifting and overhead activity (turning his neck)  Progression: improved    Social Support  Lives with: spouse    Employment status: not working (retired x 1 year)  Exercise history: none    Treatments  Previous treatment: physical therapy (for shoulder surgery several years ago)  Current treatment: physical therapy        Objective  ROM   Mild decreased R shoulder ROM into flexion.      MMT   L  R  Date   1/12/24 1/12/24  Shld flx 4-/5*  4-/5*  Shld ext 4+/5  4+/5  Shld abd 3+/5*  3+/5*  Shld ER 4-/5*  4/5  Shld IR 4+/5  4+/5    Palpation:   1/12/23: Significant pain with palpation over B/L upper traps, R levator scapula,  supraspinatus, rhomboids, scalenes and infraspinatus.    Posture:   1/12/23: Significantly forward head, rounded shoulders, sitting slouched in chair at start of session. Able to correct form with cues with good ability to maintain throughout session.    Cervical Screening:   ROM: 1/12/23:   Flexion min limitation*  Extension mod limitation*  Rotation L mod limitation*, R min-mod limitation  Sidebend L: mod limitation*, R: mod limitation    Repeated movement testing:   Cervical retraction: 1x10 w/ man A, dec/B especially into neck ext; 1x10 w/ towel (retract/ext due to limited ability to complete without assist) dec/B  Cervical protraction: not tested          Precautions: German speaking, standard    Daily Exercise Log:  Start of Care: 11/12/24  POC expires 4/5/24  FOTO: NECK 12   Date 1/12/24        Visit # 1                 Manual         STM/IASTM         Cervical traction                           Neuro Re-Ed                                                                        Ther Ex         Wall slides         Slouch correct         Cervical retraction         Cervical ext SNAG         Cerv rotation SNAG                                    Ther  Activity                           Gait Training                           Modalities         PRN                    HEP:   Access Code: [email or web address]  URL: [email or web address]  Date: 01/12/2024  Prepared by: Valerie Ríos    Exercises  - Cervical Extension AROM with Strap  - 3 x daily - 7 x weekly - 2 sets - 10 reps  - Standing shoulder flexion wall slides  - 2 x daily - 7 x weekly - 1 sets - 10 reps - 10 second hold  - Seated Cervical Rotation AROM  - 2 x daily - 7 x weekly - 2 sets - 10 reps

## 2024-01-15 ENCOUNTER — TELEPHONE (OUTPATIENT)
Facility: CLINIC | Age: 74
End: 2024-01-15

## 2024-01-15 NOTE — TELEPHONE ENCOUNTER
Patient called and spoke to staff member, stating he wants to hold off on PT visits until approved auth from both insurances

## 2024-01-17 NOTE — TELEPHONE ENCOUNTER
Sw pt, he has been on contact with Diane from the Auth team , his credit card should be in today or tomorrow, he will give Diane a call once its been received to pay to copay for the visco

## 2024-01-18 ENCOUNTER — APPOINTMENT (OUTPATIENT)
Facility: CLINIC | Age: 74
End: 2024-01-18
Payer: COMMERCIAL

## 2024-01-18 ENCOUNTER — TELEPHONE (OUTPATIENT)
Age: 74
End: 2024-01-18

## 2024-01-18 NOTE — TELEPHONE ENCOUNTER
Caller: patient    Doctor: Jason    Reason for call: pt called asking for the phone number to his insurance.  He is going to call and make his co-pay    Call back#:

## 2024-01-22 ENCOUNTER — APPOINTMENT (OUTPATIENT)
Facility: CLINIC | Age: 74
End: 2024-01-22
Payer: COMMERCIAL

## 2024-01-23 ENCOUNTER — TELEPHONE (OUTPATIENT)
Age: 74
End: 2024-01-23

## 2024-01-23 NOTE — TELEPHONE ENCOUNTER
Called patient and left message in regards to appt on 01/24/24.  Patients medication has not been approved per the Vis[employer] team.  Patient will need to reschedule his appt once medication has been paid for and approved.    Thank you,  Inocencia FERRER

## 2024-02-09 ENCOUNTER — CLINIC PROCEDURE ONLY (OUTPATIENT)
Dept: URBAN - METROPOLITAN AREA CLINIC 6 | Facility: CLINIC | Age: 74
End: 2024-02-09

## 2024-02-09 DIAGNOSIS — H40.1131: ICD-10-CM

## 2024-02-09 PROCEDURE — 65855 TRABECULOPLASTY LASER SURG: CPT

## 2024-02-09 ASSESSMENT — TONOMETRY
OS_IOP_MMHG: 12
OS_IOP_MMHG: 12
OD_IOP_MMHG: 13
OD_IOP_MMHG: 16

## 2024-02-09 ASSESSMENT — VISUAL ACUITY
OS_SC: 20/30
OD_SC: 20/40

## 2024-02-12 ENCOUNTER — TELEPHONE (OUTPATIENT)
Dept: OBGYN CLINIC | Facility: CLINIC | Age: 74
End: 2024-02-12

## 2024-02-12 ENCOUNTER — TELEPHONE (OUTPATIENT)
Dept: OBGYN CLINIC | Facility: MEDICAL CENTER | Age: 74
End: 2024-02-12

## 2024-02-12 NOTE — TELEPHONE ENCOUNTER
Called patient to inquire about Visco injection co-pay and medication.  Patient will reach out to insurance and call back.    Thank you,  Sonia FERRER

## 2024-02-14 ENCOUNTER — PROCEDURE VISIT (OUTPATIENT)
Dept: OBGYN CLINIC | Facility: CLINIC | Age: 74
End: 2024-02-14
Payer: COMMERCIAL

## 2024-02-14 DIAGNOSIS — G89.29 CHRONIC PAIN OF LEFT KNEE: ICD-10-CM

## 2024-02-14 DIAGNOSIS — M25.562 CHRONIC PAIN OF LEFT KNEE: ICD-10-CM

## 2024-02-14 DIAGNOSIS — M17.12 PRIMARY OSTEOARTHRITIS OF LEFT KNEE: Primary | ICD-10-CM

## 2024-02-14 PROCEDURE — 20610 DRAIN/INJ JOINT/BURSA W/O US: CPT | Performed by: PHYSICIAN ASSISTANT

## 2024-02-14 NOTE — PROGRESS NOTES
"Assessment/Plan:  1. Primary osteoarthritis of left knee  Large joint arthrocentesis      2. Chronic pain of left knee  Large joint arthrocentesis        Tenzin is a pleasant 73-year-old presenting today for the first of 3 Orthovisc injections for his left knee osteoarthritis and activity related pain.  He consented to and underwent the injection as detailed below, which she tolerated well without difficulty or complication.  Postinjection instructions were provided.  We will plan to see him back next week and the week after to complete the series.  All questions addressed    Large joint arthrocentesis: L knee  Universal Protocol:  Consent: Verbal consent obtained.  Risks and benefits: risks, benefits and alternatives were discussed  Consent given by: patient  Time out: Immediately prior to procedure a \"time out\" was called to verify the correct patient, procedure, equipment, support staff and site/side marked as required.  Timeout called at: 2/14/2024 4:12 PM.  Site marked: the operative site was marked  Patient identity confirmed: verbally with patient  Supporting Documentation  Indications: pain   Procedure Details  Location: knee - L knee  Preparation: Patient was prepped and draped in the usual sterile fashion  Needle size: 20 G  Ultrasound guidance: no  Approach: anterolateral  Medications administered: 30 mg sodium hyaluronate 30 mg/2 mL  Specialty Pharmacy Supplied: received medications from pharmacy  Patient tolerance: patient tolerated the procedure well with no immediate complications  Dressing:  Sterile dressing applied        Subjective: Left knee Orthovisc #1    Patient ID: Tenzin Baird is a 73 y.o. male presenting for the first of 3 Orthovisc injections for his activity related left knee pain due to his underlying osteoarthritis.  He denies new injury from his previous visit    Review of Systems      Past Medical History:   Diagnosis Date    Arthritis     shoulder    Ascending aortic aneurysm (HCC)  "    Cataract     both eyes-to have the right cataract removed on 1/25/22    Colon polyp     GERD (gastroesophageal reflux disease)     Insomnia     Leg cramps     Memory changes     Restless legs        Past Surgical History:   Procedure Laterality Date    APPENDECTOMY      CHOLECYSTECTOMY      CHOLECYSTECTOMY LAPAROSCOPIC N/A 10/27/2020    Procedure: CHOLECYSTECTOMY LAPAROSCOPIC;  Surgeon: Liam Iniguez MD;  Location: [facility];  Service: General    COLONOSCOPY N/A 12/12/2016    Procedure: COLONOSCOPY;  Surgeon: Ian Arana MD;  Location: [facility] GI LAB;  Service:     ELBOW SURGERY Bilateral     HERNIA REPAIR Right 2001    inguinal    KNEE ARTHROSCOPY Right     UT ARTHRS KNE SURG W/MENISCECTOMY MED/LAT W/SHVG Left 6/18/2020    Procedure: ARTHROSCOPY KNEE MEDIAL MENISCECTOMY;  Surgeon: Brodie Muñoz MD;  Location: [facility] OR;  Service: Orthopedics    ROTATOR CUFF REPAIR Bilateral 2011       Family History   Problem Relation Age of Onset    No Known Problems Mother     No Known Problems Father     No Known Problems Sister     No Known Problems Brother     No Known Problems Maternal Aunt     No Known Problems Maternal Uncle     No Known Problems Paternal Aunt     No Known Problems Paternal Uncle     No Known Problems Maternal Grandmother     No Known Problems Maternal Grandfather     No Known Problems Paternal Grandmother     No Known Problems Paternal Grandfather     ADD / ADHD Neg Hx     Anesthesia problems Neg Hx     Cancer Neg Hx     Clotting disorder Neg Hx     Collagen disease Neg Hx     Diabetes Neg Hx     Dislocations Neg Hx     Learning disabilities Neg Hx     Neurological problems Neg Hx     Osteoporosis Neg Hx     Rheumatologic disease Neg Hx     Scoliosis Neg Hx     Vascular Disease Neg Hx        Social History     Occupational History    Not on file   Tobacco Use    Smoking status: Never    Smokeless tobacco: Never   Vaping Use    Vaping status: Never Used   Substance and Sexual Activity     Alcohol use: Not Currently    Drug use: No    Sexual activity: Not Currently         Current Outpatient Medications:     aspirin (ECOTRIN LOW STRENGTH) 81 mg EC tablet, Take 1 tablet (81 mg total) by mouth daily with breakfast, Disp: 90 tablet, Rfl: 3    cyclobenzaprine (FLEXERIL) 5 mg tablet, Take 1 tablet (5 mg total) by mouth daily at bedtime as needed for muscle spasms, Disp: 30 tablet, Rfl: 0    Diclofenac Sodium (VOLTAREN) 1 %, Apply 2 g topically 4 (four) times a day, Disp: 100 g, Rfl: 1    gabapentin (Neurontin) 300 mg capsule, Take 1 capsule (300 mg total) by mouth daily at bedtime, Disp: 30 capsule, Rfl: 7    Lumigan 0.01 % ophthalmic drops, INSTILL 1 DROP INTO BOTH EYES IN THE EVENING, Disp: , Rfl:     pantoprazole (PROTONIX) 40 mg tablet, Take 1 tablet (40 mg total) by mouth daily, Disp: 90 tablet, Rfl: 2    rosuvastatin (CRESTOR) 10 MG tablet, Take 1 tablet (10 mg total) by mouth daily at bedtime, Disp: 90 tablet, Rfl: 3    No Known Allergies    Objective:  There were no vitals filed for this visit.    There is no height or weight on file to calculate BMI.    Ortho Exam    Physical Exam    This document was created using speech voice recognition software.   Grammatical errors, random word insertions, pronoun errors, and incomplete sentences are an occasional consequence of this system due to software limitations, ambient noise, and hardware issues.   Any formal questions or concerns about content, text, or information contained within the body of this dictation should be directly addressed to the provider for clarification.

## 2024-02-21 ENCOUNTER — PROCEDURE VISIT (OUTPATIENT)
Dept: OBGYN CLINIC | Facility: CLINIC | Age: 74
End: 2024-02-21
Payer: COMMERCIAL

## 2024-02-21 VITALS
HEIGHT: 62 IN | SYSTOLIC BLOOD PRESSURE: 108 MMHG | DIASTOLIC BLOOD PRESSURE: 73 MMHG | WEIGHT: 141 LBS | HEART RATE: 81 BPM | BODY MASS INDEX: 25.95 KG/M2

## 2024-02-21 DIAGNOSIS — M25.562 CHRONIC PAIN OF LEFT KNEE: ICD-10-CM

## 2024-02-21 DIAGNOSIS — G89.29 CHRONIC PAIN OF LEFT KNEE: ICD-10-CM

## 2024-02-21 DIAGNOSIS — M17.12 PRIMARY OSTEOARTHRITIS OF LEFT KNEE: Primary | ICD-10-CM

## 2024-02-21 PROCEDURE — 20610 DRAIN/INJ JOINT/BURSA W/O US: CPT | Performed by: PHYSICIAN ASSISTANT

## 2024-02-21 NOTE — PROGRESS NOTES
"Assessment/Plan:  1. Primary osteoarthritis of left knee  Large joint arthrocentesis: L knee      2. Chronic pain of left knee  Large joint arthrocentesis: L knee        Tenzin is a pleasant 73-year-old presenting today for the 2nd of 3 Orthovisc injections for his left knee osteoarthritis and activity related pain.  He consented to and underwent the injection as detailed below, which she tolerated well without difficulty or complication.  Postinjection instructions were provided.  We will plan to see him back next week to complete the series.  All questions addressed    Large joint arthrocentesis: L knee  Universal Protocol:  Consent: Verbal consent obtained.  Risks and benefits: risks, benefits and alternatives were discussed  Consent given by: patient  Time out: Immediately prior to procedure a \"time out\" was called to verify the correct patient, procedure, equipment, support staff and site/side marked as required.  Timeout called at: 2/21/2024 3:01 PM.  Site marked: the operative site was marked  Patient identity confirmed: verbally with patient  Supporting Documentation  Indications: pain   Procedure Details  Location: knee - L knee  Preparation: Patient was prepped and draped in the usual sterile fashion  Needle size: 20 G  Ultrasound guidance: no  Approach: anterolateral  Medications administered: 30 mg sodium hyaluronate 30 mg/2 mL  Specialty Pharmacy Supplied: received medications from pharmacy  Patient tolerance: patient tolerated the procedure well with no immediate complications  Dressing:  Sterile dressing applied        Subjective: Left knee Orthovisc #2    Patient ID: Tenzin Baird is a 73 y.o. male presenting for the 2nd of 3 Orthovisc injections for his activity related left knee pain due to his underlying osteoarthritis.  He denies new injury from his previous visit    Review of Systems      Past Medical History:   Diagnosis Date    Arthritis     shoulder    Ascending aortic aneurysm (HCC)     " Cataract     both eyes-to have the right cataract removed on 1/25/22    Colon polyp     GERD (gastroesophageal reflux disease)     Insomnia     Leg cramps     Memory changes     Restless legs        Past Surgical History:   Procedure Laterality Date    APPENDECTOMY      CHOLECYSTECTOMY      CHOLECYSTECTOMY LAPAROSCOPIC N/A 10/27/2020    Procedure: CHOLECYSTECTOMY LAPAROSCOPIC;  Surgeon: Liam Iniguez MD;  Location: [facility];  Service: General    COLONOSCOPY N/A 12/12/2016    Procedure: COLONOSCOPY;  Surgeon: Ian Arana MD;  Location: [facility] GI LAB;  Service:     ELBOW SURGERY Bilateral     HERNIA REPAIR Right 2001    inguinal    KNEE ARTHROSCOPY Right     IA ARTHRS KNE SURG W/MENISCECTOMY MED/LAT W/SHVG Left 6/18/2020    Procedure: ARTHROSCOPY KNEE MEDIAL MENISCECTOMY;  Surgeon: Brodie Muñoz MD;  Location: [facility] OR;  Service: Orthopedics    ROTATOR CUFF REPAIR Bilateral 2011       Family History   Problem Relation Age of Onset    No Known Problems Mother     No Known Problems Father     No Known Problems Sister     No Known Problems Brother     No Known Problems Maternal Aunt     No Known Problems Maternal Uncle     No Known Problems Paternal Aunt     No Known Problems Paternal Uncle     No Known Problems Maternal Grandmother     No Known Problems Maternal Grandfather     No Known Problems Paternal Grandmother     No Known Problems Paternal Grandfather     ADD / ADHD Neg Hx     Anesthesia problems Neg Hx     Cancer Neg Hx     Clotting disorder Neg Hx     Collagen disease Neg Hx     Diabetes Neg Hx     Dislocations Neg Hx     Learning disabilities Neg Hx     Neurological problems Neg Hx     Osteoporosis Neg Hx     Rheumatologic disease Neg Hx     Scoliosis Neg Hx     Vascular Disease Neg Hx        Social History     Occupational History    Not on file   Tobacco Use    Smoking status: Never    Smokeless tobacco: Never   Vaping Use    Vaping status: Never Used   Substance and Sexual Activity    Alcohol  use: Not Currently    Drug use: No    Sexual activity: Not Currently         Current Outpatient Medications:     aspirin (ECOTRIN LOW STRENGTH) 81 mg EC tablet, Take 1 tablet (81 mg total) by mouth daily with breakfast, Disp: 90 tablet, Rfl: 3    cyclobenzaprine (FLEXERIL) 5 mg tablet, Take 1 tablet (5 mg total) by mouth daily at bedtime as needed for muscle spasms, Disp: 30 tablet, Rfl: 0    Diclofenac Sodium (VOLTAREN) 1 %, Apply 2 g topically 4 (four) times a day, Disp: 100 g, Rfl: 1    gabapentin (Neurontin) 300 mg capsule, Take 1 capsule (300 mg total) by mouth daily at bedtime, Disp: 30 capsule, Rfl: 7    Lumigan 0.01 % ophthalmic drops, INSTILL 1 DROP INTO BOTH EYES IN THE EVENING, Disp: , Rfl:     pantoprazole (PROTONIX) 40 mg tablet, Take 1 tablet (40 mg total) by mouth daily, Disp: 90 tablet, Rfl: 2    rosuvastatin (CRESTOR) 10 MG tablet, Take 1 tablet (10 mg total) by mouth daily at bedtime, Disp: 90 tablet, Rfl: 3    No Known Allergies    Objective:  Vitals:    02/21/24 1520   BP: 108/73   Pulse: 81       Body mass index is 25.79 kg/m².    Ortho Exam    Physical Exam    This document was created using speech voice recognition software.   Grammatical errors, random word insertions, pronoun errors, and incomplete sentences are an occasional consequence of this system due to software limitations, ambient noise, and hardware issues.   Any formal questions or concerns about content, text, or information contained within the body of this dictation should be directly addressed to the provider for clarification.

## 2024-02-28 ENCOUNTER — PROCEDURE VISIT (OUTPATIENT)
Dept: OBGYN CLINIC | Facility: CLINIC | Age: 74
End: 2024-02-28
Payer: COMMERCIAL

## 2024-02-28 VITALS
BODY MASS INDEX: 27.05 KG/M2 | HEART RATE: 80 BPM | HEIGHT: 62 IN | DIASTOLIC BLOOD PRESSURE: 64 MMHG | SYSTOLIC BLOOD PRESSURE: 102 MMHG | WEIGHT: 147 LBS

## 2024-02-28 DIAGNOSIS — M17.12 PRIMARY OSTEOARTHRITIS OF LEFT KNEE: Primary | ICD-10-CM

## 2024-02-28 DIAGNOSIS — G89.29 CHRONIC PAIN OF LEFT KNEE: ICD-10-CM

## 2024-02-28 DIAGNOSIS — M25.562 CHRONIC PAIN OF LEFT KNEE: ICD-10-CM

## 2024-02-28 PROCEDURE — 20610 DRAIN/INJ JOINT/BURSA W/O US: CPT | Performed by: PHYSICIAN ASSISTANT

## 2024-02-28 RX ORDER — HYALURONATE SODIUM 30 MG/2 ML
SYRINGE (ML) INTRAARTICULAR
COMMUNITY
Start: 2024-02-12

## 2024-02-28 NOTE — PROGRESS NOTES
"Assessment/Plan:  1. Primary osteoarthritis of left knee  Large joint arthrocentesis      2. Chronic pain of left knee  Large joint arthrocentesis        Tenzin is a pleasant 73-year-old presenting today for the 3rd of 3 Orthovisc injections for his left knee osteoarthritis and activity related pain.  He consented to and underwent the injection as detailed below, which she tolerated well without difficulty or complication.  Postinjection instructions were provided.  We will plan to see him back in 6 months.  All questions addressed    Large joint arthrocentesis: L knee  Universal Protocol:  Consent: Verbal consent obtained.  Risks and benefits: risks, benefits and alternatives were discussed  Consent given by: patient  Time out: Immediately prior to procedure a \"time out\" was called to verify the correct patient, procedure, equipment, support staff and site/side marked as required.  Timeout called at: 2/28/2024 4:12 PM.  Site marked: the operative site was marked  Patient identity confirmed: verbally with patient  Supporting Documentation  Indications: pain   Procedure Details  Location: knee - L knee  Preparation: Patient was prepped and draped in the usual sterile fashion  Needle size: 20 G  Ultrasound guidance: no  Approach: anterolateral  Medications administered: 30 mg sodium hyaluronate 30 mg/2 mL  Specialty Pharmacy Supplied: received medications from pharmacy  Patient tolerance: patient tolerated the procedure well with no immediate complications  Dressing:  Sterile dressing applied        Subjective: Left knee Orthovisc #3    Patient ID: Tenzin Baird is a 73 y.o. male presenting for the 3rd of 3 Orthovisc injections for his activity related left knee pain due to his underlying osteoarthritis.  He denies new injury from his previous visit    Review of Systems      Past Medical History:   Diagnosis Date    Arthritis     shoulder    Ascending aortic aneurysm (HCC)     Cataract     both eyes-to have the right " cataract removed on 1/25/22    Colon polyp     GERD (gastroesophageal reflux disease)     Insomnia     Leg cramps     Memory changes     Restless legs        Past Surgical History:   Procedure Laterality Date    APPENDECTOMY      CHOLECYSTECTOMY      CHOLECYSTECTOMY LAPAROSCOPIC N/A 10/27/2020    Procedure: CHOLECYSTECTOMY LAPAROSCOPIC;  Surgeon: Liam Iniguez MD;  Location: [facility];  Service: General    COLONOSCOPY N/A 12/12/2016    Procedure: COLONOSCOPY;  Surgeon: Ian Arana MD;  Location: [facility] GI LAB;  Service:     ELBOW SURGERY Bilateral     HERNIA REPAIR Right 2001    inguinal    KNEE ARTHROSCOPY Right     NM ARTHRS KNE SURG W/MENISCECTOMY MED/LAT W/SHVG Left 6/18/2020    Procedure: ARTHROSCOPY KNEE MEDIAL MENISCECTOMY;  Surgeon: Brodie Muñoz MD;  Location: [facility];  Service: Orthopedics    ROTATOR CUFF REPAIR Bilateral 2011       Family History   Problem Relation Age of Onset    No Known Problems Mother     No Known Problems Father     No Known Problems Sister     No Known Problems Brother     No Known Problems Maternal Aunt     No Known Problems Maternal Uncle     No Known Problems Paternal Aunt     No Known Problems Paternal Uncle     No Known Problems Maternal Grandmother     No Known Problems Maternal Grandfather     No Known Problems Paternal Grandmother     No Known Problems Paternal Grandfather     ADD / ADHD Neg Hx     Anesthesia problems Neg Hx     Cancer Neg Hx     Clotting disorder Neg Hx     Collagen disease Neg Hx     Diabetes Neg Hx     Dislocations Neg Hx     Learning disabilities Neg Hx     Neurological problems Neg Hx     Osteoporosis Neg Hx     Rheumatologic disease Neg Hx     Scoliosis Neg Hx     Vascular Disease Neg Hx        Social History     Occupational History    Not on file   Tobacco Use    Smoking status: Never    Smokeless tobacco: Never   Vaping Use    Vaping status: Never Used   Substance and Sexual Activity    Alcohol use: Not Currently    Drug use: No     Sexual activity: Not Currently         Current Outpatient Medications:     aspirin (ECOTRIN LOW STRENGTH) 81 mg EC tablet, Take 1 tablet (81 mg total) by mouth daily with breakfast, Disp: 90 tablet, Rfl: 3    cyclobenzaprine (FLEXERIL) 5 mg tablet, Take 1 tablet (5 mg total) by mouth daily at bedtime as needed for muscle spasms, Disp: 30 tablet, Rfl: 0    Diclofenac Sodium (VOLTAREN) 1 %, Apply 2 g topically 4 (four) times a day, Disp: 100 g, Rfl: 1    gabapentin (Neurontin) 300 mg capsule, Take 1 capsule (300 mg total) by mouth daily at bedtime, Disp: 30 capsule, Rfl: 7    Lumigan 0.01 % ophthalmic drops, INSTILL 1 DROP INTO BOTH EYES IN THE EVENING, Disp: , Rfl:     OrthoVisc 30 MG/2ML SOSY injection, , Disp: , Rfl:     pantoprazole (PROTONIX) 40 mg tablet, Take 1 tablet (40 mg total) by mouth daily, Disp: 90 tablet, Rfl: 2    rosuvastatin (CRESTOR) 10 MG tablet, Take 1 tablet (10 mg total) by mouth daily at bedtime, Disp: 90 tablet, Rfl: 3    No Known Allergies    Objective:  Vitals:    02/28/24 1607   BP: 102/64   Pulse: 80       Body mass index is 26.89 kg/m².    Ortho Exam    Physical Exam    This document was created using speech voice recognition software.   Grammatical errors, random word insertions, pronoun errors, and incomplete sentences are an occasional consequence of this system due to software limitations, ambient noise, and hardware issues.   Any formal questions or concerns about content, text, or information contained within the body of this dictation should be directly addressed to the provider for clarification.

## 2024-03-09 DIAGNOSIS — I71.20 THORACIC AORTIC ANEURYSM WITHOUT RUPTURE (HCC): ICD-10-CM

## 2024-03-11 RX ORDER — ROSUVASTATIN CALCIUM 10 MG/1
10 TABLET, COATED ORAL
Qty: 90 TABLET | Refills: 3 | Status: SHIPPED | OUTPATIENT
Start: 2024-03-11

## 2024-03-19 ENCOUNTER — TELEPHONE (OUTPATIENT)
Dept: FAMILY MEDICINE CLINIC | Facility: CLINIC | Age: 74
End: 2024-03-19

## 2024-03-19 DIAGNOSIS — B00.9 HERPES: ICD-10-CM

## 2024-03-19 DIAGNOSIS — S46.811A TRAPEZIUS STRAIN, RIGHT, INITIAL ENCOUNTER: Primary | ICD-10-CM

## 2024-03-19 RX ORDER — VALACYCLOVIR HYDROCHLORIDE 1 G/1
1000 TABLET, FILM COATED ORAL 2 TIMES DAILY
Qty: 4 TABLET | Refills: 0 | Status: SHIPPED | OUTPATIENT
Start: 2024-03-19 | End: 2024-03-21

## 2024-03-19 NOTE — TELEPHONE ENCOUNTER
I spoke to patient and he said you prescribed this prior when he calls . I told him you are do back in the office tomorrow

## 2024-03-19 NOTE — TELEPHONE ENCOUNTER
Yes sent it in for the patient. Its for herpes outbreak. NO appointment needed. Please make patient aware it was called into pharmacy.

## 2024-03-19 NOTE — TELEPHONE ENCOUNTER
Dr. Walters:    Patient needs a refill of valacyclovir sent to [facility] in [place].  Patient is out of this medication.

## 2024-03-25 ENCOUNTER — OFFICE VISIT (OUTPATIENT)
Dept: FAMILY MEDICINE CLINIC | Facility: CLINIC | Age: 74
End: 2024-03-25
Payer: COMMERCIAL

## 2024-03-25 VITALS
BODY MASS INDEX: 26.7 KG/M2 | RESPIRATION RATE: 16 BRPM | TEMPERATURE: 97.9 F | SYSTOLIC BLOOD PRESSURE: 108 MMHG | WEIGHT: 146 LBS | DIASTOLIC BLOOD PRESSURE: 62 MMHG | HEART RATE: 86 BPM | OXYGEN SATURATION: 92 %

## 2024-03-25 DIAGNOSIS — I71.20 THORACIC AORTIC ANEURYSM WITHOUT RUPTURE, UNSPECIFIED PART (HCC): ICD-10-CM

## 2024-03-25 DIAGNOSIS — G25.81 RESTLESS LEGS SYNDROME: ICD-10-CM

## 2024-03-25 DIAGNOSIS — K22.719 BARRETT'S ESOPHAGUS WITH DYSPLASIA: Primary | ICD-10-CM

## 2024-03-25 DIAGNOSIS — R10.9 ABDOMINAL PAIN, UNSPECIFIED ABDOMINAL LOCATION: ICD-10-CM

## 2024-03-25 DIAGNOSIS — F41.9 ANXIETY: ICD-10-CM

## 2024-03-25 PROCEDURE — G2211 COMPLEX E/M VISIT ADD ON: HCPCS | Performed by: FAMILY MEDICINE

## 2024-03-25 PROCEDURE — 99214 OFFICE O/P EST MOD 30 MIN: CPT | Performed by: FAMILY MEDICINE

## 2024-03-25 RX ORDER — HYDROXYZINE PAMOATE 50 MG/1
50 CAPSULE ORAL DAILY PRN
Qty: 30 CAPSULE | Refills: 0 | Status: SHIPPED | OUTPATIENT
Start: 2024-03-25

## 2024-03-25 RX ORDER — ESCITALOPRAM OXALATE 5 MG/1
5 TABLET ORAL
Qty: 30 TABLET | Refills: 5 | Status: SHIPPED | OUTPATIENT
Start: 2024-03-25

## 2024-03-25 RX ORDER — GABAPENTIN 300 MG/1
300 CAPSULE ORAL
Qty: 30 CAPSULE | Refills: 7 | Status: SHIPPED | OUTPATIENT
Start: 2024-03-25

## 2024-03-25 NOTE — PROGRESS NOTES
Tenzin Baird 1950 male MRN: [id number]    [facility] PRACTICE OFFICE VISIT  [facility] - [facility]      ASSESSMENT/PLAN  Tenzin Baird is a 73 y.o. male presents to the office for    Diagnoses and all orders for this visit:    Yeboah's esophagus with dysplasia  -     Ambulatory Referral to Gastroenterology; Future    Restless legs syndrome  -     gabapentin (Neurontin) 300 mg capsule; Take 1 capsule (300 mg total) by mouth daily at bedtime    Abdominal pain, unspecified abdominal location  -     Ambulatory Referral to Gastroenterology; Future    Anxiety  -     hydrOXYzine pamoate (VISTARIL) 50 mg capsule; Take 1 capsule (50 mg total) by mouth daily as needed for itching  -     escitalopram (LEXAPRO) 5 mg tablet; Take 1 tablet (5 mg total) by mouth daily at bedtime  -     Comprehensive metabolic panel; Future  -     TSH, 3rd generation; Future    Thoracic aortic aneurysm without rupture, unspecified part (HCC)  -     CTA abdomen pelvis w wo contrast; Future       History of restless leg syndrome increase gabapentin to 300 mg daily  Abdominal pain not improving referral to GI given patient does have a history of Yeboah's esophagus reviewed recent endoscopy that was performed the previous year.  Patient is due for repeat CTA given his thoracic aortic aneurysm history has been stable  Anxiety started on Lexapro 5 mg at bedtime daily with a rescue Vistaril as needed  Blood work given         Future Appointments   Date Time Provider Department Center   4/3/2024  9:00 [place] [facility]   4/19/2024  9:45 AM Melissa Tovar MD [facility]   5/1/2024 10:00 AM [facility]   5/7/2024  4:20 PM Jamal Ames MD [facility]   5/20/2024  9:45 AM Melissa Tovar MD [facility]   6/26/2024 12:00 PM Natalie Snyder PA-C [facility]STR[facility]   7/10/2024 11:00 AM Christopher Kahn MD NEURO [facility]           SUBJECTIVE  CC: Abdominal Pain (Has been going on for 2 weeks )      HPI:  Tenzin Baird is a 73 y.o. male who presents for an acute appointment.  My stomach feels frozen and pain sometimes for 2 weeks.  Has not been seen by his GI specialist since the endoscopy and being diagnosed with Yeboah's esophagus.  Anxiety contiues to be persistent ; states that it is not causing his stomach pains.  Patient would like a long-acting medication for his anxiety if possible.  As well as a rescue.  Patient would like to know if he is due for his repeat thoracic aortic aneurysm.  And would like to a refill on his gabapentin if possible  Review of Systems   Constitutional:  Negative for activity change, appetite change, chills, fatigue and fever.   HENT:  Negative for congestion.    Respiratory:  Negative for cough, chest tightness and shortness of breath.    Cardiovascular:  Negative for chest pain and leg swelling.   Gastrointestinal:  Positive for abdominal pain. Negative for abdominal distention, constipation, diarrhea, nausea and vomiting.   Musculoskeletal:         Restless leg syndrome   All other systems reviewed and are negative.      Historical Information   The patient history was reviewed as follows:  Past Medical History:   Diagnosis Date   • Arthritis     shoulder   • Ascending aortic aneurysm (HCC)    • Cataract     both eyes-to have the right cataract removed on 1/25/22   • Colon polyp    • GERD (gastroesophageal reflux disease)    • Insomnia    • Leg cramps    • Memory changes    • Restless legs          Medications:     Current Outpatient Medications:   •  escitalopram (LEXAPRO) 5 mg tablet, Take 1 tablet (5 mg total) by mouth daily at bedtime, Disp: 30 tablet, Rfl: 5  •  gabapentin (Neurontin) 300 mg capsule, Take 1 capsule (300 mg total) by mouth daily at bedtime, Disp: 30 capsule, Rfl: 7  •  hydrOXYzine pamoate (VISTARIL) 50 mg capsule, Take 1 capsule (50 mg total) by mouth daily as needed for itching,  Disp: 30 capsule, Rfl: 0  •  aspirin (ECOTRIN LOW STRENGTH) 81 mg EC tablet, Take 1 tablet (81 mg total) by mouth daily with breakfast, Disp: 90 tablet, Rfl: 3  •  cyclobenzaprine (FLEXERIL) 5 mg tablet, Take 1 tablet (5 mg total) by mouth daily at bedtime as needed for muscle spasms, Disp: 30 tablet, Rfl: 0  •  Diclofenac Sodium (VOLTAREN) 1 %, Apply 2 g topically 4 (four) times a day, Disp: 100 g, Rfl: 1  •  Lumigan 0.01 % ophthalmic drops, INSTILL 1 DROP INTO BOTH EYES IN THE EVENING, Disp: , Rfl:   •  OrthoVisc 30 MG/2ML SOSY injection, , Disp: , Rfl:   •  pantoprazole (PROTONIX) 40 mg tablet, Take 1 tablet (40 mg total) by mouth daily, Disp: 90 tablet, Rfl: 2  •  rosuvastatin (CRESTOR) 10 MG tablet, TAKE 1 TABLET BY MOUTH DAILY AT BEDTIME, Disp: 90 tablet, Rfl: 3  •  valACYclovir (VALTREX) 1,000 mg tablet, Take 1 tablet (1,000 mg total) by mouth 2 (two) times a day for 2 days, Disp: 4 tablet, Rfl: 0    No Known Allergies    OBJECTIVE  Vitals:   Vitals:    03/25/24 1336   BP: 108/62   BP Location: Left arm   Patient Position: Sitting   Cuff Size: Standard   Pulse: 86   Resp: 16   Temp: 97.9 °F (36.6 °C)   SpO2: 92%   Weight: 66.2 kg (146 lb)         Physical Exam  Vitals reviewed.   Constitutional:       Appearance: He is well-developed.   HENT:      Head: Normocephalic and atraumatic.   Eyes:      Conjunctiva/sclera: Conjunctivae normal.      Pupils: Pupils are equal, round, and reactive to light.   Cardiovascular:      Rate and Rhythm: Normal rate and regular rhythm.      Heart sounds: Normal heart sounds.   Pulmonary:      Effort: Pulmonary effort is normal. No respiratory distress.      Breath sounds: Normal breath sounds.   Abdominal:      Tenderness: There is abdominal tenderness in the epigastric area.   Musculoskeletal:         General: Normal range of motion.      Cervical back: Normal range of motion and neck supple.   Skin:     General: Skin is warm.      Capillary Refill: Capillary refill takes less  than 2 seconds.   Neurological:      Mental Status: He is alert and oriented to person, place, and time.                    Melissa Walters MD,   [facility]  3/28/2024

## 2024-04-01 ENCOUNTER — APPOINTMENT (OUTPATIENT)
Dept: LAB | Facility: CLINIC | Age: 74
End: 2024-04-01
Payer: COMMERCIAL

## 2024-04-01 DIAGNOSIS — F41.9 ANXIETY: ICD-10-CM

## 2024-04-01 LAB
ALBUMIN SERPL BCP-MCNC: 4.2 G/DL (ref 3.5–5)
ALP SERPL-CCNC: 75 U/L (ref 34–104)
ALT SERPL W P-5'-P-CCNC: 18 U/L (ref 7–52)
ANION GAP SERPL CALCULATED.3IONS-SCNC: 8 MMOL/L (ref 4–13)
AST SERPL W P-5'-P-CCNC: 19 U/L (ref 13–39)
BILIRUB SERPL-MCNC: 0.8 MG/DL (ref 0.2–1)
BUN SERPL-MCNC: 13 MG/DL (ref 5–25)
CALCIUM SERPL-MCNC: 8.5 MG/DL (ref 8.4–10.2)
CHLORIDE SERPL-SCNC: 104 MMOL/L (ref 96–108)
CO2 SERPL-SCNC: 25 MMOL/L (ref 21–32)
CREAT SERPL-MCNC: 0.72 MG/DL (ref 0.6–1.3)
GFR SERPL CREATININE-BSD FRML MDRD: 93 ML/MIN/1.73SQ M
GLUCOSE P FAST SERPL-MCNC: 100 MG/DL (ref 65–99)
POTASSIUM SERPL-SCNC: 4 MMOL/L (ref 3.5–5.3)
PROT SERPL-MCNC: 6.7 G/DL (ref 6.4–8.4)
SODIUM SERPL-SCNC: 137 MMOL/L (ref 135–147)
TSH SERPL DL<=0.05 MIU/L-ACNC: 4.6 UIU/ML (ref 0.45–4.5)

## 2024-04-01 PROCEDURE — 36415 COLL VENOUS BLD VENIPUNCTURE: CPT

## 2024-04-01 PROCEDURE — 84443 ASSAY THYROID STIM HORMONE: CPT

## 2024-04-01 PROCEDURE — 80053 COMPREHEN METABOLIC PANEL: CPT

## 2024-04-03 ENCOUNTER — HOSPITAL ENCOUNTER (OUTPATIENT)
Dept: RADIOLOGY | Facility: HOSPITAL | Age: 74
Discharge: HOME | End: 2024-04-03
Attending: FAMILY MEDICINE
Payer: COMMERCIAL

## 2024-04-03 DIAGNOSIS — I71.20 THORACIC AORTIC ANEURYSM WITHOUT RUPTURE, UNSPECIFIED PART (HCC): ICD-10-CM

## 2024-04-03 DIAGNOSIS — E03.9 HYPOTHYROIDISM, UNSPECIFIED TYPE: ICD-10-CM

## 2024-04-03 DIAGNOSIS — R79.89 ABNORMAL TSH: Primary | ICD-10-CM

## 2024-04-03 PROCEDURE — 74174 CTA ABD&PLVS W/CONTRAST: CPT

## 2024-04-03 RX ORDER — LEVOTHYROXINE SODIUM 0.03 MG/1
TABLET ORAL
Qty: 30 TABLET | Refills: 2 | Status: SHIPPED | OUTPATIENT
Start: 2024-04-03

## 2024-04-03 RX ADMIN — IOHEXOL 100 ML: 350 INJECTION, SOLUTION INTRAVENOUS at 09:11

## 2024-04-10 ENCOUNTER — OFFICE VISIT (OUTPATIENT)
Dept: PULMONOLOGY | Facility: MEDICAL CENTER | Age: 74
End: 2024-04-10

## 2024-04-10 ENCOUNTER — IOP CHECK (OUTPATIENT)
Dept: URBAN - METROPOLITAN AREA CLINIC 6 | Facility: CLINIC | Age: 74
End: 2024-04-10

## 2024-04-10 VITALS
HEART RATE: 69 BPM | HEIGHT: 62 IN | TEMPERATURE: 98.2 F | SYSTOLIC BLOOD PRESSURE: 110 MMHG | DIASTOLIC BLOOD PRESSURE: 74 MMHG | RESPIRATION RATE: 12 BRPM | WEIGHT: 143 LBS | OXYGEN SATURATION: 94 % | BODY MASS INDEX: 26.31 KG/M2

## 2024-04-10 DIAGNOSIS — G25.81 RESTLESS LEGS SYNDROME: Primary | ICD-10-CM

## 2024-04-10 DIAGNOSIS — G47.33 OBSTRUCTIVE SLEEP APNEA: ICD-10-CM

## 2024-04-10 DIAGNOSIS — H40.1131: ICD-10-CM

## 2024-04-10 PROCEDURE — 92012 INTRM OPH EXAM EST PATIENT: CPT

## 2024-04-10 RX ORDER — GABAPENTIN 300 MG/1
300 CAPSULE ORAL
Qty: 30 CAPSULE | Refills: 7 | Status: SHIPPED | OUTPATIENT
Start: 2024-04-10

## 2024-04-10 ASSESSMENT — TONOMETRY
OS_IOP_MMHG: 15
OD_IOP_MMHG: 14

## 2024-04-10 ASSESSMENT — VISUAL ACUITY
OS_SC: 20/30
OD_SC: 20/40+1

## 2024-04-10 NOTE — PATIENT INSTRUCTIONS
Call if you start having any problem with breathing    Continue gabapentin 300 mg at bedtime

## 2024-04-10 NOTE — PROGRESS NOTES
Assessment/Plan        Problem List Items Addressed This Visit          Respiratory    Obstructive sleep apnea     He did have sleep study done in May 2023 which showed very mild PAULINA and he did not want to pursue CPAP therapy.  Not having any excessive daytime somnolence            Neurology/Sleep    Restless legs syndrome - Primary     Does have restless legs at nighttime.  He is on gabapentin 300 mg bedtime which has helped.  Continue this medication         Relevant Medications    gabapentin (Neurontin) 300 mg capsule         Cc: No shortness of breath      HPI      His daughter Alexandra did help with interpretation.  Patient's English is fairly good.  Tenzin denies chronic cough.  He is not having any shortness of breath.  Does get heartburn periodically.  He has been taking gabapentin 300 mg at bedtime which has helped his discomfort in his legs at bedtime.  Has some discomfort that was significantly  improved with gabapentin.  Does not wake up short of breath.    He does have history of mild PAULINA with AHI of 10.0 and oxygen svitlana of 88% as per home sleep study done May 19, 2023.  He does not have any excessive daytime somnolence and did not want to pursue CPAP therapy.        Past Medical History:   Diagnosis Date    Arthritis     shoulder    Ascending aortic aneurysm (HCC)     Cataract     both eyes-to have the right cataract removed on 1/25/22    Colon polyp     GERD (gastroesophageal reflux disease)     Insomnia     Leg cramps     Memory changes     Restless legs        Past Surgical History:   Procedure Laterality Date    APPENDECTOMY      CHOLECYSTECTOMY      CHOLECYSTECTOMY LAPAROSCOPIC N/A 10/27/2020    Procedure: CHOLECYSTECTOMY LAPAROSCOPIC;  Surgeon: Liam Iniguez MD;  Location: [facility];  Service: General    COLONOSCOPY N/A 12/12/2016    Procedure: COLONOSCOPY;  Surgeon: Ian Arana MD;  Location: [facility];  Service:     ELBOW SURGERY Bilateral     HERNIA REPAIR Right 2001    inguinal     KNEE ARTHROSCOPY Right     ND ARTHRS KNE SURG W/MENISCECTOMY MED/LAT W/SHVG Left 6/18/2020    Procedure: ARTHROSCOPY KNEE MEDIAL MENISCECTOMY;  Surgeon: Brodie Muñoz MD;  Location: [facility];  Service: Orthopedics    ROTATOR CUFF REPAIR Bilateral 2011         Current Outpatient Medications:     aspirin (ECOTRIN LOW STRENGTH) 81 mg EC tablet, Take 1 tablet (81 mg total) by mouth daily with breakfast, Disp: 90 tablet, Rfl: 3    cyclobenzaprine (FLEXERIL) 5 mg tablet, Take 1 tablet (5 mg total) by mouth daily at bedtime as needed for muscle spasms, Disp: 30 tablet, Rfl: 0    Diclofenac Sodium (VOLTAREN) 1 %, Apply 2 g topically 4 (four) times a day, Disp: 100 g, Rfl: 1    escitalopram (LEXAPRO) 5 mg tablet, Take 1 tablet (5 mg total) by mouth daily at bedtime, Disp: 30 tablet, Rfl: 5    gabapentin (Neurontin) 300 mg capsule, Take 1 capsule (300 mg total) by mouth daily at bedtime, Disp: 30 capsule, Rfl: 7    hydrOXYzine pamoate (VISTARIL) 50 mg capsule, Take 1 capsule (50 mg total) by mouth daily as needed for itching, Disp: 30 capsule, Rfl: 0    levothyroxine 25 mcg tablet, Take 1 tablet in AM on an empty stomach, Disp: 30 tablet, Rfl: 2    Lumigan 0.01 % ophthalmic drops, INSTILL 1 DROP INTO BOTH EYES IN THE EVENING, Disp: , Rfl:     OrthoVisc 30 MG/2ML SOSY injection, , Disp: , Rfl:     pantoprazole (PROTONIX) 40 mg tablet, Take 1 tablet (40 mg total) by mouth daily, Disp: 90 tablet, Rfl: 2    rosuvastatin (CRESTOR) 10 MG tablet, TAKE 1 TABLET BY MOUTH DAILY AT BEDTIME, Disp: 90 tablet, Rfl: 3    valACYclovir (VALTREX) 1,000 mg tablet, Take 1 tablet (1,000 mg total) by mouth 2 (two) times a day for 2 days, Disp: 4 tablet, Rfl: 0    No Known Allergies    Social History     Tobacco Use    Smoking status: Never    Smokeless tobacco: Never   Substance Use Topics    Alcohol use: Not Currently         Family History   Problem Relation Age of Onset    No Known Problems Mother     No Known Problems Father     No  "Known Problems Sister     No Known Problems Brother     No Known Problems Maternal Aunt     No Known Problems Maternal Uncle     No Known Problems Paternal Aunt     No Known Problems Paternal Uncle     No Known Problems Maternal Grandmother     No Known Problems Maternal Grandfather     No Known Problems Paternal Grandmother     No Known Problems Paternal Grandfather     ADD / ADHD Neg Hx     Anesthesia problems Neg Hx     Cancer Neg Hx     Clotting disorder Neg Hx     Collagen disease Neg Hx     Diabetes Neg Hx     Dislocations Neg Hx     Learning disabilities Neg Hx     Neurological problems Neg Hx     Osteoporosis Neg Hx     Rheumatologic disease Neg Hx     Scoliosis Neg Hx     Vascular Disease Neg Hx        Review of Systems   Constitutional:  Negative for chills, fever and unexpected weight change.   HENT:  Negative for congestion, rhinorrhea and sore throat.    Eyes:  Negative for discharge and redness.   Respiratory:  Negative for shortness of breath.    Cardiovascular:  Negative for chest pain, palpitations and leg swelling.   Gastrointestinal:  Negative for abdominal distention, abdominal pain and nausea.   Endocrine: Negative for polydipsia and polyphagia.   Genitourinary:  Negative for dysuria.   Musculoskeletal:  Negative for joint swelling and myalgias.        Have some discomfort in his legs at bedtime.   Skin:  Negative for rash.   Neurological:  Negative for light-headedness.   Psychiatric/Behavioral:  Negative for confusion.            Vitals:    04/10/24 1323   BP: 110/74   Pulse: 69   Resp: 12   Temp: 98.2 °F (36.8 °C)   SpO2: 94%     Height: 5' 2\" (157.5 cm)  IBW (Ideal Body Weight): 54.6 kg  Body mass index is 26.16 kg/m².  Weight (last 2 days)       Date/Time Weight    04/10/24 1323 64.9 (143)          Pulse oximetry testing:    Room air O2 saturation at 95% and lowest O2 saturation after going up and down a flight of stairs was 93 to 94%      Physical Exam  Vitals reviewed.   Constitutional:  "      General: He is not in acute distress.     Appearance: Normal appearance. He is well-developed.   HENT:      Head: Normocephalic.      Right Ear: External ear normal.      Left Ear: External ear normal.      Nose: Nose normal.      Mouth/Throat:      Mouth: Mucous membranes are moist.      Pharynx: No oropharyngeal exudate.   Eyes:      Conjunctiva/sclera: Conjunctivae normal.      Pupils: Pupils are equal, round, and reactive to light.   Cardiovascular:      Rate and Rhythm: Normal rate and regular rhythm.      Heart sounds: Normal heart sounds.   Pulmonary:      Effort: Pulmonary effort is normal.      Comments: Lung sounds are clear.  No wheezes, crackles or rhonchi  Abdominal:      General: There is no distension.      Palpations: Abdomen is soft.      Tenderness: There is no abdominal tenderness.   Musculoskeletal:      Cervical back: Neck supple.      Comments: No edema, cyanosis or clubbing   Lymphadenopathy:      Cervical: No cervical adenopathy.   Skin:     General: Skin is warm and dry.   Neurological:      General: No focal deficit present.      Mental Status: He is alert and oriented to person, place, and time. Mental status is at baseline.       Office Spirometry Results: done today    FVC -     3.26 L   105%  FEV1 -    2.88 L   126%  FEV1/FVC% - 88%    Normal lung volumes

## 2024-04-13 NOTE — ASSESSMENT & PLAN NOTE
Does have restless legs at nighttime.  He is on gabapentin 300 mg bedtime which has helped.  Continue this medication

## 2024-04-13 NOTE — ASSESSMENT & PLAN NOTE
He did have sleep study done in May 2023 which showed very mild PAULINA and he did not want to pursue CPAP therapy.  Not having any excessive daytime somnolence

## 2024-04-16 DIAGNOSIS — F41.9 ANXIETY: ICD-10-CM

## 2024-04-16 RX ORDER — HYDROXYZINE PAMOATE 50 MG/1
50 CAPSULE ORAL DAILY PRN
Qty: 90 CAPSULE | Refills: 1 | Status: SHIPPED | OUTPATIENT
Start: 2024-04-16 | End: 2024-04-19

## 2024-04-19 ENCOUNTER — OFFICE VISIT (OUTPATIENT)
Dept: FAMILY MEDICINE CLINIC | Facility: CLINIC | Age: 74
End: 2024-04-19
Payer: COMMERCIAL

## 2024-04-19 VITALS
OXYGEN SATURATION: 95 % | BODY MASS INDEX: 26.28 KG/M2 | WEIGHT: 142.8 LBS | SYSTOLIC BLOOD PRESSURE: 98 MMHG | HEART RATE: 80 BPM | HEIGHT: 62 IN | DIASTOLIC BLOOD PRESSURE: 62 MMHG | RESPIRATION RATE: 16 BRPM

## 2024-04-19 DIAGNOSIS — F41.9 ANXIETY: ICD-10-CM

## 2024-04-19 DIAGNOSIS — E03.9 HYPOTHYROIDISM, UNSPECIFIED TYPE: Primary | ICD-10-CM

## 2024-04-19 DIAGNOSIS — I71.20 THORACIC AORTIC ANEURYSM WITHOUT RUPTURE, UNSPECIFIED PART (HCC): ICD-10-CM

## 2024-04-19 PROCEDURE — 99214 OFFICE O/P EST MOD 30 MIN: CPT | Performed by: FAMILY MEDICINE

## 2024-04-19 PROCEDURE — G2211 COMPLEX E/M VISIT ADD ON: HCPCS | Performed by: FAMILY MEDICINE

## 2024-04-19 RX ORDER — HYDROXYZINE HYDROCHLORIDE 10 MG/1
10 TABLET, FILM COATED ORAL EVERY 6 HOURS PRN
Qty: 30 TABLET | Refills: 0 | Status: SHIPPED | OUTPATIENT
Start: 2024-04-19

## 2024-04-19 NOTE — PROGRESS NOTES
Tenzin Baird 1950 male MRN: [id number]    FAMILY PRACTICE OFFICE VISIT  [facility]      ASSESSMENT/PLAN  Tenzin Baird is a 73 y.o. male presents to the office for    Diagnoses and all orders for this visit:    Hypothyroidism, unspecified type    Thoracic aortic aneurysm without rupture, unspecified part (HCC)  -     CTA chest and abdomen w wo contrast; Future    Anxiety  -     hydrOXYzine HCL (ATARAX) 10 mg tablet; Take 1 tablet (10 mg total) by mouth every 6 (six) hours as needed for anxiety       Order placed for CTA for next year to be obtained  Hypothyroidism continue levothyroxine 25 mg daily.  Repeat TSH in 3 months  Anxiety switch from Vistaril to Atarax to be used as needed given Vistaril was very strong for the patient.  Patient will be seeing cardiology and report back to us if there is any changes         Future Appointments   Date Time Provider Department Center   5/1/2024 10:00 AM [facility]   5/1/2024 10:30 AM [facility]   5/7/2024  4:20 PM Jamal Ames MD CARD [facility]   5/20/2024  9:45 AM Melissa Tovar MD [facility]   6/26/2024 12:00 PM Natalie Snyder PA-C GASTRO [facility]   7/10/2024 11:00 AM Christopher Kahn MD NEURO [facility]          SUBJECTIVE  CC: Follow-up (6month follow up - review ct results)      HPI:  Tenzin Baird is a 73 y.o. male who presents for a follow-up to review CAT scan.  Patient states that he has been having anxiety and feels like the Vistaril is too strong for him and would like to try Atarax again.  Patient is takin Lexapro without any side effects.  Patient is taking his thyroid disease medications for the last couple days with no side effects as well.  Patient states his only concern is anxiety and sometimes leg pain Review of Systems   Constitutional:  Negative for activity change, appetite change, chills, fatigue and fever.   HENT:   Negative for congestion.    Respiratory:  Negative for cough, chest tightness and shortness of breath.    Cardiovascular:  Negative for chest pain and leg swelling.   Gastrointestinal:  Negative for abdominal distention, abdominal pain, constipation, diarrhea, nausea and vomiting.   Musculoskeletal:  Positive for gait problem and joint swelling.   Psychiatric/Behavioral:  Positive for sleep disturbance. The patient is nervous/anxious.    All other systems reviewed and are negative.      Historical Information   The patient history was reviewed as follows:  Past Medical History:   Diagnosis Date   • Arthritis     shoulder   • Ascending aortic aneurysm (HCC)    • Cataract     both eyes-to have the right cataract removed on 1/25/22   • Colon polyp    • GERD (gastroesophageal reflux disease)    • Insomnia    • Leg cramps    • Memory changes    • Restless legs          Medications:     Current Outpatient Medications:   •  aspirin (ECOTRIN LOW STRENGTH) 81 mg EC tablet, Take 1 tablet (81 mg total) by mouth daily with breakfast, Disp: 90 tablet, Rfl: 3  •  cyclobenzaprine (FLEXERIL) 5 mg tablet, Take 1 tablet (5 mg total) by mouth daily at bedtime as needed for muscle spasms, Disp: 30 tablet, Rfl: 0  •  Diclofenac Sodium (VOLTAREN) 1 %, Apply 2 g topically 4 (four) times a day, Disp: 100 g, Rfl: 1  •  escitalopram (LEXAPRO) 5 mg tablet, Take 1 tablet (5 mg total) by mouth daily at bedtime, Disp: 30 tablet, Rfl: 5  •  gabapentin (Neurontin) 300 mg capsule, Take 1 capsule (300 mg total) by mouth daily at bedtime, Disp: 30 capsule, Rfl: 7  •  hydrOXYzine HCL (ATARAX) 10 mg tablet, Take 1 tablet (10 mg total) by mouth every 6 (six) hours as needed for anxiety, Disp: 30 tablet, Rfl: 0  •  levothyroxine 25 mcg tablet, Take 1 tablet in AM on an empty stomach, Disp: 30 tablet, Rfl: 2  •  Lumigan 0.01 % ophthalmic drops, INSTILL 1 DROP INTO BOTH EYES IN THE EVENING, Disp: , Rfl:   •  OrthoVisc 30 MG/2ML SOSY injection, , Disp: ,  "Rfl:   •  pantoprazole (PROTONIX) 40 mg tablet, Take 1 tablet (40 mg total) by mouth daily, Disp: 90 tablet, Rfl: 2  •  rosuvastatin (CRESTOR) 10 MG tablet, TAKE 1 TABLET BY MOUTH DAILY AT BEDTIME, Disp: 90 tablet, Rfl: 3  •  valACYclovir (VALTREX) 1,000 mg tablet, Take 1 tablet (1,000 mg total) by mouth 2 (two) times a day for 2 days, Disp: 4 tablet, Rfl: 0    No Known Allergies    OBJECTIVE  Vitals:   Vitals:    04/19/24 0926   BP: 98/62   BP Location: Left arm   Patient Position: Sitting   Cuff Size: Large   Pulse: 80   Resp: 16   SpO2: 95%   Weight: 64.8 kg (142 lb 12.8 oz)   Height: 5' 2\" (1.575 m)         Physical Exam  Vitals reviewed.   Constitutional:       Appearance: He is well-developed.   HENT:      Head: Normocephalic and atraumatic.   Eyes:      Conjunctiva/sclera: Conjunctivae normal.      Pupils: Pupils are equal, round, and reactive to light.   Cardiovascular:      Rate and Rhythm: Normal rate and regular rhythm.      Heart sounds: Normal heart sounds, S1 normal and S2 normal. No murmur heard.  Pulmonary:      Effort: Pulmonary effort is normal. No respiratory distress.      Breath sounds: Normal breath sounds. No wheezing.   Musculoskeletal:         General: Normal range of motion.      Cervical back: Normal range of motion and neck supple.   Skin:     General: Skin is warm.   Neurological:      Mental Status: He is alert and oriented to person, place, and time.   Psychiatric:         Speech: Speech normal.         Behavior: Behavior normal.         Thought Content: Thought content normal.         Judgment: Judgment normal.                    Melissa Walters MD,   [facility]  4/22/2024      "

## 2024-05-01 ENCOUNTER — HOSPITAL ENCOUNTER (OUTPATIENT)
Dept: RADIOLOGY | Facility: HOSPITAL | Age: 74
Discharge: HOME | End: 2024-05-01
Attending: FAMILY MEDICINE
Payer: COMMERCIAL

## 2024-05-01 DIAGNOSIS — I71.20 THORACIC AORTIC ANEURYSM WITHOUT RUPTURE, UNSPECIFIED PART (HCC): ICD-10-CM

## 2024-05-01 PROCEDURE — 74175 CTA ABDOMEN W/CONTRAST: CPT

## 2024-05-01 PROCEDURE — 71275 CT ANGIOGRAPHY CHEST: CPT

## 2024-05-01 RX ADMIN — IOHEXOL 100 ML: 350 INJECTION, SOLUTION INTRAVENOUS at 09:47

## 2024-05-02 ENCOUNTER — TELEPHONE (OUTPATIENT)
Dept: FAMILY MEDICINE CLINIC | Facility: CLINIC | Age: 74
End: 2024-05-02

## 2024-05-02 NOTE — TELEPHONE ENCOUNTER
----- Message from Melissa Tovar MD sent at 5/2/2024 12:56 PM EDT -----  Call daughter or son Please advise patient that CT shows a stable aortic at 4.2  repeat every year

## 2024-05-07 ENCOUNTER — OFFICE VISIT (OUTPATIENT)
Dept: CARDIOLOGY CLINIC | Facility: CLINIC | Age: 74
End: 2024-05-07
Payer: COMMERCIAL

## 2024-05-07 VITALS
WEIGHT: 143 LBS | OXYGEN SATURATION: 92 % | HEIGHT: 62 IN | HEART RATE: 87 BPM | DIASTOLIC BLOOD PRESSURE: 70 MMHG | BODY MASS INDEX: 26.31 KG/M2 | SYSTOLIC BLOOD PRESSURE: 108 MMHG

## 2024-05-07 DIAGNOSIS — I73.9 PAD (PERIPHERAL ARTERY DISEASE) (HCC): ICD-10-CM

## 2024-05-07 DIAGNOSIS — I71.20 THORACIC AORTIC ANEURYSM WITHOUT RUPTURE (HCC): ICD-10-CM

## 2024-05-07 DIAGNOSIS — I71.20 THORACIC AORTIC ANEURYSM WITHOUT RUPTURE, UNSPECIFIED PART (HCC): Primary | ICD-10-CM

## 2024-05-07 DIAGNOSIS — R07.89 CHEST PRESSURE: ICD-10-CM

## 2024-05-07 PROCEDURE — 99214 OFFICE O/P EST MOD 30 MIN: CPT | Performed by: INTERNAL MEDICINE

## 2024-05-07 PROCEDURE — 93000 ELECTROCARDIOGRAM COMPLETE: CPT | Performed by: INTERNAL MEDICINE

## 2024-05-07 RX ORDER — ROSUVASTATIN CALCIUM 10 MG/1
10 TABLET, COATED ORAL
Qty: 90 TABLET | Refills: 3 | Status: SHIPPED | OUTPATIENT
Start: 2024-05-07

## 2024-05-07 NOTE — PROGRESS NOTES
[facility]  [address] [place] [address] [id number]   [place] [address]  Cardiology Follow-up    Tenzin Baird  [id number]  1950      Consult for:Atypical chest pain  Appreciate consult by: Melissa Walters MD    1. Thoracic aortic aneurysm without rupture, unspecified part (Formerly [facility])  POCT ECG      2. PAD (peripheral artery disease) (Formerly [facility])  POCT ECG      3. Chest pressure  POCT ECG         Discussion/Summary:   Atypical chest pain- resolved. Chest pain free. Coronary calcification on CT. LDL below 70. 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  Also noted with peripheral vascular disease.  His blood pressures have been controlled.  He is a nonsmoker.  Rosuvastatin 10mg. aspirin 81 mg.  Followed by vascular    Thoracic aortic aneurysm- mild.  Denies having family history for abdominal rupture.  Nonsmoker.  Blood pressure is controlled. unchanged    HPI:   70-year-old gentleman with no prior cardiac events, mild thoracic aortic aneurysm presents with recent ER visit secondary to chest discomfort.  He states having sharp chest discomfort at times.  Not completely related to exercise.  No family history for myocardial infarction.  He denies having smoking history.  He does have a history of peripheral vascular disease. Sharp pain with deep breath.  Not related to eating. Some pain is positional.      07/18/2021: He denies having chest discomfort.  His blood pressures are at goal.  His last LDL was controlled.    01/28/2022:  He continues to have the left-sided and epigastric chest discomfort.  He association with food.  He denies any major worsening with exertion.  His last EKG reviewed without any major EKG changes.  His blood pressures remain controlled.  He is compliant on low-dose aspirin and statin.     3/2/2023: He denies having major chest heaviness or shortness of breath.  He is  compliant with his cholesterol medication.  He is very active.  He is doing more than 5-10,000 steps without any troubles.  No major EKG changes.    Recent Visit: Denies having chest pain or major change in breathing. Denies significant palpitations. Compliant with therapy. Reviewed labwork together. Exercise every day.  Does 10 block exercise      Past Medical History:   Diagnosis Date    Arthritis     shoulder    Ascending aortic aneurysm (HCC)     Cataract     both eyes-to have the right cataract removed on 1/25/22    Colon polyp     GERD (gastroesophageal reflux disease)     Insomnia     Leg cramps     Memory changes     Restless legs      Social History     Socioeconomic History    Marital status: /Civil Union     Spouse name: Not on file    Number of children: Not on file    Years of education: Not on file    Highest education level: Not on file   Occupational History    Not on file   Tobacco Use    Smoking status: Never    Smokeless tobacco: Never   Vaping Use    Vaping status: Never Used   Substance and Sexual Activity    Alcohol use: Not Currently    Drug use: No    Sexual activity: Not Currently   Other Topics Concern    Not on file   Social History Narrative    Not on file     Social Determinants of Health     Financial Resource Strain: Low Risk  (11/14/2022)    Overall Financial Resource Strain (CARDIA)     Difficulty of Paying Living Expenses: Not hard at all   Food Insecurity: Not on file   Transportation Needs: No Transportation Needs (11/14/2022)    PRAPARE - Transportation     Lack of Transportation (Medical): No     Lack of Transportation (Non-Medical): No   Physical Activity: Not on file   Stress: Not on file   Social Connections: Not on file   Intimate Partner Violence: Not on file   Housing Stability: Not on file      Family History   Problem Relation Age of Onset    No Known Problems Mother     No Known Problems Father     No Known Problems Sister     No Known Problems Brother     No  Known Problems Maternal Aunt     No Known Problems Maternal Uncle     No Known Problems Paternal Aunt     No Known Problems Paternal Uncle     No Known Problems Maternal Grandmother     No Known Problems Maternal Grandfather     No Known Problems Paternal Grandmother     No Known Problems Paternal Grandfather     ADD / ADHD Neg Hx     Anesthesia problems Neg Hx     Cancer Neg Hx     Clotting disorder Neg Hx     Collagen disease Neg Hx     Diabetes Neg Hx     Dislocations Neg Hx     Learning disabilities Neg Hx     Neurological problems Neg Hx     Osteoporosis Neg Hx     Rheumatologic disease Neg Hx     Scoliosis Neg Hx     Vascular Disease Neg Hx      Past Surgical History:   Procedure Laterality Date    APPENDECTOMY      CHOLECYSTECTOMY      CHOLECYSTECTOMY LAPAROSCOPIC N/A 10/27/2020    Procedure: CHOLECYSTECTOMY LAPAROSCOPIC;  Surgeon: Liam Iniguez MD;  Location: [facility];  Service: General    COLONOSCOPY N/A 12/12/2016    Procedure: COLONOSCOPY;  Surgeon: Ian Arana MD;  Location: [facility] GI LAB;  Service:     ELBOW SURGERY Bilateral     HERNIA REPAIR Right 2001    inguinal    KNEE ARTHROSCOPY Right     RI ARTHRS KNE SURG W/MENISCECTOMY MED/LAT W/SHVG Left 6/18/2020    Procedure: ARTHROSCOPY KNEE MEDIAL MENISCECTOMY;  Surgeon: Brodie Muñoz MD;  Location: [facility];  Service: Orthopedics    ROTATOR CUFF REPAIR Bilateral 2011       Current Outpatient Medications:     aspirin (ECOTRIN LOW STRENGTH) 81 mg EC tablet, Take 1 tablet (81 mg total) by mouth daily with breakfast, Disp: 90 tablet, Rfl: 3    cyclobenzaprine (FLEXERIL) 5 mg tablet, Take 1 tablet (5 mg total) by mouth daily at bedtime as needed for muscle spasms, Disp: 30 tablet, Rfl: 0    Diclofenac Sodium (VOLTAREN) 1 %, Apply 2 g topically 4 (four) times a day, Disp: 100 g, Rfl: 1    escitalopram (LEXAPRO) 5 mg tablet, Take 1 tablet (5 mg total) by mouth daily at bedtime, Disp: 30 tablet, Rfl: 5    gabapentin (Neurontin) 300 mg capsule, Take  "1 capsule (300 mg total) by mouth daily at bedtime, Disp: 30 capsule, Rfl: 7    hydrOXYzine HCL (ATARAX) 10 mg tablet, Take 1 tablet (10 mg total) by mouth every 6 (six) hours as needed for anxiety, Disp: 30 tablet, Rfl: 0    levothyroxine 25 mcg tablet, Take 1 tablet in AM on an empty stomach, Disp: 30 tablet, Rfl: 2    Lumigan 0.01 % ophthalmic drops, INSTILL 1 DROP INTO BOTH EYES IN THE EVENING, Disp: , Rfl:     OrthoVisc 30 MG/2ML SOSY injection, , Disp: , Rfl:     pantoprazole (PROTONIX) 40 mg tablet, Take 1 tablet (40 mg total) by mouth daily, Disp: 90 tablet, Rfl: 2    rosuvastatin (CRESTOR) 10 MG tablet, TAKE 1 TABLET BY MOUTH DAILY AT BEDTIME, Disp: 90 tablet, Rfl: 3    valACYclovir (VALTREX) 1,000 mg tablet, Take 1 tablet (1,000 mg total) by mouth 2 (two) times a day for 2 days, Disp: 4 tablet, Rfl: 0  No Known Allergies  Vitals:    05/07/24 1628   BP: 108/70   BP Location: Right arm   Patient Position: Sitting   Cuff Size: Standard   Pulse: 87   SpO2: 92%   Weight: 64.9 kg (143 lb)   Height: 5' 2\" (1.575 m)       Review of Systems:   Review of Systems   Constitutional: Negative.    HENT: Negative.     Eyes: Negative.    Respiratory: Negative.     Cardiovascular:  Negative for chest pain.   Gastrointestinal: Negative.    Endocrine: Negative.    Genitourinary: Negative.    Musculoskeletal: Negative.    Skin: Negative.    Allergic/Immunologic: Negative.    Neurological: Negative.    Hematological: Negative.    Psychiatric/Behavioral: Negative.         Vitals:    05/07/24 1628   BP: 108/70   BP Location: Right arm   Patient Position: Sitting   Cuff Size: Standard   Pulse: 87   SpO2: 92%   Weight: 64.9 kg (143 lb)   Height: 5' 2\" (1.575 m)     Physical Examination:   Physical Exam  Constitutional:       General: He is not in acute distress.     Appearance: He is well-developed. He is not diaphoretic.   HENT:      Head: Normocephalic and atraumatic.      Right Ear: External ear normal.      Left Ear: External " ear normal.   Eyes:      General: No scleral icterus.        Right eye: No discharge.         Left eye: No discharge.      Conjunctiva/sclera: Conjunctivae normal.      Pupils: Pupils are equal, round, and reactive to light.   Neck:      Thyroid: No thyromegaly.      Vascular: No JVD.      Trachea: No tracheal deviation.   Cardiovascular:      Rate and Rhythm: Normal rate and regular rhythm.      Heart sounds: No murmur heard.     No friction rub. No gallop.   Pulmonary:      Effort: Pulmonary effort is normal. No respiratory distress.      Breath sounds: Normal breath sounds. No stridor. No wheezing or rales.   Chest:      Chest wall: No tenderness.   Abdominal:      General: Bowel sounds are normal. There is no distension.      Palpations: Abdomen is soft. There is no mass.      Tenderness: There is no abdominal tenderness. There is no guarding or rebound.   Musculoskeletal:         General: No tenderness or deformity. Normal range of motion.      Cervical back: Normal range of motion and neck supple.   Skin:     General: Skin is warm and dry.      Coloration: Skin is not pale.      Findings: No erythema or rash.   Neurological:      Mental Status: He is alert and oriented to person, place, and time.      Cranial Nerves: No cranial nerve deficit.      Motor: No abnormal muscle tone.      Coordination: Coordination normal.      Deep Tendon Reflexes: Reflexes are normal and symmetric. Reflexes normal.   Psychiatric:         Behavior: Behavior normal.         Thought Content: Thought content normal.         Judgment: Judgment normal.         Labs:     Lab Results   Component Value Date    WBC 5.78 11/15/2023    HGB 15.7 11/15/2023    HCT 46.2 11/15/2023    MCV 95 11/15/2023    RDW 12.1 11/15/2023     11/15/2023     BMP:  Lab Results   Component Value Date    SODIUM 137 04/01/2024    K 4.0 04/01/2024     04/01/2024    CO2 25 04/01/2024    BUN 13 04/01/2024    CREATININE 0.72 04/01/2024    GLUC 108  "03/28/2023    GLUF 100 (H) 04/01/2024    CALCIUM 8.5 04/01/2024    EGFR 93 04/01/2024    MG 2.3 10/20/2022     LFT:  Lab Results   Component Value Date    AST 19 04/01/2024    ALT 18 04/01/2024    ALKPHOS 75 04/01/2024    TP 6.7 04/01/2024    ALB 4.2 04/01/2024      Lab Results   Component Value Date    PXY5ZUXJNVFT 4.600 (H) 04/01/2024     Lab Results   Component Value Date    HGBA1C 5.6 10/20/2023     Lipid Profile:   Lab Results   Component Value Date    CHOLESTEROL 128 10/20/2023    HDL 52 10/20/2023    LDLCALC 60 10/20/2023    TRIG 80 10/20/2023     Lab Results   Component Value Date    CHOLESTEROL 128 10/20/2023    CHOLESTEROL 116 11/15/2022     Lab Results   Component Value Date    TROPONINI <0.02 10/27/2020     Lab Results   Component Value Date    NTBNP 59 10/20/2022      No results found for this or any previous visit (from the past 672 hour(s)).      Imaging & Testing   I have personally reviewed pertinent reports.      Cardiac Testing     EKG: Personally reviewed.   Normal sinus rhythm SHARONA Ames MD [facility]  [phone]  Please call with any questions or suggestions    Counseling :  A description of the counseling:   Goals and Barriers:  Patient's ability to self care:  Medication side effect reviewed with patient in detail and all their questions answered.    \"Portions of the record may have been created with voice recognition software. Occasional wrong word or \"sound a like\" substitutions may have occurred due to the inherent limitations of voice recognition software. Read the chart carefully and recognize, using context, where substitutions have occurred. Please call if you have any questions. \"    "

## 2024-05-20 ENCOUNTER — APPOINTMENT (OUTPATIENT)
Dept: LAB | Facility: CLINIC | Age: 74
End: 2024-05-20
Payer: COMMERCIAL

## 2024-05-20 ENCOUNTER — OFFICE VISIT (OUTPATIENT)
Dept: FAMILY MEDICINE CLINIC | Facility: CLINIC | Age: 74
End: 2024-05-20
Payer: COMMERCIAL

## 2024-05-20 VITALS
HEIGHT: 62 IN | RESPIRATION RATE: 14 BRPM | WEIGHT: 145 LBS | HEART RATE: 82 BPM | TEMPERATURE: 98 F | BODY MASS INDEX: 26.68 KG/M2 | SYSTOLIC BLOOD PRESSURE: 120 MMHG | DIASTOLIC BLOOD PRESSURE: 80 MMHG

## 2024-05-20 DIAGNOSIS — R79.89 ABNORMAL TSH: ICD-10-CM

## 2024-05-20 DIAGNOSIS — E03.9 HYPOTHYROIDISM, UNSPECIFIED TYPE: ICD-10-CM

## 2024-05-20 DIAGNOSIS — R79.89 ABNORMAL TSH: Primary | ICD-10-CM

## 2024-05-20 DIAGNOSIS — F41.9 ANXIETY: ICD-10-CM

## 2024-05-20 DIAGNOSIS — B00.9 HERPES: ICD-10-CM

## 2024-05-20 LAB — TSH SERPL DL<=0.05 MIU/L-ACNC: 1.86 UIU/ML (ref 0.45–4.5)

## 2024-05-20 PROCEDURE — 84443 ASSAY THYROID STIM HORMONE: CPT

## 2024-05-20 PROCEDURE — 99214 OFFICE O/P EST MOD 30 MIN: CPT | Performed by: FAMILY MEDICINE

## 2024-05-20 PROCEDURE — G2211 COMPLEX E/M VISIT ADD ON: HCPCS | Performed by: FAMILY MEDICINE

## 2024-05-20 PROCEDURE — 36415 COLL VENOUS BLD VENIPUNCTURE: CPT

## 2024-05-20 RX ORDER — HYDROXYZINE HYDROCHLORIDE 10 MG/1
10 TABLET, FILM COATED ORAL EVERY 6 HOURS PRN
Qty: 30 TABLET | Refills: 3 | Status: SHIPPED | OUTPATIENT
Start: 2024-05-20

## 2024-05-20 RX ORDER — VALACYCLOVIR HYDROCHLORIDE 1 G/1
1000 TABLET, FILM COATED ORAL 2 TIMES DAILY
Qty: 8 TABLET | Refills: 4 | Status: SHIPPED | OUTPATIENT
Start: 2024-05-20 | End: 2024-05-22

## 2024-05-20 NOTE — PROGRESS NOTES
Tenzin Baird 1950 male MRN: [id number]    [facility]  [facility]      ASSESSMENT/PLAN  Tenzin Baird is a 73 y.o. male presents to the office for    Diagnoses and all orders for this visit:    Abnormal TSH    Herpes  -     valACYclovir (VALTREX) 1,000 mg tablet; Take 1 tablet (1,000 mg total) by mouth 2 (two) times a day for 2 days    Anxiety  -     hydrOXYzine HCL (ATARAX) 10 mg tablet; Take 1 tablet (10 mg total) by mouth every 6 (six) hours as needed for anxiety       Abnormal TSH recommend a repeat thyroid level just to be sure that the patient's levels are appropriate.  Patient with an outbreak of herpes given refills today so that patient does not have to come in for an acute appointment.  Anxiety doing very well Atarax as needed for insomnia recommend discontinuing Lexapro given that the patient has not felt relief.  Patient states that he does not want to get an increased dose of this at this time prefers to just be off the medication if possible       Return to the office in 6 months    Future Appointments   Date Time Provider Department Center   6/26/2024 12:00 PM Natalie Snyder PA-C GASTRO [facility]   7/10/2024 11:00 AM Christopher Kahn MD NEURO [facility]          SUBJECTIVE  CC: Follow-up      HPI:  Tenzin Baird is a 73 y.o. male who presents for follow-up appointment.  Patient just recently had a CAT scan performed that showed that the aorta was stable.  Patient states he is taking his levothyroxine without any side effects.  Patient states that he has cold sores over his lips and would like a refill on the Valtrex if possible.  Patient states he stopped taking the Lexapro.  States that he has been feeling significant relief with taking the Atarax 1 to 2 tablets at bedtime  Patient desires to get knee replacements in the future.  Did advise him that if he needs a preop to please notify our office and I can squeeze him  in  Review of Systems   Constitutional:  Negative for activity change, appetite change, chills, fatigue and fever.   HENT:  Negative for congestion.    Respiratory:  Negative for cough, chest tightness and shortness of breath.    Cardiovascular:  Negative for chest pain and leg swelling.   Gastrointestinal:  Negative for abdominal distention, abdominal pain, constipation, diarrhea, nausea and vomiting.   All other systems reviewed and are negative.      Historical Information   The patient history was reviewed as follows:  Past Medical History:   Diagnosis Date   • Arthritis     shoulder   • Ascending aortic aneurysm (HCC)    • Cataract     both eyes-to have the right cataract removed on 1/25/22   • Colon polyp    • GERD (gastroesophageal reflux disease)    • Insomnia    • Leg cramps    • Memory changes    • Restless legs          Medications:     Current Outpatient Medications:   •  aspirin (ECOTRIN LOW STRENGTH) 81 mg EC tablet, Take 1 tablet (81 mg total) by mouth daily with breakfast, Disp: 90 tablet, Rfl: 3  •  cyclobenzaprine (FLEXERIL) 5 mg tablet, Take 1 tablet (5 mg total) by mouth daily at bedtime as needed for muscle spasms, Disp: 30 tablet, Rfl: 0  •  Diclofenac Sodium (VOLTAREN) 1 %, Apply 2 g topically 4 (four) times a day, Disp: 100 g, Rfl: 1  •  gabapentin (Neurontin) 300 mg capsule, Take 1 capsule (300 mg total) by mouth daily at bedtime, Disp: 30 capsule, Rfl: 7  •  hydrOXYzine HCL (ATARAX) 10 mg tablet, Take 1 tablet (10 mg total) by mouth every 6 (six) hours as needed for anxiety, Disp: 30 tablet, Rfl: 3  •  levothyroxine 25 mcg tablet, Take 1 tablet in AM on an empty stomach, Disp: 30 tablet, Rfl: 2  •  Lumigan 0.01 % ophthalmic drops, INSTILL 1 DROP INTO BOTH EYES IN THE EVENING, Disp: , Rfl:   •  OrthoVisc 30 MG/2ML SOSY injection, , Disp: , Rfl:   •  pantoprazole (PROTONIX) 40 mg tablet, Take 1 tablet (40 mg total) by mouth daily, Disp: 90 tablet, Rfl: 2  •  rosuvastatin (CRESTOR) 10 MG  "tablet, Take 1 tablet (10 mg total) by mouth daily at bedtime, Disp: 90 tablet, Rfl: 3  •  valACYclovir (VALTREX) 1,000 mg tablet, Take 1 tablet (1,000 mg total) by mouth 2 (two) times a day for 2 days, Disp: 8 tablet, Rfl: 4    No Known Allergies    OBJECTIVE  Vitals:   Vitals:    05/20/24 0939   BP: 120/80   BP Location: Left arm   Patient Position: Sitting   Cuff Size: Standard   Pulse: 82   Resp: 14   Temp: 98 °F (36.7 °C)   TempSrc: Temporal   Weight: 65.8 kg (145 lb)   Height: 5' 2\" (1.575 m)         Physical Exam  Vitals reviewed.   Constitutional:       Appearance: He is well-developed.   HENT:      Head: Normocephalic and atraumatic.      Mouth/Throat:      Comments: Cold sores present  Eyes:      Extraocular Movements: EOM normal.      Conjunctiva/sclera: Conjunctivae normal.      Pupils: Pupils are equal, round, and reactive to light.   Cardiovascular:      Rate and Rhythm: Normal rate and regular rhythm.      Heart sounds: Normal heart sounds, S1 normal and S2 normal. No murmur heard.  Pulmonary:      Effort: Pulmonary effort is normal. No respiratory distress.      Breath sounds: Normal breath sounds. No wheezing.   Musculoskeletal:         General: No edema. Normal range of motion.      Cervical back: Normal range of motion and neck supple.   Skin:     General: Skin is warm.   Neurological:      Mental Status: He is alert and oriented to person, place, and time.   Psychiatric:         Mood and Affect: Mood and affect normal.         Speech: Speech normal.         Behavior: Behavior normal.         Thought Content: Thought content normal.         Judgment: Judgment normal.                    Melissa Walters MD,   [facility]  5/21/2024      "

## 2024-05-21 ENCOUNTER — TELEPHONE (OUTPATIENT)
Dept: FAMILY MEDICINE CLINIC | Facility: CLINIC | Age: 74
End: 2024-05-21

## 2024-05-21 NOTE — TELEPHONE ENCOUNTER
----- Message from Melissa Tovar MD sent at 5/20/2024  9:17 PM EDT -----  Please call son to give results given language barrier.   Advise patient that his TSH is normal. Continue on current dose

## 2024-05-24 ENCOUNTER — OFFICE VISIT (OUTPATIENT)
Dept: OBGYN CLINIC | Facility: CLINIC | Age: 74
End: 2024-05-24
Payer: COMMERCIAL

## 2024-05-24 ENCOUNTER — APPOINTMENT (OUTPATIENT)
Dept: RADIOLOGY | Facility: CLINIC | Age: 74
End: 2024-05-24
Payer: COMMERCIAL

## 2024-05-24 VITALS
HEART RATE: 82 BPM | DIASTOLIC BLOOD PRESSURE: 63 MMHG | BODY MASS INDEX: 26.68 KG/M2 | WEIGHT: 145 LBS | HEIGHT: 62 IN | SYSTOLIC BLOOD PRESSURE: 109 MMHG

## 2024-05-24 DIAGNOSIS — M25.562 CHRONIC PAIN OF LEFT KNEE: ICD-10-CM

## 2024-05-24 DIAGNOSIS — M17.12 PRIMARY OSTEOARTHRITIS OF LEFT KNEE: Primary | ICD-10-CM

## 2024-05-24 DIAGNOSIS — G89.29 CHRONIC PAIN OF LEFT KNEE: ICD-10-CM

## 2024-05-24 DIAGNOSIS — M17.12 PRIMARY OSTEOARTHRITIS OF LEFT KNEE: ICD-10-CM

## 2024-05-24 PROCEDURE — 99214 OFFICE O/P EST MOD 30 MIN: CPT | Performed by: ORTHOPAEDIC SURGERY

## 2024-05-24 PROCEDURE — 73562 X-RAY EXAM OF KNEE 3: CPT

## 2024-05-24 PROCEDURE — 73560 X-RAY EXAM OF KNEE 1 OR 2: CPT

## 2024-05-24 NOTE — PROGRESS NOTES
Assessment/Plan:  No diagnosis found.  Scribe Attestation      I,:   am acting as a scribe while in the presence of the attending physician.:       I,:   personally performed the services described in this documentation    as scribed in my presence.:           ***    Subjective: ***    Patient ID: Tenzin Baird is a 73 y.o. male***    Review of Systems   Constitutional: Negative.    HENT: Negative.     Eyes: Negative.    Respiratory: Negative.     Cardiovascular: Negative.    Gastrointestinal: Negative.    Endocrine: Negative.    Genitourinary: Negative.    Musculoskeletal:  Positive for arthralgias, joint swelling and myalgias.   Skin: Negative.    Allergic/Immunologic: Negative.    Neurological: Negative.    Hematological: Negative.    Psychiatric/Behavioral: Negative.           Past Medical History:   Diagnosis Date    Arthritis     shoulder    Ascending aortic aneurysm (HCC)     Cataract     both eyes-to have the right cataract removed on 1/25/22    Colon polyp     GERD (gastroesophageal reflux disease)     Insomnia     Leg cramps     Memory changes     Restless legs        Past Surgical History:   Procedure Laterality Date    APPENDECTOMY      CHOLECYSTECTOMY      CHOLECYSTECTOMY LAPAROSCOPIC N/A 10/27/2020    Procedure: CHOLECYSTECTOMY LAPAROSCOPIC;  Surgeon: Liam Iniguez MD;  Location: [facility];  Service: General    COLONOSCOPY N/A 12/12/2016    Procedure: COLONOSCOPY;  Surgeon: Ian Arana MD;  Location: [facility] GI LAB;  Service:     ELBOW SURGERY Bilateral     HERNIA REPAIR Right 2001    inguinal    KNEE ARTHROSCOPY Right     SC ARTHRS KNE SURG W/MENISCECTOMY MED/LAT W/SHVG Left 6/18/2020    Procedure: ARTHROSCOPY KNEE MEDIAL MENISCECTOMY;  Surgeon: Brodie Muñoz MD;  Location: [facility] OR;  Service: Orthopedics    ROTATOR CUFF REPAIR Bilateral 2011       Family History   Problem Relation Age of Onset    No Known Problems Mother     No Known Problems Father     No Known Problems Sister     No  Known Problems Brother     No Known Problems Maternal Aunt     No Known Problems Maternal Uncle     No Known Problems Paternal Aunt     No Known Problems Paternal Uncle     No Known Problems Maternal Grandmother     No Known Problems Maternal Grandfather     No Known Problems Paternal Grandmother     No Known Problems Paternal Grandfather     ADD / ADHD Neg Hx     Anesthesia problems Neg Hx     Cancer Neg Hx     Clotting disorder Neg Hx     Collagen disease Neg Hx     Diabetes Neg Hx     Dislocations Neg Hx     Learning disabilities Neg Hx     Neurological problems Neg Hx     Osteoporosis Neg Hx     Rheumatologic disease Neg Hx     Scoliosis Neg Hx     Vascular Disease Neg Hx        Social History     Occupational History    Not on file   Tobacco Use    Smoking status: Never    Smokeless tobacco: Never   Vaping Use    Vaping status: Never Used   Substance and Sexual Activity    Alcohol use: Not Currently    Drug use: No    Sexual activity: Not Currently         Current Outpatient Medications:     aspirin (ECOTRIN LOW STRENGTH) 81 mg EC tablet, Take 1 tablet (81 mg total) by mouth daily with breakfast, Disp: 90 tablet, Rfl: 3    cyclobenzaprine (FLEXERIL) 5 mg tablet, Take 1 tablet (5 mg total) by mouth daily at bedtime as needed for muscle spasms, Disp: 30 tablet, Rfl: 0    Diclofenac Sodium (VOLTAREN) 1 %, Apply 2 g topically 4 (four) times a day, Disp: 100 g, Rfl: 1    gabapentin (Neurontin) 300 mg capsule, Take 1 capsule (300 mg total) by mouth daily at bedtime, Disp: 30 capsule, Rfl: 7    hydrOXYzine HCL (ATARAX) 10 mg tablet, Take 1 tablet (10 mg total) by mouth every 6 (six) hours as needed for anxiety, Disp: 30 tablet, Rfl: 3    levothyroxine 25 mcg tablet, Take 1 tablet in AM on an empty stomach, Disp: 30 tablet, Rfl: 2    Lumigan 0.01 % ophthalmic drops, INSTILL 1 DROP INTO BOTH EYES IN THE EVENING, Disp: , Rfl:     OrthoVisc 30 MG/2ML SOSY injection, , Disp: , Rfl:     pantoprazole (PROTONIX) 40 mg tablet,  Take 1 tablet (40 mg total) by mouth daily, Disp: 90 tablet, Rfl: 2    rosuvastatin (CRESTOR) 10 MG tablet, Take 1 tablet (10 mg total) by mouth daily at bedtime, Disp: 90 tablet, Rfl: 3    valACYclovir (VALTREX) 1,000 mg tablet, Take 1 tablet (1,000 mg total) by mouth 2 (two) times a day for 2 days, Disp: 8 tablet, Rfl: 4    No Known Allergies    Objective:  Vitals:    05/24/24 1118   BP: 109/63   Pulse: 82       Body mass index is 26.52 kg/m².    Ortho Exam    Physical Exam  Vitals and nursing note reviewed.   Constitutional:       Appearance: Normal appearance. He is well-developed.      Comments: Body mass index is 26.52 kg/m².   HENT:      Head: Normocephalic and atraumatic.      Right Ear: External ear normal.      Left Ear: External ear normal.   Eyes:      Extraocular Movements: Extraocular movements intact.      Conjunctiva/sclera: Conjunctivae normal.   Cardiovascular:      Rate and Rhythm: Normal rate.      Pulses: Normal pulses.   Pulmonary:      Effort: Pulmonary effort is normal.   Musculoskeletal:      Cervical back: Normal range of motion.      Comments: See ortho exam   Skin:     General: Skin is warm and dry.   Neurological:      General: No focal deficit present.      Mental Status: He is alert and oriented to person, place, and time. Mental status is at baseline.   Psychiatric:         Mood and Affect: Mood normal.         Behavior: Behavior normal.         Thought Content: Thought content normal.         Judgment: Judgment normal.       I have personally reviewed pertinent films in PACS.  ***    This document was created using speech voice recognition software.   Grammatical errors, random word insertions, pronoun errors, and incomplete sentences are an occasional consequence of this system due to software limitations, ambient noise, and hardware issues.   Any formal questions or concerns about content, text, or information contained within the body of this dictation should be directly addressed  to the provider for clarification.

## 2024-05-24 NOTE — PROGRESS NOTES
Assessment/Plan:  1. Primary osteoarthritis of left knee  XR knee 3 vw left non injury    XR knee 1 or 2 vw right    MRI knee left  wo contrast      2. Chronic pain of left knee  MRI knee left  wo contrast        Scribe Attestation      I,:  Aleksandar Rankin am acting as a scribe while in the presence of the attending physician.:       I,:  Jaron Gomez, DO personally performed the services described in this documentation    as scribed in my presence.:           Tenzin is a pleasant 73-year-old gentleman who returns today for follow-up evaluation of his left knee.  After reviewing his updated imaging and performing a thorough history and physical exam I explained to that his updated imaging today does not demonstrate significant progression of his underlying disease.  I would have expected a more robust response to his most recent injection therapy.  I have ordered an MRI of the knee to evaluate for focal chondral defects that may be contributing to his symptoms.  We will reach out to him after the study is completed to review the results and further delineate his plan of care.  Total knee arthroplasty may be appropriate pending the findings.  All of his questions and concerns were addressed today.    Subjective: Follow-up evaluation for left knee    Patient ID: Tenzin Baird is a 73 y.o. male who returns today for follow-up evaluation for his left knee.  He concluded a viscosupplementation injection series on 2/20/2024. His son, Theo, helped to translate via phone today as he is primarily Amharic speaking.  At today's visit, he reports he did receive some relief from his injection series, but continues to experience intermittent activity related pain in his left knee.  This is particularly troublesome when navigating stairs.  His pain can reach 6/10 on the pain scale.  He denies any new injury or trauma.    Review of Systems   Constitutional:  Positive for activity change. Negative for chills, fever and  unexpected weight change.   HENT:  Negative for hearing loss, nosebleeds and sore throat.    Eyes:  Negative for pain, redness and visual disturbance.   Respiratory:  Negative for cough, shortness of breath and wheezing.    Cardiovascular:  Negative for chest pain, palpitations and leg swelling.   Gastrointestinal:  Negative for abdominal pain, nausea and vomiting.   Endocrine: Negative for polyphagia and polyuria.   Genitourinary:  Negative for dysuria and hematuria.   Musculoskeletal:  Positive for arthralgias and myalgias. Negative for joint swelling.        See HPI   Skin:  Negative for rash and wound.   Neurological:  Negative for dizziness, numbness and headaches.   Psychiatric/Behavioral:  Negative for decreased concentration and suicidal ideas. The patient is not nervous/anxious.          Past Medical History:   Diagnosis Date    Arthritis     shoulder    Ascending aortic aneurysm (HCC)     Cataract     both eyes-to have the right cataract removed on 1/25/22    Colon polyp     GERD (gastroesophageal reflux disease)     Insomnia     Leg cramps     Memory changes     Restless legs        Past Surgical History:   Procedure Laterality Date    APPENDECTOMY      CHOLECYSTECTOMY      CHOLECYSTECTOMY LAPAROSCOPIC N/A 10/27/2020    Procedure: CHOLECYSTECTOMY LAPAROSCOPIC;  Surgeon: Liam Iniguez MD;  Location: [facility];  Service: General    COLONOSCOPY N/A 12/12/2016    Procedure: COLONOSCOPY;  Surgeon: Ian Arana MD;  Location: [facility] GI LAB;  Service:     ELBOW SURGERY Bilateral     HERNIA REPAIR Right 2001    inguinal    KNEE ARTHROSCOPY Right     UT ARTHRS KNE SURG W/MENISCECTOMY MED/LAT W/SHVG Left 6/18/2020    Procedure: ARTHROSCOPY KNEE MEDIAL MENISCECTOMY;  Surgeon: Brodie Muñoz MD;  Location: [facility] OR;  Service: Orthopedics    ROTATOR CUFF REPAIR Bilateral 2011       Family History   Problem Relation Age of Onset    No Known Problems Mother     No Known Problems Father     No Known Problems  Sister     No Known Problems Brother     No Known Problems Maternal Aunt     No Known Problems Maternal Uncle     No Known Problems Paternal Aunt     No Known Problems Paternal Uncle     No Known Problems Maternal Grandmother     No Known Problems Maternal Grandfather     No Known Problems Paternal Grandmother     No Known Problems Paternal Grandfather     ADD / ADHD Neg Hx     Anesthesia problems Neg Hx     Cancer Neg Hx     Clotting disorder Neg Hx     Collagen disease Neg Hx     Diabetes Neg Hx     Dislocations Neg Hx     Learning disabilities Neg Hx     Neurological problems Neg Hx     Osteoporosis Neg Hx     Rheumatologic disease Neg Hx     Scoliosis Neg Hx     Vascular Disease Neg Hx        Social History     Occupational History    Not on file   Tobacco Use    Smoking status: Never    Smokeless tobacco: Never   Vaping Use    Vaping status: Never Used   Substance and Sexual Activity    Alcohol use: Not Currently    Drug use: No    Sexual activity: Not Currently         Current Outpatient Medications:     aspirin (ECOTRIN LOW STRENGTH) 81 mg EC tablet, Take 1 tablet (81 mg total) by mouth daily with breakfast, Disp: 90 tablet, Rfl: 3    cyclobenzaprine (FLEXERIL) 5 mg tablet, Take 1 tablet (5 mg total) by mouth daily at bedtime as needed for muscle spasms, Disp: 30 tablet, Rfl: 0    Diclofenac Sodium (VOLTAREN) 1 %, Apply 2 g topically 4 (four) times a day, Disp: 100 g, Rfl: 1    gabapentin (Neurontin) 300 mg capsule, Take 1 capsule (300 mg total) by mouth daily at bedtime, Disp: 30 capsule, Rfl: 7    hydrOXYzine HCL (ATARAX) 10 mg tablet, Take 1 tablet (10 mg total) by mouth every 6 (six) hours as needed for anxiety, Disp: 30 tablet, Rfl: 3    levothyroxine 25 mcg tablet, Take 1 tablet in AM on an empty stomach, Disp: 30 tablet, Rfl: 2    Lumigan 0.01 % ophthalmic drops, INSTILL 1 DROP INTO BOTH EYES IN THE EVENING, Disp: , Rfl:     OrthoVisc 30 MG/2ML SOSY injection, , Disp: , Rfl:     pantoprazole (PROTONIX)  40 mg tablet, Take 1 tablet (40 mg total) by mouth daily, Disp: 90 tablet, Rfl: 2    rosuvastatin (CRESTOR) 10 MG tablet, Take 1 tablet (10 mg total) by mouth daily at bedtime, Disp: 90 tablet, Rfl: 3    valACYclovir (VALTREX) 1,000 mg tablet, Take 1 tablet (1,000 mg total) by mouth 2 (two) times a day for 2 days, Disp: 8 tablet, Rfl: 4    No Known Allergies    Objective:  Vitals:    05/24/24 1118   BP: 109/63   Pulse: 82       Body mass index is 26.52 kg/m².    Left Knee Exam     Muscle Strength   The patient has normal left knee strength.    Tenderness   The patient is experiencing tenderness in the medial joint line and patella.    Range of Motion   Extension:  0 normal   Flexion:  130 normal     Tests   Varus: negative Valgus: negative  Drawer:  Anterior - negative     Posterior - negative  Patellar apprehension: negative    Other   Erythema: absent  Scars: absent  Sensation: normal  Pulse: present  Swelling: none  Effusion: no effusion present    Comments:  Stable at 0, 30 and 90 degrees  Neurovascularly in tact distally  No warmth or erythema  Parapatellar crepitance noted  Patellofemoral grind: negative          Observations   Left Knee   Negative for effusion.       Physical Exam  Vitals and nursing note reviewed.   Constitutional:       Appearance: Normal appearance. He is well-developed.   HENT:      Head: Normocephalic and atraumatic.      Right Ear: External ear normal.      Left Ear: External ear normal.      Nose: Nose normal.   Eyes:      General: No scleral icterus.     Extraocular Movements: Extraocular movements intact.      Conjunctiva/sclera: Conjunctivae normal.   Cardiovascular:      Rate and Rhythm: Normal rate.   Pulmonary:      Effort: Pulmonary effort is normal. No respiratory distress.   Musculoskeletal:      Cervical back: Normal range of motion and neck supple.      Left knee: No effusion.      Comments: See Ortho exam   Skin:     General: Skin is warm and dry.   Neurological:       General: No focal deficit present.      Mental Status: He is alert and oriented to person, place, and time.   Psychiatric:         Behavior: Behavior normal.         I have personally reviewed pertinent films in PACS.    X-ray of the left knee obtained on 5/24/2024 reviewed demonstrating mild to moderate degenerative change with medial narrowing.  There is sclerosis and subtle osteophytosis.  There is no acute fracture, dislocation, lytic or blastic lesion.    This document was created using speech voice recognition software.   Grammatical errors, random word insertions, pronoun errors, and incomplete sentences are an occasional consequence of this system due to software limitations, ambient noise, and hardware issues.   Any formal questions or concerns about content, text, or information contained within the body of this dictation should be directly addressed to the provider for clarification.

## 2024-06-05 ENCOUNTER — TELEPHONE (OUTPATIENT)
Dept: OBGYN CLINIC | Facility: HOSPITAL | Age: 74
End: 2024-06-05

## 2024-06-05 NOTE — TELEPHONE ENCOUNTER
Requesting [facility] ID number MRI prior auth. Will send fax requesting information as well     Caller: [phone] Roxy PENNY nurse

## 2024-06-13 ENCOUNTER — HOSPITAL ENCOUNTER (OUTPATIENT)
Dept: RADIOLOGY | Facility: HOSPITAL | Age: 74
Discharge: HOME | End: 2024-06-13
Attending: ORTHOPAEDIC SURGERY
Payer: COMMERCIAL

## 2024-06-13 ENCOUNTER — OFFICE VISIT (OUTPATIENT)
Dept: FAMILY MEDICINE CLINIC | Facility: CLINIC | Age: 74
End: 2024-06-13
Payer: COMMERCIAL

## 2024-06-13 VITALS
TEMPERATURE: 97.8 F | SYSTOLIC BLOOD PRESSURE: 104 MMHG | RESPIRATION RATE: 18 BRPM | HEIGHT: 62 IN | HEART RATE: 76 BPM | BODY MASS INDEX: 26.46 KG/M2 | WEIGHT: 143.8 LBS | OXYGEN SATURATION: 97 % | DIASTOLIC BLOOD PRESSURE: 80 MMHG

## 2024-06-13 DIAGNOSIS — G89.29 CHRONIC PAIN OF LEFT KNEE: ICD-10-CM

## 2024-06-13 DIAGNOSIS — R42 DIZZINESS: ICD-10-CM

## 2024-06-13 DIAGNOSIS — M25.562 CHRONIC PAIN OF LEFT KNEE: ICD-10-CM

## 2024-06-13 DIAGNOSIS — M17.12 PRIMARY OSTEOARTHRITIS OF LEFT KNEE: ICD-10-CM

## 2024-06-13 DIAGNOSIS — R51.9 BILATERAL HEADACHES: ICD-10-CM

## 2024-06-13 DIAGNOSIS — R93.89 ABNORMAL CXR: Primary | ICD-10-CM

## 2024-06-13 PROCEDURE — G2211 COMPLEX E/M VISIT ADD ON: HCPCS | Performed by: FAMILY MEDICINE

## 2024-06-13 PROCEDURE — 99214 OFFICE O/P EST MOD 30 MIN: CPT | Performed by: FAMILY MEDICINE

## 2024-06-13 PROCEDURE — 73721 MRI JNT OF LWR EXTRE W/O DYE: CPT

## 2024-06-13 NOTE — PROGRESS NOTES
Tenzin Baird 1950 male MRN: [id number]    FAMILY PRACTICE OFFICE VISIT  [facility]      ASSESSMENT/PLAN  Tenzin Baird is a 73 y.o. male presents to the office for    Diagnoses and all orders for this visit:    Abnormal CXR  -     Ambulatory Referral to Pulmonology; Future  -     Complete PFT with post bronchodilator; Future    Dizziness  -     Ambulatory Referral to Neurology; Future    Bilateral headaches  -     Ambulatory Referral to Neurology; Future       Abnormal chest x-ray  Recommend pulmonary function test as well as a pulmonologist evaluation  Scans demonstrated pulmonary fibrosis.  Patient with dizziness and bilateral headaches and would like to see a neurologist.  Referred patient to neurology at this time           Future Appointments   Date Time Provider Department Center   6/26/2024 12:00 PM Natalie Snyder PA-C GASTRO [facility]   6/27/2024  9:45 AM [facility] PUL ROOM 01 [facility]   6/27/2024  3:45 PM Jaron Gomez DO ORTHO [facility]   7/9/2024 10:00 AM [facility]   7/10/2024 11:00 AM Christopher Kahn MD NEURO [facility]          SUBJECTIVE  CC: Follow-up      HPI:  Tenzin Baird is a 73 y.o. male who presents for an acute appointment.  Patient states he was seen in the emergency room and had a CAT scan that showed pulmonary fibrosis patient was advised to follow-up with his PCP.  Patient states he has no acute concerns from breathing has no chest pain no shortness of breath.  His only concerns are dizziness and bilateral headaches and would like to be seen by a neurologist.  He went to the emergency room secondary to headaches  Review of Systems   Constitutional:  Negative for activity change, appetite change, chills, fatigue and fever.   HENT:  Negative for congestion.    Respiratory:  Negative for cough, chest tightness and shortness of breath.    Cardiovascular:  Negative for chest pain  and leg swelling.   Gastrointestinal:  Negative for abdominal distention, abdominal pain, constipation, diarrhea, nausea and vomiting.   Neurological:  Positive for headaches.   All other systems reviewed and are negative.      Historical Information   The patient history was reviewed as follows:  Past Medical History:   Diagnosis Date   • Arthritis     shoulder   • Ascending aortic aneurysm (HCC)    • Cataract     both eyes-to have the right cataract removed on 1/25/22   • Colon polyp    • GERD (gastroesophageal reflux disease)    • Insomnia    • Leg cramps    • Memory changes    • Restless legs          Medications:     Current Outpatient Medications:   •  aspirin (ECOTRIN LOW STRENGTH) 81 mg EC tablet, Take 1 tablet (81 mg total) by mouth daily with breakfast, Disp: 90 tablet, Rfl: 3  •  cyclobenzaprine (FLEXERIL) 5 mg tablet, Take 1 tablet (5 mg total) by mouth daily at bedtime as needed for muscle spasms, Disp: 30 tablet, Rfl: 0  •  Diclofenac Sodium (VOLTAREN) 1 %, Apply 2 g topically 4 (four) times a day, Disp: 100 g, Rfl: 1  •  gabapentin (Neurontin) 300 mg capsule, Take 1 capsule (300 mg total) by mouth daily at bedtime, Disp: 30 capsule, Rfl: 7  •  hydrOXYzine HCL (ATARAX) 10 mg tablet, Take 1 tablet (10 mg total) by mouth every 6 (six) hours as needed for anxiety, Disp: 30 tablet, Rfl: 3  •  levothyroxine 25 mcg tablet, Take 1 tablet in AM on an empty stomach, Disp: 30 tablet, Rfl: 2  •  Lumigan 0.01 % ophthalmic drops, INSTILL 1 DROP INTO BOTH EYES IN THE EVENING, Disp: , Rfl:   •  OrthoVisc 30 MG/2ML SOSY injection, , Disp: , Rfl:   •  pantoprazole (PROTONIX) 40 mg tablet, Take 1 tablet (40 mg total) by mouth daily, Disp: 90 tablet, Rfl: 2  •  rosuvastatin (CRESTOR) 10 MG tablet, Take 1 tablet (10 mg total) by mouth daily at bedtime, Disp: 90 tablet, Rfl: 3  •  valACYclovir (VALTREX) 1,000 mg tablet, Take 1 tablet (1,000 mg total) by mouth 2 (two) times a day for 2 days, Disp: 8 tablet, Rfl: 4    No  "Known Allergies    OBJECTIVE  Vitals:   Vitals:    06/13/24 0732   BP: 104/80   BP Location: Left arm   Patient Position: Sitting   Cuff Size: Standard   Pulse: 76   Resp: 18   Temp: 97.8 °F (36.6 °C)   TempSrc: Temporal   SpO2: 97%   Weight: 65.2 kg (143 lb 12.8 oz)   Height: 5' 2\" (1.575 m)         Physical Exam  Vitals reviewed.   Constitutional:       Appearance: He is well-developed.   HENT:      Head: Normocephalic and atraumatic.   Eyes:      Extraocular Movements: EOM normal.      Conjunctiva/sclera: Conjunctivae normal.      Pupils: Pupils are equal, round, and reactive to light.   Cardiovascular:      Rate and Rhythm: Normal rate and regular rhythm.      Heart sounds: Normal heart sounds, S1 normal and S2 normal. No murmur heard.  Pulmonary:      Effort: Pulmonary effort is normal. No respiratory distress.      Breath sounds: Normal breath sounds. No wheezing.   Musculoskeletal:         General: No edema. Normal range of motion.      Cervical back: Normal range of motion and neck supple.   Skin:     General: Skin is warm.   Neurological:      Mental Status: He is alert and oriented to person, place, and time.   Psychiatric:         Mood and Affect: Mood and affect normal.         Speech: Speech normal.         Behavior: Behavior normal.         Thought Content: Thought content normal.         Judgment: Judgment normal.                    Melissa Walters MD,   [facility]  6/17/2024      "

## 2024-06-26 ENCOUNTER — TELEPHONE (OUTPATIENT)
Dept: NEUROLOGY | Facility: CLINIC | Age: 74
End: 2024-06-26

## 2024-06-27 ENCOUNTER — HOSPITAL ENCOUNTER (OUTPATIENT)
Dept: PULMONOLOGY | Facility: HOSPITAL | Age: 74
End: 2024-06-27
Attending: FAMILY MEDICINE
Payer: COMMERCIAL

## 2024-06-27 DIAGNOSIS — R93.89 ABNORMAL CXR: ICD-10-CM

## 2024-06-27 PROCEDURE — 94760 N-INVAS EAR/PLS OXIMETRY 1: CPT

## 2024-06-27 PROCEDURE — 94060 EVALUATION OF WHEEZING: CPT | Performed by: INTERNAL MEDICINE

## 2024-06-27 PROCEDURE — 94727 GAS DIL/WSHOT DETER LNG VOL: CPT | Performed by: INTERNAL MEDICINE

## 2024-06-27 PROCEDURE — 94729 DIFFUSING CAPACITY: CPT | Performed by: INTERNAL MEDICINE

## 2024-06-27 PROCEDURE — 94060 EVALUATION OF WHEEZING: CPT

## 2024-06-27 PROCEDURE — 94729 DIFFUSING CAPACITY: CPT

## 2024-06-27 RX ORDER — ALBUTEROL SULFATE 2.5 MG/3ML
2.5 SOLUTION RESPIRATORY (INHALATION) ONCE
Status: COMPLETED | OUTPATIENT
Start: 2024-06-27 | End: 2024-06-27

## 2024-06-27 RX ADMIN — ALBUTEROL SULFATE 2.5 MG: 2.5 SOLUTION RESPIRATORY (INHALATION) at 10:19

## 2024-06-29 DIAGNOSIS — R79.89 ABNORMAL TSH: ICD-10-CM

## 2024-06-29 RX ORDER — LEVOTHYROXINE SODIUM 0.03 MG/1
TABLET ORAL
Qty: 90 TABLET | Refills: 1 | Status: SHIPPED | OUTPATIENT
Start: 2024-06-29

## 2024-07-03 ENCOUNTER — TELEPHONE (OUTPATIENT)
Dept: OBGYN CLINIC | Facility: CLINIC | Age: 74
End: 2024-07-03

## 2024-07-03 NOTE — TELEPHONE ENCOUNTER
SIMON for patient to call back to reschedule appt. Dr. Gomez will be out of office during appt time.

## 2024-07-12 ENCOUNTER — TELEPHONE (OUTPATIENT)
Dept: OBGYN CLINIC | Facility: CLINIC | Age: 74
End: 2024-07-12

## 2024-07-12 ENCOUNTER — OFFICE VISIT (OUTPATIENT)
Dept: OBGYN CLINIC | Facility: CLINIC | Age: 74
End: 2024-07-12
Payer: COMMERCIAL

## 2024-07-12 VITALS
HEIGHT: 63 IN | BODY MASS INDEX: 25.66 KG/M2 | SYSTOLIC BLOOD PRESSURE: 112 MMHG | DIASTOLIC BLOOD PRESSURE: 73 MMHG | HEART RATE: 69 BPM | WEIGHT: 144.8 LBS

## 2024-07-12 DIAGNOSIS — G89.29 CHRONIC PAIN OF LEFT KNEE: ICD-10-CM

## 2024-07-12 DIAGNOSIS — M17.12 PRIMARY OSTEOARTHRITIS OF LEFT KNEE: Primary | ICD-10-CM

## 2024-07-12 DIAGNOSIS — M25.562 CHRONIC PAIN OF LEFT KNEE: ICD-10-CM

## 2024-07-12 PROCEDURE — 99214 OFFICE O/P EST MOD 30 MIN: CPT | Performed by: ORTHOPAEDIC SURGERY

## 2024-07-12 PROCEDURE — 20610 DRAIN/INJ JOINT/BURSA W/O US: CPT | Performed by: ORTHOPAEDIC SURGERY

## 2024-07-12 RX ORDER — TRIAMCINOLONE ACETONIDE 40 MG/ML
80 INJECTION, SUSPENSION INTRA-ARTICULAR; INTRAMUSCULAR
Status: COMPLETED | OUTPATIENT
Start: 2024-07-12 | End: 2024-07-12

## 2024-07-12 RX ORDER — BUPIVACAINE HYDROCHLORIDE 5 MG/ML
2 INJECTION, SOLUTION EPIDURAL; INTRACAUDAL
Status: COMPLETED | OUTPATIENT
Start: 2024-07-12 | End: 2024-07-12

## 2024-07-12 RX ADMIN — BUPIVACAINE HYDROCHLORIDE 2 ML: 5 INJECTION, SOLUTION EPIDURAL; INTRACAUDAL at 10:45

## 2024-07-12 RX ADMIN — TRIAMCINOLONE ACETONIDE 80 MG: 40 INJECTION, SUSPENSION INTRA-ARTICULAR; INTRAMUSCULAR at 10:45

## 2024-07-12 NOTE — TELEPHONE ENCOUNTER
Susy pt and tried to advise that he needed to Reschedule his appointment today as Dr. Gomez has emergency sx and will not be in office. Gunnar became very upset and frustrated because this is the second time he has to be rescheduled. Gunnar hung up the phone,  Apt has been canceled.  He can be scheduled with Dung Corey next week or when Dr. Gomez has an opening.

## 2024-07-12 NOTE — PROGRESS NOTES
"Assessment/Plan:  1. Primary osteoarthritis of left knee  Large joint arthrocentesis: L knee      2. Chronic pain of left knee  Large joint arthrocentesis: L knee        Scribe Attestation      I,:  Dung Corey PA-C am acting as a scribe while in the presence of the attending physician.:       I,:  Jaron Gomez, DO personally performed the services described in this documentation    as scribed in my presence.:           Tenzin is a pleasant 73-year-old presenting today for follow-up of his activity related left knee pain.  In reviewing his MRI, he does have mild degenerative changes tricompartmentally with focal defect of the medial femoral condylar cartilage and associated marrow edema.  We discussed these findings with him and his son via telephone here today.  Given his relatively minimal arthritis and mild symptoms that are only present with going up and down stairs, we would like to avoid pursuing total knee arthroplasty.  He is not significant limited in his ability to perform activities of daily living and enjoyment at this point.  Therefore, we discussed retrying a cortisone injection to see if this may help mitigate some of his symptoms on the stairs.  He consented to and underwent the injection as detailed below, which she tolerated well without difficulty or complication.  Postinjection instructions were provided.  He did state that he plans on moving to a home without stairs in the foreseeable future.  Will plan to see him back in 3 months pending the efficacy of today's procedure.  All questions addressed    Large joint arthrocentesis: L knee  Universal Protocol:  Consent: Verbal consent obtained.  Risks and benefits: risks, benefits and alternatives were discussed  Consent given by: patient  Time out: Immediately prior to procedure a \"time out\" was called to verify the correct patient, procedure, equipment, support staff and site/side marked as required.  Timeout called at: 7/12/2024 " 11:38 AM.  Site marked: the operative site was marked  Patient identity confirmed: verbally with patient  Supporting Documentation  Indications: pain   Procedure Details  Location: knee - L knee  Preparation: Patient was prepped and draped in the usual sterile fashion  Needle size: 20 G  Ultrasound guidance: no  Approach: anterolateral  Medications administered: 80 mg triamcinolone acetonide 40 mg/mL; 2 mL bupivacaine (PF) 0.5 %    Patient tolerance: patient tolerated the procedure well with no immediate complications  Dressing:  Sterile dressing applied        Subjective: Left knee MRI follow up    Patient ID: Tenzin Baird is a 73 y.o. male presenting today for follow-up after undergoing a left knee MRI.  His son Theo is on the phone helping to serve as our .  He reports that he continues to have discomfort going up and down stairs, but no discomfort walking on flat ground.  He denies any new injuries or feelings of instability.    Review of Systems   Constitutional:  Positive for activity change.   HENT: Negative.     Eyes: Negative.    Respiratory: Negative.     Cardiovascular: Negative.    Gastrointestinal: Negative.    Endocrine: Negative.    Genitourinary: Negative.    Musculoskeletal:  Positive for arthralgias, gait problem, joint swelling and myalgias.   Skin: Negative.    Allergic/Immunologic: Negative.    Hematological: Negative.    Psychiatric/Behavioral: Negative.       Past Medical History:   Diagnosis Date    Arthritis     shoulder    Ascending aortic aneurysm (HCC)     Cataract     both eyes-to have the right cataract removed on 1/25/22    Colon polyp     GERD (gastroesophageal reflux disease)     Insomnia     Leg cramps     Memory changes     Restless legs        Past Surgical History:   Procedure Laterality Date    APPENDECTOMY      CHOLECYSTECTOMY      CHOLECYSTECTOMY LAPAROSCOPIC N/A 10/27/2020    Procedure: CHOLECYSTECTOMY LAPAROSCOPIC;  Surgeon: Liam Iniguez MD;   Location: [facility];  Service: General    COLONOSCOPY N/A 12/12/2016    Procedure: COLONOSCOPY;  Surgeon: Ina Arana MD;  Location: [facility];  Service:     ELBOW SURGERY Bilateral     HERNIA REPAIR Right 2001    inguinal    KNEE ARTHROSCOPY Right     CA ARTHRS KNE SURG W/MENISCECTOMY MED/LAT W/SHVG Left 6/18/2020    Procedure: ARTHROSCOPY KNEE MEDIAL MENISCECTOMY;  Surgeon: Brodie Muñoz MD;  Location: [facility];  Service: Orthopedics    ROTATOR CUFF REPAIR Bilateral 2011       Family History   Problem Relation Age of Onset    No Known Problems Mother     No Known Problems Father     No Known Problems Sister     No Known Problems Brother     No Known Problems Maternal Aunt     No Known Problems Maternal Uncle     No Known Problems Paternal Aunt     No Known Problems Paternal Uncle     No Known Problems Maternal Grandmother     No Known Problems Maternal Grandfather     No Known Problems Paternal Grandmother     No Known Problems Paternal Grandfather     ADD / ADHD Neg Hx     Anesthesia problems Neg Hx     Cancer Neg Hx     Clotting disorder Neg Hx     Collagen disease Neg Hx     Diabetes Neg Hx     Dislocations Neg Hx     Learning disabilities Neg Hx     Neurological problems Neg Hx     Osteoporosis Neg Hx     Rheumatologic disease Neg Hx     Scoliosis Neg Hx     Vascular Disease Neg Hx        Social History     Occupational History    Not on file   Tobacco Use    Smoking status: Never    Smokeless tobacco: Never   Vaping Use    Vaping status: Never Used   Substance and Sexual Activity    Alcohol use: Not Currently    Drug use: No    Sexual activity: Not Currently         Current Outpatient Medications:     aspirin (ECOTRIN LOW STRENGTH) 81 mg EC tablet, Take 1 tablet (81 mg total) by mouth daily with breakfast, Disp: 90 tablet, Rfl: 3    cyclobenzaprine (FLEXERIL) 5 mg tablet, Take 1 tablet (5 mg total) by mouth daily at bedtime as needed for muscle spasms, Disp: 30 tablet, Rfl: 0    Diclofenac  Sodium (VOLTAREN) 1 %, Apply 2 g topically 4 (four) times a day, Disp: 100 g, Rfl: 1    gabapentin (Neurontin) 300 mg capsule, Take 1 capsule (300 mg total) by mouth daily at bedtime, Disp: 30 capsule, Rfl: 7    hydrOXYzine HCL (ATARAX) 10 mg tablet, Take 1 tablet (10 mg total) by mouth every 6 (six) hours as needed for anxiety, Disp: 30 tablet, Rfl: 3    levothyroxine 25 mcg tablet, TAKE 1 TABLET IN THE MORNING ON AN EMPTY STOMACH, Disp: 90 tablet, Rfl: 1    Lumigan 0.01 % ophthalmic drops, INSTILL 1 DROP INTO BOTH EYES IN THE EVENING, Disp: , Rfl:     OrthoVisc 30 MG/2ML SOSY injection, , Disp: , Rfl:     pantoprazole (PROTONIX) 40 mg tablet, Take 1 tablet (40 mg total) by mouth daily, Disp: 90 tablet, Rfl: 2    rosuvastatin (CRESTOR) 10 MG tablet, Take 1 tablet (10 mg total) by mouth daily at bedtime, Disp: 90 tablet, Rfl: 3    valACYclovir (VALTREX) 1,000 mg tablet, Take 1 tablet (1,000 mg total) by mouth 2 (two) times a day for 2 days, Disp: 8 tablet, Rfl: 4    No Known Allergies    Objective:  Vitals:    07/12/24 1116   BP: 112/73   Pulse: 69       Body mass index is 25.65 kg/m².    Left Knee Exam     Muscle Strength   The patient has normal left knee strength.    Tenderness   The patient is experiencing tenderness in the medial joint line and patella.    Range of Motion   Extension:  0 normal   Flexion:  130 normal     Tests   Varus: negative Valgus: negative  Drawer:  Anterior - negative     Posterior - negative  Patellar apprehension: negative    Other   Erythema: absent  Scars: absent  Sensation: normal  Pulse: present  Swelling: none  Effusion: no effusion present    Comments:  Collateral ligaments stable at 0, 30, and 90 degrees  Neurovascularly intact distally  No warmth or erythema  Parapatellar crepitance noted  Patellofemoral grind: negative  Thigh calf soft and nontender  Ambulates with a normal symmetrical gait          Observations   Left Knee   Negative for effusion.       Physical Exam  Vitals  and nursing note reviewed.   Constitutional:       Appearance: Normal appearance. He is well-developed.      Comments: Body mass index is 25.65 kg/m².   HENT:      Head: Normocephalic and atraumatic.      Right Ear: External ear normal.      Left Ear: External ear normal.   Eyes:      Extraocular Movements: Extraocular movements intact.      Conjunctiva/sclera: Conjunctivae normal.   Cardiovascular:      Rate and Rhythm: Normal rate.      Pulses: Normal pulses.   Pulmonary:      Effort: Pulmonary effort is normal.   Musculoskeletal:      Cervical back: Normal range of motion.      Left knee: No effusion.      Comments: See ortho exam   Skin:     General: Skin is warm and dry.   Neurological:      General: No focal deficit present.      Mental Status: He is alert and oriented to person, place, and time. Mental status is at baseline.   Psychiatric:         Mood and Affect: Mood normal.         Behavior: Behavior normal.         Thought Content: Thought content normal.         Judgment: Judgment normal.       I have personally reviewed pertinent films in PACS of the MRI of his left knee.  His collateral and cruciate ligaments are intact and there is no evidence of unstable meniscal tear.  He does have a focal defect in his medial femoral condyle with tricompartmental degenerative changes that are mild in nature.  There is some marrow edema of the medial femoral condyle    This document was created using speech voice recognition software.   Grammatical errors, random word insertions, pronoun errors, and incomplete sentences are an occasional consequence of this system due to software limitations, ambient noise, and hardware issues.   Any formal questions or concerns about content, text, or information contained within the body of this dictation should be directly addressed to the provider for clarification.

## 2024-07-15 ENCOUNTER — TELEPHONE (OUTPATIENT)
Dept: FAMILY MEDICINE CLINIC | Facility: CLINIC | Age: 74
End: 2024-07-15

## 2024-07-15 DIAGNOSIS — G47.00 INSOMNIA, UNSPECIFIED TYPE: Primary | ICD-10-CM

## 2024-07-15 DIAGNOSIS — G47.00 INSOMNIA, UNSPECIFIED TYPE: ICD-10-CM

## 2024-07-15 RX ORDER — TRAZODONE HYDROCHLORIDE 50 MG/1
100 TABLET ORAL
Qty: 180 TABLET | Refills: 1 | Status: SHIPPED | OUTPATIENT
Start: 2024-07-15 | End: 2024-10-13

## 2024-07-15 RX ORDER — TRAZODONE HYDROCHLORIDE 50 MG/1
50 TABLET ORAL
Qty: 30 TABLET | Refills: 5 | Status: SHIPPED | OUTPATIENT
Start: 2024-07-15 | End: 2024-07-15 | Stop reason: SDUPTHER

## 2024-07-15 NOTE — TELEPHONE ENCOUNTER
Bolivian-speaking  Please advise the patient that he was discontinued off the trazodone 50 mg when he asked to be off of it saying that it was not working.  If it is working then please let him know I did send in the refill so that he has enough.

## 2024-07-22 ENCOUNTER — APPOINTMENT (OUTPATIENT)
Dept: LAB | Facility: CLINIC | Age: 74
End: 2024-07-22
Payer: COMMERCIAL

## 2024-07-22 ENCOUNTER — OFFICE VISIT (OUTPATIENT)
Dept: FAMILY MEDICINE CLINIC | Facility: CLINIC | Age: 74
End: 2024-07-22
Payer: COMMERCIAL

## 2024-07-22 VITALS
RESPIRATION RATE: 12 BRPM | HEART RATE: 86 BPM | HEIGHT: 63 IN | TEMPERATURE: 97.4 F | DIASTOLIC BLOOD PRESSURE: 72 MMHG | WEIGHT: 139 LBS | SYSTOLIC BLOOD PRESSURE: 120 MMHG | OXYGEN SATURATION: 98 % | BODY MASS INDEX: 24.63 KG/M2

## 2024-07-22 DIAGNOSIS — R42 DIZZINESS: Primary | ICD-10-CM

## 2024-07-22 DIAGNOSIS — R42 DIZZINESS: ICD-10-CM

## 2024-07-22 DIAGNOSIS — E03.9 HYPOTHYROIDISM, UNSPECIFIED TYPE: ICD-10-CM

## 2024-07-22 DIAGNOSIS — G25.81 RESTLESS LEGS SYNDROME: ICD-10-CM

## 2024-07-22 DIAGNOSIS — G47.00 INSOMNIA, UNSPECIFIED TYPE: ICD-10-CM

## 2024-07-22 LAB
ALBUMIN SERPL BCG-MCNC: 4.1 G/DL (ref 3.5–5)
ALP SERPL-CCNC: 77 U/L (ref 34–104)
ALT SERPL W P-5'-P-CCNC: 25 U/L (ref 7–52)
ANION GAP SERPL CALCULATED.3IONS-SCNC: 10 MMOL/L (ref 4–13)
AST SERPL W P-5'-P-CCNC: 19 U/L (ref 13–39)
BASOPHILS # BLD AUTO: 0.01 THOUSANDS/ÂΜL (ref 0–0.1)
BASOPHILS NFR BLD AUTO: 0 % (ref 0–1)
BILIRUB SERPL-MCNC: 0.61 MG/DL (ref 0.2–1)
BUN SERPL-MCNC: 12 MG/DL (ref 5–25)
CALCIUM SERPL-MCNC: 9.1 MG/DL (ref 8.4–10.2)
CHLORIDE SERPL-SCNC: 106 MMOL/L (ref 96–108)
CO2 SERPL-SCNC: 24 MMOL/L (ref 21–32)
CREAT SERPL-MCNC: 0.84 MG/DL (ref 0.6–1.3)
EOSINOPHIL # BLD AUTO: 0.06 THOUSAND/ÂΜL (ref 0–0.61)
EOSINOPHIL NFR BLD AUTO: 1 % (ref 0–6)
ERYTHROCYTE [DISTWIDTH] IN BLOOD BY AUTOMATED COUNT: 12.2 % (ref 11.6–15.1)
FERRITIN SERPL-MCNC: 245 NG/ML (ref 24–336)
GFR SERPL CREATININE-BSD FRML MDRD: 88 ML/MIN/1.73SQ M
GLUCOSE SERPL-MCNC: 112 MG/DL (ref 65–140)
HCT VFR BLD AUTO: 47.7 % (ref 36.5–49.3)
HGB BLD-MCNC: 15.7 G/DL (ref 12–17)
IMM GRANULOCYTES # BLD AUTO: 0.04 THOUSAND/UL (ref 0–0.2)
IMM GRANULOCYTES NFR BLD AUTO: 1 % (ref 0–2)
IRON SATN MFR SERPL: 42 % (ref 15–50)
IRON SERPL-MCNC: 127 UG/DL (ref 50–212)
LYMPHOCYTES # BLD AUTO: 1.75 THOUSANDS/ÂΜL (ref 0.6–4.47)
LYMPHOCYTES NFR BLD AUTO: 37 % (ref 14–44)
MCH RBC QN AUTO: 31.6 PG (ref 26.8–34.3)
MCHC RBC AUTO-ENTMCNC: 32.9 G/DL (ref 31.4–37.4)
MCV RBC AUTO: 96 FL (ref 82–98)
MONOCYTES # BLD AUTO: 0.4 THOUSAND/ÂΜL (ref 0.17–1.22)
MONOCYTES NFR BLD AUTO: 8 % (ref 4–12)
NEUTROPHILS # BLD AUTO: 2.5 THOUSANDS/ÂΜL (ref 1.85–7.62)
NEUTS SEG NFR BLD AUTO: 53 % (ref 43–75)
NRBC BLD AUTO-RTO: 0 /100 WBCS
PLATELET # BLD AUTO: 222 THOUSANDS/UL (ref 149–390)
PMV BLD AUTO: 9.6 FL (ref 8.9–12.7)
POTASSIUM SERPL-SCNC: 3.7 MMOL/L (ref 3.5–5.3)
PROT SERPL-MCNC: 7.4 G/DL (ref 6.4–8.4)
RBC # BLD AUTO: 4.97 MILLION/UL (ref 3.88–5.62)
SODIUM SERPL-SCNC: 140 MMOL/L (ref 135–147)
TIBC SERPL-MCNC: 303 UG/DL (ref 250–450)
TSH SERPL DL<=0.05 MIU/L-ACNC: 2.32 UIU/ML (ref 0.45–4.5)
UIBC SERPL-MCNC: 176 UG/DL (ref 155–355)
WBC # BLD AUTO: 4.76 THOUSAND/UL (ref 4.31–10.16)

## 2024-07-22 PROCEDURE — 80053 COMPREHEN METABOLIC PANEL: CPT

## 2024-07-22 PROCEDURE — 82728 ASSAY OF FERRITIN: CPT

## 2024-07-22 PROCEDURE — 83550 IRON BINDING TEST: CPT

## 2024-07-22 PROCEDURE — G2211 COMPLEX E/M VISIT ADD ON: HCPCS | Performed by: FAMILY MEDICINE

## 2024-07-22 PROCEDURE — 36415 COLL VENOUS BLD VENIPUNCTURE: CPT

## 2024-07-22 PROCEDURE — 85025 COMPLETE CBC W/AUTO DIFF WBC: CPT

## 2024-07-22 PROCEDURE — 99214 OFFICE O/P EST MOD 30 MIN: CPT | Performed by: FAMILY MEDICINE

## 2024-07-22 PROCEDURE — 83540 ASSAY OF IRON: CPT

## 2024-07-22 PROCEDURE — 84443 ASSAY THYROID STIM HORMONE: CPT

## 2024-07-22 NOTE — PROGRESS NOTES
Tenzin Baird 1950 male MRN: [id number]    [facility] PRACTICE OFFICE VISIT  [facility] - [facility]      ASSESSMENT/PLAN  Tenzin Baird is a 73 y.o. male presents to the office for    Diagnoses and all orders for this visit:    Dizziness  -     Ambulatory Referral to Neurology; Future  -     MRI brain w wo contrast; Future  -     Comprehensive metabolic panel; Future  -     CBC and differential; Future  -     Iron, TIBC and Ferritin Panel; Future    Hypothyroidism, unspecified type  -     TSH, 3rd generation with Free T4 reflex; Future    Insomnia, unspecified type    Restless legs syndrome    Patient with an acute onset of dizziness.  Orthostatic blood pressures were within normal limits today.  Will refer the patient to her neurologist and get an MRI of the brain just to be cautious.  If MRI of the brain is normal then I recommend that we possibly discontinue levothyroxine as it might be a secondary side effect  Blood work was also obtained today  No changes to be made for his insomnia and restless leg medications         Future Appointments   Date Time Provider Department Center   8/21/2024  9:15 AM [facility]   10/16/2024 10:00 AM Jaron Gomez DO [facility]          SUBJECTIVE  CC: Dizziness      HPI:  Tenzin Baird is a 73 y.o. male who presents for an acute appointment.  Patient states he has been had persistent dizziness.  States that it is worse from sitting to standing.  Denies taking any other medications except for the thyroid medication being new.  Patient states that he has been sleeping very well with the insomnia medication.  And the gabapentin medication has been working great for his restless leg syndrome  Review of Systems   Constitutional:  Negative for activity change, appetite change, chills, fatigue and fever.   HENT:  Negative for congestion.    Respiratory:  Negative for cough, chest tightness and shortness of breath.     Cardiovascular:  Negative for chest pain and leg swelling.   Gastrointestinal:  Negative for abdominal distention, abdominal pain, constipation, diarrhea, nausea and vomiting.   Neurological:  Positive for dizziness.   All other systems reviewed and are negative.      Historical Information   The patient history was reviewed as follows:  Past Medical History:   Diagnosis Date   • Arthritis     shoulder   • Ascending aortic aneurysm (HCC)    • Cataract     both eyes-to have the right cataract removed on 1/25/22   • Colon polyp    • GERD (gastroesophageal reflux disease)    • Insomnia    • Leg cramps    • Memory changes    • Restless legs          Medications:     Current Outpatient Medications:   •  aspirin (ECOTRIN LOW STRENGTH) 81 mg EC tablet, Take 1 tablet (81 mg total) by mouth daily with breakfast, Disp: 90 tablet, Rfl: 3  •  cyclobenzaprine (FLEXERIL) 5 mg tablet, Take 1 tablet (5 mg total) by mouth daily at bedtime as needed for muscle spasms, Disp: 30 tablet, Rfl: 0  •  Diclofenac Sodium (VOLTAREN) 1 %, Apply 2 g topically 4 (four) times a day, Disp: 100 g, Rfl: 1  •  hydrOXYzine HCL (ATARAX) 10 mg tablet, Take 1 tablet (10 mg total) by mouth every 6 (six) hours as needed for anxiety, Disp: 30 tablet, Rfl: 3  •  levothyroxine 25 mcg tablet, TAKE 1 TABLET IN THE MORNING ON AN EMPTY STOMACH, Disp: 90 tablet, Rfl: 1  •  Lumigan 0.01 % ophthalmic drops, INSTILL 1 DROP INTO BOTH EYES IN THE EVENING, Disp: , Rfl:   •  OrthoVisc 30 MG/2ML SOSY injection, , Disp: , Rfl:   •  pantoprazole (PROTONIX) 40 mg tablet, Take 1 tablet (40 mg total) by mouth daily, Disp: 90 tablet, Rfl: 2  •  rosuvastatin (CRESTOR) 10 MG tablet, Take 1 tablet (10 mg total) by mouth daily at bedtime, Disp: 90 tablet, Rfl: 3  •  traZODone (DESYREL) 50 mg tablet, Take 2 tablets (100 mg total) by mouth daily at bedtime, Disp: 180 tablet, Rfl: 1  •  gabapentin (Neurontin) 300 mg capsule, Take 1 capsule (300 mg total) by mouth daily at bedtime,  "Disp: 30 capsule, Rfl: 7  •  valACYclovir (VALTREX) 1,000 mg tablet, Take 1 tablet (1,000 mg total) by mouth 2 (two) times a day for 2 days, Disp: 8 tablet, Rfl: 4    No Known Allergies    OBJECTIVE  Vitals:   Vitals:    07/22/24 1047   BP: 120/72   BP Location: Left arm   Patient Position: Sitting   Cuff Size: Standard   Pulse: 86   Resp: 12   Temp: (!) 97.4 °F (36.3 °C)   TempSrc: Temporal   SpO2: 98%   Weight: 63 kg (139 lb)   Height: 5' 3\" (1.6 m)         Physical Exam  Vitals reviewed.   Constitutional:       Appearance: He is well-developed.   HENT:      Head: Normocephalic and atraumatic.   Eyes:      Extraocular Movements: EOM normal.      Conjunctiva/sclera: Conjunctivae normal.      Pupils: Pupils are equal, round, and reactive to light.   Cardiovascular:      Rate and Rhythm: Normal rate and regular rhythm.      Heart sounds: Normal heart sounds, S1 normal and S2 normal. No murmur heard.  Pulmonary:      Effort: Pulmonary effort is normal. No respiratory distress.      Breath sounds: Normal breath sounds. No wheezing.   Musculoskeletal:         General: No edema. Normal range of motion.      Cervical back: Normal range of motion and neck supple.   Skin:     General: Skin is warm.   Neurological:      General: No focal deficit present.      Mental Status: He is alert and oriented to person, place, and time. Mental status is at baseline.      Comments: Negative kevin guillen     Psychiatric:         Mood and Affect: Mood and affect normal.         Speech: Speech normal.         Behavior: Behavior normal.         Thought Content: Thought content normal.         Judgment: Judgment normal.                    Melissa Walters MD,   [facility]  7/23/2024      "

## 2024-07-23 ENCOUNTER — IOP CHECK (OUTPATIENT)
Dept: URBAN - METROPOLITAN AREA CLINIC 6 | Facility: CLINIC | Age: 74
End: 2024-07-23

## 2024-07-23 DIAGNOSIS — Z96.1: ICD-10-CM

## 2024-07-23 DIAGNOSIS — H40.1131: ICD-10-CM

## 2024-07-23 PROCEDURE — 92012 INTRM OPH EXAM EST PATIENT: CPT

## 2024-07-23 ASSESSMENT — VISUAL ACUITY
OD_SC: 20/30
OS_SC: 20/30

## 2024-07-23 ASSESSMENT — TONOMETRY
OS_IOP_MMHG: 14
OD_IOP_MMHG: 13
OS_IOP_MMHG: 13
OD_IOP_MMHG: 15

## 2024-09-16 DIAGNOSIS — B00.9 HERPES: ICD-10-CM

## 2024-09-17 RX ORDER — VALACYCLOVIR HYDROCHLORIDE 1 G/1
1000 TABLET, FILM COATED ORAL 2 TIMES DAILY
Qty: 8 TABLET | Refills: 4 | Status: SHIPPED | OUTPATIENT
Start: 2024-09-17 | End: 2024-09-19

## 2024-09-25 DIAGNOSIS — K21.9 GASTROESOPHAGEAL REFLUX DISEASE WITHOUT ESOPHAGITIS: ICD-10-CM

## 2024-09-25 RX ORDER — PANTOPRAZOLE SODIUM 40 MG/1
40 TABLET, DELAYED RELEASE ORAL DAILY
Qty: 90 TABLET | Refills: 1 | Status: SHIPPED | OUTPATIENT
Start: 2024-09-25

## 2024-09-26 ENCOUNTER — HOSPITAL ENCOUNTER (OUTPATIENT)
Dept: RADIOLOGY | Facility: HOSPITAL | Age: 74
Discharge: HOME | End: 2024-09-26
Attending: FAMILY MEDICINE
Payer: COMMERCIAL

## 2024-09-26 DIAGNOSIS — R42 DIZZINESS: ICD-10-CM

## 2024-09-26 PROCEDURE — 70553 MRI BRAIN STEM W/O & W/DYE: CPT

## 2024-09-26 PROCEDURE — A9585 GADOBUTROL INJECTION: HCPCS | Performed by: FAMILY MEDICINE

## 2024-09-26 RX ORDER — GADOBUTROL 604.72 MG/ML
6 INJECTION INTRAVENOUS
Status: COMPLETED | OUTPATIENT
Start: 2024-09-26 | End: 2024-09-26

## 2024-09-26 RX ADMIN — GADOBUTROL 6 ML: 604.72 INJECTION INTRAVENOUS at 12:01

## 2024-10-02 ENCOUNTER — TELEPHONE (OUTPATIENT)
Dept: FAMILY MEDICINE CLINIC | Facility: CLINIC | Age: 74
End: 2024-10-02

## 2024-10-02 NOTE — TELEPHONE ENCOUNTER
----- Message from Ryleigh N sent at 10/2/2024 11:00 AM EDT -----    ----- Message -----  From: eMlissa Tovar MD  Sent: 10/1/2024   3:05 PM EDT  To: [facility]    Please try to call the patient to see if he received my message shown below

## 2024-10-16 ENCOUNTER — OFFICE VISIT (OUTPATIENT)
Dept: OBGYN CLINIC | Facility: CLINIC | Age: 74
End: 2024-10-16
Payer: COMMERCIAL

## 2024-10-16 VITALS
WEIGHT: 144.2 LBS | BODY MASS INDEX: 25.55 KG/M2 | SYSTOLIC BLOOD PRESSURE: 126 MMHG | DIASTOLIC BLOOD PRESSURE: 77 MMHG | HEART RATE: 73 BPM | HEIGHT: 63 IN

## 2024-10-16 DIAGNOSIS — M17.12 PRIMARY OSTEOARTHRITIS OF LEFT KNEE: Primary | ICD-10-CM

## 2024-10-16 DIAGNOSIS — M25.562 CHRONIC PAIN OF LEFT KNEE: ICD-10-CM

## 2024-10-16 DIAGNOSIS — G89.29 CHRONIC PAIN OF LEFT KNEE: ICD-10-CM

## 2024-10-16 PROCEDURE — 20610 DRAIN/INJ JOINT/BURSA W/O US: CPT | Performed by: PHYSICIAN ASSISTANT

## 2024-10-16 PROCEDURE — 99213 OFFICE O/P EST LOW 20 MIN: CPT | Performed by: ORTHOPAEDIC SURGERY

## 2024-10-16 RX ORDER — TRIAMCINOLONE ACETONIDE 40 MG/ML
80 INJECTION, SUSPENSION INTRA-ARTICULAR; INTRAMUSCULAR
Status: COMPLETED | OUTPATIENT
Start: 2024-10-16 | End: 2024-10-16

## 2024-10-16 RX ORDER — BUPIVACAINE HYDROCHLORIDE 5 MG/ML
2 INJECTION, SOLUTION EPIDURAL; INTRACAUDAL
Status: COMPLETED | OUTPATIENT
Start: 2024-10-16 | End: 2024-10-16

## 2024-10-16 RX ADMIN — BUPIVACAINE HYDROCHLORIDE 2 ML: 5 INJECTION, SOLUTION EPIDURAL; INTRACAUDAL at 10:00

## 2024-10-16 RX ADMIN — TRIAMCINOLONE ACETONIDE 80 MG: 40 INJECTION, SUSPENSION INTRA-ARTICULAR; INTRAMUSCULAR at 10:00

## 2024-10-16 NOTE — PROGRESS NOTES
"Assessment/Plan:  1. Primary osteoarthritis of left knee  Large joint arthrocentesis      2. Chronic pain of left knee  Large joint arthrocentesis        Tenzin is a pleasant 74-year-old gentleman known to our practice for his activity related left knee pain due to his mild to moderate underlying osteoarthritis, especially of the patellofemoral compartment.  He did very well after a cortisone injection 3 months ago.  With the return of his discomfort on stairs, he consented to and underwent a left knee cortisone injection as detailed below, which he tolerated well without difficulty or complication.  Postinjection instructions were provided.  We will plan to see him back in 3 to 4 months pending efficacy of today's procedure.  All questions addressed    Large joint arthrocentesis: L knee  Universal Protocol:  Consent: Verbal consent obtained.  Risks and benefits: risks, benefits and alternatives were discussed  Consent given by: patient  Time out: Immediately prior to procedure a \"time out\" was called to verify the correct patient, procedure, equipment, support staff and site/side marked as required.  Timeout called at: 10/16/2024 10:15 AM.  Site marked: the operative site was marked  Patient identity confirmed: verbally with patient  Supporting Documentation  Indications: pain   Procedure Details  Location: knee - L knee  Preparation: Patient was prepped and draped in the usual sterile fashion  Needle size: 20 G  Ultrasound guidance: no  Approach: anterolateral  Medications administered: 80 mg triamcinolone acetonide 40 mg/mL; 2 mL bupivacaine (PF) 0.5 %    Patient tolerance: patient tolerated the procedure well with no immediate complications  Dressing:  Sterile dressing applied        Subjective: Left knee follow-up    Patient ID: Tenzin Baird is a 74 y.o. male presenting today for follow-up of his active related left knee pain due to his mild underlying osteoarthritis.  He reports that the cortisone injection " did help with his discomfort and going up and down stairs.  Over the past week or so, he is noted a return of his activity related discomfort on stairs up to 6 out of 10.  He does not have significant activity related pain on flat ground, but does have 13 stairs at home.  He does state that he and his wife are going to be moving to a 1 level apartment in April    Review of Systems   Constitutional:  Positive for activity change.   HENT: Negative.     Eyes: Negative.    Respiratory: Negative.     Cardiovascular: Negative.    Gastrointestinal: Negative.    Endocrine: Negative.    Genitourinary: Negative.    Musculoskeletal:  Positive for arthralgias, joint swelling and myalgias.   Skin: Negative.    Allergic/Immunologic: Negative.    Neurological: Negative.    Hematological: Negative.    Psychiatric/Behavioral: Negative.       Past Medical History:   Diagnosis Date    Arthritis     shoulder    Ascending aortic aneurysm (HCC)     Cataract     both eyes-to have the right cataract removed on 1/25/22    Colon polyp     GERD (gastroesophageal reflux disease)     Insomnia     Leg cramps     Memory changes     Restless legs        Past Surgical History:   Procedure Laterality Date    APPENDECTOMY      CHOLECYSTECTOMY      CHOLECYSTECTOMY LAPAROSCOPIC N/A 10/27/2020    Procedure: CHOLECYSTECTOMY LAPAROSCOPIC;  Surgeon: Liam Iinguez MD;  Location: [facility];  Service: General    COLONOSCOPY N/A 12/12/2016    Procedure: COLONOSCOPY;  Surgeon: Ian Arana MD;  Location: [facility] GI LAB;  Service:     ELBOW SURGERY Bilateral     HERNIA REPAIR Right 2001    inguinal    KNEE ARTHROSCOPY Right     NV ARTHRS KNE SURG W/MENISCECTOMY MED/LAT W/SHVG Left 6/18/2020    Procedure: ARTHROSCOPY KNEE MEDIAL MENISCECTOMY;  Surgeon: Brodie Muñoz MD;  Location: [facility] OR;  Service: Orthopedics    ROTATOR CUFF REPAIR Bilateral 2011       Family History   Problem Relation Age of Onset    No Known Problems Mother     No Known Problems  Father     No Known Problems Sister     No Known Problems Brother     No Known Problems Maternal Aunt     No Known Problems Maternal Uncle     No Known Problems Paternal Aunt     No Known Problems Paternal Uncle     No Known Problems Maternal Grandmother     No Known Problems Maternal Grandfather     No Known Problems Paternal Grandmother     No Known Problems Paternal Grandfather     ADD / ADHD Neg Hx     Anesthesia problems Neg Hx     Cancer Neg Hx     Clotting disorder Neg Hx     Collagen disease Neg Hx     Diabetes Neg Hx     Dislocations Neg Hx     Learning disabilities Neg Hx     Neurological problems Neg Hx     Osteoporosis Neg Hx     Rheumatologic disease Neg Hx     Scoliosis Neg Hx     Vascular Disease Neg Hx        Social History     Occupational History    Not on file   Tobacco Use    Smoking status: Never    Smokeless tobacco: Never   Vaping Use    Vaping status: Never Used   Substance and Sexual Activity    Alcohol use: Not Currently    Drug use: No    Sexual activity: Not Currently         Current Outpatient Medications:     aspirin (ECOTRIN LOW STRENGTH) 81 mg EC tablet, Take 1 tablet (81 mg total) by mouth daily with breakfast, Disp: 90 tablet, Rfl: 3    cyclobenzaprine (FLEXERIL) 5 mg tablet, Take 1 tablet (5 mg total) by mouth daily at bedtime as needed for muscle spasms, Disp: 30 tablet, Rfl: 0    Diclofenac Sodium (VOLTAREN) 1 %, Apply 2 g topically 4 (four) times a day, Disp: 100 g, Rfl: 1    gabapentin (Neurontin) 300 mg capsule, Take 1 capsule (300 mg total) by mouth daily at bedtime, Disp: 30 capsule, Rfl: 7    hydrOXYzine HCL (ATARAX) 10 mg tablet, Take 1 tablet (10 mg total) by mouth every 6 (six) hours as needed for anxiety, Disp: 30 tablet, Rfl: 3    levothyroxine 25 mcg tablet, TAKE 1 TABLET IN THE MORNING ON AN EMPTY STOMACH, Disp: 90 tablet, Rfl: 1    Lumigan 0.01 % ophthalmic drops, INSTILL 1 DROP INTO BOTH EYES IN THE EVENING, Disp: , Rfl:     OrthoVisc 30 MG/2ML SOSY injection, ,  Disp: , Rfl:     pantoprazole (PROTONIX) 40 mg tablet, TAKE 1 TABLET BY MOUTH EVERY DAY, Disp: 90 tablet, Rfl: 1    rosuvastatin (CRESTOR) 10 MG tablet, Take 1 tablet (10 mg total) by mouth daily at bedtime, Disp: 90 tablet, Rfl: 3    traZODone (DESYREL) 50 mg tablet, Take 2 tablets (100 mg total) by mouth daily at bedtime, Disp: 180 tablet, Rfl: 1    valACYclovir (VALTREX) 1,000 mg tablet, Take 1 tablet (1,000 mg total) by mouth 2 (two) times a day for 2 days, Disp: 8 tablet, Rfl: 4    No Known Allergies    Objective:  Vitals:    10/16/24 0958   BP: 126/77   Pulse: 73       Body mass index is 25.54 kg/m².    Left Knee Exam     Muscle Strength   The patient has normal left knee strength.    Tenderness   The patient is experiencing tenderness in the medial joint line and patella.    Range of Motion   Extension:  0 normal   Flexion:  130 normal     Tests   Varus: negative Valgus: negative  Drawer:  Anterior - negative     Posterior - negative  Patellar apprehension: negative    Other   Erythema: absent  Scars: absent  Sensation: normal  Pulse: present  Swelling: none  Effusion: no effusion present    Comments:  Collateral ligaments stable at 0, 30, and 90 degrees  Neurovascularly intact distally  No warmth or erythema  Parapatellar crepitance noted  Patellofemoral grind: negative  Thigh calf soft and nontender  Ambulates with a normal symmetrical gait          Observations   Left Knee   Negative for effusion.       Physical Exam  Vitals and nursing note reviewed.   Constitutional:       Appearance: Normal appearance. He is well-developed.      Comments: Body mass index is 25.54 kg/m².   HENT:      Head: Normocephalic and atraumatic.      Right Ear: External ear normal.      Left Ear: External ear normal.   Eyes:      Extraocular Movements: Extraocular movements intact.      Conjunctiva/sclera: Conjunctivae normal.   Cardiovascular:      Rate and Rhythm: Normal rate.      Pulses: Normal pulses.   Pulmonary:       Effort: Pulmonary effort is normal.   Musculoskeletal:      Cervical back: Normal range of motion.      Left knee: No effusion.      Comments: See ortho exam   Skin:     General: Skin is warm and dry.   Neurological:      General: No focal deficit present.      Mental Status: He is alert and oriented to person, place, and time. Mental status is at baseline.   Psychiatric:         Mood and Affect: Mood normal.         Behavior: Behavior normal.         Thought Content: Thought content normal.         Judgment: Judgment normal.       This document was created using speech voice recognition software.   Grammatical errors, random word insertions, pronoun errors, and incomplete sentences are an occasional consequence of this system due to software limitations, ambient noise, and hardware issues.   Any formal questions or concerns about content, text, or information contained within the body of this dictation should be directly addressed to the provider for clarification.

## 2024-11-19 ENCOUNTER — APPOINTMENT (OUTPATIENT)
Dept: LAB | Facility: CLINIC | Age: 74
End: 2024-11-19
Payer: COMMERCIAL

## 2024-11-26 ENCOUNTER — OFFICE VISIT (OUTPATIENT)
Dept: FAMILY MEDICINE CLINIC | Facility: CLINIC | Age: 74
End: 2024-11-26
Payer: COMMERCIAL

## 2024-11-26 VITALS
WEIGHT: 141 LBS | BODY MASS INDEX: 24.98 KG/M2 | OXYGEN SATURATION: 96 % | TEMPERATURE: 97.2 F | SYSTOLIC BLOOD PRESSURE: 118 MMHG | RESPIRATION RATE: 16 BRPM | HEART RATE: 83 BPM | HEIGHT: 63 IN | DIASTOLIC BLOOD PRESSURE: 62 MMHG

## 2024-11-26 DIAGNOSIS — J22 LOWER RESPIRATORY TRACT INFECTION: Primary | ICD-10-CM

## 2024-11-26 DIAGNOSIS — G62.9 PERIPHERAL POLYNEUROPATHY: ICD-10-CM

## 2024-11-26 DIAGNOSIS — I73.9 PERIPHERAL ARTERY DISEASE (HCC): ICD-10-CM

## 2024-11-26 DIAGNOSIS — E87.8 ELECTROLYTE ABNORMALITY: ICD-10-CM

## 2024-11-26 DIAGNOSIS — K21.9 GASTROESOPHAGEAL REFLUX DISEASE WITHOUT ESOPHAGITIS: ICD-10-CM

## 2024-11-26 DIAGNOSIS — R35.0 URINE FREQUENCY: ICD-10-CM

## 2024-11-26 DIAGNOSIS — R73.03 PREDIABETES: ICD-10-CM

## 2024-11-26 DIAGNOSIS — R07.82 INTERCOSTAL PAIN: ICD-10-CM

## 2024-11-26 DIAGNOSIS — Z13.1 DIABETES MELLITUS SCREENING: ICD-10-CM

## 2024-11-26 DIAGNOSIS — R07.9 CHEST PAIN, UNSPECIFIED TYPE: ICD-10-CM

## 2024-11-26 DIAGNOSIS — Z13.0 SCREENING, IRON DEFICIENCY ANEMIA: ICD-10-CM

## 2024-11-26 PROCEDURE — 99214 OFFICE O/P EST MOD 30 MIN: CPT | Performed by: STUDENT IN AN ORGANIZED HEALTH CARE EDUCATION/TRAINING PROGRAM

## 2024-11-26 PROCEDURE — 93000 ELECTROCARDIOGRAM COMPLETE: CPT | Performed by: STUDENT IN AN ORGANIZED HEALTH CARE EDUCATION/TRAINING PROGRAM

## 2024-11-26 RX ORDER — DOXYCYCLINE HYCLATE 100 MG
100 TABLET ORAL 2 TIMES DAILY
Qty: 14 TABLET | Refills: 0 | Status: SHIPPED | OUTPATIENT
Start: 2024-11-26 | End: 2024-12-03

## 2024-11-26 RX ORDER — METHYLPREDNISOLONE 4 MG/1
TABLET ORAL
Qty: 21 EACH | Refills: 0 | Status: SHIPPED | OUTPATIENT
Start: 2024-11-26 | End: 2024-12-07 | Stop reason: SDUPTHER

## 2024-11-26 NOTE — PROGRESS NOTES
Clinic Visit Note  Tenzin Baird 74 y.o. male   MRN: [id number]    Assessment and Plan      Diagnoses and all orders for this visit:    Lower respiratory tract infection  -     XR chest pa and lateral; Future  -     doxycycline hyclate (VIBRA-TABS) 100 mg tablet; Take 1 tablet (100 mg total) by mouth 2 (two) times a day for 7 days  -     methylPREDNISolone 4 MG tablet therapy pack; Use as directed on package    Intercostal pain    Chest pain, unspecified type  -     POCT ECG  -     High Sensitivity Troponin I Random; Future    Peripheral artery disease (HCC)    Gastroesophageal reflux disease without esophagitis    Peripheral polyneuropathy    Urine frequency  -     UA w Reflex to Microscopic w Reflex to Culture; Future    Screening, iron deficiency anemia  -     CBC and differential; Future    Diabetes mellitus screening    Electrolyte abnormality  -     Comprehensive metabolic panel; Future    Prediabetes  -     Hemoglobin A1C; Future      Patient is a pleasant 74-year-old male coming in secondary to chest pain, likely lower respiratory tract/bronchitis symptoms, recommend antibiotic/steroid.    EKG shows no signs of ischemia, NSR, intervals appropriate.    Recommend blood work, will include random troponin level.  Chest x-ray imaging pending.    Patient will follow-up within 2 weeks with PCP for annual physical, monitor for symptomatic resolution.    My impressions and treatment recommendations were discussed in detail with the patient who verbalized understanding and had no further questions.  Discharge instructions were provided.    Subjective     Chief Complaint: Acute care visit    History of Present Illness:    Patient is a pleasant 74-year-old male presenting secondary to substernal chest pain x 2 months.    The following portions of the patient's history were reviewed and updated as appropriate: allergies, current medications, past family history, past medical history, past social history, past surgical  history and problem list.    REVIEW OF SYSTEMS:  A complete 12-point review of systems is negative other than that noted in the HPI.    Review of Systems   Constitutional:  Negative for chills, fatigue and fever.   HENT:  Negative for congestion and sore throat.    Eyes:  Negative for pain and visual disturbance.   Respiratory:  Positive for cough. Negative for shortness of breath and wheezing.    Cardiovascular:  Positive for chest pain. Negative for palpitations.   Gastrointestinal:  Negative for abdominal pain, constipation, diarrhea, nausea and vomiting.   Genitourinary:  Negative for dysuria and frequency.   Musculoskeletal:  Negative for back pain and neck pain.   Skin:  Negative for color change and rash.   Neurological:  Negative for dizziness and headaches.   Psychiatric/Behavioral:  Negative for agitation and confusion.    All other systems reviewed and are negative.        Current Outpatient Medications:     aspirin (ECOTRIN LOW STRENGTH) 81 mg EC tablet, Take 1 tablet (81 mg total) by mouth daily with breakfast, Disp: 90 tablet, Rfl: 3    cyclobenzaprine (FLEXERIL) 5 mg tablet, Take 1 tablet (5 mg total) by mouth daily at bedtime as needed for muscle spasms, Disp: 30 tablet, Rfl: 0    Diclofenac Sodium (VOLTAREN) 1 %, Apply 2 g topically 4 (four) times a day, Disp: 100 g, Rfl: 1    doxycycline hyclate (VIBRA-TABS) 100 mg tablet, Take 1 tablet (100 mg total) by mouth 2 (two) times a day for 7 days, Disp: 14 tablet, Rfl: 0    gabapentin (Neurontin) 300 mg capsule, Take 1 capsule (300 mg total) by mouth daily at bedtime, Disp: 30 capsule, Rfl: 7    hydrOXYzine HCL (ATARAX) 10 mg tablet, Take 1 tablet (10 mg total) by mouth every 6 (six) hours as needed for anxiety, Disp: 30 tablet, Rfl: 3    levothyroxine 25 mcg tablet, TAKE 1 TABLET IN THE MORNING ON AN EMPTY STOMACH, Disp: 90 tablet, Rfl: 1    Lumigan 0.01 % ophthalmic drops, INSTILL 1 DROP INTO BOTH EYES IN THE EVENING, Disp: , Rfl:      methylPREDNISolone 4 MG tablet therapy pack, Use as directed on package, Disp: 21 each, Rfl: 0    OrthoVisc 30 MG/2ML SOSY injection, , Disp: , Rfl:     pantoprazole (PROTONIX) 40 mg tablet, TAKE 1 TABLET BY MOUTH EVERY DAY, Disp: 90 tablet, Rfl: 1    rosuvastatin (CRESTOR) 10 MG tablet, Take 1 tablet (10 mg total) by mouth daily at bedtime, Disp: 90 tablet, Rfl: 3    traZODone (DESYREL) 50 mg tablet, Take 2 tablets (100 mg total) by mouth daily at bedtime, Disp: 180 tablet, Rfl: 1    valACYclovir (VALTREX) 1,000 mg tablet, Take 1 tablet (1,000 mg total) by mouth 2 (two) times a day for 2 days, Disp: 8 tablet, Rfl: 4  Past Medical History:   Diagnosis Date    Arthritis     shoulder    Ascending aortic aneurysm (HCC)     Cataract     both eyes-to have the right cataract removed on 1/25/22    Colon polyp     GERD (gastroesophageal reflux disease)     Insomnia     Leg cramps     Memory changes     Restless legs      Past Surgical History:   Procedure Laterality Date    APPENDECTOMY      CHOLECYSTECTOMY      CHOLECYSTECTOMY LAPAROSCOPIC N/A 10/27/2020    Procedure: CHOLECYSTECTOMY LAPAROSCOPIC;  Surgeon: Liam Iniguez MD;  Location: [facility];  Service: General    COLONOSCOPY N/A 12/12/2016    Procedure: COLONOSCOPY;  Surgeon: Ian Arana MD;  Location: [facility] GI LAB;  Service:     ELBOW SURGERY Bilateral     HERNIA REPAIR Right 2001    inguinal    KNEE ARTHROSCOPY Right     ID ARTHRS KNE SURG W/MENISCECTOMY MED/LAT W/SHVG Left 6/18/2020    Procedure: ARTHROSCOPY KNEE MEDIAL MENISCECTOMY;  Surgeon: Brodie Muñoz MD;  Location: [facility] OR;  Service: Orthopedics    ROTATOR CUFF REPAIR Bilateral 2011     Social History     Socioeconomic History    Marital status: /Civil Union     Spouse name: Not on file    Number of children: Not on file    Years of education: Not on file    Highest education level: Not on file   Occupational History    Not on file   Tobacco Use    Smoking status: Never    Smokeless  "tobacco: Never   Vaping Use    Vaping status: Never Used   Substance and Sexual Activity    Alcohol use: Not Currently    Drug use: No    Sexual activity: Not Currently   Other Topics Concern    Not on file   Social History Narrative    Not on file     Social Drivers of Health     Financial Resource Strain: Low Risk  (11/14/2022)    Overall Financial Resource Strain (CARDIA)     Difficulty of Paying Living Expenses: Not hard at all   Food Insecurity: Not on file   Transportation Needs: No Transportation Needs (11/14/2022)    PRAPARE - Transportation     Lack of Transportation (Medical): No     Lack of Transportation (Non-Medical): No   Physical Activity: Not on file   Stress: Not on file   Social Connections: Not on file   Intimate Partner Violence: Not on file   Housing Stability: Not on file     Family History   Problem Relation Age of Onset    No Known Problems Mother     No Known Problems Father     No Known Problems Sister     No Known Problems Brother     No Known Problems Maternal Aunt     No Known Problems Maternal Uncle     No Known Problems Paternal Aunt     No Known Problems Paternal Uncle     No Known Problems Maternal Grandmother     No Known Problems Maternal Grandfather     No Known Problems Paternal Grandmother     No Known Problems Paternal Grandfather     ADD / ADHD Neg Hx     Anesthesia problems Neg Hx     Cancer Neg Hx     Clotting disorder Neg Hx     Collagen disease Neg Hx     Diabetes Neg Hx     Dislocations Neg Hx     Learning disabilities Neg Hx     Neurological problems Neg Hx     Osteoporosis Neg Hx     Rheumatologic disease Neg Hx     Scoliosis Neg Hx     Vascular Disease Neg Hx      No Known Allergies    Objective     Vitals:    11/26/24 1028   BP: 118/62   BP Location: Left arm   Patient Position: Sitting   Cuff Size: Standard   Pulse: 83   Resp: 16   Temp: (!) 97.2 °F (36.2 °C)   TempSrc: Temporal   SpO2: 96%   Weight: 64 kg (141 lb)   Height: 5' 3\" (1.6 m)       Physical Exam: "     GENERAL: NAD, pleasant   HEENT:  NC/AT, PERRL, EOMI, no scleral icterus  CARDIAC:  RRR, +S1/S2, no S3/S4 appreciated, no m/g/r  PULMONARY:  CTA B/L, no wheezing/rales/rhonci, non-labored breathing  ABDOMEN:  Soft, NT/ND, no rebound/guarding/rigidity  Extremities:. No edema, cyanosis, or clubbing  Musculoskeletal:  Full range of motion intact in all extremities   NEUROLOGIC: Grossly intact, no focal deficits  SKIN:  No rashes or erythema noted on exposed skin  Psych: Normal affect, mood stable    ==  PLEASE NOTE:  This encounter was completed utilizing the [employer] Direct Speech Voice Recognition Software. Grammatical errors, random word insertions, pronoun errors and incomplete sentences are occasional consequences of the system due to software limitations, ambient noise and hardware issues.These may be missed by proof reading prior to affixing electronic signature. Any questions or concerns about the content, text or information contained within the body of this dictation should be directly addressed to the physician for clarification. Please do not hesitate to call me directly if you have any any questions or concerns.    Pancho Willis, DO  [facility] Internal Medicine   [facility]

## 2024-11-27 ENCOUNTER — APPOINTMENT (OUTPATIENT)
Dept: LAB | Facility: CLINIC | Age: 74
End: 2024-11-27
Payer: COMMERCIAL

## 2024-11-27 ENCOUNTER — RA CDI HCC (OUTPATIENT)
Dept: OTHER | Facility: HOSPITAL | Age: 74
End: 2024-11-27

## 2024-11-27 ENCOUNTER — APPOINTMENT (OUTPATIENT)
Dept: RADIOLOGY | Facility: CLINIC | Age: 74
End: 2024-11-27
Payer: COMMERCIAL

## 2024-11-27 DIAGNOSIS — Z13.0 SCREENING, IRON DEFICIENCY ANEMIA: ICD-10-CM

## 2024-11-27 DIAGNOSIS — E87.8 ELECTROLYTE ABNORMALITY: ICD-10-CM

## 2024-11-27 DIAGNOSIS — J22 LOWER RESPIRATORY TRACT INFECTION: ICD-10-CM

## 2024-11-27 DIAGNOSIS — R07.9 CHEST PAIN, UNSPECIFIED TYPE: ICD-10-CM

## 2024-11-27 DIAGNOSIS — R73.03 PREDIABETES: ICD-10-CM

## 2024-11-27 DIAGNOSIS — R35.0 URINE FREQUENCY: ICD-10-CM

## 2024-11-27 PROBLEM — K80.00 ACUTE CHOLECYSTITIS DUE TO BILIARY CALCULUS: Status: RESOLVED | Noted: 2020-10-27 | Resolved: 2024-11-27

## 2024-11-27 LAB
ALBUMIN SERPL BCG-MCNC: 4.4 G/DL (ref 3.5–5)
ALP SERPL-CCNC: 72 U/L (ref 34–104)
ALT SERPL W P-5'-P-CCNC: 19 U/L (ref 7–52)
ANION GAP SERPL CALCULATED.3IONS-SCNC: 6 MMOL/L (ref 4–13)
AST SERPL W P-5'-P-CCNC: 18 U/L (ref 13–39)
BASOPHILS # BLD AUTO: 0 THOUSANDS/ΜL (ref 0–0.1)
BASOPHILS NFR BLD AUTO: 0 % (ref 0–1)
BILIRUB SERPL-MCNC: 0.8 MG/DL (ref 0.2–1)
BILIRUB UR QL STRIP: NEGATIVE
BUN SERPL-MCNC: 17 MG/DL (ref 5–25)
CALCIUM SERPL-MCNC: 9.2 MG/DL (ref 8.4–10.2)
CARDIAC TROPONIN I PNL SERPL HS: 3 NG/L (ref 8–18)
CHLORIDE SERPL-SCNC: 105 MMOL/L (ref 96–108)
CLARITY UR: CLEAR
CO2 SERPL-SCNC: 28 MMOL/L (ref 21–32)
COLOR UR: COLORLESS
CREAT SERPL-MCNC: 0.83 MG/DL (ref 0.6–1.3)
EOSINOPHIL # BLD AUTO: 0.03 THOUSAND/ΜL (ref 0–0.61)
EOSINOPHIL NFR BLD AUTO: 1 % (ref 0–6)
ERYTHROCYTE [DISTWIDTH] IN BLOOD BY AUTOMATED COUNT: 12 % (ref 11.6–15.1)
EST. AVERAGE GLUCOSE BLD GHB EST-MCNC: 111 MG/DL
GFR SERPL CREATININE-BSD FRML MDRD: 87 ML/MIN/1.73SQ M
GLUCOSE P FAST SERPL-MCNC: 92 MG/DL (ref 65–99)
GLUCOSE UR STRIP-MCNC: NEGATIVE MG/DL
HBA1C MFR BLD: 5.5 %
HCT VFR BLD AUTO: 49.7 % (ref 36.5–49.3)
HGB BLD-MCNC: 16.5 G/DL (ref 12–17)
HGB UR QL STRIP.AUTO: NEGATIVE
IMM GRANULOCYTES # BLD AUTO: 0.01 THOUSAND/UL (ref 0–0.2)
IMM GRANULOCYTES NFR BLD AUTO: 0 % (ref 0–2)
KETONES UR STRIP-MCNC: NEGATIVE MG/DL
LEUKOCYTE ESTERASE UR QL STRIP: NEGATIVE
LYMPHOCYTES # BLD AUTO: 2.5 THOUSANDS/ΜL (ref 0.6–4.47)
LYMPHOCYTES NFR BLD AUTO: 43 % (ref 14–44)
MCH RBC QN AUTO: 32.4 PG (ref 26.8–34.3)
MCHC RBC AUTO-ENTMCNC: 33.2 G/DL (ref 31.4–37.4)
MCV RBC AUTO: 98 FL (ref 82–98)
MONOCYTES # BLD AUTO: 0.56 THOUSAND/ΜL (ref 0.17–1.22)
MONOCYTES NFR BLD AUTO: 10 % (ref 4–12)
NEUTROPHILS # BLD AUTO: 2.77 THOUSANDS/ΜL (ref 1.85–7.62)
NEUTS SEG NFR BLD AUTO: 46 % (ref 43–75)
NITRITE UR QL STRIP: NEGATIVE
NRBC BLD AUTO-RTO: 0 /100 WBCS
PH UR STRIP.AUTO: 6 [PH]
PLATELET # BLD AUTO: 214 THOUSANDS/UL (ref 149–390)
PMV BLD AUTO: 10.1 FL (ref 8.9–12.7)
POTASSIUM SERPL-SCNC: 3.9 MMOL/L (ref 3.5–5.3)
PROT SERPL-MCNC: 7.1 G/DL (ref 6.4–8.4)
PROT UR STRIP-MCNC: NEGATIVE MG/DL
RBC # BLD AUTO: 5.1 MILLION/UL (ref 3.88–5.62)
SODIUM SERPL-SCNC: 139 MMOL/L (ref 135–147)
SP GR UR STRIP.AUTO: 1.01 (ref 1–1.03)
UROBILINOGEN UR STRIP-ACNC: <2 MG/DL
WBC # BLD AUTO: 5.87 THOUSAND/UL (ref 4.31–10.16)

## 2024-11-27 PROCEDURE — 36415 COLL VENOUS BLD VENIPUNCTURE: CPT

## 2024-11-27 PROCEDURE — 80053 COMPREHEN METABOLIC PANEL: CPT

## 2024-11-27 PROCEDURE — 84484 ASSAY OF TROPONIN QUANT: CPT

## 2024-11-27 PROCEDURE — 71046 X-RAY EXAM CHEST 2 VIEWS: CPT

## 2024-11-27 PROCEDURE — 83036 HEMOGLOBIN GLYCOSYLATED A1C: CPT

## 2024-11-27 PROCEDURE — 85025 COMPLETE CBC W/AUTO DIFF WBC: CPT

## 2024-11-27 PROCEDURE — 81003 URINALYSIS AUTO W/O SCOPE: CPT

## 2024-11-29 ENCOUNTER — RESULTS FOLLOW-UP (OUTPATIENT)
Dept: FAMILY MEDICINE CLINIC | Facility: CLINIC | Age: 74
End: 2024-11-29

## 2024-12-05 ENCOUNTER — OFFICE VISIT (OUTPATIENT)
Dept: FAMILY MEDICINE CLINIC | Facility: CLINIC | Age: 74
End: 2024-12-05
Payer: COMMERCIAL

## 2024-12-05 VITALS
RESPIRATION RATE: 12 BRPM | SYSTOLIC BLOOD PRESSURE: 100 MMHG | DIASTOLIC BLOOD PRESSURE: 70 MMHG | WEIGHT: 137 LBS | TEMPERATURE: 97.2 F | BODY MASS INDEX: 24.27 KG/M2 | OXYGEN SATURATION: 97 % | HEART RATE: 89 BPM | HEIGHT: 63 IN

## 2024-12-05 DIAGNOSIS — J22 LOWER RESPIRATORY TRACT INFECTION: ICD-10-CM

## 2024-12-05 DIAGNOSIS — Z23 NEED FOR COVID-19 VACCINE: ICD-10-CM

## 2024-12-05 DIAGNOSIS — Z00.00 MEDICARE ANNUAL WELLNESS VISIT, SUBSEQUENT: Primary | ICD-10-CM

## 2024-12-05 DIAGNOSIS — Z23 ENCOUNTER FOR IMMUNIZATION: ICD-10-CM

## 2024-12-05 DIAGNOSIS — F41.9 ANXIETY: ICD-10-CM

## 2024-12-05 DIAGNOSIS — R07.9 CHEST PAIN, UNSPECIFIED TYPE: ICD-10-CM

## 2024-12-05 PROCEDURE — 90662 IIV NO PRSV INCREASED AG IM: CPT | Performed by: FAMILY MEDICINE

## 2024-12-05 PROCEDURE — 90471 IMMUNIZATION ADMIN: CPT | Performed by: FAMILY MEDICINE

## 2024-12-05 PROCEDURE — G0439 PPPS, SUBSEQ VISIT: HCPCS | Performed by: FAMILY MEDICINE

## 2024-12-05 PROCEDURE — 91320 SARSCV2 VAC 30MCG TRS-SUC IM: CPT | Performed by: FAMILY MEDICINE

## 2024-12-05 PROCEDURE — 90480 ADMN SARSCOV2 VAC 1/ONLY CMP: CPT | Performed by: FAMILY MEDICINE

## 2024-12-05 RX ORDER — ESCITALOPRAM OXALATE 10 MG/1
10 TABLET ORAL
Qty: 30 TABLET | Refills: 3 | Status: SHIPPED | OUTPATIENT
Start: 2024-12-05 | End: 2025-01-04

## 2024-12-05 NOTE — PATIENT INSTRUCTIONS
Medicare Preventive Visit Patient Instructions  Thank you for completing your Welcome to Medicare Visit or Medicare Annual Wellness Visit today. Your next wellness visit will be due in one year (12/6/2025).  The screening/preventive services that you may require over the next 5-10 years are detailed below. Some tests may not apply to you based off risk factors and/or age. Screening tests ordered at today's visit but not completed yet may show as past due. Also, please note that scanned in results may not display below.  Preventive Screenings:  Service Recommendations Previous Testing/Comments   Colorectal Cancer Screening  Colonoscopy    Fecal Occult Blood Test (FOBT)/Fecal Immunochemical Test (FIT)  Fecal DNA/Cologuard Test  Flexible Sigmoidoscopy Age: 45-75 years old   Colonoscopy: every 10 years (May be performed more frequently if at higher risk)  OR  FOBT/FIT: every 1 year  OR  Cologuard: every 3 years  OR  Sigmoidoscopy: every 5 years  Screening may be recommended earlier than age 45 if at higher risk for colorectal cancer. Also, an individualized decision between you and your healthcare provider will decide whether screening between the ages of 76-85 would be appropriate. Colonoscopy: 01/31/2022  FOBT/FIT: Not on file  Cologuard: Not on file  Sigmoidoscopy: Not on file    Screening Current     Prostate Cancer Screening Individualized decision between patient and health care provider in men between ages of 55-69   Medicare will cover every 12 months beginning on the day after your 50th birthday PSA: 1.585 ng/mL     Screening Current     Hepatitis C Screening Once for adults born between 1945 and 1965  More frequently in patients at high risk for Hepatitis C Hep C Antibody: 11/15/2022    Screening Current   Diabetes Screening 1-2 times per year if you're at risk for diabetes or have pre-diabetes Fasting glucose: 92 mg/dL (11/27/2024)  A1C: 5.5 % (11/27/2024)  Screening Current   Cholesterol Screening Once every  5 years if you don't have a lipid disorder. May order more often based on risk factors. Lipid panel: 10/20/2023  Screening Current      Other Preventive Screenings Covered by Medicare:  Abdominal Aortic Aneurysm (AAA) Screening: covered once if your at risk. You're considered to be at risk if you have a family history of AAA or a male between the age of 65-75 who smoking at least 100 cigarettes in your lifetime.  Lung Cancer Screening: covers low dose CT scan once per year if you meet all of the following conditions: (1) Age 55-77; (2) No signs or symptoms of lung cancer; (3) Current smoker or have quit smoking within the last 15 years; (4) You have a tobacco smoking history of at least 20 pack years (packs per day x number of years you smoked); (5) You get a written order from a healthcare provider.  Glaucoma Screening: covered annually if you're considered high risk: (1) You have diabetes OR (2) Family history of glaucoma OR (3)  aged 50 and older OR (4)  American aged 65 and older  Osteoporosis Screening: covered every 2 years if you meet one of the following conditions: (1) Have a vertebral abnormality; (2) On glucocorticoid therapy for more than 3 months; (3) Have primary hyperparathyroidism; (4) On osteoporosis medications and need to assess response to drug therapy.  HIV Screening: covered annually if you're between the age of 15-65. Also covered annually if you are younger than 15 and older than 65 with risk factors for HIV infection. For pregnant patients, it is covered up to 3 times per pregnancy.    Immunizations:  Immunization Recommendations   Influenza Vaccine Annual influenza vaccination during flu season is recommended for all persons aged >= 6 months who do not have contraindications   Pneumococcal Vaccine   * Pneumococcal conjugate vaccine = PCV13 (Prevnar 13), PCV15 (Vaxneuvance), PCV20 (Prevnar 20)  * Pneumococcal polysaccharide vaccine = PPSV23 (Pneumovax) Adults 19-63 yo  with certain risk factors or if 65+ yo  If never received any pneumonia vaccine: recommend Prevnar 20 (PCV20)  Give PCV20 if previously received 1 dose of PCV13 or PPSV23   Hepatitis B Vaccine 3 dose series if at intermediate or high risk (ex: diabetes, end stage renal disease, liver disease)   Respiratory syncytial virus (RSV) Vaccine - COVERED BY MEDICARE PART D  * RSVPreF3 (Arexvy) CDC recommends that adults 60 years of age and older may receive a single dose of RSV vaccine using shared clinical decision-making (SCDM)   Tetanus (Td) Vaccine - COST NOT COVERED BY MEDICARE PART B Following completion of primary series, a booster dose should be given every 10 years to maintain immunity against tetanus. Td may also be given as tetanus wound prophylaxis.   Tdap Vaccine - COST NOT COVERED BY MEDICARE PART B Recommended at least once for all adults. For pregnant patients, recommended with each pregnancy.   Shingles Vaccine (Shingrix) - COST NOT COVERED BY MEDICARE PART B  2 shot series recommended in those 19 years and older who have or will have weakened immune systems or those 50 years and older     Health Maintenance Due:      Topic Date Due   • Colorectal Cancer Screening  01/30/2027   • Hepatitis C Screening  Completed     Immunizations Due:  There are no preventive care reminders to display for this patient.  Advance Directives   What are advance directives?  Advance directives are legal documents that state your wishes and plans for medical care. These plans are made ahead of time in case you lose your ability to make decisions for yourself. Advance directives can apply to any medical decision, such as the treatments you want, and if you want to donate organs.   What are the types of advance directives?  There are many types of advance directives, and each state has rules about how to use them. You may choose a combination of any of the following:  Living will:  This is a written record of the treatment you want.  You can also choose which treatments you do not want, which to limit, and which to stop at a certain time. This includes surgery, medicine, IV fluid, and tube feedings.   Durable power of  for healthcare (DPAHC):  This is a written record that states who you want to make healthcare choices for you when you are unable to make them for yourself. This person, called a proxy, is usually a family member or a friend. You may choose more than 1 proxy.  Do not resuscitate (DNR) order:  A DNR order is used in case your heart stops beating or you stop breathing. It is a request not to have certain forms of treatment, such as CPR. A DNR order may be included in other types of advance directives.  Medical directive:  This covers the care that you want if you are in a coma, near death, or unable to make decisions for yourself. You can list the treatments you want for each condition. Treatment may include pain medicine, surgery, blood transfusions, dialysis, IV or tube feedings, and a ventilator (breathing machine).  Values history:  This document has questions about your views, beliefs, and how you feel and think about life. This information can help others choose the care that you would choose.  Why are advance directives important?  An advance directive helps you control your care. Although spoken wishes may be used, it is better to have your wishes written down. Spoken wishes can be misunderstood, or not followed. Treatments may be given even if you do not want them. An advance directive may make it easier for your family to make difficult choices about your care.       © Copyright [employer] 2018 Information is for End User's use only and may not be sold, redistributed or otherwise used for commercial purposes. All illustrations and images included in CareNotes® are the copyrighted property of [employer] or [employer]

## 2024-12-05 NOTE — PROGRESS NOTES
Name: Tenzin Baird      : 1950      MRN: [id number]  Encounter Provider: Melissa Tovar MD  Encounter Date: 2024   Encounter department: [facility]    Assessment & Plan  Medicare annual wellness visit, subsequent         Need for COVID-19 vaccine    Orders:  •  COVID-19 Pfizer mRNA vaccine 12 yr and older (Comirnaty pre-filled syringe)    Encounter for immunization    Orders:  •  influenza vaccine, high-dose, PF 0.5 mL (Fluzone High Dose)    Anxiety  Not well-controlled recommend restarting Lexapro 10 mg daily patient agrees with my plan  Orders:  •  escitalopram (LEXAPRO) 10 mg tablet; Take 1 tablet (10 mg total) by mouth daily at bedtime    Chest pain, unspecified type  Believe it is likely secondary to anxiety given patient has already had a complete cardiac workup       Lower respiratory tract infection  Medrol pack to help with wheezing from postviral infection  Orders:  •  methylPREDNISolone 4 MG tablet therapy pack; Use as directed on package       Preventive health issues were discussed with patient, and age appropriate screening tests were ordered as noted in patient's After Visit Summary. Personalized health advice and appropriate referrals for health education or preventive services given if needed, as noted in patient's After Visit Summary.    History of Present Illness     HPI     74-year-old male presenting to the office for his physical exam.  Patient states that this might be her last appointment given that his insurance is changing and her insurance is not excepted in this office.  Patient states that spraying him a lot of anxiety switching providers but he feels like his anxiety has not been well-controlled for several months.  Patient willing to go back on medications if possible.  Just recently had a lower respiratory tract and feels like he is not 100% better and would like a refill of the medications if possible    Patient Care Team:  Melissa Walters  MD La as PCP - General (Family Medicine)  MD Alirio Carlton MD Barry Eugene Herman, MD as Endoscopist    Review of Systems   Constitutional:  Negative for activity change, appetite change, chills, fatigue and fever.   HENT:  Negative for congestion.    Respiratory:  Positive for chest tightness. Negative for cough and shortness of breath.    Cardiovascular:  Negative for chest pain and leg swelling.   Gastrointestinal:  Negative for abdominal distention, abdominal pain, constipation, diarrhea, nausea and vomiting.   Psychiatric/Behavioral:  Positive for sleep disturbance. The patient is nervous/anxious.    All other systems reviewed and are negative.    Medical History Reviewed by provider this encounter:  [facility]       Annual Wellness Visit Questionnaire   Tenzin is here for his Subsequent Wellness visit.     Health Risk Assessment:   Patient rates overall health as good. Patient feels that their physical health rating is same. Patient is satisfied with their life. Eyesight was rated as same. Hearing was rated as same. Patient feels that their emotional and mental health rating is same. Patients states they are never, rarely angry. Patient states they are sometimes unusually tired/fatigued. Pain experienced in the last 7 days has been none. Patient states that he has experienced no weight loss or gain in last 6 months.     Depression Screening:   PHQ-2 Score: 0      Fall Risk Screening:   In the past year, patient has experienced: no history of falling in past year      Home Safety:  Patient does not have trouble with stairs inside or outside of their home. Patient has working smoke alarms and has working carbon monoxide detector. Home safety hazards include: none.     Nutrition:   Current diet is Regular.     Medications:   Patient is not currently taking any over-the-counter supplements. Patient is able to manage medications.     Activities of Daily Living (ADLs)/Instrumental Activities of Daily  Living (IADLs):   Walk and transfer into and out of bed and chair?: Yes  Dress and groom yourself?: Yes    Bathe or shower yourself?: Yes    Feed yourself? Yes  Do your laundry/housekeeping?: Yes  Manage your money, pay your bills and track your expenses?: Yes  Make your own meals?: Yes    Do your own shopping?: Yes    Previous Hospitalizations:   Any hospitalizations or ED visits within the last 12 months?: No      Advance Care Planning:   Living will: No    Durable POA for healthcare: No    Advanced directive: No      Cognitive Screening:   Provider or family/friend/caregiver concerned regarding cognition?: No    PREVENTIVE SCREENINGS      Cardiovascular Screening:    General: Screening Current      Diabetes Screening:     General: Screening Current      Colorectal Cancer Screening:     General: Screening Current      Prostate Cancer Screening:    General: Screening Current      Osteoporosis Screening:    General: Risks and Benefits Discussed      Abdominal Aortic Aneurysm (AAA) Screening:    Risk factors include: age between 65-74 yo        General: Risks and Benefits Discussed      Lung Cancer Screening:     General: Screening Not Indicated      Hepatitis C Screening:    General: Screening Current    Screening, Brief Intervention, and Referral to Treatment (SBIRT)    Screening  Typical number of drinks in a day: 0  Typical number of drinks in a week: 0  Interpretation: Low risk drinking behavior.    Single Item Drug Screening:  How often have you used an illegal drug (including marijuana) or a prescription medication for non-medical reasons in the past year? never    Single Item Drug Screen Score: 0  Interpretation: Negative screen for possible drug use disorder    Social Drivers of Health     Financial Resource Strain: Low Risk  (11/14/2022)    Overall Financial Resource Strain (CARDIA)    • Difficulty of Paying Living Expenses: Not hard at all   Food Insecurity: No Food Insecurity (12/5/2024)    Hunger Vital  "Sign    • Worried About Running Out of Food in the Last Year: Never true    • Ran Out of Food in the Last Year: Never true   Transportation Needs: No Transportation Needs (12/5/2024)    PRAPARE - Transportation    • Lack of Transportation (Medical): No    • Lack of Transportation (Non-Medical): No   Housing Stability: Unknown (12/5/2024)    Housing Stability Vital Sign    • Unable to Pay for Housing in the Last Year: No    • Homeless in the Last Year: No   Utilities: Not At Risk (12/5/2024)    [facility] Utilities    • Threatened with loss of utilities: No     No results found.    Objective   /70 (BP Location: Left arm, Patient Position: Sitting, Cuff Size: Standard)   Pulse 89   Temp (!) 97.2 °F (36.2 °C) (Temporal)   Resp 12   Ht 5' 3\" (1.6 m)   Wt 62.1 kg (137 lb)   SpO2 97%   BMI 24.27 kg/m²     Physical Exam  Vitals reviewed.   Constitutional:       Appearance: He is well-developed.   HENT:      Head: Normocephalic and atraumatic.      Right Ear: Tympanic membrane, ear canal and external ear normal.      Left Ear: Tympanic membrane, ear canal and external ear normal.      Nose: Nose normal.      Mouth/Throat:      Mouth: Mucous membranes are moist.   Eyes:      Conjunctiva/sclera: Conjunctivae normal.      Pupils: Pupils are equal, round, and reactive to light.   Cardiovascular:      Rate and Rhythm: Normal rate and regular rhythm.      Heart sounds: Normal heart sounds.   Pulmonary:      Effort: Pulmonary effort is normal.      Breath sounds: Normal breath sounds. No wheezing.   Abdominal:      General: Bowel sounds are normal. There is no distension.      Palpations: Abdomen is soft. There is no mass.      Tenderness: There is no abdominal tenderness.   Musculoskeletal:         General: No tenderness. Normal range of motion.      Cervical back: Normal range of motion and neck supple.   Skin:     General: Skin is warm.      Capillary Refill: Capillary refill takes less than 2 seconds.   Neurological:     "  Mental Status: He is alert and oriented to person, place, and time.      Cranial Nerves: No cranial nerve deficit.      Sensory: No sensory deficit.      Motor: No abnormal muscle tone.      Coordination: Coordination normal.      Deep Tendon Reflexes: Reflexes normal.   Psychiatric:         Behavior: Behavior normal.         Thought Content: Thought content normal.         Judgment: Judgment normal.

## 2024-12-07 RX ORDER — METHYLPREDNISOLONE 4 MG/1
TABLET ORAL
Qty: 21 EACH | Refills: 0 | Status: SHIPPED | OUTPATIENT
Start: 2024-12-07

## 2024-12-27 ENCOUNTER — ESTABLISHED COMPREHENSIVE EXAM (OUTPATIENT)
Dept: URBAN - METROPOLITAN AREA CLINIC 6 | Facility: CLINIC | Age: 74
End: 2024-12-27

## 2024-12-27 DIAGNOSIS — H40.1131: ICD-10-CM

## 2024-12-27 DIAGNOSIS — Z96.1: ICD-10-CM

## 2024-12-27 PROCEDURE — 92083 EXTENDED VISUAL FIELD XM: CPT

## 2024-12-27 PROCEDURE — 92014 COMPRE OPH EXAM EST PT 1/>: CPT

## 2024-12-27 PROCEDURE — 92133 CPTRZD OPH DX IMG PST SGM ON: CPT

## 2024-12-27 ASSESSMENT — TONOMETRY
OS_IOP_MMHG: 16
OD_IOP_MMHG: 15

## 2024-12-27 ASSESSMENT — VISUAL ACUITY
OS_SC: 20/30
OD_SC: 20/40+2

## 2025-01-16 ENCOUNTER — OFFICE VISIT (OUTPATIENT)
Dept: OBGYN CLINIC | Facility: CLINIC | Age: 75
End: 2025-01-16
Payer: COMMERCIAL

## 2025-01-16 VITALS — HEIGHT: 63 IN | WEIGHT: 139.4 LBS | BODY MASS INDEX: 24.7 KG/M2

## 2025-01-16 DIAGNOSIS — M17.12 PRIMARY OSTEOARTHRITIS OF LEFT KNEE: Primary | ICD-10-CM

## 2025-01-16 DIAGNOSIS — M25.562 CHRONIC PAIN OF LEFT KNEE: ICD-10-CM

## 2025-01-16 DIAGNOSIS — G89.29 CHRONIC PAIN OF LEFT KNEE: ICD-10-CM

## 2025-01-16 PROCEDURE — 20610 DRAIN/INJ JOINT/BURSA W/O US: CPT | Performed by: ORTHOPAEDIC SURGERY

## 2025-01-16 PROCEDURE — 99214 OFFICE O/P EST MOD 30 MIN: CPT | Performed by: ORTHOPAEDIC SURGERY

## 2025-01-16 RX ORDER — TRIAMCINOLONE ACETONIDE 40 MG/ML
80 INJECTION, SUSPENSION INTRA-ARTICULAR; INTRAMUSCULAR
Status: COMPLETED | OUTPATIENT
Start: 2025-01-16 | End: 2025-01-16

## 2025-01-16 RX ORDER — BUPIVACAINE HYDROCHLORIDE 5 MG/ML
2 INJECTION, SOLUTION EPIDURAL; INTRACAUDAL
Status: COMPLETED | OUTPATIENT
Start: 2025-01-16 | End: 2025-01-16

## 2025-01-16 RX ORDER — TRAZODONE HYDROCHLORIDE 50 MG/1
TABLET, FILM COATED ORAL
COMMUNITY
Start: 2025-01-11

## 2025-01-16 RX ADMIN — TRIAMCINOLONE ACETONIDE 80 MG: 40 INJECTION, SUSPENSION INTRA-ARTICULAR; INTRAMUSCULAR at 10:00

## 2025-01-16 RX ADMIN — BUPIVACAINE HYDROCHLORIDE 2 ML: 5 INJECTION, SOLUTION EPIDURAL; INTRACAUDAL at 10:00

## 2025-01-16 NOTE — PROGRESS NOTES
Assessment/Plan:  1. Primary osteoarthritis of left knee  Large joint arthrocentesis: L knee      2. Chronic pain of left knee  Large joint arthrocentesis: L knee        Scribe Attestation      I,:  Inocencia Miner am acting as a scribe while in the presence of the attending physician.:       I,:  Jaron Gomez,  personally performed the services described in this documentation    as scribed in my presence.:           Tenzin is a pleasant 74 y.o. male who presents for follow up evaluation of chronic left knee pain in the setting of osteoarthritis. He has been managing his activity related knee pain with repeat corticosteroid injections, he last under went an injection 10/16/24 with benefit until recently. Risks and benefits of repeat CSI were discussed, patient consented to and underwent the below procedure. Procedure tolerated well, post injection protocol advised. Patient understands this can be repeated no sooner then 3 months.       Large joint arthrocentesis: L knee  Universal Protocol:  procedure performed by consultantConsent: Verbal consent obtained.  Risks and benefits: risks, benefits and alternatives were discussed  Consent given by: patient  Timeout called at: 1/16/2025 10:00 AM.  Patient understanding: patient states understanding of the procedure being performed  Patient identity confirmed: verbally with patient  Supporting Documentation  Indications: pain and diagnostic evaluation   Procedure Details  Location: knee - L knee  Preparation: Patient was prepped and draped in the usual sterile fashion  Needle size: 20 G  Ultrasound guidance: no  Approach: anterolateral  Medications administered: 2 mL bupivacaine (PF) 0.5 %; 80 mg triamcinolone acetonide 40 mg/mL    Patient tolerance: patient tolerated the procedure well with no immediate complications  Dressing:  Sterile dressing applied            Subjective: Follow up left knee    Patient ID: Tenzin Baird is a 74 y.o. male who presents for follow up  evaluation of chronic left knee pain secondary to osteoarthritis. Patient has been managing his activity related knee pain with repeat corticosteroid injections. His last one was 10/16/24 with benefit until recently. He notes a return of his start up pain and stiffness. He denies any acute injury or trauma. Pain is rated 6/10 today, he would like to discuss repeat CSI today.     Review of Systems   Constitutional:  Positive for activity change. Negative for chills and fever.   HENT: Negative.  Negative for ear pain and sore throat.    Eyes: Negative.  Negative for pain and visual disturbance.   Respiratory: Negative.  Negative for cough and shortness of breath.    Cardiovascular: Negative.  Negative for chest pain and palpitations.   Gastrointestinal: Negative.  Negative for abdominal pain and vomiting.   Endocrine: Negative.    Genitourinary:  Negative for dysuria and hematuria.   Musculoskeletal:  Positive for arthralgias and gait problem. Negative for back pain.   Skin: Negative.  Negative for color change and rash.   Neurological:  Negative for seizures and syncope.   Psychiatric/Behavioral: Negative.     All other systems reviewed and are negative.        Past Medical History:   Diagnosis Date    Arthritis     shoulder    Ascending aortic aneurysm (HCC)     Cataract     both eyes-to have the right cataract removed on 1/25/22    Colon polyp     GERD (gastroesophageal reflux disease)     Insomnia     Leg cramps     Memory changes     Restless legs        Past Surgical History:   Procedure Laterality Date    APPENDECTOMY      CHOLECYSTECTOMY      CHOLECYSTECTOMY LAPAROSCOPIC N/A 10/27/2020    Procedure: CHOLECYSTECTOMY LAPAROSCOPIC;  Surgeon: Liam Iniguez MD;  Location: [facility];  Service: General    COLONOSCOPY N/A 12/12/2016    Procedure: COLONOSCOPY;  Surgeon: Ian Arana MD;  Location: [facility];  Service:     ELBOW SURGERY Bilateral     HERNIA REPAIR Right 2001    inguinal    KNEE ARTHROSCOPY Right      RI ARTHRS KNE SURG W/MENISCECTOMY MED/LAT W/SHVG Left 6/18/2020    Procedure: ARTHROSCOPY KNEE MEDIAL MENISCECTOMY;  Surgeon: Brodie Muñoz MD;  Location: [facility];  Service: Orthopedics    ROTATOR CUFF REPAIR Bilateral 2011       Family History   Problem Relation Age of Onset    No Known Problems Mother     No Known Problems Father     No Known Problems Sister     No Known Problems Brother     No Known Problems Maternal Aunt     No Known Problems Maternal Uncle     No Known Problems Paternal Aunt     No Known Problems Paternal Uncle     No Known Problems Maternal Grandmother     No Known Problems Maternal Grandfather     No Known Problems Paternal Grandmother     No Known Problems Paternal Grandfather     ADD / ADHD Neg Hx     Anesthesia problems Neg Hx     Cancer Neg Hx     Clotting disorder Neg Hx     Collagen disease Neg Hx     Diabetes Neg Hx     Dislocations Neg Hx     Learning disabilities Neg Hx     Neurological problems Neg Hx     Osteoporosis Neg Hx     Rheumatologic disease Neg Hx     Scoliosis Neg Hx     Vascular Disease Neg Hx        Social History     Occupational History    Not on file   Tobacco Use    Smoking status: Never    Smokeless tobacco: Never   Vaping Use    Vaping status: Never Used   Substance and Sexual Activity    Alcohol use: Not Currently    Drug use: No    Sexual activity: Not Currently         Current Outpatient Medications:     aspirin (ECOTRIN LOW STRENGTH) 81 mg EC tablet, Take 1 tablet (81 mg total) by mouth daily with breakfast, Disp: 90 tablet, Rfl: 3    cyclobenzaprine (FLEXERIL) 5 mg tablet, Take 1 tablet (5 mg total) by mouth daily at bedtime as needed for muscle spasms, Disp: 30 tablet, Rfl: 0    Diclofenac Sodium (VOLTAREN) 1 %, Apply 2 g topically 4 (four) times a day, Disp: 100 g, Rfl: 1    gabapentin (Neurontin) 300 mg capsule, Take 1 capsule (300 mg total) by mouth daily at bedtime, Disp: 30 capsule, Rfl: 7    hydrOXYzine HCL (ATARAX) 10 mg tablet, Take 1  tablet (10 mg total) by mouth every 6 (six) hours as needed for anxiety, Disp: 30 tablet, Rfl: 3    levothyroxine 25 mcg tablet, TAKE 1 TABLET IN THE MORNING ON AN EMPTY STOMACH, Disp: 90 tablet, Rfl: 1    Lumigan 0.01 % ophthalmic drops, INSTILL 1 DROP INTO BOTH EYES IN THE EVENING, Disp: , Rfl:     OrthoVisc 30 MG/2ML SOSY injection, , Disp: , Rfl:     pantoprazole (PROTONIX) 40 mg tablet, TAKE 1 TABLET BY MOUTH EVERY DAY, Disp: 90 tablet, Rfl: 1    rosuvastatin (CRESTOR) 10 MG tablet, Take 1 tablet (10 mg total) by mouth daily at bedtime, Disp: 90 tablet, Rfl: 3    traZODone (DESYREL) 50 mg tablet, , Disp: , Rfl:     escitalopram (LEXAPRO) 10 mg tablet, Take 1 tablet (10 mg total) by mouth daily at bedtime, Disp: 30 tablet, Rfl: 3    methylPREDNISolone 4 MG tablet therapy pack, Use as directed on package (Patient not taking: Reported on 1/16/2025), Disp: 21 each, Rfl: 0    valACYclovir (VALTREX) 1,000 mg tablet, Take 1 tablet (1,000 mg total) by mouth 2 (two) times a day for 2 days, Disp: 8 tablet, Rfl: 4    No Known Allergies    Objective:  There were no vitals filed for this visit.    Body mass index is 24.69 kg/m².    Left Knee Exam     Tenderness   The patient is experiencing tenderness in the medial joint line and patella.    Range of Motion   Extension:  0   Flexion:  120     Tests   Varus: negative Valgus: negative    Other   Erythema: absent  Sensation: normal  Pulse: present  Swelling: none  Effusion: no effusion present    Comments:  Knee is stable to stress on exam   Parapatellar crepitance  Skin is warm and dry to touch with no signs of erythema, ecchymosis, or infection   Calf compartments are soft and supple  2+ DP and PT pulses with brisk capillary refill to the toes  Sural, saphenous, tibial, superficial, and deep peroneal motor and sensory distributions intact  Sensation light touch intact distally            Observations   Left Knee   Negative for effusion.       Physical Exam  Vitals and  nursing note reviewed.   Constitutional:       Appearance: Normal appearance.   HENT:      Head: Normocephalic and atraumatic.      Right Ear: External ear normal.      Left Ear: External ear normal.   Eyes:      Extraocular Movements: Extraocular movements intact.      Conjunctiva/sclera: Conjunctivae normal.   Cardiovascular:      Rate and Rhythm: Normal rate.      Pulses: Normal pulses.   Pulmonary:      Effort: Pulmonary effort is normal.   Musculoskeletal:         General: Normal range of motion.      Cervical back: Normal range of motion and neck supple.      Left knee: No effusion.      Comments: See ortho exam   Skin:     General: Skin is warm and dry.   Neurological:      General: No focal deficit present.      Mental Status: He is alert.   Psychiatric:         Behavior: Behavior normal.         I have personally reviewed pertinent films in PACS.      This document was created using speech voice recognition software.   Grammatical errors, random word insertions, pronoun errors, and incomplete sentences are an occasional consequence of this system due to software limitations, ambient noise, and hardware issues.   Any formal questions or concerns about content, text, or information contained within the body of this dictation should be directly addressed to the provider for clarification.

## 2025-02-27 ENCOUNTER — TELEPHONE (OUTPATIENT)
Age: 75
End: 2025-02-27

## 2025-03-02 DIAGNOSIS — F41.9 ANXIETY: ICD-10-CM

## 2025-03-03 RX ORDER — ESCITALOPRAM OXALATE 10 MG/1
10 TABLET ORAL
Qty: 90 TABLET | Refills: 0 | Status: SHIPPED | OUTPATIENT
Start: 2025-03-03

## 2025-03-28 ENCOUNTER — TELEPHONE (OUTPATIENT)
Dept: PULMONOLOGY | Facility: MEDICAL CENTER | Age: 75
End: 2025-03-28

## 2025-03-28 NOTE — TELEPHONE ENCOUNTER
Spoke with patient attempted to schedule 1yr follow up appointment, patient declined stating his insurance has changed and he is currently seeing a Pulmonary provider in [place].

## 2025-04-12 DIAGNOSIS — G47.00 INSOMNIA, UNSPECIFIED TYPE: ICD-10-CM

## 2025-04-12 DIAGNOSIS — F41.9 ANXIETY: Primary | ICD-10-CM

## 2025-04-14 RX ORDER — TRAZODONE HYDROCHLORIDE 50 MG/1
TABLET ORAL
Qty: 180 TABLET | Refills: 1 | Status: SHIPPED | OUTPATIENT
Start: 2025-04-14

## 2025-04-14 NOTE — TELEPHONE ENCOUNTER
Requested medication(s) are due for refill today: Yes  Patient has already received a courtesy refill: No  Other reason request has been forwarded to provider: Last ordered by another provider. Please review.

## 2025-06-05 DIAGNOSIS — I71.20 THORACIC AORTIC ANEURYSM WITHOUT RUPTURE (HCC): ICD-10-CM

## 2025-06-05 RX ORDER — ROSUVASTATIN CALCIUM 10 MG/1
10 TABLET, COATED ORAL
Qty: 90 TABLET | Refills: 3 | Status: SHIPPED | OUTPATIENT
Start: 2025-06-05

## 2025-08-01 ENCOUNTER — OFFICE VISIT (OUTPATIENT)
Dept: OBGYN CLINIC | Facility: CLINIC | Age: 75
End: 2025-08-01
Payer: COMMERCIAL

## 2025-08-01 ENCOUNTER — APPOINTMENT (OUTPATIENT)
Dept: RADIOLOGY | Facility: CLINIC | Age: 75
End: 2025-08-01
Attending: ORTHOPAEDIC SURGERY
Payer: COMMERCIAL

## 2025-08-01 VITALS — WEIGHT: 143 LBS | HEIGHT: 63 IN | BODY MASS INDEX: 25.34 KG/M2

## 2025-08-01 DIAGNOSIS — M17.0 PRIMARY OSTEOARTHRITIS OF BOTH KNEES: ICD-10-CM

## 2025-08-01 DIAGNOSIS — G89.29 CHRONIC PAIN OF BOTH KNEES: ICD-10-CM

## 2025-08-01 DIAGNOSIS — M25.561 CHRONIC PAIN OF BOTH KNEES: ICD-10-CM

## 2025-08-01 DIAGNOSIS — G89.29 CHRONIC PAIN OF RIGHT KNEE: ICD-10-CM

## 2025-08-01 DIAGNOSIS — M25.561 CHRONIC PAIN OF RIGHT KNEE: ICD-10-CM

## 2025-08-01 DIAGNOSIS — M17.0 PRIMARY OSTEOARTHRITIS OF BOTH KNEES: Primary | ICD-10-CM

## 2025-08-01 DIAGNOSIS — M25.562 CHRONIC PAIN OF BOTH KNEES: ICD-10-CM

## 2025-08-01 PROCEDURE — 99214 OFFICE O/P EST MOD 30 MIN: CPT | Performed by: ORTHOPAEDIC SURGERY

## 2025-08-01 PROCEDURE — 20610 DRAIN/INJ JOINT/BURSA W/O US: CPT | Performed by: ORTHOPAEDIC SURGERY

## 2025-08-01 PROCEDURE — 73562 X-RAY EXAM OF KNEE 3: CPT

## 2025-08-01 RX ORDER — TRIAMCINOLONE ACETONIDE 40 MG/ML
80 INJECTION, SUSPENSION INTRA-ARTICULAR; INTRAMUSCULAR
Status: COMPLETED | OUTPATIENT
Start: 2025-08-01 | End: 2025-08-01

## 2025-08-01 RX ORDER — METOPROLOL SUCCINATE 25 MG/1
1 TABLET, EXTENDED RELEASE ORAL DAILY
COMMUNITY
Start: 2025-04-29

## 2025-08-01 RX ORDER — BUPIVACAINE HYDROCHLORIDE 5 MG/ML
2 INJECTION, SOLUTION EPIDURAL; INTRACAUDAL; PERINEURAL
Status: COMPLETED | OUTPATIENT
Start: 2025-08-01 | End: 2025-08-01

## 2025-08-01 RX ADMIN — BUPIVACAINE HYDROCHLORIDE 2 ML: 5 INJECTION, SOLUTION EPIDURAL; INTRACAUDAL; PERINEURAL at 14:00

## 2025-08-01 RX ADMIN — TRIAMCINOLONE ACETONIDE 80 MG: 40 INJECTION, SUSPENSION INTRA-ARTICULAR; INTRAMUSCULAR at 14:00

## (undated) DEVICE — LIGAMAX 5 MM ENDOSCOPIC MULTIPLE CLIP APPLIER: Brand: LIGAMAX

## (undated) DEVICE — TISSUE RETRIEVAL SYSTEM: Brand: INZII RETRIEVAL SYSTEM

## (undated) DEVICE — GLOVE INDICATOR PI UNDERGLOVE SZ 7.5 BLUE

## (undated) DEVICE — INTENDED FOR TISSUE SEPARATION, AND OTHER PROCEDURES THAT REQUIRE A SHARP SURGICAL BLADE TO PUNCTURE OR CUT.: Brand: BARD-PARKER SAFETY BLADES SIZE 11, STERILE

## (undated) DEVICE — CHLORAPREP HI-LITE 26ML ORANGE

## (undated) DEVICE — ASTOUND STANDARD SURGICAL GOWN, XL: Brand: CONVERTORS

## (undated) DEVICE — LATEX FREE 10 FT  INSUFFLATION TUBING, COLDER CONNECTOR WITH 1 MICRON FILTER STERILE ONE TIME USE, 20 U: Brand: SURGICAL DIRECT

## (undated) DEVICE — ANTI-FOG SOLUTION WITH FOAM PAD: Brand: DEVON

## (undated) DEVICE — FABRIC REINFORCED, SURGICAL GOWN, XL: Brand: CONVERTORS

## (undated) DEVICE — TIBURON GENERAL ENDOSCOPY DRAPE: Brand: CONVERTORS

## (undated) DEVICE — ENDOPATH PNEUMONEEDLE INSUFFLATION NEEDLES WITH LUER LOCK CONNECTORS 120MM: Brand: ENDOPATH

## (undated) DEVICE — SUT VICRYL 0 UR-6 27 IN J603H

## (undated) DEVICE — SYRINGE 10ML LL

## (undated) DEVICE — ELECTRODE LAP L WIRE E-Z CLEAN 33CM -0100

## (undated) DEVICE — PACK ARTHROSCOPY

## (undated) DEVICE — SUT MONOCRYL 4-0 PS-2 18 IN Y496G

## (undated) DEVICE — BLADE SHAVER TORPEDO CRV 4MM 13MM COOLCUT

## (undated) DEVICE — GAUZE SPONGES,16 PLY: Brand: CURITY

## (undated) DEVICE — GLOVE SRG BIOGEL 6.5

## (undated) DEVICE — TUBING ARTHROSCOPIC WAVE  MAIN PUMP

## (undated) DEVICE — BASIC DOUBLE BASIN 2-LF: Brand: MEDLINE INDUSTRIES, INC.

## (undated) DEVICE — GLOVE INDICATOR PI UNDERGLOVE SZ 6.5 BLUE

## (undated) DEVICE — GLOVE SRG BIOGEL 8

## (undated) DEVICE — IRRIG ENDO FLO TUBING

## (undated) DEVICE — TROCAR: Brand: KII FIOS FIRST ENTRY

## (undated) DEVICE — TROCAR: Brand: KII® SLEEVE

## (undated) DEVICE — TIBURON SPLIT SHEET: Brand: CONVERTORS

## (undated) DEVICE — SCD SEQUENTIAL COMPRESSION COMFORT SLEEVE MEDIUM KNEE LENGTH: Brand: KENDALL SCD

## (undated) DEVICE — TUBING SMOKE EVAC W/FILTRATION DEVICE PLUMEPORT ACTIV

## (undated) DEVICE — OCCLUSIVE GAUZE STRIP,3% BISMUTH TRIBROMOPHENATE IN PETROLATUM BLEND: Brand: XEROFORM

## (undated) DEVICE — GLOVE SRG BIOGEL 7.5

## (undated) DEVICE — ABDOMINAL PAD: Brand: DERMACEA

## (undated) DEVICE — Device

## (undated) DEVICE — SHEARS MONOPOL MINI 5MM X 31CM RATCHET HNDL

## (undated) DEVICE — ADHESIVE SKIN HIGH VISCOSITY EXOFIN 1ML

## (undated) DEVICE — PACK GENERAL LF

## (undated) DEVICE — TIBURON EXTREMITY SHEET: Brand: CONVERTORS

## (undated) DEVICE — ACE WRAP 6 IN STERILE

## (undated) DEVICE — DRAPE UTILITY